# Patient Record
Sex: FEMALE | Race: WHITE | NOT HISPANIC OR LATINO | Employment: OTHER | URBAN - METROPOLITAN AREA
[De-identification: names, ages, dates, MRNs, and addresses within clinical notes are randomized per-mention and may not be internally consistent; named-entity substitution may affect disease eponyms.]

---

## 2017-04-14 ENCOUNTER — HOSPITAL ENCOUNTER (OUTPATIENT)
Dept: LAB | Facility: MEDICAL CENTER | Age: 77
End: 2017-04-14
Attending: INTERNAL MEDICINE
Payer: MEDICARE

## 2017-04-14 DIAGNOSIS — E03.4 HYPOTHYROIDISM DUE TO ACQUIRED ATROPHY OF THYROID: ICD-10-CM

## 2017-04-14 LAB
ALBUMIN SERPL BCP-MCNC: 4.2 G/DL (ref 3.2–4.9)
ALBUMIN/GLOB SERPL: 1.5 G/DL
ALP SERPL-CCNC: 48 U/L (ref 30–99)
ALT SERPL-CCNC: 21 U/L (ref 2–50)
ANION GAP SERPL CALC-SCNC: 6 MMOL/L (ref 0–11.9)
AST SERPL-CCNC: 15 U/L (ref 12–45)
BASOPHILS # BLD AUTO: 0.5 % (ref 0–1.8)
BASOPHILS # BLD: 0.02 K/UL (ref 0–0.12)
BILIRUB SERPL-MCNC: 0.8 MG/DL (ref 0.1–1.5)
BUN SERPL-MCNC: 11 MG/DL (ref 8–22)
CALCIUM SERPL-MCNC: 9.5 MG/DL (ref 8.5–10.5)
CHLORIDE SERPL-SCNC: 106 MMOL/L (ref 96–112)
CHOLEST SERPL-MCNC: 202 MG/DL (ref 100–199)
CO2 SERPL-SCNC: 26 MMOL/L (ref 20–33)
CREAT SERPL-MCNC: 0.52 MG/DL (ref 0.5–1.4)
CREAT UR-MCNC: 115.4 MG/DL
EOSINOPHIL # BLD AUTO: 0.06 K/UL (ref 0–0.51)
EOSINOPHIL NFR BLD: 1.4 % (ref 0–6.9)
ERYTHROCYTE [DISTWIDTH] IN BLOOD BY AUTOMATED COUNT: 38.5 FL (ref 35.9–50)
EST. AVERAGE GLUCOSE BLD GHB EST-MCNC: 151 MG/DL
GFR SERPL CREATININE-BSD FRML MDRD: >60 ML/MIN/1.73 M 2
GLOBULIN SER CALC-MCNC: 2.8 G/DL (ref 1.9–3.5)
GLUCOSE SERPL-MCNC: 166 MG/DL (ref 65–99)
HBA1C MFR BLD: 6.9 % (ref 0–5.6)
HCT VFR BLD AUTO: 42.9 % (ref 37–47)
HDLC SERPL-MCNC: 53 MG/DL
HGB BLD-MCNC: 14.7 G/DL (ref 12–16)
IMM GRANULOCYTES # BLD AUTO: 0 K/UL (ref 0–0.11)
IMM GRANULOCYTES NFR BLD AUTO: 0 % (ref 0–0.9)
LDLC SERPL CALC-MCNC: 118 MG/DL
LYMPHOCYTES # BLD AUTO: 1.58 K/UL (ref 1–4.8)
LYMPHOCYTES NFR BLD: 36.4 % (ref 22–41)
MCH RBC QN AUTO: 29.6 PG (ref 27–33)
MCHC RBC AUTO-ENTMCNC: 34.3 G/DL (ref 33.6–35)
MCV RBC AUTO: 86.3 FL (ref 81.4–97.8)
MICROALBUMIN UR-MCNC: 1.5 MG/DL
MICROALBUMIN/CREAT UR: 13 MG/G (ref 0–30)
MONOCYTES # BLD AUTO: 0.3 K/UL (ref 0–0.85)
MONOCYTES NFR BLD AUTO: 6.9 % (ref 0–13.4)
NEUTROPHILS # BLD AUTO: 2.38 K/UL (ref 2–7.15)
NEUTROPHILS NFR BLD: 54.8 % (ref 44–72)
NRBC # BLD AUTO: 0 K/UL
NRBC BLD AUTO-RTO: 0 /100 WBC
PLATELET # BLD AUTO: 193 K/UL (ref 164–446)
PMV BLD AUTO: 10.4 FL (ref 9–12.9)
POTASSIUM SERPL-SCNC: 4.3 MMOL/L (ref 3.6–5.5)
PROT SERPL-MCNC: 7 G/DL (ref 6–8.2)
RBC # BLD AUTO: 4.97 M/UL (ref 4.2–5.4)
SODIUM SERPL-SCNC: 138 MMOL/L (ref 135–145)
TRIGL SERPL-MCNC: 153 MG/DL (ref 0–149)
TSH SERPL DL<=0.005 MIU/L-ACNC: 1.19 UIU/ML (ref 0.3–3.7)
WBC # BLD AUTO: 4.3 K/UL (ref 4.8–10.8)

## 2017-04-14 PROCEDURE — 84443 ASSAY THYROID STIM HORMONE: CPT

## 2017-04-14 PROCEDURE — 82570 ASSAY OF URINE CREATININE: CPT

## 2017-04-14 PROCEDURE — 82043 UR ALBUMIN QUANTITATIVE: CPT

## 2017-04-14 PROCEDURE — 83036 HEMOGLOBIN GLYCOSYLATED A1C: CPT

## 2017-04-14 PROCEDURE — 85025 COMPLETE CBC W/AUTO DIFF WBC: CPT

## 2017-04-14 PROCEDURE — 36415 COLL VENOUS BLD VENIPUNCTURE: CPT

## 2017-04-14 PROCEDURE — 80053 COMPREHEN METABOLIC PANEL: CPT

## 2017-04-14 PROCEDURE — 80061 LIPID PANEL: CPT

## 2017-04-24 ENCOUNTER — OFFICE VISIT (OUTPATIENT)
Dept: MEDICAL GROUP | Age: 77
End: 2017-04-24
Payer: MEDICARE

## 2017-04-24 VITALS
HEART RATE: 67 BPM | BODY MASS INDEX: 29.09 KG/M2 | HEIGHT: 66 IN | SYSTOLIC BLOOD PRESSURE: 138 MMHG | DIASTOLIC BLOOD PRESSURE: 80 MMHG | OXYGEN SATURATION: 94 % | TEMPERATURE: 97.2 F | WEIGHT: 181 LBS

## 2017-04-24 DIAGNOSIS — M54.50 CHRONIC MIDLINE LOW BACK PAIN WITHOUT SCIATICA: ICD-10-CM

## 2017-04-24 DIAGNOSIS — E78.2 MIXED HYPERLIPIDEMIA: ICD-10-CM

## 2017-04-24 DIAGNOSIS — M81.0 OSTEOPOROSIS: ICD-10-CM

## 2017-04-24 DIAGNOSIS — Z23 NEED FOR TDAP VACCINATION: ICD-10-CM

## 2017-04-24 DIAGNOSIS — K21.00 GASTROESOPHAGEAL REFLUX DISEASE WITH ESOPHAGITIS: ICD-10-CM

## 2017-04-24 DIAGNOSIS — G89.29 CHRONIC MIDLINE LOW BACK PAIN WITHOUT SCIATICA: ICD-10-CM

## 2017-04-24 DIAGNOSIS — E55.9 VITAMIN D DEFICIENCY: ICD-10-CM

## 2017-04-24 DIAGNOSIS — E03.4 HYPOTHYROIDISM DUE TO ACQUIRED ATROPHY OF THYROID: ICD-10-CM

## 2017-04-24 PROCEDURE — 99215 OFFICE O/P EST HI 40 MIN: CPT | Performed by: INTERNAL MEDICINE

## 2017-04-24 RX ORDER — GLIMEPIRIDE 2 MG/1
2 TABLET ORAL EVERY MORNING
Qty: 90 TAB | Refills: 4 | Status: SHIPPED | OUTPATIENT
Start: 2017-04-24 | End: 2017-09-22

## 2017-04-24 RX ORDER — SIMVASTATIN 40 MG
40 TABLET ORAL EVERY EVENING
Qty: 90 TAB | Refills: 4 | Status: SHIPPED | OUTPATIENT
Start: 2017-04-24 | End: 2018-07-15 | Stop reason: SDUPTHER

## 2017-04-24 RX ORDER — MELOXICAM 15 MG/1
15 TABLET ORAL DAILY
Qty: 30 TAB | Refills: 4 | Status: SHIPPED | OUTPATIENT
Start: 2017-04-24 | End: 2019-02-13 | Stop reason: SDUPTHER

## 2017-04-24 RX ORDER — SIMVASTATIN 40 MG
40 TABLET ORAL EVERY EVENING
Qty: 90 TAB | Refills: 4 | Status: SHIPPED | OUTPATIENT
Start: 2017-04-24 | End: 2017-04-24 | Stop reason: SDUPTHER

## 2017-04-24 ASSESSMENT — ENCOUNTER SYMPTOMS
NEUROLOGICAL NEGATIVE: 1
MUSCULOSKELETAL NEGATIVE: 1
EYES NEGATIVE: 1
CARDIOVASCULAR NEGATIVE: 1
RESPIRATORY NEGATIVE: 1
PSYCHIATRIC NEGATIVE: 1
GASTROINTESTINAL NEGATIVE: 1
CONSTITUTIONAL NEGATIVE: 1

## 2017-04-24 NOTE — PROGRESS NOTES
Subjective:      Mavis Lizarraga is a 76 y.o. female who presents with Thyroid Problem    Patient Active Problem List    Diagnosis Date Noted   • Chronic midline low back pain without sciatica 04/24/2017   • S/P colonoscopy- neg at [facility] about 2014 complicated by perforation 10/24/2016   • Uncontrolled type 2 diabetes mellitus without complication, without long-term current use of insulin (CMS-[facility]) 01/28/2016   • Mixed hyperlipidemia 01/15/2013   • Hypothyroidism due to acquired atrophy of thyroid 01/15/2013   • Gastroesophageal reflux disease with esophagitis 01/15/2013   • S/P breast reconstruction- right breast cancer 1980s- neg nodes; 6 mo chemo rx 08/15/2012     Allergies   Allergen Reactions   • Metformin Diarrhea     NAUSEA VOMIITG AND DIARRHEA   • Morphine      Hallucinations     Outpatient Prescriptions Prior to Visit   Medication Sig Dispense Refill   • levothyroxine (SYNTHROID) 75 MCG Tab Take 1 Tab by mouth every day. 90 Tab 4   • Omega-3 Fatty Acids (FISH OIL PO) Take 1 Cap by mouth every day.     • metformin (GLUCOPHAGE) 500 MG Tab Take 0.5 Tabs by mouth every day. (Patient not taking: Reported on 4/24/2017) 90 Tab 4   • lovastatin (MEVACOR) 10 MG tablet Take 1 Tab by mouth every day. 90 Tab 4   • cimetidine (TAGAMET) 400 MG Tab Take 1 Tab by mouth 1 time daily as needed. 60 Tab 11   • simvastatin (ZOCOR) 40 MG Tab Take 1 Tab by mouth every evening. 90 Tab 3   • VITAMIN E Take 1 Cap by mouth every day.     • Multiple Vitamin (MULTI-VITAMIN PO) Take 1 Tab by mouth every day.       No facility-administered medications prior to visit.               HPI    Review of Systems   Constitutional: Negative.    HENT: Negative.    Eyes: Negative.    Respiratory: Negative.    Cardiovascular: Negative.    Gastrointestinal: Negative.    Genitourinary: Negative.    Musculoskeletal: Negative.    Skin: Negative.    Neurological: Negative.    Endo/Heme/Allergies: Negative.    Psychiatric/Behavioral: Negative.            "Objective:     /80 mmHg  Pulse 67  Temp(Src) 36.2 °C (97.2 °F)  Ht 1.664 m (5' 5.5\")  Wt 82.101 kg (181 lb)  BMI 29.65 kg/m2  SpO2 94%     Physical Exam   Constitutional: She is oriented to person, place, and time. She appears well-developed and well-nourished.   HENT:   Head: Normocephalic and atraumatic.   Right Ear: External ear normal.   Left Ear: External ear normal.   Nose: Nose normal.   Mouth/Throat: Oropharynx is clear and moist.   Eyes: Conjunctivae and EOM are normal. Pupils are equal, round, and reactive to light. Right eye exhibits no discharge. Left eye exhibits no discharge. No scleral icterus.   Neck: Normal range of motion. Neck supple. No JVD present. No tracheal deviation present. No thyromegaly present.   Cardiovascular: Normal rate, regular rhythm, normal heart sounds and intact distal pulses.  Exam reveals no gallop and no friction rub.    Pulmonary/Chest: Effort normal and breath sounds normal. No stridor. No respiratory distress. She has no wheezes. She has no rales. She exhibits no tenderness.   Abdominal: Soft. Bowel sounds are normal. She exhibits no distension and no mass. There is no tenderness. There is no rebound and no guarding. No hernia.   Musculoskeletal: Normal range of motion. She exhibits no edema or tenderness.   Lymphadenopathy:     She has no cervical adenopathy.   Neurological: She is alert and oriented to person, place, and time. She has normal reflexes. She displays normal reflexes. No cranial nerve deficit. Coordination normal.   Skin: Skin is warm and dry. No rash noted. No erythema. No pallor.   Psychiatric: She has a normal mood and affect. Her behavior is normal. Judgment and thought content normal.   Nursing note and vitals reviewed.    Hospital Outpatient Visit on 04/14/2017   Component Date Value   • Glycohemoglobin 04/14/2017 6.9*   • Est Avg Glucose 04/14/2017 151    • Creatinine, Urine 04/14/2017 115.40    • Microalbumin, Urine Rand* 04/14/2017 1.5  "   • Micro Alb Creat Ratio 04/14/2017 13    • WBC 04/14/2017 4.3*   • RBC 04/14/2017 4.97    • Hemoglobin 04/14/2017 14.7    • Hematocrit 04/14/2017 42.9    • MCV 04/14/2017 86.3    • MCH 04/14/2017 29.6    • MCHC 04/14/2017 34.3    • RDW 04/14/2017 38.5    • Platelet Count 04/14/2017 193    • MPV 04/14/2017 10.4    • Neutrophils-Polys 04/14/2017 54.80    • Lymphocytes 04/14/2017 36.40    • Monocytes 04/14/2017 6.90    • Eosinophils 04/14/2017 1.40    • Basophils 04/14/2017 0.50    • Immature Granulocytes 04/14/2017 0.00    • Nucleated RBC 04/14/2017 0.00    • Neutrophils (Absolute) 04/14/2017 2.38    • Lymphs (Absolute) 04/14/2017 1.58    • Monos (Absolute) 04/14/2017 0.30    • Eos (Absolute) 04/14/2017 0.06    • Baso (Absolute) 04/14/2017 0.02    • Immature Granulocytes (a* 04/14/2017 0.00    • NRBC (Absolute) 04/14/2017 0.00    • Sodium 04/14/2017 138    • Potassium 04/14/2017 4.3    • Chloride 04/14/2017 106    • Co2 04/14/2017 26    • Anion Gap 04/14/2017 6.0    • Glucose 04/14/2017 166*   • Bun 04/14/2017 11    • Creatinine 04/14/2017 0.52    • Calcium 04/14/2017 9.5    • AST(SGOT) 04/14/2017 15    • ALT(SGPT) 04/14/2017 21    • Alkaline Phosphatase 04/14/2017 48    • Total Bilirubin 04/14/2017 0.8    • Albumin 04/14/2017 4.2    • Total Protein 04/14/2017 7.0    • Globulin 04/14/2017 2.8    • A-G Ratio 04/14/2017 1.5    • Cholesterol,Tot 04/14/2017 202*   • Triglycerides 04/14/2017 153*   • HDL 04/14/2017 53    • LDL 04/14/2017 118*   • TSH 04/14/2017 1.190    • GFR If  04/14/2017 >60    • GFR If Non  Ameri* 04/14/2017 >60       Lab Results   Component Value Date/Time    GLYCOHEMOGLOBIN 6.9* 04/14/2017 07:33 AM    GLYCOHEMOGLOBIN 7.4* 10/19/2016 07:05 AM     Lab Results   Component Value Date/Time    SODIUM 138 04/14/2017 07:33 AM    POTASSIUM 4.3 04/14/2017 07:33 AM    CHLORIDE 106 04/14/2017 07:33 AM    CO2 26 04/14/2017 07:33 AM    GLUCOSE 166* 04/14/2017 07:33 AM    BUN 11 04/14/2017  07:33 AM    CREATININE 0.52 04/14/2017 07:33 AM    ALKALINE PHOSPHATASE 48 04/14/2017 07:33 AM    AST(SGOT) 15 04/14/2017 07:33 AM    ALT(SGPT) 21 04/14/2017 07:33 AM    TOTAL BILIRUBIN 0.8 04/14/2017 07:33 AM     Lab Results   Component Value Date/Time    INR 1.29* 11/06/2009 03:10 PM     Lab Results   Component Value Date/Time    CHOLESTEROL,* 04/14/2017 07:33 AM    * 04/14/2017 07:33 AM    HDL 53 04/14/2017 07:33 AM    TRIGLYCERIDES 153* 04/14/2017 07:33 AM       No results found for: TESTOSTERONE  No results found for: TSH  No results found for: FREET4  No results found for: URICACID  No components found for: VITB12  No results found for: 25HYDROXY              Assessment/Plan:     1. Uncontrolled type 2 diabetes mellitus without complication, without long-term current use of insulin (CMS-[facility])    Under good control. Continue same regimen.    - glimepiride (AMARYL) 2 MG Tab; Take 1 Tab by mouth every morning.  Dispense: 90 Tab; Refill: 4  - TSH; Future  - COMP METABOLIC PANEL; Future  - LIPID PROFILE; Future  - CBC WITH DIFFERENTIAL; Future  - HEMOGLOBIN A1C; Future  - MICROALBUMIN CREAT RATIO URINE; Future    2. Mixed hyperlipidemia Under good control. Continue same regimen.   - simvastatin (ZOCOR) 40 MG Tab; Take 1 Tab by mouth every evening. REPLACES LOVASTATIN  Dispense: 90 Tab; Refill: 4    3. Hypothyroidism due to acquired atrophy of thyroid Under good control. Continue same regimen.        4. Gastroesophageal reflux disease with esophagitis    Under good control. Continue same regimen.  5. Chronic midline low back pain without sciatica    Under good control. Continue same regimen.  - meloxicam (MOBIC) 15 MG tablet; Take 1 Tab by mouth every day.  Dispense: 30 Tab; Refill: 4    6. Osteoporosis     - DS-BONE DENSITY STUDY (DEXA); Future    7. Vitamin D deficiency    Under good control. Continue same regimen.- VITAMIN D,25 HYDROXY; Future    8. Need for Tdap vaccination          - TDAP VACCINE  =>6YO IM        40 minute face-to-face encounter took place today.  More than half of this time was spent in the coordination of care of the above problems, as well as counseling.

## 2017-04-24 NOTE — MR AVS SNAPSHOT
"        Mavis Patel Zia   2017 10:00 AM   Office Visit   MRN: [id number]    Department:  [address] [place] [address]   Dept Phone:  [phone]    Description:  Female : 1940   Provider:  Jonathan Bboo M.D.           Reason for Visit     Thyroid Problem lab review      Allergies as of 2017     Allergen Noted Reactions    Metformin 2017   Diarrhea    NAUSEA VOMIITG AND DIARRHEA    Morphine 02/10/2010       Hallucinations      You were diagnosed with     Uncontrolled type 2 diabetes mellitus without complication, without long-term current use of insulin (CMS-HCC)   [8925064]       Mixed hyperlipidemia   [272.2.ICD-9-CM]       Hypothyroidism due to acquired atrophy of thyroid   [2782685]       Gastroesophageal reflux disease with esophagitis   [4679556]       Chronic midline low back pain without sciatica   [9666360]       Osteoporosis   [7870717]       Vitamin D deficiency   [6488472]       Need for Tdap vaccination   [206490]         Vital Signs     Blood Pressure Pulse Temperature Height Weight Body Mass Index    138/80 mmHg 67 36.2 °C (97.2 °F) 1.664 m (5' 5.5\") 82.101 kg (181 lb) 29.65 kg/m2    Oxygen Saturation Smoking Status                94% Never Smoker           Basic Information     Date Of Birth Sex Race Ethnicity Preferred Language    1940 Female White Non- English      Problem List              ICD-10-CM Priority Class Noted - Resolved    S/P breast reconstruction- right breast cancer 1980s- neg nodes; 6 mo chemo rx Z98.82   8/15/2012 - Present    Mixed hyperlipidemia E78.2   1/15/2013 - Present    Hypothyroidism due to acquired atrophy of thyroid E03.4   1/15/2013 - Present    Gastroesophageal reflux disease with esophagitis K21.0   1/15/2013 - Present    Uncontrolled type 2 diabetes mellitus without complication, without long-term current use of insulin (CMS-HCC) E11.65   2016 - Present    S/P colonoscopy- neg at [facility] about  complicated by perforation Z98.890 "  10/24/2016 - Present    Chronic midline low back pain without sciatica M54.5, G89.29   4/24/2017 - Present      Health Maintenance        Date Due Completion Dates    IMM DTaP/Tdap/Td Vaccine (1 - Tdap) 8/30/1960 ---    BONE DENSITY 8/30/2006 ---    IMM PNEUMOCOCCAL 65+ (ADULT) LOW/MEDIUM RISK SERIES (2 of 2 - PPSV23) 9/24/2016 9/24/2015    MAMMOGRAM 5/23/2017 5/23/2016, 3/26/2014, 5/1/2012, 6/8/2009, 6/8/2009, 8/9/2006, 4/29/2005    A1C SCREENING 10/14/2017 4/14/2017, 10/19/2016, 1/28/2016, 6/26/2015, 6/11/2013    RETINAL SCREENING 11/30/2017 11/30/2015    DIABETES MONOFILAMENT / LE EXAM 12/13/2017 12/13/2016, 1/28/2016    FASTING LIPID PROFILE 4/14/2018 4/14/2017, 10/19/2016, 6/5/2015, 3/29/2013, 4/2/2012, 9/8/2011    URINE ACR / MICROALBUMIN 4/14/2018 4/14/2017, 10/19/2016    SERUM CREATININE 4/14/2018 4/14/2017, 10/19/2016, 6/5/2015, 4/15/2014, 3/29/2013, 4/2/2012, 9/8/2011, 2/12/2010, 2/11/2010, 2/5/2010, 11/6/2009, 10/30/2009, 10/29/2009, 10/28/2009, 10/27/2009, 10/26/2009, 10/25/2009, 10/24/2009, 10/23/2009    COLONOSCOPY 9/29/2019 9/29/2009            Current Immunizations     13-VALENT PCV PREVNAR 9/24/2015    Influenza Vaccine Adult HD 10/24/2016    SHINGLES VACCINE 11/19/2012    Tdap Vaccine  Incomplete      Below and/or attached are the medications your provider expects you to take. Review all of your home medications and newly ordered medications with your provider and/or pharmacist. Follow medication instructions as directed by your provider and/or pharmacist. Please keep your medication list with you and share with your provider. Update the information when medications are discontinued, doses are changed, or new medications (including over-the-counter products) are added; and carry medication information at all times in the event of emergency situations     Allergies:  METFORMIN - Diarrhea     MORPHINE - (reactions not documented)               Medications  Valid as of: April 24, 2017 -  1:48 PM     Generic Name Brand Name Tablet Size Instructions for use    Cimetidine (Tab) TAGAMET 400 MG Take 1 Tab by mouth 1 time daily as needed.        Glimepiride (Tab) AMARYL 2 MG Take 1 Tab by mouth every morning.        Levothyroxine Sodium (Tab) SYNTHROID 75 MCG Take 1 Tab by mouth every day.        Meloxicam (Tab) MOBIC 15 MG Take 1 Tab by mouth every day.        Omega-3 Fatty Acids   Take 1 Cap by mouth every day.        Simvastatin (Tab) ZOCOR 40 MG Take 1 Tab by mouth every evening. REPLACES LOVASTATIN        .                 Medicines prescribed today were sent to:     [facility][id number] - [place], [place] - [address]    [address] [place] [address]    Phone: [phone] Fax: [phone]    Open 24 Hours?: No      Medication refill instructions:       If your prescription bottle indicates you have medication refills left, it is not necessary to call your provider’s office. Please contact your pharmacy and they will refill your medication.    If your prescription bottle indicates you do not have any refills left, you may request refills at any time through one of the following ways: The online [employer] system (except Urgent Care), by calling your provider’s office, or by asking your pharmacy to contact your provider’s office with a refill request. Medication refills are processed only during regular business hours and may not be available until the next business day. Your provider may request additional information or to have a follow-up visit with you prior to refilling your medication.   *Please Note: Medication refills are assigned a new Rx number when refilled electronically. Your pharmacy may indicate that no refills were authorized even though a new prescription for the same medication is available at the pharmacy. Please request the medicine by name with the pharmacy before contacting your provider for a refill.        Your To Do List     Future Labs/Procedures Complete By Expires     CBC WITH DIFFERENTIAL  As directed 4/24/2018    COMP METABOLIC PANEL  As directed 4/24/2018    DS-BONE DENSITY STUDY (DEXA)  As directed 4/24/2018    HEMOGLOBIN A1C  As directed 4/24/2018    LIPID PROFILE  As directed 4/24/2018    MICROALBUMIN CREAT RATIO URINE  As directed 4/24/2018    TSH  As directed 4/24/2018    VITAMIN D,25 HYDROXY  As directed 4/24/2018         MyChart Access Code: Activation code not generated  Current MyChart Status: Active

## 2017-08-02 ENCOUNTER — PATIENT OUTREACH (OUTPATIENT)
Dept: HEALTH INFORMATION MANAGEMENT | Facility: OTHER | Age: 77
End: 2017-08-02

## 2017-08-02 NOTE — PROGRESS NOTES
WebIZ Checked & Epic Updated: yes  HealthConnect Verified: yes  Verify PCP: yes    Communication Preference Obtained: yes     Review Care Team: no/ Pt was busy    Annual Wellness Visit Scheduling  1. Scheduling Status:Scheduled     Care Gap Scheduling (Attempt to Schedule EACH Overdue Care Gap!)     Health Maintenance Due   Topic Date Due   • IMM DTaP/Tdap/Td Vaccine (1 - Tdap) All care gaps scheduled   • BONE DENSITY     • IMM PNEUMOCOCCAL 65+ (ADULT) LOW/MEDIUM RISK SERIES (2 of 2 - PPSV23)    • Annual Wellness Visit     • MAMMOGRAM           [facility] Medical Activation: already active  [facility] Medical [place]: yes  Virtual Visits: yes  Opt In to Text Messages: yes

## 2017-09-14 ENCOUNTER — TELEPHONE (OUTPATIENT)
Dept: MEDICAL GROUP | Age: 77
End: 2017-09-14

## 2017-09-14 NOTE — TELEPHONE ENCOUNTER
ANNUAL WELLNESS VISIT PRE-VISIT PLANNING     1.  Reviewed note from last office visit with PCP: YES    2.  If any orders were placed at last visit, do we have Results/Consult Notes?        •  Labs - labs ordered for 10/30/17 appointment       •  Imaging - Imaging ordered, NOT completed. Patient advised to complete prior to next appointment.       •  Referrals - No referrals were ordered at last office visit.    3.  Immunizations were updated in [facility] using WebIZ?: Yes       •  WebIZ Recommendations: FLU, PNEUMOVAX (PPSV23), TDAP and ZOSTAVAX (Shingles)       •  Is patient due for Tdap? YES. Patient was notified of copay.       •  Is patient due for Shingles? YES. Patient was notified of copay.     4.  Patient is due for the following Health Maintenance Topics:   Health Maintenance Due   Topic Date Due   • IMM DTaP/Tdap/Td Vaccine (1 - Tdap) 08/30/1960   • BONE DENSITY  08/30/2006   • IMM PNEUMOCOCCAL 65+ (ADULT) LOW/MEDIUM RISK SERIES (2 of 2 - PPSV23) 09/24/2016   • Annual Wellness Visit  09/24/2016   • MAMMOGRAM  05/23/2017   • IMM INFLUENZA (1) 09/01/2017       - Patient has completed PREVNAR (PCV13)  and ZOSTAVAX (Shingles) Immunization(s) per WebIZ. Chart has been updated.      5.  Reviewed/Updated the following with patient:       •   Preferred Pharmacy? YES       •   Preferred Lab? YES       •   Medications? YES. Was Abstract Encounter opened and chart updated? YES       •   Social History? YES. Was Abstract Encounter opened and chart updated? YES       •   Family History? YES. Was Abstract Encounter opened and chart updated? YES    6.  Care Team Updated:       •   DME Company (gait device, O2, CPAP, etc.): N\A       •   Other Specialists (eye doctor, derm, GYN, cardiology, endo, etc): YES    7.  Patient has the following Care Path diagnoses on Problem List:  DIABETES    Has patient ever had diabetes education? Yes, and is NOT interested in more at this time.        8.  Specialty Comments was updated with  diagnosis information provided by SCP: NO    9.  Patient was advised: “This is a free wellness visit. The provider will screen for medical conditions to help you stay healthy. If you have other concerns to address you may be asked to discuss these at a separate visit or there may be an additional fee.”     6.  Patient was informed to arrive 15 min prior to their scheduled appointment and bring in their medication bottles.

## 2017-09-22 ENCOUNTER — OFFICE VISIT (OUTPATIENT)
Dept: MEDICAL GROUP | Age: 77
End: 2017-09-22
Payer: MEDICARE

## 2017-09-22 VITALS
WEIGHT: 183 LBS | SYSTOLIC BLOOD PRESSURE: 120 MMHG | HEART RATE: 62 BPM | HEIGHT: 65 IN | BODY MASS INDEX: 30.49 KG/M2 | OXYGEN SATURATION: 95 % | DIASTOLIC BLOOD PRESSURE: 80 MMHG | TEMPERATURE: 97.2 F

## 2017-09-22 DIAGNOSIS — K21.00 GASTROESOPHAGEAL REFLUX DISEASE WITH ESOPHAGITIS: ICD-10-CM

## 2017-09-22 DIAGNOSIS — E03.4 HYPOTHYROIDISM DUE TO ACQUIRED ATROPHY OF THYROID: ICD-10-CM

## 2017-09-22 DIAGNOSIS — Z23 NEED FOR VACCINATION: ICD-10-CM

## 2017-09-22 DIAGNOSIS — Z00.00 MEDICARE ANNUAL WELLNESS VISIT, SUBSEQUENT: ICD-10-CM

## 2017-09-22 DIAGNOSIS — Z98.890 STATUS POST RIGHT BREAST RECONSTRUCTION: ICD-10-CM

## 2017-09-22 DIAGNOSIS — E78.2 MIXED HYPERLIPIDEMIA: ICD-10-CM

## 2017-09-22 PROBLEM — M54.50 CHRONIC MIDLINE LOW BACK PAIN WITHOUT SCIATICA: Status: RESOLVED | Noted: 2017-04-24 | Resolved: 2017-09-22

## 2017-09-22 PROBLEM — G89.29 CHRONIC MIDLINE LOW BACK PAIN WITHOUT SCIATICA: Status: RESOLVED | Noted: 2017-04-24 | Resolved: 2017-09-22

## 2017-09-22 PROCEDURE — G0009 ADMIN PNEUMOCOCCAL VACCINE: HCPCS | Performed by: INTERNAL MEDICINE

## 2017-09-22 PROCEDURE — 90662 IIV NO PRSV INCREASED AG IM: CPT | Performed by: INTERNAL MEDICINE

## 2017-09-22 PROCEDURE — G0439 PPPS, SUBSEQ VISIT: HCPCS | Mod: 25 | Performed by: INTERNAL MEDICINE

## 2017-09-22 PROCEDURE — 90732 PPSV23 VACC 2 YRS+ SUBQ/IM: CPT | Performed by: INTERNAL MEDICINE

## 2017-09-22 PROCEDURE — G0008 ADMIN INFLUENZA VIRUS VAC: HCPCS | Performed by: INTERNAL MEDICINE

## 2017-09-22 ASSESSMENT — PATIENT HEALTH QUESTIONNAIRE - PHQ9
SUM OF ALL RESPONSES TO PHQ QUESTIONS 1-9: 0
CLINICAL INTERPRETATION OF PHQ2 SCORE: 0

## 2017-09-22 NOTE — LETTER
Request for Medical Records    Patient Name: Mavis Lizarraga    : 1940      Dear Doctor: Dr. Chapman  The above named patient receives primary care at the [facility] by Jonathan Boob M.D..  The patient informs us that you are her eye care Provider.    Please fax a copy of the most recent eye exam to [phone] or answer the  questions below and fax this sheet back to us at the above number.  Attached is a signed Release of Information.      Date of last eye exam: _____________    Retinal eye exam summary:        Please select the choice(s) that apply.    ____ No diabetic retinopathy    ____    Diabetic retinopathy present      Printed Name and Credentials: __________________________________    Signature of Eye Care Provider: _________________________________    We appreciate your assistance and collaboration in providing efficient patient care!    Kindest Regards,    KYLAH CONTRERAS  [address]  [place] [address] [phone]

## 2017-09-22 NOTE — PROGRESS NOTES
Chief Complaint   Patient presents with   • Annual Wellness Visit         HPI:  Mavis is a 77 y.o. here for Medicare Annual Wellness Visit         Patient Active Problem List    Diagnosis Date Noted   • Chronic midline low back pain without sciatica 04/24/2017   • S/P colonoscopy- neg at [facility] about 2014 complicated by perforation 10/24/2016   • Uncontrolled type 2 diabetes mellitus without complication, without long-term current use of insulin (CMS-[facility]) 01/28/2016   • Mixed hyperlipidemia 01/15/2013   • Hypothyroidism due to acquired atrophy of thyroid 01/15/2013   • Gastroesophageal reflux disease with esophagitis 01/15/2013   • S/P breast reconstruction- right breast cancer 1980s- neg nodes; 6 mo chemo rx 08/15/2012       Current Outpatient Prescriptions   Medication Sig Dispense Refill   • simvastatin (ZOCOR) 40 MG Tab Take 1 Tab by mouth every evening. REPLACES LOVASTATIN 90 Tab 4   • meloxicam (MOBIC) 15 MG tablet Take 1 Tab by mouth every day. 30 Tab 4   • levothyroxine (SYNTHROID) 75 MCG Tab Take 1 Tab by mouth every day. 90 Tab 4   • cimetidine (TAGAMET) 400 MG Tab Take 1 Tab by mouth 1 time daily as needed. 60 Tab 11   • Omega-3 Fatty Acids (FISH OIL PO) Take 1 Cap by mouth every day.     • glimepiride (AMARYL) 2 MG Tab Take 1 Tab by mouth every morning. (Patient not taking: Reported on 9/22/2017) 90 Tab 4     No current facility-administered medications for this visit.         Patient is taking medications as noted in medication list.  Current supplements as per medication list.     Allergies: Metformin and Morphine    Current social contact/activities: playing [employer], [employer], card club      Is patient current with immunizations? No, due for FLU, PNEUMOVAX (PPSV23) and TDAP. Patient is interested in receiving FLU and PNEUMOVAX (PPSV23) today.    She  reports that she has never smoked. She has never used smokeless tobacco. She reports that she drinks alcohol. She reports that she does not use  drugs.  Counseling given: Yes        DPA/Advanced directive: Patient has Advanced Directive on file.     ROS:    Gait: Uses no assistive device    Ostomy: no    Other tubes: no     Amputations: no    Chronic oxygen use no    Last eye exam 11/2016    Wears hearing aids: no    : Denies incontinence.          Screening:    DIABETES    Has patient ever had diabetes education? Yes, and is NOT interested in more at this time.            Depression Screening    Little interest or pleasure in doing things?  0 - not at all  Feeling down, depressed, or hopeless? 0 - not at all  Patient Health Questionnaire Score: 0    If depressive symptoms identified deferred to follow up visit unless specifically addressed in assessment and plan.    Interpretation of PHQ-9 Total Score   Score Severity   1-4 No Depression   5-9 Mild Depression   10-14 Moderate Depression   15-19 Moderately Severe Depression   20-27 Severe Depression    Screening for Cognitive Impairment    Three Minute Recall (apple, watch, allie)  3/3    Draw clock face with all 12 numbers set to the hand to show 10 minutes past 11 o'clock  1 5/5  If cognitive concerns identified, deferred for follow up unless specifically addressed in assessment and plan.    Fall Risk Assessment    Has the patient had two or more falls in the last year or any fall with injury in the last year?  No  If fall risk identified, deferred for follow up unless specifically addressed in assessment and plan.    Safety Assessment    Throw rugs on floor.  Yes  Handrails on all stairs.  Yes  Good lighting in all hallways.  Yes  Difficulty hearing.  No  Patient counseled about all safety risks that were identified.    Functional Assessment ADLs    Are there any barriers preventing you from cooking for yourself or meeting nutritional needs?  No.    Are there any barriers preventing you from driving safely or obtaining transportation?  No.    Are there any barriers preventing you from using a telephone  or calling for help?  No.    Are there any barriers preventing you from shopping?  No.    Are there any barriers preventing you from taking care of your own finances?  No.    Are there any barriers preventing you from managing your medications?  No.    Are you currently engaging any exercise or physical activity?  Yes.  Walking dog everyday.    Health Maintenance Summary                IMM DTaP/Tdap/Td Vaccine Overdue 8/30/1960     BONE DENSITY Overdue 8/30/2006     IMM PNEUMOCOCCAL 65+ (ADULT) LOW/MEDIUM RISK SERIES Overdue 9/24/2016      Done 9/24/2015 Imm Admin: Pneumococcal Conjugate Vaccine (Prevnar/PCV-13)    Annual Wellness Visit Overdue 9/24/2016      Done 9/24/2015 Visit Dx: Medicare annual wellness visit, subsequent     Patient has more history with this topic...    MAMMOGRAM Overdue 5/23/2017      Done 5/23/2016 MA-SCREEN MAMMO W/ IMPLANTS W/CAD-BILAT     Patient has more history with this topic...    IMM INFLUENZA Overdue 9/1/2017      Done 10/24/2016 Imm Admin: Influenza Vaccine Adult HD    A1C SCREENING Next Due 10/14/2017      Done 4/14/2017 HEMOGLOBIN A1C (A)     Patient has more history with this topic...    RETINAL SCREENING Next Due 11/30/2017      Done 11/30/2015 AMB REFERRAL FOR RETINAL SCREENING EXAM    DIABETES MONOFILAMENT / LE EXAM Next Due 12/13/2017      Done 12/13/2016 AMB DIABETIC MONOFILAMENT LOWER EXTREMITY EXAM     Patient has more history with this topic...    FASTING LIPID PROFILE Next Due 4/14/2018      Done 4/14/2017 LIPID PROFILE (A)     Patient has more history with this topic...    URINE ACR / MICROALBUMIN Next Due 4/14/2018      Done 4/14/2017 MICROALBUMIN CREAT RATIO URINE     Patient has more history with this topic...    SERUM CREATININE Next Due 4/14/2018      Done 4/14/2017 COMP METABOLIC PANEL (A)     Patient has more history with this topic...    COLONOSCOPY Next Due 9/29/2019      Done 9/29/2009 AMB REFERRAL TO GI FOR COLONOSCOPY          Patient Care Team:  Jonathan NICHOLAS  MARLA Bobo as PCP - General (Internal Medicine)  Hi Chapman II, O.D. (Optometry)  Lauri Batista M.D. (Gynecology)    Social History   Substance Use Topics   • Smoking status: Never Smoker   • Smokeless tobacco: Never Used   • Alcohol use Yes      Comment: very rarely on ocassion will have a Sravani     Family History   Problem Relation Age of Onset   • Heart Disease Mother 64   • Hypertension Mother    • Diabetes Mother    • Stroke Mother    • Cancer Mother      Stomach cancer   • Heart Disease Maternal Grandmother 63     heart attack     She  has a past medical history of Anesthesia; Arthritis; Backpain; Breast cancer (CMS-HCC) (1980s); Diverticulosis; DVT of deep femoral vein (CMS-HCC); Heart burn; High cholesterol; Pneumonia (Feb 2006); Thyroid condition; and Ulcer (CMS-HCC). She also has no past medical history of COPD.   Past Surgical History:   Procedure Laterality Date   • ANTERIOR AND POSTERIOR REPAIR  6/23/2014    Performed by Lauri Batista M.D. at SURGERY SAME DAY [facility] ORS   • BLADDER SLING FEMALE  6/23/2014    Performed by Lauri Batista M.D. at SURGERY SAME DAY [facility] ORS   • BREAST RECONSTRUCTION  8/14/2012    Performed by SARTHAK LEVIN at [facility]   • BREAST IMPLANT REVISION  8/14/2012    Performed by SARTHAK LEVIN at [facility]   • CAPSULECTOMY  8/14/2012    Performed by SARTHAK LEVIN at [facility]   • MASTOPEXY  8/14/2012    Performed by SARTHAK LEVIN at [facility]   • LIPOSUCTION  8/14/2012    Performed by SARTHAK LEVIN at [facility]   • RHYTIDECTOMY  8/14/2012    Performed by SARTHAK LEVIN at [facility]   • PLATYSMAPLASTY  8/14/2012    Performed by SARTHAK LEVIN at [facility]   • BLEPHAROPLASTY  8/14/2012    Performed by SARTHAK LEVIN at [facility]   • LOW ANTERIOR RESECTION LAPAROSCOPIC  2/10/2010    Performed by KOBI GOODE at  "SURGERY [facility] ORS   • COLECTOMY N/A 2009    partial- post colonoscopy perforation- dr cohn   • MASTECTOMY Right 1980s    neg nodes; 6 mos adjuvant chemo   • OTHER      mastectomy R breast   • OTHER ABDOMINAL SURGERY      I&D of ruptured diverticuli    • PB ENLARGE BREAST WITH IMPLANT     • PB REDUCTION OF LARGE BREAST     • [place] CHEMOTHERAPY, UNSPECIFIED PROCEDURE             Exam:     Blood pressure 120/80, pulse 62, temperature 36.2 °C (97.2 °F), height 1.651 m (5' 5\"), weight 83 kg (183 lb), SpO2 95 %. Body mass index is 30.45 kg/m².    Hearing excellent.    Dentition good  Alert, oriented in no acute distress.  Eye contact is good, speech goal directed, affect calm       Assessment and Plan. The following treatment and monitoring plan is recommended:     1. Need for vaccination  INFLUENZA VACCINE, HIGH DOSE (65+ ONLY)    Pneumococal Polysaccharide Vaccine 23-Valent =>1yo SQ/IM     1. Medicare annual wellness visit, subsequent     - Annual Wellness Visit - Includes PPPS Subsequent ()    2. Need for vaccination     - INFLUENZA VACCINE, HIGH DOSE (65+ ONLY)  - Pneumococal Polysaccharide Vaccine 23-Valent =>1yo SQ/IM  - Annual Wellness Visit - Includes PPPS Subsequent ()    3. Uncontrolled type 2 diabetes mellitus without complication, without long-term current use of insulin (CMS-[facility])   Under good control. Continue same regimen    OFF ALL MEDS NOW ONEAL NEEDS FADIA IN 1 MO TO ASSESS CONTROL OFF MEDS- COULD NOT TOLERATE AMARYL OR METFORMIN    - REFERRAL TO OPHTHALMOLOGY  - Annual Wellness Visit - Includes PPPS Subsequent ()    4. Mixed hyperlipidemia   Under good control. Continue same regimen.    - Annual Wellness Visit - Includes PPPS Subsequent ()    5. Hypothyroidism due to acquired atrophy of thyroid    Under good control. Continue same regimen.  - Annual Wellness Visit - Includes PPPS Subsequent ()    6. Gastroesophageal reflux disease with esophagitis    Under good control. " Continue same regimen.  - Annual Wellness Visit - Includes PPPS Subsequent ()    7. Status post right breast reconstruction       - Annual Wellness Visit - Includes PPPS Subsequent ()      Services suggested: No services needed at this time  Health Care Screening recommendations as per orders if indicated.  Referrals offered: PT/OT/Nutrition counseling/Behavioral Health/Smoking cessation as per orders if indicated.    Discussion today about general wellness and lifestyle habits:    · Prevent falls and reduce trip hazards; Cautioned about securing or removing rugs.  · Have a working fire alarm and carbon monoxide detector;   · Engage in regular physical activity and social activities       Follow-up: No Follow-up on file.

## 2017-09-22 NOTE — LETTER
[facility]  Jonathan Bobo M.D.  [address]  [address]  [place] [address]  Fax: [phone]   Authorization for Release/Disclosure of   Protected Health Information   Name: MAVIS TAYLOR : 1940 SSN: [id number]   Address: [address] [place] [address] Phone:    [phone] (home)    I authorize the entity listed below to release/disclose the PHI below to:   [facility]/Jonathan Bobo M.D. and Jonathan Bobo M.D.   Provider or Entity Name:  Dr. Chapman   Address   City, State, Zip   Phone:      Fax:  [phone]   Reason for request: continuity of care   Information to be released:    [  ] LAST COLONOSCOPY,  including any PATH REPORT and follow-up  [  ] LAST FIT/COLOGUARD RESULT [  ] LAST DEXA  [  ] LAST MAMMOGRAM  [  ] LAST PAP  [  ] LAST LABS [X  ] RETINA EXAM REPORT  [  ] IMMUNIZATION RECORDS  [  ] Release all info      [  ] Check here and initial the line next to each item to release ALL health information INCLUDING  _____ Care and treatment for drug and / or alcohol abuse  _____ HIV testing, infection status, or AIDS  _____ Genetic Testing    DATES OF SERVICE OR TIME PERIOD TO BE DISCLOSED: _____________  I understand and acknowledge that:  * This Authorization may be revoked at any time by you in writing, except if your health information has already been used or disclosed.  * Your health information that will be used or disclosed as a result of you signing this authorization could be re-disclosed by the recipient. If this occurs, your re-disclosed health information may no longer be protected by State or Federal laws.  * You may refuse to sign this Authorization. Your refusal will not affect your ability to obtain treatment.  * This Authorization becomes effective upon signing and will  on (date) __________.      If no date is indicated, this Authorization will  one (1) year from the signature date.    Name: Mavis Taylor    Signature:   Date:     2017            PLEASE FAX REQUESTED RECORDS BACK TO: [phone]

## 2017-11-10 ENCOUNTER — HOSPITAL ENCOUNTER (OUTPATIENT)
Dept: RADIOLOGY | Facility: MEDICAL CENTER | Age: 77
End: 2017-11-10
Attending: INTERNAL MEDICINE
Payer: MEDICARE

## 2017-11-10 DIAGNOSIS — M81.0 OSTEOPOROSIS: ICD-10-CM

## 2017-11-10 DIAGNOSIS — Z12.31 VISIT FOR SCREENING MAMMOGRAM: ICD-10-CM

## 2017-11-10 PROCEDURE — 77080 DXA BONE DENSITY AXIAL: CPT

## 2017-11-10 PROCEDURE — G0202 SCR MAMMO BI INCL CAD: HCPCS

## 2017-11-13 ENCOUNTER — HOSPITAL ENCOUNTER (OUTPATIENT)
Dept: LAB | Facility: MEDICAL CENTER | Age: 77
End: 2017-11-13
Attending: INTERNAL MEDICINE
Payer: MEDICARE

## 2017-11-13 DIAGNOSIS — E03.4 HYPOTHYROIDISM DUE TO ACQUIRED ATROPHY OF THYROID: ICD-10-CM

## 2017-11-13 DIAGNOSIS — E78.2 MIXED HYPERLIPIDEMIA: ICD-10-CM

## 2017-11-13 LAB
ALBUMIN SERPL BCP-MCNC: 4.3 G/DL (ref 3.2–4.9)
ALBUMIN/GLOB SERPL: 1.5 G/DL
ALP SERPL-CCNC: 47 U/L (ref 30–99)
ALT SERPL-CCNC: 19 U/L (ref 2–50)
ANION GAP SERPL CALC-SCNC: 7 MMOL/L (ref 0–11.9)
AST SERPL-CCNC: 16 U/L (ref 12–45)
BASOPHILS # BLD AUTO: 0.2 % (ref 0–1.8)
BASOPHILS # BLD: 0.01 K/UL (ref 0–0.12)
BILIRUB SERPL-MCNC: 0.9 MG/DL (ref 0.1–1.5)
BUN SERPL-MCNC: 8 MG/DL (ref 8–22)
CALCIUM SERPL-MCNC: 9.3 MG/DL (ref 8.5–10.5)
CHLORIDE SERPL-SCNC: 103 MMOL/L (ref 96–112)
CHOLEST SERPL-MCNC: 181 MG/DL (ref 100–199)
CO2 SERPL-SCNC: 26 MMOL/L (ref 20–33)
CREAT SERPL-MCNC: 0.53 MG/DL (ref 0.5–1.4)
CREAT UR-MCNC: 145.7 MG/DL
EOSINOPHIL # BLD AUTO: 0.09 K/UL (ref 0–0.51)
EOSINOPHIL NFR BLD: 2 % (ref 0–6.9)
ERYTHROCYTE [DISTWIDTH] IN BLOOD BY AUTOMATED COUNT: 39.9 FL (ref 35.9–50)
EST. AVERAGE GLUCOSE BLD GHB EST-MCNC: 180 MG/DL
GFR SERPL CREATININE-BSD FRML MDRD: >60 ML/MIN/1.73 M 2
GLOBULIN SER CALC-MCNC: 2.9 G/DL (ref 1.9–3.5)
GLUCOSE SERPL-MCNC: 169 MG/DL (ref 65–99)
HBA1C MFR BLD: 7.9 % (ref 0–5.6)
HCT VFR BLD AUTO: 42.2 % (ref 37–47)
HDLC SERPL-MCNC: 46 MG/DL
HGB BLD-MCNC: 14.5 G/DL (ref 12–16)
IMM GRANULOCYTES # BLD AUTO: 0 K/UL (ref 0–0.11)
IMM GRANULOCYTES NFR BLD AUTO: 0 % (ref 0–0.9)
LDLC SERPL CALC-MCNC: 99 MG/DL
LYMPHOCYTES # BLD AUTO: 1.55 K/UL (ref 1–4.8)
LYMPHOCYTES NFR BLD: 35.1 % (ref 22–41)
MCH RBC QN AUTO: 29.6 PG (ref 27–33)
MCHC RBC AUTO-ENTMCNC: 34.4 G/DL (ref 33.6–35)
MCV RBC AUTO: 86.1 FL (ref 81.4–97.8)
MICROALBUMIN UR-MCNC: 2.8 MG/DL
MICROALBUMIN/CREAT UR: 19 MG/G (ref 0–30)
MONOCYTES # BLD AUTO: 0.27 K/UL (ref 0–0.85)
MONOCYTES NFR BLD AUTO: 6.1 % (ref 0–13.4)
NEUTROPHILS # BLD AUTO: 2.5 K/UL (ref 2–7.15)
NEUTROPHILS NFR BLD: 56.6 % (ref 44–72)
NRBC # BLD AUTO: 0 K/UL
NRBC BLD AUTO-RTO: 0 /100 WBC
PLATELET # BLD AUTO: 176 K/UL (ref 164–446)
PMV BLD AUTO: 9.5 FL (ref 9–12.9)
POTASSIUM SERPL-SCNC: 4.1 MMOL/L (ref 3.6–5.5)
PROT SERPL-MCNC: 7.2 G/DL (ref 6–8.2)
RBC # BLD AUTO: 4.9 M/UL (ref 4.2–5.4)
SODIUM SERPL-SCNC: 136 MMOL/L (ref 135–145)
TRIGL SERPL-MCNC: 179 MG/DL (ref 0–149)
TSH SERPL DL<=0.005 MIU/L-ACNC: 1.34 UIU/ML (ref 0.3–3.7)
WBC # BLD AUTO: 4.4 K/UL (ref 4.8–10.8)

## 2017-11-13 PROCEDURE — 82570 ASSAY OF URINE CREATININE: CPT

## 2017-11-13 PROCEDURE — 83036 HEMOGLOBIN GLYCOSYLATED A1C: CPT

## 2017-11-13 PROCEDURE — 80061 LIPID PANEL: CPT

## 2017-11-13 PROCEDURE — 84443 ASSAY THYROID STIM HORMONE: CPT

## 2017-11-13 PROCEDURE — 80053 COMPREHEN METABOLIC PANEL: CPT

## 2017-11-13 PROCEDURE — 82043 UR ALBUMIN QUANTITATIVE: CPT

## 2017-11-13 PROCEDURE — 36415 COLL VENOUS BLD VENIPUNCTURE: CPT

## 2017-11-13 PROCEDURE — 85025 COMPLETE CBC W/AUTO DIFF WBC: CPT

## 2017-11-15 ENCOUNTER — NON-PROVIDER VISIT (OUTPATIENT)
Dept: MEDICAL GROUP | Age: 77
End: 2017-11-15
Payer: MEDICARE

## 2017-11-15 DIAGNOSIS — Z23 NEED FOR TDAP VACCINATION: ICD-10-CM

## 2017-11-15 PROCEDURE — 90715 TDAP VACCINE 7 YRS/> IM: CPT | Performed by: INTERNAL MEDICINE

## 2017-11-15 PROCEDURE — 90471 IMMUNIZATION ADMIN: CPT | Performed by: INTERNAL MEDICINE

## 2017-11-15 NOTE — PROGRESS NOTES
"Mavis Lizarraga is a 77 y.o. female here for a non-provider visit for:   TDAP    Reason for immunization: Annual Flu Vaccine  Immunization records indicate need for vaccine: Yes, confirmed with Epic  Minimum interval has been met for this vaccine: Yes  ABN completed: Yes    Order and dose verified by: WILBERTO  VIS Dated   was given to patient: Yes  All IAC Questionnaire questions were answered \"No.\"    Patient tolerated injection and no adverse effects were observed or reported: Yes    Pt scheduled for next dose in series: Not Indicated    "

## 2018-01-14 DIAGNOSIS — K21.9 GASTROESOPHAGEAL REFLUX DISEASE WITHOUT ESOPHAGITIS: ICD-10-CM

## 2018-01-15 RX ORDER — CIMETIDINE 400 MG/1
TABLET, FILM COATED ORAL
Qty: 60 TAB | Refills: 0 | Status: SHIPPED | OUTPATIENT
Start: 2018-01-15 | End: 2018-09-25 | Stop reason: SDUPTHER

## 2018-01-24 DIAGNOSIS — E03.4 HYPOTHYROIDISM DUE TO ACQUIRED ATROPHY OF THYROID: ICD-10-CM

## 2018-01-24 RX ORDER — LEVOTHYROXINE SODIUM 0.07 MG/1
75 TABLET ORAL DAILY
Qty: 90 TAB | Refills: 4 | Status: SHIPPED | OUTPATIENT
Start: 2018-01-24 | End: 2018-09-25 | Stop reason: SDUPTHER

## 2018-05-23 ENCOUNTER — PATIENT OUTREACH (OUTPATIENT)
Dept: HEALTH INFORMATION MANAGEMENT | Facility: OTHER | Age: 78
End: 2018-05-23

## 2018-05-23 NOTE — PROGRESS NOTES
Outcome: Requested Call Back    Please transfer to Patient Outreach Team at [phone] when patient returns call.    WebIZ Checked & Epic Updated:  yes    [facility]Connect Verified: yes    Attempt # 1

## 2018-06-12 NOTE — PROGRESS NOTES
1. Attempt #: 2 - Patient is going out of town, will call back to schedule when ready    2. HealthConnect Verified: yes    3. Verify PCP: yes    4. Care Team Updated:       •   DME Company (gait device, O2, CPAP, etc.): N\A       •   Other Specialists (eye doctor, derm, GYN, cardiology, endo, etc): N\A    5.  Reviewed/Updated the following with patient:       •   Communication Preference Obtained? YES       •   Preferred Pharmacy? NO       •   Preferred Lab? NO       •   Family History (document living status of immediate family members and if + hx of cancer, diabetes, hypertension, hyperlipidemia, heart attack, stroke) NO    6. [employer] Activation: already active    7. [employer] Garrick: no    8. Annual Wellness Visit Scheduling  Scheduling Status:Not Scheduled. Patient states they are not interested      9. Care Gap Scheduling (Attempt to Schedule EACH Overdue Care Gap!)     Health Maintenance Due   Topic Date Due   • DIABETES MONOFILAMENT / LE EXAM  12/13/2017   • RETINAL SCREENING  01/10/2018   • A1C SCREENING  05/13/2018        10. Patient was NOT advised: “This is a free wellness visit. The provider will screen for medical conditions to help you stay healthy. If you have other concerns to address you may be asked to discuss these at a separate visit or there may be an additional fee.”     11. Patient was NOT informed to arrive 15 min prior to their scheduled appointment and bring in their medication bottles.

## 2018-07-15 DIAGNOSIS — E78.2 MIXED HYPERLIPIDEMIA: ICD-10-CM

## 2018-07-17 RX ORDER — SIMVASTATIN 40 MG
TABLET ORAL
Qty: 90 TAB | Refills: 0 | Status: SHIPPED | OUTPATIENT
Start: 2018-07-17 | End: 2018-09-25 | Stop reason: SDUPTHER

## 2018-08-20 ENCOUNTER — PATIENT OUTREACH (OUTPATIENT)
Dept: HEALTH INFORMATION MANAGEMENT | Facility: OTHER | Age: 78
End: 2018-08-20

## 2018-08-20 NOTE — PROGRESS NOTES
Outcome: Left Message    Please transfer to Patient Outreach Team at [phone] when patient returns call.    WebIZ Checked & Epic Updated:  yes    [facility]Connect Verified: yes    Attempt # 1

## 2018-09-10 NOTE — PROGRESS NOTES
Outcome: Left Message    Please transfer to Patient Outreach Team at [phone] when patient returns call.      Attempt # 2

## 2018-09-12 NOTE — PROGRESS NOTES
1. Attempt #:2    2. HealthConnect Verified: yes    3. Verify PCP: yes    4. Review Care Team: yes    5. WebIZ Checked & Epic Updated: Yes  · WebIZ Recommendations: FLU, HEPATITIS B and SHINGRIX (Shingles)  · Is patient due for Tdap? YES. Patient was not notified of copay/out of pocket cost.  · Is patient due for Shingles? YES. Patient was not notified of copay/out of pocket cost.    6. Communication Preference Obtained: yes    7. Annual Wellness Visit Scheduling  · Scheduling Status:Scheduled        9. Care Gap Scheduling (Attempt to Schedule EACH Overdue Care Gap!)     Health Maintenance Due   Topic Date Due   • IMM HEP B VACCINE (1 of 3 - Risk 3-dose series) 08/30/1960   • IMM ZOSTER VACCINES (2 of 3) 01/14/2013   • DIABETES MONOFILAMENT / LE EXAM  12/13/2017   • RETINAL SCREENING  01/10/2018   • A1C SCREENING  05/13/2018   • IMM INFLUENZA (1) 09/01/2018        Scheduled patient for Annual Wellness Visit     10. [employer] Activation: already active    11. [employer] Garrick: no    12. Virtual Visits: no    13. [employer] Text Messages: yes

## 2018-09-24 ENCOUNTER — TELEPHONE (OUTPATIENT)
Dept: MEDICAL GROUP | Age: 78
End: 2018-09-24

## 2018-09-24 NOTE — TELEPHONE ENCOUNTER
ANNUAL WELLNESS VISIT PRE-VISIT PLANNING WITH OUTREACH    1.  If any orders were placed at last visit, do we have Results/Consult Notes?        •  Labs - Labs were not ordered at last office visit.  Note: If patient appointment is for lab review and patient did not complete labs, check with provider if OK to reschedule patient until labs completed.       •  Imaging - Imaging was not ordered at last office visit.       •  Referrals - No referrals were ordered at last office visit.    2.  Immunizations were updated in Epic using WebIZ?:Epic matches WebIZ       •  WebIZ Recommendations: FLU and HEPATITIS B       •  Is patient due for Tdap? NO       •  Is patient due for Shingles?NO    3.  Patient has the following Care Path diagnoses on Problem List:  DIABETES    Has patient ever had diabetes education? Yes, and is NOT interested in more at this time.      4.  MDX printed for Provider? YES

## 2018-09-25 ENCOUNTER — OFFICE VISIT (OUTPATIENT)
Dept: MEDICAL GROUP | Age: 78
End: 2018-09-25
Payer: MEDICARE

## 2018-09-25 VITALS
DIASTOLIC BLOOD PRESSURE: 66 MMHG | HEIGHT: 66 IN | OXYGEN SATURATION: 97 % | TEMPERATURE: 98 F | WEIGHT: 180 LBS | BODY MASS INDEX: 28.93 KG/M2 | HEART RATE: 71 BPM | SYSTOLIC BLOOD PRESSURE: 114 MMHG

## 2018-09-25 DIAGNOSIS — E78.2 MIXED HYPERLIPIDEMIA: ICD-10-CM

## 2018-09-25 DIAGNOSIS — Z23 NEED FOR VACCINATION: ICD-10-CM

## 2018-09-25 DIAGNOSIS — Z00.00 MEDICARE ANNUAL WELLNESS VISIT, SUBSEQUENT: ICD-10-CM

## 2018-09-25 DIAGNOSIS — Z98.890 STATUS POST RIGHT BREAST RECONSTRUCTION: ICD-10-CM

## 2018-09-25 DIAGNOSIS — K21.00 GASTROESOPHAGEAL REFLUX DISEASE WITH ESOPHAGITIS: ICD-10-CM

## 2018-09-25 DIAGNOSIS — E03.4 HYPOTHYROIDISM DUE TO ACQUIRED ATROPHY OF THYROID: ICD-10-CM

## 2018-09-25 LAB
HBA1C MFR BLD: 8.1 % (ref ?–5.8)
INT CON NEG: NORMAL
INT CON POS: NORMAL

## 2018-09-25 PROCEDURE — 90662 IIV NO PRSV INCREASED AG IM: CPT | Performed by: INTERNAL MEDICINE

## 2018-09-25 PROCEDURE — 83036 HEMOGLOBIN GLYCOSYLATED A1C: CPT | Performed by: INTERNAL MEDICINE

## 2018-09-25 PROCEDURE — G0008 ADMIN INFLUENZA VIRUS VAC: HCPCS | Performed by: INTERNAL MEDICINE

## 2018-09-25 PROCEDURE — G0439 PPPS, SUBSEQ VISIT: HCPCS | Mod: 25 | Performed by: INTERNAL MEDICINE

## 2018-09-25 RX ORDER — SIMVASTATIN 40 MG
40 TABLET ORAL EVERY EVENING
Qty: 90 TAB | Refills: 4 | Status: SHIPPED | OUTPATIENT
Start: 2018-09-25 | End: 2019-08-14

## 2018-09-25 RX ORDER — CIMETIDINE 400 MG/1
400 TABLET, FILM COATED ORAL
Qty: 60 TAB | Refills: 0 | Status: SHIPPED | OUTPATIENT
Start: 2018-09-25 | End: 2019-03-27

## 2018-09-25 RX ORDER — GLIPIZIDE 5 MG/1
5 TABLET ORAL DAILY
Qty: 90 TAB | Refills: 4 | Status: SHIPPED | OUTPATIENT
Start: 2018-09-25 | End: 2019-03-27 | Stop reason: SDUPTHER

## 2018-09-25 RX ORDER — MULTIVIT WITH MINERALS/LUTEIN
1000 TABLET ORAL EVERY MORNING
Status: ON HOLD | COMMUNITY
End: 2023-03-02

## 2018-09-25 RX ORDER — LEVOTHYROXINE SODIUM 0.07 MG/1
75 TABLET ORAL DAILY
Qty: 90 TAB | Refills: 4 | Status: SHIPPED | OUTPATIENT
Start: 2018-09-25 | End: 2019-12-23

## 2018-09-25 ASSESSMENT — PATIENT HEALTH QUESTIONNAIRE - PHQ9: CLINICAL INTERPRETATION OF PHQ2 SCORE: 0

## 2018-09-25 ASSESSMENT — ACTIVITIES OF DAILY LIVING (ADL): BATHING_REQUIRES_ASSISTANCE: 0

## 2018-09-25 ASSESSMENT — ENCOUNTER SYMPTOMS: GENERAL WELL-BEING: EXCELLENT

## 2018-09-25 NOTE — PROGRESS NOTES
Chief Complaint   Patient presents with   • Annual Wellness Visit          HPI:  Mavis is a 78 y.o. here for Medicare Annual Wellness Visit          Patient Active Problem List    Diagnosis Date Noted   • Uncontrolled type 2 diabetes mellitus without complication, without long-term current use of insulin (HCC) 01/28/2016   • Mixed hyperlipidemia 01/15/2013   • Hypothyroidism due to acquired atrophy of thyroid 01/15/2013   • Gastroesophageal reflux disease with esophagitis 01/15/2013   • Status post right breast reconstruction- right breast cancer 1980s- neg nodes; 6 mo chemo rx 08/15/2012       Current Outpatient Prescriptions   Medication Sig Dispense Refill   • vitamin E (VITAMIN E) 1000 UNIT Cap Take 1 Cap by mouth every day.     • simvastatin (ZOCOR) 40 MG Tab TAKE ONE TABLET BY MOUTH EVERY EVENING (REPLACES LOVASTATIN) 90 Tab 0   • levothyroxine (SYNTHROID) 75 MCG Tab Take 1 Tab by mouth every day. 90 Tab 4   • cimetidine (TAGAMET) 400 MG Tab TAKE ONE TABLET BY MOUTH ONE TIME DAILY AS NEEDED (GENERIC FOR TAGAMET) 60 Tab 0   • Omega-3 Fatty Acids (FISH OIL PO) Take 1 Cap by mouth every day.     • meloxicam (MOBIC) 15 MG tablet Take 1 Tab by mouth every day. 30 Tab 4     No current facility-administered medications for this visit.         Patient is taking medications as noted in medication list.  Current supplements as per medication list.     Allergies: Amaryl [glimepiride]; Metformin; and Morphine    Current social contact/activities: travel/play cards/line dancing      Is patient current with immunizations? No, due for FLU. Patient is interested in receiving FLU today.    She  reports that she has never smoked. She has never used smokeless tobacco. She reports that she drinks alcohol. She reports that she does not use drugs.  Counseling given: Not Answered        DPA/Advanced directive: Patient has Advanced Directive on file.     ROS:    Gait: Uses no assistive device    Ostomy: No      Other tubes: No     Amputations: No    Chronic oxygen use No     Last eye exam 6/18    Wears hearing aids: No    : Denies any urinary leakage during the last 6 months         Screening:         Depression Screening    Little interest or pleasure in doing things?  0 - not at all  Feeling down, depressed, or hopeless? 0 - not at all  Patient Health Questionnaire Score: 0    If depressive symptoms identified deferred to follow up visit unless specifically addressed in assessment and plan.    Interpretation of PHQ-9 Total Score   Score Severity   1-4 No Depression   5-9 Mild Depression   10-14 Moderate Depression   15-19 Moderately Severe Depression   20-27 Severe Depression    Screening for Cognitive Impairment    Three Minute Recall (leader, season, table)  3/3    Tano clock face with all 12 numbers and set the hands to show 10 past 11.  Yes    If cognitive concerns identified, deferred for follow up unless specifically addressed in assessment and plan.    Fall Risk Assessment    Has the patient had two or more falls in the last year or any fall with injury in the last year?  No  If fall risk identified, deferred for follow up unless specifically addressed in assessment and plan.    Safety Assessment    Throw rugs on floor.  Yes  Handrails on all stairs.  No  Good lighting in all hallways.  Yes  Difficulty hearing.  No  Patient counseled about all safety risks that were identified.    Functional Assessment ADLs    Are there any barriers preventing you from cooking for yourself or meeting nutritional needs?  No.    Are there any barriers preventing you from driving safely or obtaining transportation?  No.    Are there any barriers preventing you from using a telephone or calling for help?  No.    Are there any barriers preventing you from shopping?  No.    Are there any barriers preventing you from taking care of your own finances?  No.    Are there any barriers preventing you from managing your medications?  No.    Are there any  barriers preventing you from showering, bathing or dressing yourself?  No.    Are you currently engaging in any exercise or physical activity?  Yes.  work  What is your perception of your health?  Excellent.    Health Maintenance Summary                IMM HEP B VACCINE Overdue 8/30/1960     IMM ZOSTER VACCINES Overdue 1/14/2013      Done 11/19/2012 Imm Admin: Zoster Vaccine Live (ZVL) (Zostavax)    DIABETES MONOFILAMENT / LE EXAM Overdue 12/13/2017      Done 12/13/2016 AMB DIABETIC MONOFILAMENT LOWER EXTREMITY EXAM     Patient has more history with this topic...    A1C SCREENING Overdue 5/13/2018      Done 11/13/2017 HEMOGLOBIN A1C      Patient has more history with this topic...    IMM INFLUENZA Overdue 9/1/2018      Done 9/22/2017 Imm Admin: Influenza Vaccine Adult HD     Patient has more history with this topic...    Annual Wellness Visit Overdue 9/23/2018      Done 9/22/2017 Visit Dx: Medicare annual wellness visit, subsequent     Patient has more history with this topic...    FASTING LIPID PROFILE Next Due 11/13/2018      Done 11/13/2017 LIPID PROFILE      Patient has more history with this topic...    URINE ACR / MICROALBUMIN Next Due 11/13/2018      Done 11/13/2017 MICROALBUMIN CREAT RATIO URINE     Patient has more history with this topic...    SERUM CREATININE Next Due 11/13/2018      Done 11/13/2017 COMP METABOLIC PANEL      Patient has more history with this topic...    RETINAL SCREENING Next Due 7/31/2019      Done 7/31/2018 REFERRAL FOR RETINAL SCREENING EXAM     Patient has more history with this topic...    MAMMOGRAM Next Due 11/10/2019      Done 11/10/2017 MA-MAMMO SCREENING BILAT W/TOMOSYNTHESIS W/CAD     Patient has more history with this topic...    BONE DENSITY Next Due 11/10/2022      Done 11/10/2017 DS-BONE DENSITY STUDY (DEXA)    IMM DTaP/Tdap/Td Vaccine Next Due 11/15/2027      Done 11/15/2017 Imm Admin: Tdap Vaccine          Patient Care Team:  Jonathan Bobo M.D. as PCP - General  (Internal Medicine)  Hi Chapman II, O.D. (Optometry)  Lauri Batista M.D. (Gynecology)    Social History   Substance Use Topics   • Smoking status: Never Smoker   • Smokeless tobacco: Never Used   • Alcohol use Yes      Comment: very rarely on ocassion will have a Sravani     Family History   Problem Relation Age of Onset   • Heart Disease Mother 64   • Hypertension Mother    • Diabetes Mother    • Stroke Mother    • Cancer Mother         Stomach cancer   • Heart Disease Maternal Grandmother 63        heart attack     She  has a past medical history of Anesthesia; Arthritis; Backpain; Breast cancer (HCC) (1980s); Diverticulosis; DVT of deep femoral vein (HCC); Heart burn; High cholesterol; Pneumonia (Feb 2006); Thyroid condition; and Ulcer. She also has no past medical history of COPD.   Past Surgical History:   Procedure Laterality Date   • ANTERIOR AND POSTERIOR REPAIR  6/23/2014    Performed by aLuri Batista M.D. at SURGERY SAME DAY [facility]   • BLADDER SLING FEMALE  6/23/2014    Performed by Lauri Batista M.D. at SURGERY SAME DAY [facility]   • BREAST RECONSTRUCTION  8/14/2012    Performed by SARTHAK LEVIN at SURGERY [facility]   • BREAST IMPLANT REVISION  8/14/2012    Performed by SARTHAK LEVIN at [facility]   • CAPSULECTOMY  8/14/2012    Performed by SARTHAK LEVIN at [facility]   • MASTOPEXY  8/14/2012    Performed by SARTHAK LEVIN at [facility]   • LIPOSUCTION  8/14/2012    Performed by SARTHAK LEVIN at [facility]   • RHYTIDECTOMY  8/14/2012    Performed by SARTHAK LEVIN at [facility]   • PLATYSMAPLASTY  8/14/2012    Performed by SARTHAK LEVIN at [facility]   • BLEPHAROPLASTY  8/14/2012    Performed by SARTHAK LEVIN at [facility]   • LOW ANTERIOR RESECTION LAPAROSCOPIC  2/10/2010    Performed by KOBI GOODE at [facility]   • COLECTOMY N/A  2009    partial- post colonoscopy perforation- dr cohn   • MASTECTOMY Right 1980s    neg nodes; 6 mos adjuvant chemo   • OTHER      mastectomy R breast   • OTHER ABDOMINAL SURGERY      I&D of ruptured diverticuli    • PB ENLARGE BREAST WITH IMPLANT     • PB REDUCTION OF LARGE BREAST     • HI CHEMOTHERAPY, UNSPECIFIED PROCEDURE             Exam:     not currently breastfeeding. There is no height or weight on file to calculate BMI.    Hearing excellent.    Dentition good  Alert, oriented in no acute distress.  Eye contact is good, speech goal directed, affect calm       Assessment and Plan. The following treatment and monitoring plan is recommended:     /.  1. Medicare annual wellness visit, subsequent     - Annual Wellness Visit - Includes PPPS Subsequent ()    2. Need for vaccination       - INFLUENZA VACCINE, HIGH DOSE (65+ ONLY)  - Annual Wellness Visit - Includes PPPS Subsequent ()    3. Uncontrolled type 2 diabetes mellitus without complication, without long-term current use of insulin (HCC)  Not at goal.  Not on any diabetic medication.  Start glipizide 5 mg once a day with meals and recheck A1c again in 6 months      - POCT Hemoglobin A1C  - glipiZIDE (GLUCOTROL) 5 MG Tab; Take 1 Tab by mouth every day.  Dispense: 90 Tab; Refill: 4  - LIPID PROFILE; Future  - HEMOGLOBIN A1C; Future  - MICROALBUMIN CREAT RATIO URINE; Future  - Diabetic Monofilament Lower Extremity Exam  - Annual Wellness Visit - Includes PPPS Subsequent ()    4. Status post right breast reconstruction- right breast cancer 1980s- neg nodes; 6 mo chemo rx    Under good control. Continue same regimen.  - Annual Wellness Visit - Includes PPPS Subsequent ()    5. Mixed hyperlipidemia   Under good control. Continue same regimen.    - simvastatin (ZOCOR) 40 MG Tab; Take 1 Tab by mouth every evening.  Dispense: 90 Tab; Refill: 4  - COMP METABOLIC PANEL; Future  - LIPID PROFILE; Future  - CBC WITH DIFFERENTIAL; Future  - Annual  Wellness Visit - Includes PPPS Subsequent ()    6. Hypothyroidism due to acquired atrophy of thyroid    Under good control. Continue same regimen.  - levothyroxine (SYNTHROID) 75 MCG Tab; Take 1 Tab by mouth every day.  Dispense: 90 Tab; Refill: 4  - TSH; Future  - Annual Wellness Visit - Includes PPPS Subsequent ()    7. GERD  - cimetidine (TAGAMET) 400 MG Tab; Take 1 Tab by mouth 1 time daily as needed.  Dispense: 60 Tab; Refill: 0  - Annual Wellness Visit - Includes PPPS Subsequent ()   Under good control. Continue same regimen.      Services suggested: No services needed at this time  Health Care Screening recommendations as per orders if indicated.  Referrals offered: PT/OT/Nutrition counseling/Behavioral Health/Smoking cessation as per orders if indicated.    Discussion today about general wellness and lifestyle habits:    · Prevent falls and reduce trip hazards; Cautioned about securing or removing rugs.  · Have a working fire alarm and carbon monoxide detector;   · Engage in regular physical activity and social activities       Follow-up: No Follow-up on file.

## 2019-02-13 DIAGNOSIS — G89.29 CHRONIC MIDLINE LOW BACK PAIN WITHOUT SCIATICA: ICD-10-CM

## 2019-02-13 DIAGNOSIS — M54.50 CHRONIC MIDLINE LOW BACK PAIN WITHOUT SCIATICA: ICD-10-CM

## 2019-02-13 RX ORDER — MELOXICAM 15 MG/1
15 TABLET ORAL DAILY
Qty: 90 TAB | Refills: 4 | Status: SHIPPED | OUTPATIENT
Start: 2019-02-13 | End: 2019-03-27

## 2019-03-20 ENCOUNTER — TELEPHONE (OUTPATIENT)
Dept: MEDICAL GROUP | Age: 79
End: 2019-03-20

## 2019-03-20 NOTE — TELEPHONE ENCOUNTER
ESTABLISHED PATIENT PRE-VISIT PLANNING     Patient was contacted to complete PVP.     Note: Patient will not be contacted if there is no indication to call.     1.  Reviewed notes from the last few office visits within the medical group: Yes    2.  If any orders were placed at last visit or intended to be done for this visit (i.e. 6 mos follow-up), do we have Results/Consult Notes?        •  Labs - Labs ordered, but not to be completed until 3/28/19.   Note: If patient appointment is for lab review and patient did not complete labs, check with provider if OK to reschedule patient until labs completed.       •  Imaging - Imaging was not ordered at last office visit.       •  Referrals - No referrals were ordered at last office visit.    3. Is this appointment scheduled as a Hospital Follow-Up? No    4.  Immunizations were updated in Epic using WebIZ?: Epic matches WebIZ       •  Web Iz Recommendations: HEPATITIS B and SHINGRIX (Shingles)    5.  Patient is due for the following Health Maintenance Topics:   Health Maintenance Due   Topic Date Due   • IMM HEP B VACCINE (1 of 3 - Risk 3-dose series) 08/30/1960   • IMM ZOSTER VACCINES (2 of 3) 01/14/2013   • FASTING LIPID PROFILE  11/13/2018   • URINE ACR / MICROALBUMIN  11/13/2018   • SERUM CREATININE  11/13/2018           6. Orders for overdue Health Maintenance topics pended in Pre-Charting? N\A    7.  AHA (MDX) form printed for Provider? YES    8.  Patient was informed to arrive 15 min prior to their scheduled appointment and bring in their medication bottles.

## 2019-03-27 ENCOUNTER — OFFICE VISIT (OUTPATIENT)
Dept: MEDICAL GROUP | Age: 79
End: 2019-03-27
Payer: MEDICARE

## 2019-03-27 VITALS
TEMPERATURE: 97.6 F | HEART RATE: 74 BPM | DIASTOLIC BLOOD PRESSURE: 72 MMHG | BODY MASS INDEX: 30.18 KG/M2 | HEIGHT: 66 IN | SYSTOLIC BLOOD PRESSURE: 126 MMHG | OXYGEN SATURATION: 94 % | WEIGHT: 187.8 LBS

## 2019-03-27 DIAGNOSIS — K21.00 GASTROESOPHAGEAL REFLUX DISEASE WITH ESOPHAGITIS: ICD-10-CM

## 2019-03-27 DIAGNOSIS — M62.830 SPASM OF BACK MUSCLES: ICD-10-CM

## 2019-03-27 DIAGNOSIS — E03.4 HYPOTHYROIDISM DUE TO ACQUIRED ATROPHY OF THYROID: ICD-10-CM

## 2019-03-27 DIAGNOSIS — Z12.31 SCREENING MAMMOGRAM, ENCOUNTER FOR: ICD-10-CM

## 2019-03-27 DIAGNOSIS — E78.2 MIXED HYPERLIPIDEMIA: ICD-10-CM

## 2019-03-27 LAB
HBA1C MFR BLD: 8 % (ref 0–5.6)
INT CON NEG: NEGATIVE
INT CON POS: POSITIVE

## 2019-03-27 PROCEDURE — 83036 HEMOGLOBIN GLYCOSYLATED A1C: CPT | Performed by: INTERNAL MEDICINE

## 2019-03-27 PROCEDURE — 8041 PR SCP AHA: Performed by: INTERNAL MEDICINE

## 2019-03-27 PROCEDURE — 99215 OFFICE O/P EST HI 40 MIN: CPT | Performed by: INTERNAL MEDICINE

## 2019-03-27 RX ORDER — CYCLOBENZAPRINE HCL 10 MG
10 TABLET ORAL 3 TIMES DAILY PRN
Qty: 30 TAB | Refills: 1 | Status: SHIPPED | OUTPATIENT
Start: 2019-03-27 | End: 2020-01-30 | Stop reason: SDUPTHER

## 2019-03-27 RX ORDER — GLIPIZIDE AND METFORMIN HCL 2.5; 25 MG/1; MG/1
1 TABLET, FILM COATED ORAL 2 TIMES DAILY WITH MEALS
Qty: 180 TAB | Refills: 4 | Status: SHIPPED | OUTPATIENT
Start: 2019-03-27 | End: 2020-04-27

## 2019-03-27 RX ORDER — GLIPIZIDE AND METFORMIN HCL 2.5; 25 MG/1; MG/1
1 TABLET, FILM COATED ORAL 2 TIMES DAILY WITH MEALS
Qty: 180 TAB | Refills: 4 | Status: SHIPPED | OUTPATIENT
Start: 2019-03-27 | End: 2019-03-27 | Stop reason: SDUPTHER

## 2019-03-27 RX ORDER — GLIPIZIDE 5 MG/1
5 TABLET ORAL DAILY
Qty: 180 TAB | Refills: 4 | Status: SHIPPED | OUTPATIENT
Start: 2019-03-27 | End: 2019-03-27

## 2019-03-27 ASSESSMENT — ENCOUNTER SYMPTOMS
RESPIRATORY NEGATIVE: 1
NEUROLOGICAL NEGATIVE: 1
GASTROINTESTINAL NEGATIVE: 1
MUSCULOSKELETAL NEGATIVE: 1
CARDIOVASCULAR NEGATIVE: 1
PSYCHIATRIC NEGATIVE: 1
CONSTITUTIONAL NEGATIVE: 1
EYES NEGATIVE: 1

## 2019-03-27 ASSESSMENT — PATIENT HEALTH QUESTIONNAIRE - PHQ9: CLINICAL INTERPRETATION OF PHQ2 SCORE: 0

## 2019-03-27 NOTE — PROGRESS NOTES
Annual Health Assessment Questions:    1.  Are you currently engaging in any exercise or physical activity? Yes    2.  How would you describe your mood or emotional well-being today? good    3.  Have you had any falls in the last year? No    4.  Have you noticed any problems with your balance or had difficulty walking? No    5.  In the last six months have you experienced any leakage of urine? No    6. DPA/Advanced Directive: Patient has Durable Power of  on file.      Subjective:      Mavis Lizarraga is a 78 y.o. female who presents with Annual Exam        HPI    The patient is here for follow up of chronic medical problems listed below. The patient is compliant with medications and having no side effects from them. Denies chest pain, abdominal pain, dyspnea, myalgias, or cough.    A1c 8.0 in office today. Currently taking Glipizide 5 mg QD for type 2 diabetes. Previously tried Metformin but did not tolerate well with vomiting. However, she does report she took Metformin with alcohol. Does not monitor glucose levels at home. She denies any symptoms including peripheral neuropathy. She will have blood work completed tomorrow. Retinal screening completed 07/2018. Did return from [place] last week and reports 7 lb weight gain during vacation.    She additionally reports occasional back spasms after certain exertions, such as gardening. She has been taking Cyclobenzaprine 10 mg TID PRN for pain, and requests a refill today. Denies associated radiating pain, numbness, weakness, or tingling.    Health maintenance reviewed. Mammogram due. History of right breast cancer in 1980's without recurrence. Reconstruction with implant at that time. Partial colectomy in 2009 due to perforation during colonoscopy.        Patient Active Problem List   Diagnosis   • Mixed hyperlipidemia   • Hypothyroidism due to acquired atrophy of thyroid   • Gastroesophageal reflux disease with esophagitis   • Uncontrolled type 2 diabetes  "mellitus without complication, without long-term current use of insulin (HCC)   • Spasm of back muscles       Outpatient Medications Prior to Visit   Medication Sig Dispense Refill   • vitamin E (VITAMIN E) 1000 UNIT Cap Take 1 Cap by mouth every day.     • simvastatin (ZOCOR) 40 MG Tab Take 1 Tab by mouth every evening. 90 Tab 4   • levothyroxine (SYNTHROID) 75 MCG Tab Take 1 Tab by mouth every day. 90 Tab 4   • Omega-3 Fatty Acids (FISH OIL PO) Take 1 Cap by mouth every day.     • meloxicam (MOBIC) 15 MG tablet Take 1 Tab by mouth every day. 90 Tab 4   • cimetidine (TAGAMET) 400 MG Tab TAKE ONE TABLET BY MOUTH DAILY AS NEEDED.(GENERIC FOR TAGAMET) 90 Tab 4   • glipiZIDE (GLUCOTROL) 5 MG Tab Take 1 Tab by mouth every day. 90 Tab 4   • cimetidine (TAGAMET) 400 MG Tab Take 1 Tab by mouth 1 time daily as needed. 60 Tab 0     No facility-administered medications prior to visit.         Allergies   Allergen Reactions   • Amaryl [Glimepiride]      drowsiness   • Metformin Diarrhea     NAUSEA VOMIITG AND DIARRHEA   • Morphine      Hallucinations       Review of Systems   Constitutional: Negative.    HENT: Negative.    Eyes: Negative.    Respiratory: Negative.    Cardiovascular: Negative.    Gastrointestinal: Negative.    Genitourinary: Negative.    Musculoskeletal: Negative.    Skin: Negative.    Neurological: Negative.    Endo/Heme/Allergies: Negative.    Psychiatric/Behavioral: Negative.    All other systems reviewed and are negative.           Objective:     /72 (BP Location: Left arm, Patient Position: Sitting, BP Cuff Size: Adult)   Pulse 74   Temp 36.4 °C (97.6 °F) (Temporal)   Ht 1.676 m (5' 6\")   Wt 85.2 kg (187 lb 12.8 oz)   LMP  (LMP Unknown)   SpO2 94%   Breastfeeding? No   BMI 30.31 kg/m²     Physical Exam   Constitutional: Oriented to person, place, and time. Appears well-developed and well-nourished. No distress.   Head: Normocephalic and atraumatic.   Right Ear: External ear normal.   Left " Ear: External ear normal.   Nose: Nose normal.   Mouth/Throat: Oropharynx is clear and moist. No oropharyngeal exudate.   Eyes: Pupils are equal, round, and reactive to light. Conjunctivae and EOM are normal. Right eye exhibits no discharge. Left eye exhibits no discharge. No scleral icterus.   Neck: Normal range of motion. Neck supple. No JVD present. No tracheal deviation present. No thyromegaly present.   Cardiovascular: Normal rate, regular rhythm, normal heart sounds and intact distal pulses.  Exam reveals no gallop and no friction rub.    No murmur heard.  Pulmonary/Chest: Effort normal. No stridor. No respiratory distress. No wheezing or rales. No tenderness.   Abdominal: Soft. Bowel sounds are normal. No distension and no mass. There is no tenderness. There is no rebound and no guarding. No hernia.   Musculoskeletal: Normal range of motion No edema or tenderness.   Lymphadenopathy: No cervical adenopathy.   Neurological: Alert and oriented to person, place, and time. Normal reflexes. Normal reflexes. No cranial nerve deficit. Normal muscle tone. Coordination normal.   Skin: Skin is warm and dry. No rash noted. Not diaphoretic. No erythema. No pallor.   Psychiatric: Normal mood and affect. Behavior is normal. Judgment and thought content normal.   Nursing note and vitals reviewed.      Lab Results   Component Value Date/Time    HBA1C 8.0 (A) 03/27/2019 03:47 PM    HBA1C 8.1 09/25/2018 02:55 PM     Lab Results   Component Value Date/Time    SODIUM 136 11/13/2017 07:23 AM    POTASSIUM 4.1 11/13/2017 07:23 AM    CHLORIDE 103 11/13/2017 07:23 AM    CO2 26 11/13/2017 07:23 AM    GLUCOSE 169 (H) 11/13/2017 07:23 AM    BUN 8 11/13/2017 07:23 AM    CREATININE 0.53 11/13/2017 07:23 AM    ALKPHOSPHAT 47 11/13/2017 07:23 AM    ASTSGOT 16 11/13/2017 07:23 AM    ALTSGPT 19 11/13/2017 07:23 AM    TBILIRUBIN 0.9 11/13/2017 07:23 AM     Lab Results   Component Value Date/Time    INR 1.29 (H) 11/06/2009 03:10 PM     Lab  Results   Component Value Date/Time    CHOLSTRLTOT 181 11/13/2017 07:23 AM    LDL 99 11/13/2017 07:23 AM    HDL 46 11/13/2017 07:23 AM    TRIGLYCERIDE 179 (H) 11/13/2017 07:23 AM       No results found for: TESTOSTERONE  No results found for: TSH  No results found for: FREET4  No results found for: URICACID  No components found for: VITB12  No results found for: 25HYDROXY       Assessment/Plan:     1. Uncontrolled type 2 diabetes mellitus without complication, without long-term current use of insulin (HCC)  Not at goal. Due to A1c of 8.0 today, will discontinue Glipizide 5 mg QD and initiate Glipizide-Metformin 2.5-250 mg BID. Advised to take medication on full stomach. If unable to tolerate, will switch to Glipizide 5 mg BID. Discussed goal of A1c below 7.0. Additionally discussed increasing exercise and monitoring diet with high protein and low carbohydrate intake. Lose 15 lbs.; patient counseled.  - POCT Hemoglobin A1C  - glipizide-metformin (METAGLIP) 2.5-250 MG per tablet; Take 1 Tab by mouth 2 times a day, with meals.  Dispense: 180 Tab; Refill: 4    2. Mixed hyperlipidemia  Under good control. Continue same regimen of Simvastatin 40 mg QD.    3. Hypothyroidism due to acquired atrophy of thyroid    Under good control. Continue same regimen of Levothyroxine 75 mcg QD.    4. Gastroesophageal reflux disease with esophagitis  Under good control. Continue same regimen of Cimetidine 400 mg PRN.    5. Spasm of back muscles  Occasional back spasms without other symptoms, continue to use Cyclobenzaprine 10 mg TID PRN.  - cyclobenzaprine (FLEXERIL) 10 MG Tab; Take 1 Tab by mouth 3 times a day as needed for Mild Pain.  Dispense: 30 Tab; Refill: 1    6. Screening mammogram, encounter for  History of breast cancer in 1980's without recurrence. Due for screening mammogram.  - MA-SCREEN MAMMO W/CAD-BILAT; Future        40 minute face-to-face encounter took place today.  More than half of this time was spent in the  coordination of care of the above problems, as well as counseling.     I, Mary Raya (Marsibdevorah), am scribing for, and in the presence of, Jonathan Bobo M.D..    Electronically signed by: Mary Raya (Jose Miguel), 3/27/2019    I, Jonathan Bobo M.D., personally performed the services described in this documentation, as scribed by Mary Raya in my presence, and it is both accurate and complete.

## 2019-03-28 ENCOUNTER — HOSPITAL ENCOUNTER (OUTPATIENT)
Dept: LAB | Facility: MEDICAL CENTER | Age: 79
End: 2019-03-28
Attending: INTERNAL MEDICINE
Payer: MEDICARE

## 2019-03-28 DIAGNOSIS — E78.2 MIXED HYPERLIPIDEMIA: ICD-10-CM

## 2019-03-28 DIAGNOSIS — E03.4 HYPOTHYROIDISM DUE TO ACQUIRED ATROPHY OF THYROID: ICD-10-CM

## 2019-03-28 LAB
ALBUMIN SERPL BCP-MCNC: 4.5 G/DL (ref 3.2–4.9)
ALBUMIN/GLOB SERPL: 1.7 G/DL
ALP SERPL-CCNC: 43 U/L (ref 30–99)
ALT SERPL-CCNC: 25 U/L (ref 2–50)
ANION GAP SERPL CALC-SCNC: 8 MMOL/L (ref 0–11.9)
AST SERPL-CCNC: 17 U/L (ref 12–45)
BASOPHILS # BLD AUTO: 0.5 % (ref 0–1.8)
BASOPHILS # BLD: 0.02 K/UL (ref 0–0.12)
BILIRUB SERPL-MCNC: 0.9 MG/DL (ref 0.1–1.5)
BUN SERPL-MCNC: 13 MG/DL (ref 8–22)
CALCIUM SERPL-MCNC: 9.1 MG/DL (ref 8.5–10.5)
CHLORIDE SERPL-SCNC: 107 MMOL/L (ref 96–112)
CHOLEST SERPL-MCNC: 186 MG/DL (ref 100–199)
CO2 SERPL-SCNC: 25 MMOL/L (ref 20–33)
CREAT SERPL-MCNC: 0.57 MG/DL (ref 0.5–1.4)
CREAT UR-MCNC: 382.9 MG/DL
EOSINOPHIL # BLD AUTO: 0.08 K/UL (ref 0–0.51)
EOSINOPHIL NFR BLD: 1.8 % (ref 0–6.9)
ERYTHROCYTE [DISTWIDTH] IN BLOOD BY AUTOMATED COUNT: 39.8 FL (ref 35.9–50)
FASTING STATUS PATIENT QL REPORTED: NORMAL
GLOBULIN SER CALC-MCNC: 2.6 G/DL (ref 1.9–3.5)
GLUCOSE SERPL-MCNC: 216 MG/DL (ref 65–99)
HCT VFR BLD AUTO: 44.2 % (ref 37–47)
HDLC SERPL-MCNC: 50 MG/DL
HGB BLD-MCNC: 15.1 G/DL (ref 12–16)
IMM GRANULOCYTES # BLD AUTO: 0.01 K/UL (ref 0–0.11)
IMM GRANULOCYTES NFR BLD AUTO: 0.2 % (ref 0–0.9)
LDLC SERPL CALC-MCNC: 108 MG/DL
LYMPHOCYTES # BLD AUTO: 1.78 K/UL (ref 1–4.8)
LYMPHOCYTES NFR BLD: 40.2 % (ref 22–41)
MCH RBC QN AUTO: 30.4 PG (ref 27–33)
MCHC RBC AUTO-ENTMCNC: 34.2 G/DL (ref 33.6–35)
MCV RBC AUTO: 88.9 FL (ref 81.4–97.8)
MICROALBUMIN UR-MCNC: 10.8 MG/DL
MICROALBUMIN/CREAT UR: 28 MG/G (ref 0–30)
MONOCYTES # BLD AUTO: 0.36 K/UL (ref 0–0.85)
MONOCYTES NFR BLD AUTO: 8.1 % (ref 0–13.4)
NEUTROPHILS # BLD AUTO: 2.18 K/UL (ref 2–7.15)
NEUTROPHILS NFR BLD: 49.2 % (ref 44–72)
NRBC # BLD AUTO: 0 K/UL
NRBC BLD-RTO: 0 /100 WBC
PLATELET # BLD AUTO: 177 K/UL (ref 164–446)
PMV BLD AUTO: 9.9 FL (ref 9–12.9)
POTASSIUM SERPL-SCNC: 4.3 MMOL/L (ref 3.6–5.5)
PROT SERPL-MCNC: 7.1 G/DL (ref 6–8.2)
RBC # BLD AUTO: 4.97 M/UL (ref 4.2–5.4)
SODIUM SERPL-SCNC: 140 MMOL/L (ref 135–145)
TRIGL SERPL-MCNC: 139 MG/DL (ref 0–149)
TSH SERPL DL<=0.005 MIU/L-ACNC: 1.35 UIU/ML (ref 0.38–5.33)
WBC # BLD AUTO: 4.4 K/UL (ref 4.8–10.8)

## 2019-03-28 PROCEDURE — 80053 COMPREHEN METABOLIC PANEL: CPT

## 2019-03-28 PROCEDURE — 82570 ASSAY OF URINE CREATININE: CPT

## 2019-03-28 PROCEDURE — 85025 COMPLETE CBC W/AUTO DIFF WBC: CPT

## 2019-03-28 PROCEDURE — 84443 ASSAY THYROID STIM HORMONE: CPT

## 2019-03-28 PROCEDURE — 80061 LIPID PANEL: CPT

## 2019-03-28 PROCEDURE — 82043 UR ALBUMIN QUANTITATIVE: CPT

## 2019-03-28 PROCEDURE — 36415 COLL VENOUS BLD VENIPUNCTURE: CPT

## 2019-06-05 ENCOUNTER — PATIENT OUTREACH (OUTPATIENT)
Dept: HEALTH INFORMATION MANAGEMENT | Facility: OTHER | Age: 79
End: 2019-06-05

## 2019-06-05 NOTE — PROGRESS NOTES
Outcome: call back at a later time    Please transfer to [facility]  [phone] when patient returns call.     WebIZ Checked & Epic Updated:  yes     HealthConnect Verified: yes     Attempt # 1

## 2019-08-12 ENCOUNTER — TELEPHONE (OUTPATIENT)
Dept: MEDICAL GROUP | Age: 79
End: 2019-08-12

## 2019-08-12 NOTE — TELEPHONE ENCOUNTER
ESTABLISHED PATIENT PRE-VISIT PLANNING     Patient was NOT contacted to complete PVP.     Note: Patient will not be contacted if there is no indication to call.     1.  Reviewed notes from the last few office visits within the medical group: Yes    2.  If any orders were placed at last visit or intended to be done for this visit (i.e. 6 mos follow-up), do we have Results/Consult Notes?        •  Labs - Labs ordered, completed on 3/28/19 and results are in chart.   Note: If patient appointment is for lab review and patient did not complete labs, check with provider if OK to reschedule patient until labs completed.       •  Imaging - Imaging ordered, NOT completed. Patient advised to complete prior to next appointment.       •  Referrals - No referrals were ordered at last office visit.    3. Is this appointment scheduled as a Hospital Follow-Up? No    4.  Immunizations were updated in Epic using WebIZ?: Epic matches WebIZ       •  Web Iz Recommendations: HEPATITIS B and SHINGRIX (Shingles)    5.  Patient is due for the following Health Maintenance Topics:   Health Maintenance Due   Topic Date Due   • IMM HEP B VACCINE (1 of 3 - Risk 3-dose series) 08/30/1960   • IMM ZOSTER VACCINES (2 of 3) 01/14/2013   • RETINAL SCREENING  07/31/2019           6. Orders for overdue Health Maintenance topics pended in Pre-Charting? N\A    7.  AHA (MDX) form printed for Provider? No, already completed    8.  Patient was NOT informed to arrive 15 min prior to their scheduled appointment and bring in their medication bottles.

## 2019-08-14 ENCOUNTER — OFFICE VISIT (OUTPATIENT)
Dept: MEDICAL GROUP | Age: 79
End: 2019-08-14
Payer: MEDICARE

## 2019-08-14 VITALS
OXYGEN SATURATION: 96 % | HEART RATE: 62 BPM | SYSTOLIC BLOOD PRESSURE: 126 MMHG | HEIGHT: 66 IN | TEMPERATURE: 98.4 F | DIASTOLIC BLOOD PRESSURE: 82 MMHG | WEIGHT: 185.6 LBS | BODY MASS INDEX: 29.83 KG/M2

## 2019-08-14 DIAGNOSIS — K21.00 GASTROESOPHAGEAL REFLUX DISEASE WITH ESOPHAGITIS: ICD-10-CM

## 2019-08-14 DIAGNOSIS — L81.9 PIGMENTED SKIN LESION OF UNCERTAIN NATURE: ICD-10-CM

## 2019-08-14 DIAGNOSIS — E78.2 MIXED HYPERLIPIDEMIA: ICD-10-CM

## 2019-08-14 DIAGNOSIS — E03.4 HYPOTHYROIDISM DUE TO ACQUIRED ATROPHY OF THYROID: ICD-10-CM

## 2019-08-14 DIAGNOSIS — D23.9 MULTIPLE DYSPLASTIC NEVI: ICD-10-CM

## 2019-08-14 LAB
HBA1C MFR BLD: 7.6 % (ref 0–5.6)
INT CON NEG: NEGATIVE
INT CON POS: POSITIVE

## 2019-08-14 PROCEDURE — 83036 HEMOGLOBIN GLYCOSYLATED A1C: CPT | Performed by: INTERNAL MEDICINE

## 2019-08-14 PROCEDURE — 99214 OFFICE O/P EST MOD 30 MIN: CPT | Performed by: INTERNAL MEDICINE

## 2019-08-14 RX ORDER — ROSUVASTATIN CALCIUM 40 MG/1
40 TABLET, COATED ORAL DAILY
Qty: 100 TAB | Refills: 4 | Status: SHIPPED | OUTPATIENT
Start: 2019-08-14 | End: 2020-08-13

## 2019-08-14 ASSESSMENT — ENCOUNTER SYMPTOMS
PSYCHIATRIC NEGATIVE: 1
NEUROLOGICAL NEGATIVE: 1
CARDIOVASCULAR NEGATIVE: 1
CONSTITUTIONAL NEGATIVE: 1
RESPIRATORY NEGATIVE: 1
GASTROINTESTINAL NEGATIVE: 1
MUSCULOSKELETAL NEGATIVE: 1
EYES NEGATIVE: 1

## 2019-08-14 NOTE — PROGRESS NOTES
Subjective:      Mavis Lizarraga is a 78 y.o. female who presents with Referral Needed (dermatology)         HPI    She presents today requesting a referral to dermatology for diffuse skin lesions which have gradually increased in size over the past several months. The lesions are pigmented, painful and occasionally scaly. Some of the lesions are becoming irritated secondary to them catching on her clothing and jewelery. No prior history of skin cancer.     Patient has a chronic history of poorly controlled type II diabetes mellitus. Currently on Glipizide-Metformin 2.5-250 mg twice daily. A1c was 7.6 today. Reports compliance with medications and denies any medication side effects. She denies any vision changes, polyuria, polydipsia, polyphagia, numbness or tingling to their feet, lesions or opens wounds to their feet. Retinal screening was last completed on 07/31/18. Weight is relatively stable over the past year.     Patient has a history of hypothyroidism and is taking Levothyroxine 75 mcg once every morning on an empty stomach. She denies any medication side effects. Thyroid testing was completed on 03/28/19 revealing a normal TSH of 1.350.     Chronic history of hyperlipidemia. Currently taking Zocor 40 mg once daily as directed. No medication side effects were reported including myalgias or abdominal pain. She is not taking a daily Aspirin. No acute complaints of dizziness, claudication or chest pain. Lipid panel was last completed on 03/28/19. Normal liver enzymes.       Patient Active Problem List   Diagnosis   • Mixed hyperlipidemia   • Hypothyroidism due to acquired atrophy of thyroid   • Gastroesophageal reflux disease with esophagitis   • Uncontrolled type 2 diabetes mellitus without complication, without long-term current use of insulin ([facility])   • Spasm of back muscles       Outpatient Medications Prior to Visit   Medication Sig Dispense Refill   • cyclobenzaprine (FLEXERIL) 10 MG Tab Take 1 Tab by  "mouth 3 times a day as needed for Mild Pain. 30 Tab 1   • glipizide-metformin (METAGLIP) 2.5-250 MG per tablet Take 1 Tab by mouth 2 times a day, with meals. 180 Tab 4   • cimetidine (TAGAMET) 400 MG Tab TAKE ONE TABLET BY MOUTH DAILY AS NEEDED.(GENERIC FOR TAGAMET) 90 Tab 4   • vitamin E (VITAMIN E) 1000 UNIT Cap Take 1 Cap by mouth every day.     • levothyroxine (SYNTHROID) 75 MCG Tab Take 1 Tab by mouth every day. 90 Tab 4   • Omega-3 Fatty Acids (FISH OIL PO) Take 1 Cap by mouth every day.     • simvastatin (ZOCOR) 40 MG Tab Take 1 Tab by mouth every evening. 90 Tab 4     No facility-administered medications prior to visit.         Allergies   Allergen Reactions   • Amaryl [Glimepiride]      drowsiness   • Metformin Diarrhea     NAUSEA VOMIITG AND DIARRHEA   • Morphine      Hallucinations         Review of Systems   Constitutional: Negative.    HENT: Negative.    Eyes: Negative.    Respiratory: Negative.    Cardiovascular: Negative.    Gastrointestinal: Negative.    Genitourinary: Negative.    Musculoskeletal: Negative.    Skin:        Multiple diffuse skin lesions increasing in size, pigmentation, pain and scaling.   Neurological: Negative.    Endo/Heme/Allergies: Negative.    Psychiatric/Behavioral: Negative.    All other systems reviewed and are negative.     See HPI for further details.     Objective:     /82 (BP Location: Left arm, Patient Position: Sitting, BP Cuff Size: Adult)   Pulse 62   Temp 36.9 °C (98.4 °F) (Temporal)   Ht 1.676 m (5' 6\")   Wt 84.2 kg (185 lb 9.6 oz)   LMP  (LMP Unknown)   SpO2 96%   Breastfeeding? No   BMI 29.96 kg/m²     Nursing note and vitals reviewed.    Physical Exam   Constitutional: Oriented to person, place, and time. Appears well-developed and well-nourished. No distress.   Head: Normocephalic and atraumatic.   Right Ear: External ear normal.   Left Ear: External ear normal.   Nose: Nose normal.   Mouth/Throat: Oropharynx is clear and moist. No oropharyngeal " exudate.   Eyes: Pupils are equal, round, and reactive to light. Conjunctivae and EOM are normal. Right eye exhibits no discharge. Left eye exhibits no discharge. No scleral icterus.   Neck: Normal range of motion. Neck supple. No JVD present. No tracheal deviation present. No thyromegaly present. No bruit.  Cardiovascular: Normal rate, regular rhythm, normal heart sounds and intact distal pulses.  Exam reveals no gallop and no friction rub. No murmur heard.  Pulmonary/Chest: Effort normal. No stridor. No respiratory distress. No wheezing or rales. No tenderness.   Abdominal: Soft. Bowel sounds are normal. No distension and no mass. There is no tenderness. There is no rebound and no guarding. No hernia. No pulsatile masses.  Musculoskeletal: Normal range of motion No edema or tenderness.   Lymphadenopathy: No cervical adenopathy.   Neurological: Alert and oriented to person, place, and time. Normal muscle tone. Coordination normal.   Skin: Skin is warm and dry. No rash noted. Not diaphoretic. No erythema. No pallor. Multiple dysplastic nevi and seborrheic keratosis diffusely.  Psychiatric: Normal mood and affect. Behavior is normal. Judgment and thought content normal.      No visits with results within 1 Month(s) from this visit.   Latest known visit with results is:   Hospital Outpatient Visit on 03/28/2019   Component Date Value   • TSH 03/28/2019 1.350    • Sodium 03/28/2019 140    • Potassium 03/28/2019 4.3    • Chloride 03/28/2019 107    • Co2 03/28/2019 25    • Anion Gap 03/28/2019 8.0    • Glucose 03/28/2019 216*   • Bun 03/28/2019 13    • Creatinine 03/28/2019 0.57    • Calcium 03/28/2019 9.1    • AST(SGOT) 03/28/2019 17    • ALT(SGPT) 03/28/2019 25    • Alkaline Phosphatase 03/28/2019 43    • Total Bilirubin 03/28/2019 0.9    • Albumin 03/28/2019 4.5    • Total Protein 03/28/2019 7.1    • Globulin 03/28/2019 2.6    • A-G Ratio 03/28/2019 1.7    • Cholesterol,Tot 03/28/2019 186    • Triglycerides 03/28/2019  139    • HDL 03/28/2019 50    • LDL 03/28/2019 108*   • WBC 03/28/2019 4.4*   • RBC 03/28/2019 4.97    • Hemoglobin 03/28/2019 15.1    • Hematocrit 03/28/2019 44.2    • MCV 03/28/2019 88.9    • MCH 03/28/2019 30.4    • MCHC 03/28/2019 34.2    • RDW 03/28/2019 39.8    • Platelet Count 03/28/2019 177    • MPV 03/28/2019 9.9    • Neutrophils-Polys 03/28/2019 49.20    • Lymphocytes 03/28/2019 40.20    • Monocytes 03/28/2019 8.10    • Eosinophils 03/28/2019 1.80    • Basophils 03/28/2019 0.50    • Immature Granulocytes 03/28/2019 0.20    • Nucleated RBC 03/28/2019 0.00    • Neutrophils (Absolute) 03/28/2019 2.18    • Lymphs (Absolute) 03/28/2019 1.78    • Monos (Absolute) 03/28/2019 0.36    • Eos (Absolute) 03/28/2019 0.08    • Baso (Absolute) 03/28/2019 0.02    • Immature Granulocytes (a* 03/28/2019 0.01    • NRBC (Absolute) 03/28/2019 0.00    • Creatinine, Urine 03/28/2019 382.90    • Microalbumin, Urine Rand* 03/28/2019 10.8    • Micro Alb Creat Ratio 03/28/2019 28    • Fasting Status 03/28/2019 Fasting    • GFR If  03/28/2019 >60    • GFR If Non  Ameri* 03/28/2019 >60       Lab Results   Component Value Date/Time    HBA1C 7.6 (A) 08/14/2019 02:00 PM    HBA1C 8.0 (A) 03/27/2019 03:47 PM     Lab Results   Component Value Date/Time    SODIUM 140 03/28/2019 07:12 AM    POTASSIUM 4.3 03/28/2019 07:12 AM    CHLORIDE 107 03/28/2019 07:12 AM    CO2 25 03/28/2019 07:12 AM    GLUCOSE 216 (H) 03/28/2019 07:12 AM    BUN 13 03/28/2019 07:12 AM    CREATININE 0.57 03/28/2019 07:12 AM    ALKPHOSPHAT 43 03/28/2019 07:12 AM    ASTSGOT 17 03/28/2019 07:12 AM    ALTSGPT 25 03/28/2019 07:12 AM    TBILIRUBIN 0.9 03/28/2019 07:12 AM     Lab Results   Component Value Date/Time    INR 1.29 (H) 11/06/2009 03:10 PM     Lab Results   Component Value Date/Time    CHOLSTRLTOT 186 03/28/2019 07:12 AM     (H) 03/28/2019 07:12 AM    HDL 50 03/28/2019 07:12 AM    TRIGLYCERIDE 139 03/28/2019 07:12 AM       No results  found for: TESTOSTERONE  No results found for: TSH  No results found for: FREET4  No results found for: URICACID  No components found for: VITB12  No results found for: 25HYDROXY       Assessment/Plan:   The following treatment plan was discussed:     1. Pigmented skin lesion of uncertain nature  Plan to refer to dermatology for further evaluation and possible removal.  - REFERRAL TO DERMATOLOGY    2. Multiple dysplastic nevi  Plan to refer to dermatology for further evaluation and possible removal.  - REFERRAL TO DERMATOLOGY    3. Uncontrolled type 2 diabetes mellitus without complication, without long-term current use of insulin ([facility])  Current medication regimen consists of Glipizide-Metformin 2.5-250 mg twice daily. A1c was 7.6 today.  - Recommended the patient follow a low fat, low carbohydrate and high protein diet. Avoid foods with processed sugars.  - Plan to reevaluate labs 1-2 weeks prior to their next follow up appointment.  - POCT Hemoglobin A1C  - Diabetic Monofilament Lower Extremity Exam  - Comp Metabolic Panel; Future  - Lipid Profile; Future  - CBC WITH DIFFERENTIAL; Future  - HEMOGLOBIN A1C; Future    4. Mixed hyperlipidemia- not at goal on zocor 40 mg- switch to crestor 40 mg/d  Not at goal with current regimen of Zocor. Plan to switch to Crestor 40 mg once daily. Reviewed the risks and benefits of treatment and potential side effects of medication.  - Recommended they follow low fat, low carbohydrate and high fiber diet. Additionally, patient was asked to exercise regularly including frequent cardio.  - Recheck lab 1-2 weeks before next follow up visit.   - rosuvastatin (CRESTOR) 40 MG tablet; Take 1 Tab by mouth every day.  Dispense: 100 Tab; Refill: 4    5. Hypothyroidism due to acquired atrophy of thyroid  Chronic, stable history. Well controlled with current medication of Levothyroxine 75 mcg taken on an empty stomach. Patient understands they must wait at least 30 minutes prior to eating or  drinking coffee after taking the medication.  - Thyroid testing was last completed on her most recent labs revealing a normal TSH of 1.350.  - Plan to repeat labs 1-2 weeks prior to their next follow up appointment.    6. Gastroesophageal reflux disease with esophagitis  Under good control. Continue same regimen.           40 minute face-to-face encounter took place today.  More than half of this time was spent in the coordination of care of the above problems, as well as counseling.      IGita (Scribe), am scribing for, and in the presence of, Jonathan Bobo M.D.    Electronically signed by: Gita Jaime (Scribe), 8/14/2019    IJonathan M.D., personally performed the services described in this documentation, as scribed by Gita Jaime in my presence, and it is both accurate and complete.

## 2019-09-11 ENCOUNTER — APPOINTMENT (OUTPATIENT)
Dept: RADIOLOGY | Facility: MEDICAL CENTER | Age: 79
End: 2019-09-11
Attending: INTERNAL MEDICINE
Payer: MEDICARE

## 2019-09-28 DIAGNOSIS — E78.2 MIXED HYPERLIPIDEMIA: ICD-10-CM

## 2019-09-30 RX ORDER — SIMVASTATIN 40 MG
TABLET ORAL
Qty: 100 TAB | Refills: 4 | Status: SHIPPED
Start: 2019-09-30 | End: 2020-01-29 | Stop reason: CLARIF

## 2019-10-21 ENCOUNTER — HOSPITAL ENCOUNTER (OUTPATIENT)
Dept: RADIOLOGY | Facility: MEDICAL CENTER | Age: 79
End: 2019-10-21
Attending: INTERNAL MEDICINE
Payer: MEDICARE

## 2019-10-21 DIAGNOSIS — Z12.31 SCREENING MAMMOGRAM, ENCOUNTER FOR: ICD-10-CM

## 2019-10-21 PROCEDURE — 77063 BREAST TOMOSYNTHESIS BI: CPT

## 2019-12-22 DIAGNOSIS — E03.4 HYPOTHYROIDISM DUE TO ACQUIRED ATROPHY OF THYROID: ICD-10-CM

## 2019-12-23 RX ORDER — LEVOTHYROXINE SODIUM 0.07 MG/1
TABLET ORAL
Qty: 90 TAB | Refills: 3 | Status: SHIPPED | OUTPATIENT
Start: 2019-12-23 | End: 2020-11-03

## 2019-12-27 ENCOUNTER — HOSPITAL ENCOUNTER (OUTPATIENT)
Dept: LAB | Facility: MEDICAL CENTER | Age: 79
End: 2019-12-27
Attending: INTERNAL MEDICINE
Payer: MEDICARE

## 2019-12-27 LAB
ALBUMIN SERPL BCP-MCNC: 4.2 G/DL (ref 3.2–4.9)
ALBUMIN/GLOB SERPL: 1.7 G/DL
ALP SERPL-CCNC: 38 U/L (ref 30–99)
ALT SERPL-CCNC: 23 U/L (ref 2–50)
ANION GAP SERPL CALC-SCNC: 5 MMOL/L (ref 0–11.9)
AST SERPL-CCNC: 17 U/L (ref 12–45)
BASOPHILS # BLD AUTO: 0.2 % (ref 0–1.8)
BASOPHILS # BLD: 0.01 K/UL (ref 0–0.12)
BILIRUB SERPL-MCNC: 0.6 MG/DL (ref 0.1–1.5)
BUN SERPL-MCNC: 11 MG/DL (ref 8–22)
CALCIUM SERPL-MCNC: 9.1 MG/DL (ref 8.5–10.5)
CHLORIDE SERPL-SCNC: 105 MMOL/L (ref 96–112)
CHOLEST SERPL-MCNC: 153 MG/DL (ref 100–199)
CO2 SERPL-SCNC: 28 MMOL/L (ref 20–33)
CREAT SERPL-MCNC: 0.57 MG/DL (ref 0.5–1.4)
EOSINOPHIL # BLD AUTO: 0.11 K/UL (ref 0–0.51)
EOSINOPHIL NFR BLD: 2.7 % (ref 0–6.9)
ERYTHROCYTE [DISTWIDTH] IN BLOOD BY AUTOMATED COUNT: 38.8 FL (ref 35.9–50)
EST. AVERAGE GLUCOSE BLD GHB EST-MCNC: 200 MG/DL
GLOBULIN SER CALC-MCNC: 2.5 G/DL (ref 1.9–3.5)
GLUCOSE SERPL-MCNC: 214 MG/DL (ref 65–99)
HBA1C MFR BLD: 8.6 % (ref 0–5.6)
HCT VFR BLD AUTO: 41.1 % (ref 37–47)
HDLC SERPL-MCNC: 48 MG/DL
HGB BLD-MCNC: 14 G/DL (ref 12–16)
IMM GRANULOCYTES # BLD AUTO: 0.01 K/UL (ref 0–0.11)
IMM GRANULOCYTES NFR BLD AUTO: 0.2 % (ref 0–0.9)
LDLC SERPL CALC-MCNC: 74 MG/DL
LYMPHOCYTES # BLD AUTO: 1.76 K/UL (ref 1–4.8)
LYMPHOCYTES NFR BLD: 42.7 % (ref 22–41)
MCH RBC QN AUTO: 30.1 PG (ref 27–33)
MCHC RBC AUTO-ENTMCNC: 34.1 G/DL (ref 33.6–35)
MCV RBC AUTO: 88.4 FL (ref 81.4–97.8)
MONOCYTES # BLD AUTO: 0.3 K/UL (ref 0–0.85)
MONOCYTES NFR BLD AUTO: 7.3 % (ref 0–13.4)
NEUTROPHILS # BLD AUTO: 1.93 K/UL (ref 2–7.15)
NEUTROPHILS NFR BLD: 46.9 % (ref 44–72)
NRBC # BLD AUTO: 0 K/UL
NRBC BLD-RTO: 0 /100 WBC
PLATELET # BLD AUTO: 161 K/UL (ref 164–446)
PMV BLD AUTO: 9.9 FL (ref 9–12.9)
POTASSIUM SERPL-SCNC: 4.7 MMOL/L (ref 3.6–5.5)
PROT SERPL-MCNC: 6.7 G/DL (ref 6–8.2)
RBC # BLD AUTO: 4.65 M/UL (ref 4.2–5.4)
SODIUM SERPL-SCNC: 138 MMOL/L (ref 135–145)
TRIGL SERPL-MCNC: 155 MG/DL (ref 0–149)
WBC # BLD AUTO: 4.1 K/UL (ref 4.8–10.8)

## 2019-12-27 PROCEDURE — 83036 HEMOGLOBIN GLYCOSYLATED A1C: CPT

## 2019-12-27 PROCEDURE — 80053 COMPREHEN METABOLIC PANEL: CPT

## 2019-12-27 PROCEDURE — 36415 COLL VENOUS BLD VENIPUNCTURE: CPT

## 2019-12-27 PROCEDURE — 85025 COMPLETE CBC W/AUTO DIFF WBC: CPT

## 2019-12-27 PROCEDURE — 80061 LIPID PANEL: CPT

## 2020-01-28 ENCOUNTER — TELEPHONE (OUTPATIENT)
Dept: MEDICAL GROUP | Age: 80
End: 2020-01-28

## 2020-01-28 NOTE — TELEPHONE ENCOUNTER
ESTABLISHED PATIENT PRE-VISIT PLANNING     Patient was NOT contacted to complete PVP.     Note: Patient will not be contacted if there is no indication to call.     1.  Reviewed notes from the last few office visits within the medical group: Yes    2.  If any orders were placed at last visit or intended to be done for this visit (i.e. 6 mos follow-up), do we have Results/Consult Notes?        •  Labs - Labs ordered, completed on 12/27/19 and results are in chart.   Note: If patient appointment is for lab review and patient did not complete labs, check with provider if OK to reschedule patient until labs completed.       •  Imaging - Imaging ordered, completed and results are in chart.       •  Referrals - Referral ordered, patient has NOT been seen.    3. Is this appointment scheduled as a Hospital Follow-Up? No    4.  Immunizations were updated in Epic using WebIZ?: Epic matches WebIZ       •  Web Iz Recommendations: HEPATITIS B and SHINGRIX (Shingles)    5.  Patient is due for the following Health Maintenance Topics:   Health Maintenance Due   Topic Date Due   • IMM HEP B VACCINE (1 of 3 - Risk 3-dose series) 08[birth year]30/1960   • IMM ZOSTER VACCINES (2 of 3) 01/14/2013   • RETINAL SCREENING  07/31/2019   • Annual Wellness Visit  09/26/2019           6. Orders for overdue Health Maintenance topics pended in Pre-Charting? N\A    7.  AHA (MDX) form printed for Provider? YES    8.  Patient was NOT informed to arrive 15 min prior to their scheduled appointment and bring in their medication bottles.

## 2020-01-29 NOTE — PROGRESS NOTES
"RN-CDE Note  Annual Health Assessment Questions:    1.  Are you currently engaging in any exercise or physical activity? No    2.  How would you describe your mood or emotional well-being today? good    3.  Have you had any falls in the last year? No    4.  Have you noticed any problems with your balance or had difficulty walking? No    5.  In the last six months have you experienced any leakage of urine? No    6. DPA/Advanced Directive: Patient has Advanced Directive on file.        Subjective:   Mavis is a 79 year old female with type 2 diabetes, here for follow up  Health changes since last visit/interval Hx: None    Medications (including changes made today)  Current Outpatient Medications   Medication Sig Dispense Refill   • levothyroxine (SYNTHROID) 75 MCG Tab TAKE ONE TABLET BY MOUTH DAILY 90 Tab 3   • rosuvastatin (CRESTOR) 40 MG tablet Take 1 Tab by mouth every day. 100 Tab 4   • cyclobenzaprine (FLEXERIL) 10 MG Tab Take 1 Tab by mouth 3 times a day as needed for Mild Pain. 30 Tab 1   • glipizide-metformin (METAGLIP) 2.5-250 MG per tablet Take 1 Tab by mouth 2 times a day, with meals. 180 Tab 4   • cimetidine (TAGAMET) 400 MG Tab TAKE ONE TABLET BY MOUTH DAILY AS NEEDED.(GENERIC FOR TAGAMET) 90 Tab 4   • vitamin E (VITAMIN E) 1000 UNIT Cap Take 1 Cap by mouth every day.     • Omega-3 Fatty Acids (FISH OIL PO) Take 1 Cap by mouth every day.       No current facility-administered medications for this visit.        Taking daily ASA: No  Taking above medications as prescribed: no  did not take diabetes medication for about a month, back to using now  SIDE EFFECTS: Patient denies side effects to medications    Exercise: no regular exercise, sedentary  Diet: \"healthy\" diet  in general  Patient's body mass index is unknown because there is no height or weight on file. Exercise and nutrition counseling were performed at this visit.      Health Maintenance:   Health Maintenance Due   Topic Date Due   • IMM HEP B " VACCINE (1 of 3 - Risk 3-dose series) 08/30/1960   • IMM ZOSTER VACCINES (2 of 3) 01/14/2013   • RETINAL SCREENING  07/31/2019   • Annual Wellness Visit  09/26/2019       DM:   Last A1c:   Lab Results   Component Value Date/Time    HBA1C 8.6 (H) 12/27/2019 07:22 AM      A1C GOAL: < 7.5    Glucose monitoring frequency: not testing    Hypoglycemic episodes: no    Last Retinal Exam: will send request for records to Dr. Chapman  Daily Foot Exam: Yes , denies problems.     Lab Results   Component Value Date/Time    MALBCRT 28 03/28/2019 07:12 AM    MICROALBUR 10.8 03/28/2019 07:12 AM        ACR Albumin/Creatinine Ratio goal <30     HTN:   Blood pressure goal <140/<80 .   Currently Rx ACE/ARB: No    Dyslipidemia:    Lab Results   Component Value Date/Time    CHOLSTRLTOT 153 12/27/2019 07:22 AM    LDL 74 12/27/2019 07:22 AM    HDL 48 12/27/2019 07:22 AM    TRIGLYCERIDE 155 (H) 12/27/2019 07:22 AM       Lab Results   Component Value Date/Time    SODIUM 138 12/27/2019 07:22 AM    POTASSIUM 4.7 12/27/2019 07:22 AM    CHLORIDE 105 12/27/2019 07:22 AM    CO2 28 12/27/2019 07:22 AM    GLUCOSE 214 (H) 12/27/2019 07:22 AM    BUN 11 12/27/2019 07:22 AM    CREATININE 0.57 12/27/2019 07:22 AM     Lab Results   Component Value Date/Time    ALKPHOSPHAT 38 12/27/2019 07:22 AM    ASTSGOT 17 12/27/2019 07:22 AM    ALTSGPT 23 12/27/2019 07:22 AM    TBILIRUBIN 0.6 12/27/2019 07:22 AM        Currently Rx Statin: Yes    She  reports that she has never smoked. She has never used smokeless tobacco.    Objective:     Exam:  Monofilament: not done    Plan:     Discussed and educated on:   - All medications, side effects and compliance (discussed carefully)  - Annual eye examinations at Ophthalmology  - Foot Care: what to look for when checking feet every day  - HbA1C: target  - Weight control and daily exercise    Recommended medication changes: none at this time.

## 2020-01-30 ENCOUNTER — OFFICE VISIT (OUTPATIENT)
Dept: MEDICAL GROUP | Age: 80
End: 2020-01-30
Payer: MEDICARE

## 2020-01-30 VITALS
SYSTOLIC BLOOD PRESSURE: 110 MMHG | HEART RATE: 59 BPM | OXYGEN SATURATION: 95 % | TEMPERATURE: 98.2 F | DIASTOLIC BLOOD PRESSURE: 64 MMHG | HEIGHT: 66 IN | BODY MASS INDEX: 28.93 KG/M2 | WEIGHT: 180 LBS

## 2020-01-30 DIAGNOSIS — M62.830 SPASM OF BACK MUSCLES: ICD-10-CM

## 2020-01-30 DIAGNOSIS — K21.9 GASTROESOPHAGEAL REFLUX DISEASE WITHOUT ESOPHAGITIS: ICD-10-CM

## 2020-01-30 DIAGNOSIS — E03.4 HYPOTHYROIDISM DUE TO ACQUIRED ATROPHY OF THYROID: ICD-10-CM

## 2020-01-30 DIAGNOSIS — E78.2 MIXED HYPERLIPIDEMIA: ICD-10-CM

## 2020-01-30 PROCEDURE — 8041 PR SCP AHA: Performed by: INTERNAL MEDICINE

## 2020-01-30 PROCEDURE — 99214 OFFICE O/P EST MOD 30 MIN: CPT | Performed by: INTERNAL MEDICINE

## 2020-01-30 RX ORDER — CIMETIDINE 400 MG/1
TABLET, FILM COATED ORAL
Qty: 90 TAB | Refills: 4 | Status: SHIPPED | OUTPATIENT
Start: 2020-01-30 | End: 2021-07-07

## 2020-01-30 RX ORDER — CYCLOBENZAPRINE HCL 10 MG
10 TABLET ORAL 3 TIMES DAILY PRN
Qty: 30 TAB | Refills: 1 | Status: ON HOLD | OUTPATIENT
Start: 2020-01-30 | End: 2021-04-14 | Stop reason: SDUPTHER

## 2020-01-30 NOTE — LETTER
[facility]  Jonathan Bobo M.D.  [address]  [place] [address]  Fax: [phone]   Authorization for Release/Disclosure of   Protected Health Information   Name: MAVIS TAYLOR : 1940 SSN: [id number]   Address: [address] [place] [address] Phone:    [phone] (home)    I authorize the entity listed below to release/disclose the PHI below to:   [facility]/Jonathan Bobo M.D. and Jonathan Bobo M.D.   Provider or Entity Name:    Dr. Chapman   Address   City, State, Zip   Phone:      Fax:  [phone]   Reason for request: continuity of care   Information to be released:    [  ] LAST COLONOSCOPY,  including any PATH REPORT and follow-up  [  ] LAST FIT/COLOGUARD RESULT [  ] LAST DEXA  [  ] LAST MAMMOGRAM  [  ] LAST PAP  [  ] LAST LABS [ xxx ] RETINA EXAM REPORT  [  ] IMMUNIZATION RECORDS  [  ] Release all info      [  ] Check here and initial the line next to each item to release ALL health information INCLUDING  _____ Care and treatment for drug and / or alcohol abuse  _____ HIV testing, infection status, or AIDS  _____ Genetic Testing    DATES OF SERVICE OR TIME PERIOD TO BE DISCLOSED: _____________  I understand and acknowledge that:  * This Authorization may be revoked at any time by you in writing, except if your health information has already been used or disclosed.  * Your health information that will be used or disclosed as a result of you signing this authorization could be re-disclosed by the recipient. If this occurs, your re-disclosed health information may no longer be protected by State or Federal laws.  * You may refuse to sign this Authorization. Your refusal will not affect your ability to obtain treatment.  * This Authorization becomes effective upon signing and will  on (date) __________.      If no date is indicated, this Authorization will  one (1) year from the signature date.    Name: Mavis Taylor    Signature:   Date:     2020            PLEASE FAX REQUESTED RECORDS BACK TO: [phone]

## 2020-01-30 NOTE — PROGRESS NOTES
Subjective:      Mavis Lizarraga is a 79 y.o. female who presents with Follow-Up and Diabetes Mellitus        HPI    The patient is here to visit the diabetic nurse for her type II diabetes. Refer to diabetic RN note. She is also here for followup of chronic medical problems listed below. The patient is compliant with medications and having no side effects from them. Denies chest pain, abdominal pain, dyspnea, myalgias, or cough.    Patient states she was recently on a cruise and had forgotten to take her medications during that time which may reflect on her recent blood work. However, she states that she is currently feeling good without any complications. Review of lab work shows that results are within normal limits.     Patient Active Problem List   Diagnosis   • Mixed hyperlipidemia   • Hypothyroidism due to acquired atrophy of thyroid   • Gastroesophageal reflux disease with esophagitis   • Uncontrolled type 2 diabetes mellitus without complication, without long-term current use of insulin (HCC)   • Spasm of back muscles       Outpatient Medications Prior to Visit   Medication Sig Dispense Refill   • levothyroxine (SYNTHROID) 75 MCG Tab TAKE ONE TABLET BY MOUTH DAILY 90 Tab 3   • rosuvastatin (CRESTOR) 40 MG tablet Take 1 Tab by mouth every day. 100 Tab 4   • glipizide-metformin (METAGLIP) 2.5-250 MG per tablet Take 1 Tab by mouth 2 times a day, with meals. 180 Tab 4   • vitamin E (VITAMIN E) 1000 UNIT Cap Take 1 Cap by mouth every day.     • Omega-3 Fatty Acids (FISH OIL PO) Take 1 Cap by mouth every day.     • simvastatin (ZOCOR) 40 MG Tab TAKE ONE TABLET BY MOUTH EVERY EVENING 100 Tab 4   • cyclobenzaprine (FLEXERIL) 10 MG Tab Take 1 Tab by mouth 3 times a day as needed for Mild Pain. 30 Tab 1   • cimetidine (TAGAMET) 400 MG Tab TAKE ONE TABLET BY MOUTH DAILY AS NEEDED.(GENERIC FOR TAGAMET) 90 Tab 4     No facility-administered medications prior to visit.         Allergies   Allergen Reactions   • Amaryl  "[Glimepiride]      drowsiness   • Metformin Diarrhea     NAUSEA VOMIITG AND DIARRHEA   • Morphine      Hallucinations       Review of Systems   All other systems reviewed and are negative.       Objective:     /64 (BP Location: Left arm, Patient Position: Sitting, BP Cuff Size: Adult)   Pulse (!) 59   Temp 36.8 °C (98.2 °F) (Temporal)   Ht 1.676 m (5' 6\")   Wt 81.6 kg (180 lb)   LMP  (LMP Unknown)   SpO2 95%   BMI 29.05 kg/m²     Physical Exam   Constitutional: Oriented to person, place, and time. Appears well-developed and well-nourished. No distress.   Head: Normocephalic and atraumatic.   Right Ear: External ear normal.   Left Ear: External ear normal.   Nose: Nose normal.   Mouth/Throat: Oropharynx is clear and moist. No oropharyngeal exudate.   Eyes: Pupils are equal, round, and reactive to light. Conjunctivae and EOM are normal. Right eye exhibits no discharge. Left eye exhibits no discharge. No scleral icterus.   Neck: Normal range of motion. Neck supple. No JVD present. No tracheal deviation present. No thyromegaly present.   Cardiovascular: Normal rate, regular rhythm, normal heart sounds and intact distal pulses.  Exam reveals no gallop and no friction rub.    No murmur heard.  Pulmonary/Chest: Effort normal. No stridor. No respiratory distress. No wheezing or rales. No tenderness.   Abdominal: Soft. Bowel sounds are normal. No distension and no mass. There is no tenderness. There is no rebound and no guarding. No hernia.   Musculoskeletal: Normal range of motion No edema or tenderness.   Lymphadenopathy: No cervical adenopathy.   Neurological: Alert and oriented to person, place, and time. Normal reflexes. Normal reflexes. No cranial nerve deficit. Normal muscle tone. Coordination normal.   Skin: Skin is warm and dry. No rash noted. Not diaphoretic. No erythema. No pallor.   Psychiatric: Normal mood and affect. Behavior is normal. Judgment and thought content normal.   Nursing note and vitals " reviewed.      Lab Results   Component Value Date/Time    HBA1C 8.6 (H) 12/27/2019 07:22 AM    HBA1C 7.6 (A) 08/14/2019 02:00 PM     Lab Results   Component Value Date/Time    SODIUM 138 12/27/2019 07:22 AM    POTASSIUM 4.7 12/27/2019 07:22 AM    CHLORIDE 105 12/27/2019 07:22 AM    CO2 28 12/27/2019 07:22 AM    GLUCOSE 214 (H) 12/27/2019 07:22 AM    BUN 11 12/27/2019 07:22 AM    CREATININE 0.57 12/27/2019 07:22 AM    ALKPHOSPHAT 38 12/27/2019 07:22 AM    ASTSGOT 17 12/27/2019 07:22 AM    ALTSGPT 23 12/27/2019 07:22 AM    TBILIRUBIN 0.6 12/27/2019 07:22 AM     Lab Results   Component Value Date/Time    INR 1.29 (H) 11/06/2009 03:10 PM     Lab Results   Component Value Date/Time    CHOLSTRLTOT 153 12/27/2019 07:22 AM    LDL 74 12/27/2019 07:22 AM    HDL 48 12/27/2019 07:22 AM    TRIGLYCERIDE 155 (H) 12/27/2019 07:22 AM       No results found for: TESTOSTERONE  No results found for: TSH  No results found for: FREET4  No results found for: URICACID  No components found for: VITB12  No results found for: 25HYDROXY       Assessment/Plan:     1. Spasm of back muscles  - Under good control. Continue same regimen.   - cyclobenzaprine (FLEXERIL) 10 MG Tab; Take 1 Tab by mouth 3 times a day as needed for Mild Pain.  Dispense: 30 Tab; Refill: 1    2. Gastroesophageal reflux disease without esophagitis  - Under good control. Continue same regimen.   - cimetidine (TAGAMET) 400 MG Tab; TAKE ONE TABLET BY MOUTH DAILY AS NEEDED.(GENERIC FOR TAGAMET)  Dispense: 90 Tab; Refill: 4    3. Uncontrolled type 2 diabetes mellitus without complication, without long-term current use of insulin (HCC)  - Under good control. Continue same regimen. Refer to diabetic RN note.     4. Hypothyroidism due to acquired atrophy of thyroid  - Under good control. Continue same regimen.     5. Mixed hyperlipidemia  - Under good control. Continue same regimen.         Sachi SETHI (Scribe), am scribing for, and in the presence of, Jonathan Bobo  M.D..    Electronically signed by: Sachi Vale (Scribe), 1/30/2020    IJonathan M.D., personally performed the services described in this documentation, as scribed by Sachi Vale in my presence, and it is both accurate and complete.

## 2020-04-17 DIAGNOSIS — G89.29 CHRONIC MIDLINE LOW BACK PAIN WITHOUT SCIATICA: ICD-10-CM

## 2020-04-17 DIAGNOSIS — M54.50 CHRONIC MIDLINE LOW BACK PAIN WITHOUT SCIATICA: ICD-10-CM

## 2020-04-17 RX ORDER — MELOXICAM 15 MG/1
TABLET ORAL
Qty: 90 TAB | Refills: 3 | Status: SHIPPED | OUTPATIENT
Start: 2020-04-17 | End: 2021-04-28 | Stop reason: SDUPTHER

## 2020-04-26 DIAGNOSIS — E78.2 MIXED HYPERLIPIDEMIA: ICD-10-CM

## 2020-04-27 RX ORDER — GLIPIZIDE AND METFORMIN HCL 2.5; 25 MG/1; MG/1
TABLET, FILM COATED ORAL
Qty: 200 TAB | Refills: 3 | Status: ON HOLD
Start: 2020-04-27 | End: 2021-04-14

## 2020-08-13 DIAGNOSIS — E78.2 MIXED HYPERLIPIDEMIA: ICD-10-CM

## 2020-08-13 RX ORDER — ROSUVASTATIN CALCIUM 40 MG/1
TABLET, COATED ORAL
Qty: 100 TAB | Refills: 3 | Status: SHIPPED | OUTPATIENT
Start: 2020-08-13 | End: 2021-04-28 | Stop reason: SDUPTHER

## 2020-08-26 ENCOUNTER — NON-PROVIDER VISIT (OUTPATIENT)
Dept: MEDICAL GROUP | Age: 80
End: 2020-08-26
Payer: MEDICARE

## 2020-08-26 ENCOUNTER — TELEPHONE (OUTPATIENT)
Dept: MEDICAL GROUP | Age: 80
End: 2020-08-26

## 2020-08-26 DIAGNOSIS — Z23 NEED FOR VACCINATION: ICD-10-CM

## 2020-08-26 PROCEDURE — 90746 HEPB VACCINE 3 DOSE ADULT IM: CPT | Performed by: INTERNAL MEDICINE

## 2020-08-26 PROCEDURE — 90472 IMMUNIZATION ADMIN EACH ADD: CPT | Performed by: INTERNAL MEDICINE

## 2020-08-26 PROCEDURE — 90750 HZV VACC RECOMBINANT IM: CPT | Performed by: INTERNAL MEDICINE

## 2020-08-26 PROCEDURE — G0010 ADMIN HEPATITIS B VACCINE: HCPCS | Performed by: INTERNAL MEDICINE

## 2020-08-26 NOTE — PROGRESS NOTES
"Mavis Lizarraga is a 79 y.o. female here for a non-provider visit for:   HEPATITIS B 1 of 3  SHINGRIX (Shingles)    Reason for immunization: Overdue/Provider Recommended  Immunization records indicate need for vaccine: Yes, confirmed with Epic  Minimum interval has been met for this vaccine: Yes  ABN completed: Not Indicated    Order and dose verified by: AS  VIS Dated  8/15/19 and 10/30/19 was given to patient: Yes  All IAC Questionnaire questions were answered \"No.\"    Patient tolerated injection and no adverse effects were observed or reported: Yes    Pt scheduled for next dose in series: Not Indicated    "

## 2020-10-09 ENCOUNTER — TELEPHONE (OUTPATIENT)
Dept: MEDICAL GROUP | Age: 80
End: 2020-10-09

## 2020-10-09 NOTE — TELEPHONE ENCOUNTER
ESTABLISHED PATIENT PRE-VISIT PLANNING     Patient was NOT contacted to complete PVP.     Note: Patient will not be contacted if there is no indication to call.     1.  Reviewed notes from the last few office visits within the medical group: Yes    2.  If any orders were placed at last visit or intended to be done for this visit (i.e. 6 mos follow-up), do we have Results/Consult Notes?        •  Labs - Labs were not ordered at last office visit.   Note: If patient appointment is for lab review and patient did not complete labs, check with provider if OK to reschedule patient until labs completed.       •  Imaging - Imaging was not ordered at last office visit.       •  Referrals - No referrals were ordered at last office visit.    3. Is this appointment scheduled as a Hospital Follow-Up? No    4.  Immunizations were updated in Epic using WebIZ?: Epic matches WebIZ       •  Web Iz Recommendations: FLU, HEPATITIS B and SHINGRIX (Shingles)    5.  Patient is due for the following Health Maintenance Topics:   Health Maintenance Due   Topic Date Due   • Annual Wellness Visit  09/26/2019   • URINE ACR / MICROALBUMIN  03/28/2020   • A1C SCREENING  06/27/2020   • DIABETES MONOFILAMENT / LE EXAM  08/14/2020   • IMM INFLUENZA (1) 09/01/2020   • IMM HEP B VACCINE (2 of 3 - Risk 3-dose series) 09/23/2020   • RETINAL SCREENING  10/16/2020   • IMM ZOSTER VACCINES (3 of 3) 10/21/2020           6. Orders for overdue Health Maintenance topics pended in Pre-Charting? N\A    7.  AHA (MDX) form printed for Provider? No, already completed    8.  Patient was NOT informed to arrive 15 min prior to their scheduled appointment and bring in their medication bottles.

## 2020-11-16 NOTE — PROGRESS NOTES
1. Attempt #:Final    2. [facility] Verified: yes    3. Verify PCP: yes    4. Review Care Team: yes    5.  Reviewed/Updated the following with patient:       •   Communication Preference Obtained? YES       •   Preferred Pharmacy? YES       •   Preferred Lab? YES       •   Family History (document living status of immediate family members and if + hx of cancer, diabetes, hypertension, hyperlipidemia, heart attack, stroke) NO    7. Annual Wellness Visit Scheduling  · Scheduling Status:Scheduled     8. Care Gap Scheduling (Attempt to Schedule EACH Overdue Care Gap!)     Health Maintenance Due   Topic Date Due   • Annual Wellness Visit  09/26/2019   • URINE ACR / MICROALBUMIN  03/28/2020   • A1C SCREENING  06/27/2020   • DIABETES MONOFILAMENT / LE EXAM  08/14/2020   • IMM INFLUENZA (1) 09/01/2020   • IMM HEP B VACCINE (2 of 3 - Risk 3-dose series) 09/23/2020   • RETINAL SCREENING  10/16/2020   • IMM ZOSTER VACCINES (3 of 3) 10/21/2020      9. Code[employer] Activation: already active    10. Code[employer] Garrick: no    11. Virtual Visits: no    12. Opt In to Text Messages: no    13. Patient was advised: “This is a free wellness visit. The provider will screen for medical conditions to help you stay healthy. If you have other concerns to address you may be asked to discuss these at a separate visit or there may be an additional fee.”     14. Patient was informed to arrive 15 min prior to their scheduled appointment and bring in their medication bottles.

## 2021-01-08 DIAGNOSIS — Z23 NEED FOR VACCINATION: ICD-10-CM

## 2021-01-21 ENCOUNTER — IMMUNIZATION (OUTPATIENT)
Dept: FAMILY PLANNING/WOMEN'S HEALTH CLINIC | Facility: IMMUNIZATION CENTER | Age: 81
End: 2021-01-21
Attending: INTERNAL MEDICINE
Payer: MEDICARE

## 2021-01-21 DIAGNOSIS — Z23 ENCOUNTER FOR VACCINATION: Primary | ICD-10-CM

## 2021-01-21 DIAGNOSIS — Z23 NEED FOR VACCINATION: ICD-10-CM

## 2021-01-21 PROCEDURE — 0001A PFIZER SARS-COV-2 VACCINE: CPT | Performed by: NURSE PRACTITIONER

## 2021-01-21 PROCEDURE — 91300 PFIZER SARS-COV-2 VACCINE: CPT | Performed by: NURSE PRACTITIONER

## 2021-02-11 ENCOUNTER — IMMUNIZATION (OUTPATIENT)
Dept: FAMILY PLANNING/WOMEN'S HEALTH CLINIC | Facility: IMMUNIZATION CENTER | Age: 81
End: 2021-02-11
Attending: INTERNAL MEDICINE
Payer: MEDICARE

## 2021-02-11 DIAGNOSIS — Z23 ENCOUNTER FOR VACCINATION: Primary | ICD-10-CM

## 2021-02-11 PROCEDURE — 91300 PFIZER SARS-COV-2 VACCINE: CPT

## 2021-02-11 PROCEDURE — 0002A PFIZER SARS-COV-2 VACCINE: CPT

## 2021-03-18 ENCOUNTER — APPOINTMENT (OUTPATIENT)
Dept: RADIOLOGY | Facility: IMAGING CENTER | Age: 81
End: 2021-03-18
Attending: NURSE PRACTITIONER
Payer: MEDICARE

## 2021-03-18 ENCOUNTER — OFFICE VISIT (OUTPATIENT)
Dept: URGENT CARE | Facility: CLINIC | Age: 81
End: 2021-03-18
Payer: MEDICARE

## 2021-03-18 VITALS
DIASTOLIC BLOOD PRESSURE: 84 MMHG | WEIGHT: 180 LBS | TEMPERATURE: 98.9 F | BODY MASS INDEX: 28.93 KG/M2 | HEIGHT: 66 IN | OXYGEN SATURATION: 95 % | HEART RATE: 72 BPM | SYSTOLIC BLOOD PRESSURE: 132 MMHG | RESPIRATION RATE: 16 BRPM

## 2021-03-18 DIAGNOSIS — S49.92XA ARM INJURY, LEFT, INITIAL ENCOUNTER: ICD-10-CM

## 2021-03-18 DIAGNOSIS — S52.124A CLOSED NONDISPLACED FRACTURE OF HEAD OF RIGHT RADIUS, INITIAL ENCOUNTER: ICD-10-CM

## 2021-03-18 PROCEDURE — 73080 X-RAY EXAM OF ELBOW: CPT | Mod: TC,FY,RT | Performed by: NURSE PRACTITIONER

## 2021-03-18 PROCEDURE — 73090 X-RAY EXAM OF FOREARM: CPT | Mod: TC,FY,RT | Performed by: NURSE PRACTITIONER

## 2021-03-18 PROCEDURE — 73060 X-RAY EXAM OF HUMERUS: CPT | Mod: TC,FY,RT | Performed by: NURSE PRACTITIONER

## 2021-03-18 PROCEDURE — 99203 OFFICE O/P NEW LOW 30 MIN: CPT | Performed by: NURSE PRACTITIONER

## 2021-03-18 RX ORDER — MULTIVIT-MIN/IRON/FOLIC ACID/K 18-600-40
CAPSULE ORAL
COMMUNITY
End: 2021-04-08

## 2021-03-18 ASSESSMENT — FIBROSIS 4 INDEX: FIB4 SCORE: 1.74

## 2021-03-18 NOTE — PROGRESS NOTES
Subjective:      Mavis Lizarraga is a 80 y.o. female who presents with Arm Injury (fell on her rt arm yesteray )    Past Medical History:   Diagnosis Date   • Anesthesia     poor tolerence to morphine   • Arthritis     back   • Backpain    • Breast cancer (HCC) 1980s    right   • Diverticulosis    • DVT of deep femoral vein (HCC)     leg   • Heart burn    • High cholesterol    • Pneumonia Feb 2006   • Thyroid condition    • Ulcer      Social History     Socioeconomic History   • Marital status:      Spouse name: Not on file   • Number of children: Not on file   • Years of education: Not on file   • Highest education level: Not on file   Occupational History   • Not on file   Tobacco Use   • Smoking status: Never Smoker   • Smokeless tobacco: Never Used   Substance and Sexual Activity   • Alcohol use: Yes     Comment: very rarely on ocassion will have a Sravani   • Drug use: No   • Sexual activity: Yes     Partners: Male   Other Topics Concern   • Not on file   Social History Narrative   • Not on file     Social Determinants of Health     Financial Resource Strain:    • Difficulty of Paying Living Expenses:    Food Insecurity:    • Worried About Running Out of Food in the Last Year:    • Ran Out of Food in the Last Year:    Transportation Needs:    • Lack of Transportation (Medical):    • Lack of Transportation (Non-Medical):    Physical Activity:    • Days of Exercise per Week:    • Minutes of Exercise per Session:    Stress:    • Feeling of Stress :    Social Connections:    • Frequency of Communication with Friends and Family:    • Frequency of Social Gatherings with Friends and Family:    • Attends [employer] Services:    • Active Member of Clubs or Organizations:    • Attends Club or Organization Meetings:    • Marital Status:    Intimate Partner Violence:    • Fear of Current or Ex-Partner:    • Emotionally Abused:    • Physically Abused:    • Sexually Abused:      Family History   Problem Relation  "Age of Onset   • Heart Disease Mother 64   • Hypertension Mother    • Diabetes Mother    • Stroke Mother    • Cancer Mother         Stomach cancer   • Heart Disease Maternal Grandmother 63        heart attack       Allergies: Amaryl [glimepiride], Metformin, and Morphine    Patient is a 80-year-old female who presents today with complaint throughout the right upper arm, elbow, and right forearm.  States yesterday she tripped and fell in her garage.  States she landed on her right arm with her arm underneath her.  States she is unable to fully extend her elbow.  When she attempts to do this, states she has pain in both the upper and lower arm.  She has noted no decreased range of motion in her shoulder.  No other injuries.          Arm Injury  This is a new problem. The current episode started yesterday. The problem occurs constantly. The problem has been unchanged. Nothing aggravates the symptoms. She has tried nothing for the symptoms. The treatment provided no relief.       Review of Systems   Musculoskeletal:        Right arm injury     All other systems reviewed and are negative.         Objective:     /84   Pulse 72   Temp 37.2 °C (98.9 °F) (Temporal)   Resp 16   Ht 1.676 m (5' 6\")   Wt 81.6 kg (180 lb)   LMP  (LMP Unknown)   SpO2 95%   BMI 29.05 kg/m²      Physical Exam  Vitals reviewed.   Constitutional:       Appearance: Normal appearance.   Musculoskeletal:        Arms:       Comments: No obvious discoloration or soft tissue swelling.  Full range of motion in the shoulder.  Patient has full range of motion with flexion in the right elbow, however experiences pain in the elbow, upper arm, and lower arm with full extension.  Patient is not able to fully extend.   Skin:     General: Skin is warm and dry.   Neurological:      General: No focal deficit present.      Mental Status: She is alert.   Psychiatric:         Mood and Affect: Mood normal.         Behavior: Behavior normal.         Thought " Content: Thought content normal.         Judgment: Judgment normal.       XR humerus:    IMPRESSION:     Mildly impacted radial head fracture.     Elbow joint effusion.     Degenerative changes at the acromioclavicular and glenohumeral joints..     Calcification adjacent to the greater tuberosity can be seen in calcific tendinopathy.    XR elbow:    IMPRESSION:     Mildly impacted radial head fracture.     Elbow joint effusion.     Demineralization.    XR forearm:     3/18/2021 12:09 PM     HISTORY/REASON FOR EXAM:  Pain/Deformity Following Trauma        TECHNIQUE/EXAM DESCRIPTION AND NUMBER OF VIEWS:  2 views of the right forearm.     COMPARISON:  None     FINDINGS:  There is a mildly impacted radial head fracture. No dislocation is seen. Bones are demineralized. There is spurring of the olecranon.        IMPRESSION:     Mildly impacted radial head fracture.     Minimal spurring of the olecranon.     Demineralization.          Assessment/Plan:        1. Arm injury, left, initial encounter  2. Right radial head fracture    Sling placed  Referral to orthopedics;patient will follow up with [facility]  Ice as needed  Tylenol as needed.

## 2021-04-08 ENCOUNTER — APPOINTMENT (OUTPATIENT)
Dept: RADIOLOGY | Facility: MEDICAL CENTER | Age: 81
End: 2021-04-08
Attending: EMERGENCY MEDICINE
Payer: MEDICARE

## 2021-04-08 ENCOUNTER — HOSPITAL ENCOUNTER (OUTPATIENT)
Facility: MEDICAL CENTER | Age: 81
End: 2021-04-11
Attending: EMERGENCY MEDICINE | Admitting: INTERNAL MEDICINE
Payer: MEDICARE
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
## 2021-04-08 DIAGNOSIS — R42 DIZZINESS: ICD-10-CM

## 2021-04-08 DIAGNOSIS — R29.898 RIGHT LEG WEAKNESS: ICD-10-CM

## 2021-04-08 LAB
ABO + RH BLD: NORMAL
ABO GROUP BLD: NORMAL
ALBUMIN SERPL BCP-MCNC: 4.5 G/DL (ref 3.2–4.9)
ALBUMIN/GLOB SERPL: 1.6 G/DL
ALP SERPL-CCNC: 92 U/L (ref 30–99)
ALT SERPL-CCNC: 17 U/L (ref 2–50)
ANION GAP SERPL CALC-SCNC: 9 MMOL/L (ref 7–16)
APTT PPP: 25.1 SEC (ref 24.7–36)
AST SERPL-CCNC: 16 U/L (ref 12–45)
BASOPHILS # BLD AUTO: 0.4 % (ref 0–1.8)
BASOPHILS # BLD: 0.02 K/UL (ref 0–0.12)
BILIRUB SERPL-MCNC: 0.8 MG/DL (ref 0.1–1.5)
BLD GP AB SCN SERPL QL: NORMAL
BUN SERPL-MCNC: 10 MG/DL (ref 8–22)
CALCIUM SERPL-MCNC: 9.3 MG/DL (ref 8.4–10.2)
CHLORIDE SERPL-SCNC: 100 MMOL/L (ref 96–112)
CK SERPL-CCNC: 47 U/L (ref 0–154)
CO2 SERPL-SCNC: 28 MMOL/L (ref 20–33)
CREAT SERPL-MCNC: 0.61 MG/DL (ref 0.5–1.4)
CRP SERPL HS-MCNC: <0.3 MG/DL (ref 0–0.75)
EKG IMPRESSION: NORMAL
EOSINOPHIL # BLD AUTO: 0.07 K/UL (ref 0–0.51)
EOSINOPHIL NFR BLD: 1.4 % (ref 0–6.9)
ERYTHROCYTE [DISTWIDTH] IN BLOOD BY AUTOMATED COUNT: 39.2 FL (ref 35.9–50)
ERYTHROCYTE [SEDIMENTATION RATE] IN BLOOD BY WESTERGREN METHOD: 5 MM/HOUR (ref 0–30)
EST. AVERAGE GLUCOSE BLD GHB EST-MCNC: 266 MG/DL
GLOBULIN SER CALC-MCNC: 2.9 G/DL (ref 1.9–3.5)
GLUCOSE SERPL-MCNC: 265 MG/DL (ref 65–99)
HBA1C MFR BLD: 10.9 % (ref 4–5.6)
HCT VFR BLD AUTO: 43.1 % (ref 37–47)
HGB BLD-MCNC: 14.6 G/DL (ref 12–16)
IMM GRANULOCYTES # BLD AUTO: 0.01 K/UL (ref 0–0.11)
IMM GRANULOCYTES NFR BLD AUTO: 0.2 % (ref 0–0.9)
INR PPP: 0.99 (ref 0.87–1.13)
LYMPHOCYTES # BLD AUTO: 1.86 K/UL (ref 1–4.8)
LYMPHOCYTES NFR BLD: 36.4 % (ref 22–41)
MCH RBC QN AUTO: 29.7 PG (ref 27–33)
MCHC RBC AUTO-ENTMCNC: 33.9 G/DL (ref 33.6–35)
MCV RBC AUTO: 87.6 FL (ref 81.4–97.8)
MONOCYTES # BLD AUTO: 0.32 K/UL (ref 0–0.85)
MONOCYTES NFR BLD AUTO: 6.3 % (ref 0–13.4)
NEUTROPHILS # BLD AUTO: 2.83 K/UL (ref 2–7.15)
NEUTROPHILS NFR BLD: 55.3 % (ref 44–72)
NRBC # BLD AUTO: 0 K/UL
NRBC BLD-RTO: 0 /100 WBC
PLATELET # BLD AUTO: 173 K/UL (ref 164–446)
PMV BLD AUTO: 9.5 FL (ref 9–12.9)
POTASSIUM SERPL-SCNC: 4.2 MMOL/L (ref 3.6–5.5)
PROT SERPL-MCNC: 7.4 G/DL (ref 6–8.2)
PROTHROMBIN TIME: 12.8 SEC (ref 12–14.6)
RBC # BLD AUTO: 4.92 M/UL (ref 4.2–5.4)
RH BLD: NORMAL
SARS-COV-2 RNA RESP QL NAA+PROBE: NOTDETECTED
SODIUM SERPL-SCNC: 137 MMOL/L (ref 135–145)
SPECIMEN SOURCE: NORMAL
TROPONIN T SERPL-MCNC: 8 NG/L (ref 6–19)
TSH SERPL DL<=0.005 MIU/L-ACNC: 1.21 UIU/ML (ref 0.38–5.33)
WBC # BLD AUTO: 5.1 K/UL (ref 4.8–10.8)

## 2021-04-08 PROCEDURE — 82607 VITAMIN B-12: CPT

## 2021-04-08 PROCEDURE — 80053 COMPREHEN METABOLIC PANEL: CPT

## 2021-04-08 PROCEDURE — 71045 X-RAY EXAM CHEST 1 VIEW: CPT

## 2021-04-08 PROCEDURE — 70498 CT ANGIOGRAPHY NECK: CPT

## 2021-04-08 PROCEDURE — 70496 CT ANGIOGRAPHY HEAD: CPT

## 2021-04-08 PROCEDURE — 82550 ASSAY OF CK (CPK): CPT

## 2021-04-08 PROCEDURE — 84443 ASSAY THYROID STIM HORMONE: CPT

## 2021-04-08 PROCEDURE — 86901 BLOOD TYPING SEROLOGIC RH(D): CPT

## 2021-04-08 PROCEDURE — 96375 TX/PRO/DX INJ NEW DRUG ADDON: CPT

## 2021-04-08 PROCEDURE — 96372 THER/PROPH/DIAG INJ SC/IM: CPT

## 2021-04-08 PROCEDURE — 85025 COMPLETE CBC W/AUTO DIFF WBC: CPT

## 2021-04-08 PROCEDURE — 85610 PROTHROMBIN TIME: CPT

## 2021-04-08 PROCEDURE — 86900 BLOOD TYPING SEROLOGIC ABO: CPT

## 2021-04-08 PROCEDURE — 0042T CT-CEREBRAL PERFUSION ANALYSIS: CPT

## 2021-04-08 PROCEDURE — 93971 EXTREMITY STUDY: CPT | Mod: RT

## 2021-04-08 PROCEDURE — 99285 EMERGENCY DEPT VISIT HI MDM: CPT

## 2021-04-08 PROCEDURE — 700102 HCHG RX REV CODE 250 W/ 637 OVERRIDE(OP): Performed by: INTERNAL MEDICINE

## 2021-04-08 PROCEDURE — 83036 HEMOGLOBIN GLYCOSYLATED A1C: CPT

## 2021-04-08 PROCEDURE — 700111 HCHG RX REV CODE 636 W/ 250 OVERRIDE (IP): Performed by: EMERGENCY MEDICINE

## 2021-04-08 PROCEDURE — 85730 THROMBOPLASTIN TIME PARTIAL: CPT

## 2021-04-08 PROCEDURE — 99219 PR INITIAL OBSERVATION CARE,LEVL II: CPT | Performed by: INTERNAL MEDICINE

## 2021-04-08 PROCEDURE — 84484 ASSAY OF TROPONIN QUANT: CPT

## 2021-04-08 PROCEDURE — 86140 C-REACTIVE PROTEIN: CPT

## 2021-04-08 PROCEDURE — 86780 TREPONEMA PALLIDUM: CPT

## 2021-04-08 PROCEDURE — U0003 INFECTIOUS AGENT DETECTION BY NUCLEIC ACID (DNA OR RNA); SEVERE ACUTE RESPIRATORY SYNDROME CORONAVIRUS 2 (SARS-COV-2) (CORONAVIRUS DISEASE [COVID-19]), AMPLIFIED PROBE TECHNIQUE, MAKING USE OF HIGH THROUGHPUT TECHNOLOGIES AS DESCRIBED BY CMS-2020-01-R: HCPCS

## 2021-04-08 PROCEDURE — 93971 EXTREMITY STUDY: CPT | Mod: 26 | Performed by: INTERNAL MEDICINE

## 2021-04-08 PROCEDURE — 85652 RBC SED RATE AUTOMATED: CPT

## 2021-04-08 PROCEDURE — 700111 HCHG RX REV CODE 636 W/ 250 OVERRIDE (IP): Performed by: INTERNAL MEDICINE

## 2021-04-08 PROCEDURE — 93005 ELECTROCARDIOGRAM TRACING: CPT | Performed by: EMERGENCY MEDICINE

## 2021-04-08 PROCEDURE — G0378 HOSPITAL OBSERVATION PER HR: HCPCS

## 2021-04-08 PROCEDURE — 96374 THER/PROPH/DIAG INJ IV PUSH: CPT

## 2021-04-08 PROCEDURE — U0005 INFEC AGEN DETEC AMPLI PROBE: HCPCS

## 2021-04-08 PROCEDURE — 700117 HCHG RX CONTRAST REV CODE 255: Performed by: EMERGENCY MEDICINE

## 2021-04-08 PROCEDURE — A9270 NON-COVERED ITEM OR SERVICE: HCPCS | Performed by: INTERNAL MEDICINE

## 2021-04-08 PROCEDURE — 73521 X-RAY EXAM HIPS BI 2 VIEWS: CPT

## 2021-04-08 PROCEDURE — 70450 CT HEAD/BRAIN W/O DYE: CPT | Mod: MG

## 2021-04-08 PROCEDURE — 86850 RBC ANTIBODY SCREEN: CPT

## 2021-04-08 RX ORDER — BISACODYL 10 MG
10 SUPPOSITORY, RECTAL RECTAL
Status: DISCONTINUED | OUTPATIENT
Start: 2021-04-08 | End: 2021-04-11 | Stop reason: HOSPADM

## 2021-04-08 RX ORDER — LEVOTHYROXINE SODIUM 0.07 MG/1
75 TABLET ORAL
Status: DISCONTINUED | OUTPATIENT
Start: 2021-04-08 | End: 2021-04-11 | Stop reason: HOSPADM

## 2021-04-08 RX ORDER — ACETAMINOPHEN 325 MG/1
650 TABLET ORAL EVERY 6 HOURS PRN
Status: DISCONTINUED | OUTPATIENT
Start: 2021-04-08 | End: 2021-04-11 | Stop reason: HOSPADM

## 2021-04-08 RX ORDER — MULTIVIT WITH MINERALS/LUTEIN
1000 TABLET ORAL EVERY MORNING
COMMUNITY

## 2021-04-08 RX ORDER — OXYCODONE HYDROCHLORIDE 5 MG/1
5 TABLET ORAL EVERY 4 HOURS PRN
Status: DISCONTINUED | OUTPATIENT
Start: 2021-04-08 | End: 2021-04-11 | Stop reason: HOSPADM

## 2021-04-08 RX ORDER — LIDOCAINE 50 MG/G
1 PATCH TOPICAL DAILY
Status: DISCONTINUED | OUTPATIENT
Start: 2021-04-08 | End: 2021-04-11 | Stop reason: HOSPADM

## 2021-04-08 RX ORDER — ROSUVASTATIN CALCIUM 10 MG/1
10 TABLET, COATED ORAL EVERY EVENING
Status: DISCONTINUED | OUTPATIENT
Start: 2021-04-09 | End: 2021-04-11 | Stop reason: HOSPADM

## 2021-04-08 RX ORDER — ROSUVASTATIN CALCIUM 10 MG/1
10 TABLET, COATED ORAL EVERY EVENING
Status: DISCONTINUED | OUTPATIENT
Start: 2021-04-08 | End: 2021-04-08

## 2021-04-08 RX ORDER — CYCLOBENZAPRINE HCL 10 MG
10 TABLET ORAL 3 TIMES DAILY PRN
Status: DISCONTINUED | OUTPATIENT
Start: 2021-04-08 | End: 2021-04-11 | Stop reason: HOSPADM

## 2021-04-08 RX ORDER — SODIUM CHLORIDE 9 MG/ML
INJECTION, SOLUTION INTRAVENOUS CONTINUOUS
Status: DISCONTINUED | OUTPATIENT
Start: 2021-04-08 | End: 2021-04-08

## 2021-04-08 RX ORDER — ONDANSETRON 2 MG/ML
4 INJECTION INTRAMUSCULAR; INTRAVENOUS EVERY 4 HOURS PRN
Status: DISCONTINUED | OUTPATIENT
Start: 2021-04-08 | End: 2021-04-11 | Stop reason: HOSPADM

## 2021-04-08 RX ORDER — ONDANSETRON 4 MG/1
4 TABLET, ORALLY DISINTEGRATING ORAL EVERY 4 HOURS PRN
Status: DISCONTINUED | OUTPATIENT
Start: 2021-04-08 | End: 2021-04-11 | Stop reason: HOSPADM

## 2021-04-08 RX ORDER — AMOXICILLIN 250 MG
2 CAPSULE ORAL 2 TIMES DAILY
Status: DISCONTINUED | OUTPATIENT
Start: 2021-04-08 | End: 2021-04-11 | Stop reason: HOSPADM

## 2021-04-08 RX ORDER — POLYETHYLENE GLYCOL 3350 17 G/17G
1 POWDER, FOR SOLUTION ORAL
Status: DISCONTINUED | OUTPATIENT
Start: 2021-04-08 | End: 2021-04-11 | Stop reason: HOSPADM

## 2021-04-08 RX ADMIN — ACETAMINOPHEN 650 MG: 325 TABLET, FILM COATED ORAL at 18:14

## 2021-04-08 RX ADMIN — IOHEXOL 140 ML: 350 INJECTION, SOLUTION INTRAVENOUS at 06:10

## 2021-04-08 RX ADMIN — OXYCODONE HYDROCHLORIDE 5 MG: 5 TABLET ORAL at 08:47

## 2021-04-08 RX ADMIN — OXYCODONE HYDROCHLORIDE 5 MG: 5 TABLET ORAL at 13:20

## 2021-04-08 RX ADMIN — OXYCODONE HYDROCHLORIDE 5 MG: 5 TABLET ORAL at 19:40

## 2021-04-08 RX ADMIN — ENOXAPARIN SODIUM 40 MG: 40 INJECTION SUBCUTANEOUS at 10:06

## 2021-04-08 RX ADMIN — LEVOTHYROXINE SODIUM 75 MCG: 0.07 TABLET ORAL at 10:06

## 2021-04-08 RX ADMIN — FENTANYL CITRATE 50 MCG: 50 INJECTION, SOLUTION INTRAMUSCULAR; INTRAVENOUS at 06:57

## 2021-04-08 RX ADMIN — CYCLOBENZAPRINE 10 MG: 10 TABLET, FILM COATED ORAL at 13:20

## 2021-04-08 ASSESSMENT — PATIENT HEALTH QUESTIONNAIRE - PHQ9
SUM OF ALL RESPONSES TO PHQ9 QUESTIONS 1 AND 2: 0
1. LITTLE INTEREST OR PLEASURE IN DOING THINGS: NOT AT ALL
2. FEELING DOWN, DEPRESSED, IRRITABLE, OR HOPELESS: NOT AT ALL

## 2021-04-08 ASSESSMENT — LIFESTYLE VARIABLES
HAVE YOU EVER FELT YOU SHOULD CUT DOWN ON YOUR DRINKING: NO
CONSUMPTION TOTAL: NEGATIVE
ALCOHOL_USE: NO
EVER HAD A DRINK FIRST THING IN THE MORNING TO STEADY YOUR NERVES TO GET RID OF A HANGOVER: NO
TOTAL SCORE: 0
HAVE PEOPLE ANNOYED YOU BY CRITICIZING YOUR DRINKING: NO
TOTAL SCORE: 0
AVERAGE NUMBER OF DAYS PER WEEK YOU HAVE A DRINK CONTAINING ALCOHOL: 0
TOTAL SCORE: 0
EVER FELT BAD OR GUILTY ABOUT YOUR DRINKING: NO
ON A TYPICAL DAY WHEN YOU DRINK ALCOHOL HOW MANY DRINKS DO YOU HAVE: 0
HOW MANY TIMES IN THE PAST YEAR HAVE YOU HAD 5 OR MORE DRINKS IN A DAY: 0

## 2021-04-08 ASSESSMENT — COGNITIVE AND FUNCTIONAL STATUS - GENERAL
SUGGESTED CMS G CODE MODIFIER DAILY ACTIVITY: CH
SUGGESTED CMS G CODE MODIFIER MOBILITY: CH
MOBILITY SCORE: 24
DAILY ACTIVITIY SCORE: 24

## 2021-04-08 ASSESSMENT — ENCOUNTER SYMPTOMS
SEIZURES: 0
EYES NEGATIVE: 1
GASTROINTESTINAL NEGATIVE: 1
CARDIOVASCULAR NEGATIVE: 1
FEVER: 0
TINGLING: 0
MYALGIAS: 0
NECK PAIN: 0
PSYCHIATRIC NEGATIVE: 1
SENSORY CHANGE: 0
LOSS OF CONSCIOUSNESS: 0
CHILLS: 0
FOCAL WEAKNESS: 1
TREMORS: 0
SPEECH CHANGE: 0
WEAKNESS: 1
FALLS: 0
DIZZINESS: 0

## 2021-04-08 ASSESSMENT — PAIN SCALES - WONG BAKER
WONGBAKER_NUMERICALRESPONSE: HURTS A LITTLE MORE
WONGBAKER_NUMERICALRESPONSE: HURTS A WHOLE LOT

## 2021-04-08 ASSESSMENT — PAIN DESCRIPTION - PAIN TYPE
TYPE: ACUTE PAIN

## 2021-04-08 ASSESSMENT — FIBROSIS 4 INDEX: FIB4 SCORE: 1.74

## 2021-04-08 NOTE — ED PROVIDER NOTES
ED Provider Note    CHIEF COMPLAINT  Chief Complaint   Patient presents with   • Fall   • Dizziness       HPI  Mavis Lizarraga is a 80 y.o. female who presents with a chief complaint of bilateral lower extremity weakness and fall.  Patient notes she was in her baseline state of health when she went to bed last night at 9:30 PM.  She awoke this morning around 4:00 AM with significant bilateral lower extremity pain from the waist down to both feet.  She noted that she was having difficulty trying to get out of the bed to use the restroom but ultimately was able to shuffle out of the bed.  Unfortunately, when she attempted to stand it was as if her legs were not there.  She fell forward to the ground landing on her right arm and striking her head.  She does not think she lost consciousness.  She is not anticoagulated.  She was unable to get up from the ground on her own and ultimately her  was able to assist her to a standing position.  She walked into the living room area and was able to stretch and extend her legs.  Her  made coffee but she did not drink it because she was feeling dizzy and lightheaded.  She finally decided to come to the ER and was able to ambulate to their vehicle without difficulty.  She had no difficulty speaking or swallowing.  No fevers or chills, chest pain or shortness of breath, nausea, vomiting, diarrhea, constipation.    REVIEW OF SYSTEMS  See HPI for further details.  Bilateral lower extremity weakness.  Fall.  Dizziness.  Lightheadedness.  Leg pain.  All other systems are negative.     PAST MEDICAL HISTORY   has a past medical history of Anesthesia, Arthritis, Backpain, Breast cancer (HCC) (1980s), Diverticulosis, DVT of deep femoral vein ([facility]), Heart burn, High cholesterol, Pneumonia (Feb 2006), Thyroid condition, and Ulcer.    SOCIAL HISTORY  Social History     Tobacco Use   • Smoking status: Never Smoker   • Smokeless tobacco: Never Used   Substance and Sexual  "Activity   • Alcohol use: Yes     Comment: very rarely on ocassion will have a Sravani   • Drug use: No   • Sexual activity: Yes     Partners: Male       SURGICAL HISTORY   has a past surgical history that includes other abdominal surgery; other; low anterior resection laparoscopic (2/10/2010); breast reduction; chemotherapy, unspecified procedure; breast reconstruction (8/14/2012); breast implant revision (8/14/2012); capsulectomy (8/14/2012); mastopexy (8/14/2012); liposuction (8/14/2012); rhytidectomy (8/14/2012); platysmaplasty (8/14/2012); blepharoplasty (8/14/2012); breast augmentation with implant; mastectomy (Right, 1980s); anterior and posterior repair (6/23/2014); bladder sling female (6/23/2014); and colectomy (N/A, 2009).    CURRENT MEDICATIONS  Home Medications     Reviewed by Minerva Guillaume R.N. (Registered Nurse) on 04/08/21 at 0545  Med List Status: Partial   Medication Last Dose Status   cimetidine (TAGAMET) 400 MG Tab  Active   cyclobenzaprine (FLEXERIL) 10 MG Tab  Active   glipizide-metformin (METAGLIP) 2.5-250 MG per tablet 4/7/2021 Active   levothyroxine (SYNTHROID) 75 MCG Tab 4/7/2021 Active   meloxicam (MOBIC) 15 MG tablet  Active   Omega-3 Fatty Acids (FISH OIL PO)  Active   rosuvastatin (CRESTOR) 40 MG tablet 4/7/2021 Active   Vitamin D, Cholecalciferol, 50 MCG (2000 UT) Cap  Active   vitamin E (VITAMIN E) 1000 UNIT Cap  Active                ALLERGIES  Allergies   Allergen Reactions   • Amaryl [Glimepiride]      drowsiness   • Metformin Diarrhea     NAUSEA VOMIITG AND DIARRHEA   • Morphine      Hallucinations       PHYSICAL EXAM  VITAL SIGNS: Pulse 70   Resp 17   Ht 1.702 m (5' 7\")   Wt 79.4 kg (175 lb)   LMP  (LMP Unknown)   SpO2 96%   BMI 27.41 kg/m²     Pulse ox interpretation: I interpret this pulse ox as normal.  Constitutional: Alert in no apparent distress.  HENT: No signs of trauma, Bilateral external ears normal, Nose normal.  Moist mucous membranes.  Eyes: Pupils are equal " and reactive, Conjunctiva normal, Non-icteric.   Neck: Normal range of motion, No tenderness, Supple, No stridor.   Lymphatic: No lymphadenopathy noted.   Cardiovascular: Regular rate and rhythm, no murmurs. Pulses symmetrical.  Thorax & Lungs: Normal breath sounds, No respiratory distress, No wheezing, No chest tenderness.   Abdomen: Bowel sounds normal, Soft, No tenderness, No masses, No pulsatile masses. No peritoneal signs.  Skin: Warm, Dry, No erythema, No rash.   Back: Normal alignment.  Extremities: Intact distal pulses, No edema, No tenderness, No cyanosis.  Musculoskeletal: No major deformities noted.  No tenderness in right upper extremity.  Right upper extremity is warm and well-perfused.  Full range of motion at right shoulder, right elbow, and right wrist.  Tender in the right inguinal region without obvious external trauma.  No shortening/rotation of the right lower extremity.  When hip is isolated patient is easily able to flex and extend at the right knee.  Patient is unable to engage the right hip to lift the right lower extremity off of the stretcher.  Neurologic: Alert and oriented x3, normal speech, normal vision, cranial nerves II through XII are grossly intact, normal finger-to-nose, no pronator drift, normal strength and sensation in bilateral upper extremities, normal sensation bilateral lower extremities, unable to lift right lower extremity off of the bed.  When right lower extremity is passively lifted it falls directly down to the bed.  When right hip is isolated, patient can flex and extend at the knee without difficulty.  Psychiatric: Affect normal, Judgment normal, Mood normal.     DIAGNOSTIC STUDIES / PROCEDURES    LABS  Results for orders placed or performed during the hospital encounter of 04/08/21   CBC WITH DIFFERENTIAL   Result Value Ref Range    WBC 5.1 4.8 - 10.8 K/uL    RBC 4.92 4.20 - 5.40 M/uL    Hemoglobin 14.6 12.0 - 16.0 g/dL    Hematocrit 43.1 37.0 - 47.0 %    MCV 87.6  81.4 - 97.8 fL    MCH 29.7 27.0 - 33.0 pg    MCHC 33.9 33.6 - 35.0 g/dL    RDW 39.2 35.9 - 50.0 fL    Platelet Count 173 164 - 446 K/uL    MPV 9.5 9.0 - 12.9 fL    Neutrophils-Polys 55.30 44.00 - 72.00 %    Lymphocytes 36.40 22.00 - 41.00 %    Monocytes 6.30 0.00 - 13.40 %    Eosinophils 1.40 0.00 - 6.90 %    Basophils 0.40 0.00 - 1.80 %    Immature Granulocytes 0.20 0.00 - 0.90 %    Nucleated RBC 0.00 /100 WBC    Neutrophils (Absolute) 2.83 2.00 - 7.15 K/uL    Lymphs (Absolute) 1.86 1.00 - 4.80 K/uL    Monos (Absolute) 0.32 0.00 - 0.85 K/uL    Eos (Absolute) 0.07 0.00 - 0.51 K/uL    Baso (Absolute) 0.02 0.00 - 0.12 K/uL    Immature Granulocytes (abs) 0.01 0.00 - 0.11 K/uL    NRBC (Absolute) 0.00 K/uL   COMP METABOLIC PANEL   Result Value Ref Range    Sodium 137 135 - 145 mmol/L    Potassium 4.2 3.6 - 5.5 mmol/L    Chloride 100 96 - 112 mmol/L    Co2 28 20 - 33 mmol/L    Anion Gap 9.0 7.0 - 16.0    Glucose 265 (H) 65 - 99 mg/dL    Bun 10 8 - 22 mg/dL    Creatinine 0.61 0.50 - 1.40 mg/dL    Calcium 9.3 8.4 - 10.2 mg/dL    AST(SGOT) 16 12 - 45 U/L    ALT(SGPT) 17 2 - 50 U/L    Alkaline Phosphatase 92 30 - 99 U/L    Total Bilirubin 0.8 0.1 - 1.5 mg/dL    Albumin 4.5 3.2 - 4.9 g/dL    Total Protein 7.4 6.0 - 8.2 g/dL    Globulin 2.9 1.9 - 3.5 g/dL    A-G Ratio 1.6 g/dL   PROTHROMBIN TIME   Result Value Ref Range    PT 12.8 12.0 - 14.6 sec    INR 0.99 0.87 - 1.13   APTT   Result Value Ref Range    APTT 25.1 24.7 - 36.0 sec   COD (ADULT)   Result Value Ref Range    ABO Grouping Only A     Rh Grouping Only NEG     Antibody Screen-Cod NEG    TROPONIN   Result Value Ref Range    Troponin T 8 6 - 19 ng/L   ABO Rh Confirm   Result Value Ref Range    ABO Rh Confirm A NEG    ESTIMATED GFR   Result Value Ref Range    GFR If African American >60 >60 mL/min/1.73 m 2    GFR If Non African American >60 >60 mL/min/1.73 m 2   EKG   Result Value Ref Range    Report       [facility] Emergency Dept.    Test Date:   2021  Pt Name:    JAMAICA TAYLOR               Department: E[facility]  MRN:        [id number]                      [facility]  Gender:     Female                       Technician: [id number]  :        [id number]                   Requested By:BISMARK BAILEY  Order #:    [id number]                    Reading MD: Bismark Bailey MD    Measurements  Intervals                                Axis  Rate:       66                           P:          -32  CO:         199                          QRS:        1  QRSD:       93                           T:          55  QT:         407  QTc:        427    Interpretive Statements  Sinus rhythm  Low voltage, extremity and precordial leads  Compared to ECG 04/15/2014 09:14:26  Low QRS voltage now present  Electronically Signed On 2021 5:57:20 PDT by Bimsark Bailey MD       RADIOLOGY  Chest x-ray  CT head  CTA head/neck  CT perfusion scan    COURSE & MEDICAL DECISION MAKING  Pertinent Labs & Imaging studies reviewed. (See chart for details)  This is a 80-year-old female who was last seen normal at 9:30 PM last night who is here with transient weakness and numbness in her bilateral lower extremities leading to a ground-level fall.  Initially patient was not made a stroke alert as she was outside of the window for TPA but during the exam, after she had ambulated into the emergency department without difficulty, she was once again unable to move her right lower extremity.  Her left lower extremity was able to flex and extend without difficulty.  She had no numbness in the lower extremities at all.  The remainder of her neurologic exam was normal.  Point-of-care blood sugar revealed that the patient was not hypoglycemic.  The patient was sent to the CT scanner and a stroke alert was called.  Thankfully, CT imaging did not reveal acute abnormality.  I spoke with neurology, Dr. Alcala, who felt that this was a more peripheral etiology.  He recommended I try to isolate the patient's  right hip to determine if she can flex and extend at the knee which she was able to do easily.  She does have tenderness in the right groin region and notes a history of DVT during which she had similar symptoms decades ago.  She also had a fall within the past week and was evaluated at an urgent care.  It is possible that her symptoms are musculoskeletal in origin.  Dr. Alcala does not feel that an emergent MRI is necessary.  Patient has a 1+ right dorsalis pedis pulse in the right lower extremity is warm and well-perfused.    DVT ultrasound and x-ray of the pelvis were ordered.  Patient was given a dose of pain medication.    Labs are grossly normal with the exception of blood glucose that is 265.    Chest x-ray reveals atherosclerosis but no acute abnormality.    Again CT imaging of the head and neck as well as perfusion scan were without acute abnormality.    Patient care will be transferred to my colleague, Dr. Patel, pending imaging results.  I anticipate hospitalization for physical therapy given her inability to ambulate.    FINAL IMPRESSION  1.  Dizzy  2.  Ground-level fall  3.  Right hip pain     Electronically signed by: Cyrus Ulloa M.D., 4/8/2021 5:52 AM

## 2021-04-08 NOTE — ED NOTES
Pharmacy Medication Reconciliation      Medication reconciliation updated and complete per pt & pt family at bedside  Allergies have been verified and updated   No oral ABX within the last 14 days  Patient home pharmacy:[facility]

## 2021-04-08 NOTE — ASSESSMENT & PLAN NOTE
Patient takes rosuvastatin 40 mg daily at home(very high dose)  Since the patient does not have any history of heart disease or stroke, >>decrease dose to 10 mg daily  Check CPK

## 2021-04-08 NOTE — ED TRIAGE NOTES
"Pt woke up at 0400 and had to go to bathroom. Pt states she went to get up and \"I had no legs\" and they wouldn't move. Pt reports legs were aching. Pt reports she fell forward and hit forehead. Denies LOC.      helped her get up, she reports she was dizzy then.     Pt reports tripping last week as well and cracking bone in right arm.     Pt not on blood thinners. Pt partner at bedside.     Pt was able to stand and ambulate with steady gait to bed; pt unable to lift RIGHT leg for stoke scale assessment due to RIGHT hip pain.  "

## 2021-04-08 NOTE — PROGRESS NOTES
Received report from ER RN for admit to room 224-2. New admit to floor. Oriented patient to room and bathroom. Pt alert and oriented. Ambulated with assist by 2 for stability. Tolerated well. Will continue to monitor.

## 2021-04-08 NOTE — ASSESSMENT & PLAN NOTE
History of breast cancer was treated more than  30 years ago by surgery  She has been stable since

## 2021-04-08 NOTE — H&P
Hospital Medicine History & Physical Note    Date of Service  4/8/2021    Primary Care Physician  Jonathan Bobo M.D.    Consultants  None     Code Status  Full Code    Chief Complaint  Chief Complaint   Patient presents with   • Fall   • Dizziness       History of Presenting Illness    80-year-old female with history of breast cancer, dyslipidemia, diabetes, DVT and hypothyroidism presented 4/8 with lower extremity weakness, patient woke up today with lower extremity weakness and pain on around her hips area, the weakness on the left side was improved however she was not able to move her right leg with pain, no sharp pain and no numbness or tingling and her sensation were normal, denied any back pain, no urinary or bowel symptoms, denied any weakness or numbness on her arms, no headache, denied any chest pain or shortness of breath, on admission labs did not show any acute finding, CTA and CT for head did not show any acute stroke or bleeding, x-ray for hips did not show any fractures.       Review of Systems  Review of Systems   Constitutional: Negative for chills, fever and malaise/fatigue.   HENT: Negative.    Eyes: Negative.    Cardiovascular: Negative.    Gastrointestinal: Negative.    Genitourinary: Negative.    Musculoskeletal: Positive for joint pain. Negative for falls, myalgias and neck pain.   Skin: Negative.    Neurological: Positive for focal weakness and weakness. Negative for dizziness, tingling, tremors, sensory change, speech change, seizures and loss of consciousness.   Psychiatric/Behavioral: Negative.        Past Medical History   has a past medical history of Anesthesia, Arthritis, Backpain, Breast cancer (HCC) (1980s), Diverticulosis, DVT of deep femoral vein (HCC), Heart burn, High cholesterol, Pneumonia (Feb 2006), Thyroid condition, and Ulcer.    Surgical History   has a past surgical history that includes other abdominal surgery; other; low anterior resection laparoscopic (2/10/2010); pr  breast reduction; pr chemotherapy, unspecified procedure; breast reconstruction (8/14/2012); breast implant revision (8/14/2012); capsulectomy (8/14/2012); mastopexy (8/14/2012); liposuction (8/14/2012); rhytidectomy (8/14/2012); platysmaplasty (8/14/2012); blepharoplasty (8/14/2012); pr breast augmentation with implant; mastectomy (Right, 1980s); anterior and posterior repair (6/23/2014); bladder sling female (6/23/2014); and colectomy (N/A, 2009).     Family History  family history includes Cancer in her mother; Diabetes in her mother; Heart Disease (age of onset: 63) in her maternal grandmother; Heart Disease (age of onset: 64) in her mother; Hypertension in her mother; Stroke in her mother.     Social History   reports that she has never smoked. She has never used smokeless tobacco. She reports current alcohol use. She reports that she does not use drugs.    Allergies  Allergies   Allergen Reactions   • Glimepiride Unspecified     drowsiness   • Metformin Diarrhea and Nausea     NAUSEA VOMIITG AND DIARRHEA   • Morphine Unspecified     Hallucinations       Medications  Prior to Admission Medications   Prescriptions Last Dose Informant Patient Reported? Taking?   Omega-3 Fatty Acids (FISH OIL PO)  Patient Yes No   Sig: Take 1 Cap by mouth every day.   Vitamin D, Cholecalciferol, 50 MCG (2000 UT) Cap   Yes No   Sig: Take  by mouth.   cimetidine (TAGAMET) 400 MG Tab   No No   Sig: TAKE ONE TABLET BY MOUTH DAILY AS NEEDED.(GENERIC FOR TAGAMET)   cyclobenzaprine (FLEXERIL) 10 MG Tab   No No   Sig: Take 1 Tab by mouth 3 times a day as needed for Mild Pain.   glipizide-metformin (METAGLIP) 2.5-250 MG per tablet 4/7/2021 at Unknown time  No No   Sig: TAKE ONE TABLET BY MOUTH TWICE A DAY WITH MEALS   levothyroxine (SYNTHROID) 75 MCG Tab 4/7/2021 at Unknown time  No No   Sig: TAKE ONE TABLET BY MOUTH DAILY (SYNTHROID)   meloxicam (MOBIC) 15 MG tablet   No No   Sig: TAKE ONE TABLET BY MOUTH DAILY   rosuvastatin (CRESTOR) 40  MG tablet 4/7/2021 at Unknown time  No No   Sig: TAKE ONE TABLET BY MOUTH DAILY (REPLACES SIMVASTATIN)   vitamin E (VITAMIN E) 1000 UNIT Cap   Yes No   Sig: Take 1 Cap by mouth every day.      Facility-Administered Medications: None       Physical Exam  Temp:  [36.1 °C (97 °F)-36.9 °C (98.4 °F)] 36.9 °C (98.4 °F)  Pulse:  [63-73] 67  Resp:  [9-18] 17  BP: (123-150)/(53-62) 149/55  SpO2:  [93 %-99 %] 94 %    Physical Exam  Constitutional:       Appearance: She is not ill-appearing.   HENT:      Head: Normocephalic and atraumatic.   Eyes:      General: No scleral icterus.  Cardiovascular:      Rate and Rhythm: Normal rate.      Heart sounds: Murmur present.   Pulmonary:      Effort: Pulmonary effort is normal. No respiratory distress.      Breath sounds: No rales.   Abdominal:      General: Abdomen is flat. Bowel sounds are normal. There is no distension.      Tenderness: There is no abdominal tenderness. There is no guarding.   Musculoskeletal:         General: No swelling or deformity.      Right lower leg: No edema.      Left lower leg: No edema.   Skin:     General: Skin is warm.      Coloration: Skin is not jaundiced.      Findings: No bruising or rash.   Neurological:      Mental Status: She is alert and oriented to person, place, and time.      Cranial Nerves: Cranial nerves are intact.      Sensory: No sensory deficit.      Motor: Weakness present.      Coordination: Coordination normal.      Gait: Gait abnormal.      Deep Tendon Reflexes: Babinski sign absent on the right side. Babinski sign absent on the left side.      Reflex Scores:       Patellar reflexes are 2+ on the right side and 2+ on the left side.       Achilles reflexes are 2+ on the right side and 2+ on the left side.     Comments: Weakness on the right leg 2-3/5   Weakness on the left leg 4/5  Reflexes around normal  No weakness on upper extremities         Laboratory:  Recent Labs     04/08/21  0540   WBC 5.1   RBC 4.92   HEMOGLOBIN 14.6    HEMATOCRIT 43.1   MCV 87.6   MCH 29.7   MCHC 33.9   RDW 39.2   PLATELETCT 173   MPV 9.5     Recent Labs     04/08/21  0540   SODIUM 137   POTASSIUM 4.2   CHLORIDE 100   CO2 28   GLUCOSE 265*   BUN 10   CREATININE 0.61   CALCIUM 9.3     Recent Labs     04/08/21  0540   ALTSGPT 17   ASTSGOT 16   ALKPHOSPHAT 92   TBILIRUBIN 0.8   GLUCOSE 265*     Recent Labs     04/08/21  0540   APTT 25.1   INR 0.99     No results for input(s): NTPROBNP in the last 72 hours.      Recent Labs     04/08/21  0540   TROPONINT 8       Imaging:  US-EXTREMITY VENOUS LOWER UNILAT RIGHT   Final Result      DX-HIP-BILATERAL-WITH PELVIS-2 VIEWS   Final Result         1.  No radiographic evidence of acute traumatic injury.      CT-CTA HEAD WITH & W/O-POST PROCESS   Final Result         1.  No large vessel occlusion or aneurysm is identified.      CT-CTA NECK WITH & W/O-POST PROCESSING   Final Result         1.  No high-grade stenosis, occlusion, aneurysm, or dissection identified.         CT-CEREBRAL PERFUSION ANALYSIS   Final Result         1.  Cerebral blood flow less than 30% likely representing completed infarct = 0 mL.      2.  T Max more than 6 seconds likely representing combination of completed infarct and ischemia = 0 mL.      3.  Mismatched volume likely representing ischemic brain/penumbra = None      4.  Please note that the cerebral perfusion was performed on the limited brain tissue around the basal ganglia region. Infarct/ischemia outside the CT perfusion sections can be missed in this study.      DX-CHEST-PORTABLE (1 VIEW)   Final Result         1.  No acute cardiopulmonary disease.   2.  Atherosclerosis      CT-HEAD W/O   Final Result         1.  No acute intracranial abnormality.   2.  Atherosclerosis.      MR-HIP-W/O RIGHT    (Results Pending)         Assessment/Plan:  I anticipate this patient is appropriate for observation status at this time.    * Lower extremity weakness  Assessment & Plan  Happened suddenly  morning,  right more than the left with pain around her hip area   no numbness or losing sensation, and no urinary or bowel symptoms  CTA for head did not show any ischemia or stroke  X-ray did not show any fracture around hips area  Could be related to osteoarthritis and pain causing her weakness  Inflammation markers ESR CRP and CPK are normal  MRI for right hip  PT and OT  Consider neurology consult if needed            Uncontrolled type 2 diabetes mellitus without complication, without long-term current use of insulin  Assessment & Plan  Last A1c in 2019 was 8  Patient does not take her medications due to side effects(glipizide and Metformin)  Check A1c  SSI    Mixed hyperlipidemia- (present on admission)  Assessment & Plan  Patient takes rosuvastatin 40 mg daily at home(very high dose)  Since the patient does not have any history of heart disease or stroke, >>decrease dose to 10 mg daily  Check CPK    Breast cancer (HCC)  Assessment & Plan  History of breast cancer was treated more than  30 years ago by surgery  She has been stable since    Hypothyroidism due to acquired atrophy of thyroid- (present on admission)  Assessment & Plan  Continue levothyroxine 75 mcg daily  Check TSH        Interventions to be considered in all patients in order to minimize the risk of delirium.   -do not disturb patient (vitals or lab draws) between the hours of 10 PM and 6 AM.  -ideally the patient should not sleep during the day and we should avoid day time naps.   -up in chair for meals  -ambulate at least three times daily, as able  -watch for constipation  -timed voiding - ask patient is she would like to go to the bathroom q 2-3 hours, except during the do not disturb hours.   -remove all necessary lines (central lines, peripheral IVs, feeding tubes, savage catheters)  -unless patient has shown harm to self or others I would recommend against use of restraints - either chemical or physical (antipsychotics)   -minimize polypharmacy, do  not dose medication during sleep hours

## 2021-04-08 NOTE — ED NOTES
Report to [facility]. Pt remains on monitor. Spouse at bedside. Pt reports she has not felt effects of pain medication yet.

## 2021-04-08 NOTE — DIETARY
Nutrition Services:    Presence of wound or hx of wound noted on Nutrition Admit Screen. Per nursing's progress not addressing skin check, pt's skin is intact. Pt is currently on a regular diet and per chart,  pt's PO was 50-75% at lunch today. Ht: 170.2 cm, Wt: 79.4 kg (stand up scale), BMI 27.41 (overweight).  Consult RD as needed. RD will re-screen weekly.      RD available prn

## 2021-04-08 NOTE — ASSESSMENT & PLAN NOTE
Happened suddenly  morning, right more than the left with pain around her hip area   no numbness or losing sensation, and no urinary or bowel symptoms  CTA for head did not show any ischemia or stroke  X-ray did not show any fracture around hips area  Inflammation markers ESR CRP and CPK are normal    MRI for right hip  showed Fractures of the right superior and inferior pubic rami.    Consulted orthopedics Dr. 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 I talked to neurosurgeon Dr. Marin, who reviewed the image, don't think the neurologic deficits is related to the mass. no surgery indication at this point. No need for steroids.     MRI brain and thoracic w and wo contrast to complete the workup for metastasis  Dr. Young reviewed the imaging, discussed with neurosurgeon Dr. Marin regarding severe cervical narrowing and cord compression.     PT and OT  neurology consulted  Oncology consulted  Neurosurgeon consulted     Patient will be Transferred to [facility] for radiation oncology consult

## 2021-04-08 NOTE — PROGRESS NOTES
Assessed patient for any skin issues or impairments. All bony prominences observed by 2 RN check. All skin areas are intact, warm, pink, and show adequate turgor. Will continue to monitor.

## 2021-04-08 NOTE — ED NOTES
This Rn unable to draw off existing US placed IV. IV still flushing and working. Lab said they would draw requested repeat purple when pt is upstairs.

## 2021-04-08 NOTE — ASSESSMENT & PLAN NOTE
A1c 10.9  Patient does not take her medications due to side effects(glipizide and Metformin)    BG high over 200  Start on Lantus 10 units  SSI

## 2021-04-09 ENCOUNTER — APPOINTMENT (OUTPATIENT)
Dept: RADIOLOGY | Facility: MEDICAL CENTER | Age: 81
End: 2021-04-09
Attending: INTERNAL MEDICINE
Payer: MEDICARE

## 2021-04-09 ENCOUNTER — APPOINTMENT (OUTPATIENT)
Dept: RADIOLOGY | Facility: MEDICAL CENTER | Age: 81
End: 2021-04-09
Attending: PSYCHIATRY & NEUROLOGY
Payer: MEDICARE

## 2021-04-09 ENCOUNTER — APPOINTMENT (OUTPATIENT)
Dept: RADIOLOGY | Facility: MEDICAL CENTER | Age: 81
End: 2021-04-09
Attending: STUDENT IN AN ORGANIZED HEALTH CARE EDUCATION/TRAINING PROGRAM
Payer: MEDICARE

## 2021-04-09 PROBLEM — S32.599A PUBIC RAMUS FRACTURE (HCC): Status: ACTIVE | Noted: 2021-04-09

## 2021-04-09 LAB
AMMONIA PLAS-SCNC: 21 UMOL/L (ref 11–45)
ANION GAP SERPL CALC-SCNC: 12 MMOL/L (ref 7–16)
BASOPHILS # BLD AUTO: 0.2 % (ref 0–1.8)
BASOPHILS # BLD: 0.01 K/UL (ref 0–0.12)
BUN SERPL-MCNC: 11 MG/DL (ref 8–22)
CALCIUM SERPL-MCNC: 8.9 MG/DL (ref 8.4–10.2)
CHLORIDE SERPL-SCNC: 100 MMOL/L (ref 96–112)
CO2 SERPL-SCNC: 25 MMOL/L (ref 20–33)
CREAT SERPL-MCNC: 0.47 MG/DL (ref 0.5–1.4)
EOSINOPHIL # BLD AUTO: 0.07 K/UL (ref 0–0.51)
EOSINOPHIL NFR BLD: 1.3 % (ref 0–6.9)
ERYTHROCYTE [DISTWIDTH] IN BLOOD BY AUTOMATED COUNT: 39.4 FL (ref 35.9–50)
EST. AVERAGE GLUCOSE BLD GHB EST-MCNC: 266 MG/DL
GLUCOSE BLD-MCNC: 219 MG/DL (ref 65–99)
GLUCOSE BLD-MCNC: 268 MG/DL (ref 65–99)
GLUCOSE SERPL-MCNC: 218 MG/DL (ref 65–99)
HBA1C MFR BLD: 10.9 % (ref 4–5.6)
HCT VFR BLD AUTO: 40 % (ref 37–47)
HGB BLD-MCNC: 13.5 G/DL (ref 12–16)
IMM GRANULOCYTES # BLD AUTO: 0.01 K/UL (ref 0–0.11)
IMM GRANULOCYTES NFR BLD AUTO: 0.2 % (ref 0–0.9)
LYMPHOCYTES # BLD AUTO: 1.86 K/UL (ref 1–4.8)
LYMPHOCYTES NFR BLD: 34.5 % (ref 22–41)
MAGNESIUM SERPL-MCNC: 1.8 MG/DL (ref 1.5–2.5)
MCH RBC QN AUTO: 29.9 PG (ref 27–33)
MCHC RBC AUTO-ENTMCNC: 33.8 G/DL (ref 33.6–35)
MCV RBC AUTO: 88.5 FL (ref 81.4–97.8)
MONOCYTES # BLD AUTO: 0.46 K/UL (ref 0–0.85)
MONOCYTES NFR BLD AUTO: 8.5 % (ref 0–13.4)
NEUTROPHILS # BLD AUTO: 2.98 K/UL (ref 2–7.15)
NEUTROPHILS NFR BLD: 55.3 % (ref 44–72)
NRBC # BLD AUTO: 0 K/UL
NRBC BLD-RTO: 0 /100 WBC
PLATELET # BLD AUTO: 157 K/UL (ref 164–446)
PMV BLD AUTO: 9.7 FL (ref 9–12.9)
POTASSIUM SERPL-SCNC: 4.2 MMOL/L (ref 3.6–5.5)
RBC # BLD AUTO: 4.52 M/UL (ref 4.2–5.4)
SODIUM SERPL-SCNC: 137 MMOL/L (ref 135–145)
TREPONEMA PALLIDUM IGG+IGM AB [PRESENCE] IN SERUM OR PLASMA BY IMMUNOASSAY: NORMAL
TSH SERPL DL<=0.005 MIU/L-ACNC: 0.52 UIU/ML (ref 0.38–5.33)
VIT B12 SERPL-MCNC: 1277 PG/ML (ref 211–911)
WBC # BLD AUTO: 5.4 K/UL (ref 4.8–10.8)

## 2021-04-09 PROCEDURE — 99226 PR SUBSEQUENT OBSERVATION CARE,LEVEL III: CPT | Performed by: STUDENT IN AN ORGANIZED HEALTH CARE EDUCATION/TRAINING PROGRAM

## 2021-04-09 PROCEDURE — 82140 ASSAY OF AMMONIA: CPT

## 2021-04-09 PROCEDURE — 96375 TX/PRO/DX INJ NEW DRUG ADDON: CPT

## 2021-04-09 PROCEDURE — 700101 HCHG RX REV CODE 250: Performed by: INTERNAL MEDICINE

## 2021-04-09 PROCEDURE — 700102 HCHG RX REV CODE 250 W/ 637 OVERRIDE(OP): Performed by: INTERNAL MEDICINE

## 2021-04-09 PROCEDURE — 84165 PROTEIN E-PHORESIS SERUM: CPT

## 2021-04-09 PROCEDURE — 36415 COLL VENOUS BLD VENIPUNCTURE: CPT

## 2021-04-09 PROCEDURE — 83825 ASSAY OF MERCURY: CPT

## 2021-04-09 PROCEDURE — 86039 ANTINUCLEAR ANTIBODIES (ANA): CPT

## 2021-04-09 PROCEDURE — 99204 OFFICE O/P NEW MOD 45 MIN: CPT | Performed by: PSYCHIATRY & NEUROLOGY

## 2021-04-09 PROCEDURE — 86038 ANTINUCLEAR ANTIBODIES: CPT

## 2021-04-09 PROCEDURE — 83036 HEMOGLOBIN GLYCOSYLATED A1C: CPT

## 2021-04-09 PROCEDURE — G0378 HOSPITAL OBSERVATION PER HR: HCPCS

## 2021-04-09 PROCEDURE — 86235 NUCLEAR ANTIGEN ANTIBODY: CPT | Mod: 91

## 2021-04-09 PROCEDURE — 83655 ASSAY OF LEAD: CPT

## 2021-04-09 PROCEDURE — 82175 ASSAY OF ARSENIC: CPT

## 2021-04-09 PROCEDURE — 86225 DNA ANTIBODY NATIVE: CPT

## 2021-04-09 PROCEDURE — 82607 VITAMIN B-12: CPT

## 2021-04-09 PROCEDURE — 700111 HCHG RX REV CODE 636 W/ 250 OVERRIDE (IP): Performed by: INTERNAL MEDICINE

## 2021-04-09 PROCEDURE — 97161 PT EVAL LOW COMPLEX 20 MIN: CPT

## 2021-04-09 PROCEDURE — A9270 NON-COVERED ITEM OR SERVICE: HCPCS | Performed by: INTERNAL MEDICINE

## 2021-04-09 PROCEDURE — A9576 INJ PROHANCE MULTIPACK: HCPCS | Performed by: STUDENT IN AN ORGANIZED HEALTH CARE EDUCATION/TRAINING PROGRAM

## 2021-04-09 PROCEDURE — 700111 HCHG RX REV CODE 636 W/ 250 OVERRIDE (IP): Performed by: PSYCHIATRY & NEUROLOGY

## 2021-04-09 PROCEDURE — 96365 THER/PROPH/DIAG IV INF INIT: CPT

## 2021-04-09 PROCEDURE — 83735 ASSAY OF MAGNESIUM: CPT

## 2021-04-09 PROCEDURE — 700102 HCHG RX REV CODE 250 W/ 637 OVERRIDE(OP): Performed by: STUDENT IN AN ORGANIZED HEALTH CARE EDUCATION/TRAINING PROGRAM

## 2021-04-09 PROCEDURE — 86780 TREPONEMA PALLIDUM: CPT

## 2021-04-09 PROCEDURE — 97166 OT EVAL MOD COMPLEX 45 MIN: CPT

## 2021-04-09 PROCEDURE — 80048 BASIC METABOLIC PNL TOTAL CA: CPT

## 2021-04-09 PROCEDURE — 82300 ASSAY OF CADMIUM: CPT

## 2021-04-09 PROCEDURE — 96366 THER/PROPH/DIAG IV INF ADDON: CPT

## 2021-04-09 PROCEDURE — 84443 ASSAY THYROID STIM HORMONE: CPT

## 2021-04-09 PROCEDURE — 85025 COMPLETE CBC W/AUTO DIFF WBC: CPT

## 2021-04-09 PROCEDURE — 84155 ASSAY OF PROTEIN SERUM: CPT

## 2021-04-09 PROCEDURE — 73721 MRI JNT OF LWR EXTRE W/O DYE: CPT | Mod: RT,ME

## 2021-04-09 PROCEDURE — 72158 MRI LUMBAR SPINE W/O & W/DYE: CPT | Mod: MF

## 2021-04-09 PROCEDURE — 700117 HCHG RX CONTRAST REV CODE 255: Performed by: STUDENT IN AN ORGANIZED HEALTH CARE EDUCATION/TRAINING PROGRAM

## 2021-04-09 PROCEDURE — 84425 ASSAY OF VITAMIN B-1: CPT

## 2021-04-09 PROCEDURE — 82962 GLUCOSE BLOOD TEST: CPT | Mod: 91

## 2021-04-09 PROCEDURE — 87529 HSV DNA AMP PROBE: CPT

## 2021-04-09 PROCEDURE — 96372 THER/PROPH/DIAG INJ SC/IM: CPT

## 2021-04-09 PROCEDURE — G0475 HIV COMBINATION ASSAY: HCPCS

## 2021-04-09 PROCEDURE — 82306 VITAMIN D 25 HYDROXY: CPT

## 2021-04-09 RX ORDER — DIPHENHYDRAMINE HYDROCHLORIDE 50 MG/ML
25 INJECTION INTRAMUSCULAR; INTRAVENOUS ONCE
Status: COMPLETED | OUTPATIENT
Start: 2021-04-09 | End: 2021-04-09

## 2021-04-09 RX ORDER — MAGNESIUM SULFATE HEPTAHYDRATE 40 MG/ML
2 INJECTION, SOLUTION INTRAVENOUS ONCE
Status: COMPLETED | OUTPATIENT
Start: 2021-04-09 | End: 2021-04-09

## 2021-04-09 RX ORDER — DEXTROSE MONOHYDRATE 25 G/50ML
50 INJECTION, SOLUTION INTRAVENOUS
Status: DISCONTINUED | OUTPATIENT
Start: 2021-04-09 | End: 2021-04-11 | Stop reason: HOSPADM

## 2021-04-09 RX ORDER — INSULIN GLARGINE 100 [IU]/ML
10 INJECTION, SOLUTION SUBCUTANEOUS EVERY EVENING
Status: DISCONTINUED | OUTPATIENT
Start: 2021-04-09 | End: 2021-04-11 | Stop reason: HOSPADM

## 2021-04-09 RX ORDER — METOCLOPRAMIDE HYDROCHLORIDE 5 MG/ML
10 INJECTION INTRAMUSCULAR; INTRAVENOUS ONCE
Status: COMPLETED | OUTPATIENT
Start: 2021-04-09 | End: 2021-04-09

## 2021-04-09 RX ADMIN — ENOXAPARIN SODIUM 40 MG: 40 INJECTION SUBCUTANEOUS at 05:04

## 2021-04-09 RX ADMIN — ROSUVASTATIN 10 MG: 10 TABLET, FILM COATED ORAL at 05:03

## 2021-04-09 RX ADMIN — INSULIN GLARGINE 10 UNITS: 100 INJECTION, SOLUTION SUBCUTANEOUS at 17:23

## 2021-04-09 RX ADMIN — ACETAMINOPHEN 650 MG: 325 TABLET, FILM COATED ORAL at 05:03

## 2021-04-09 RX ADMIN — LEVOTHYROXINE SODIUM 75 MCG: 0.07 TABLET ORAL at 05:03

## 2021-04-09 RX ADMIN — GADOTERIDOL 15 ML: 279.3 INJECTION, SOLUTION INTRAVENOUS at 15:22

## 2021-04-09 RX ADMIN — LIDOCAINE 1 PATCH: 50 PATCH TOPICAL at 05:05

## 2021-04-09 RX ADMIN — INSULIN HUMAN 3 UNITS: 100 INJECTION, SOLUTION PARENTERAL at 17:24

## 2021-04-09 RX ADMIN — INSULIN HUMAN 2 UNITS: 100 INJECTION, SOLUTION PARENTERAL at 22:20

## 2021-04-09 RX ADMIN — ACETAMINOPHEN 650 MG: 325 TABLET, FILM COATED ORAL at 18:04

## 2021-04-09 RX ADMIN — METOCLOPRAMIDE 10 MG: 5 INJECTION, SOLUTION INTRAMUSCULAR; INTRAVENOUS at 16:35

## 2021-04-09 RX ADMIN — MAGNESIUM SULFATE 2 G: 2 INJECTION INTRAVENOUS at 16:36

## 2021-04-09 RX ADMIN — DIPHENHYDRAMINE HYDROCHLORIDE 25 MG: 50 INJECTION, SOLUTION INTRAMUSCULAR; INTRAVENOUS at 16:36

## 2021-04-09 ASSESSMENT — ENCOUNTER SYMPTOMS
CHILLS: 0
TREMORS: 0
PALPITATIONS: 0
WEAKNESS: 1
VOMITING: 0
NAUSEA: 0
LOSS OF CONSCIOUSNESS: 0
WHEEZING: 0
FOCAL WEAKNESS: 1
ABDOMINAL PAIN: 0
DOUBLE VISION: 0
BLOOD IN STOOL: 0
SENSORY CHANGE: 0
NECK PAIN: 0
HEMOPTYSIS: 0
BLURRED VISION: 0
HEADACHES: 0
COUGH: 0
DIARRHEA: 0
EYE PAIN: 0
BACK PAIN: 0
SHORTNESS OF BREATH: 0
FLANK PAIN: 0
FEVER: 0

## 2021-04-09 ASSESSMENT — COGNITIVE AND FUNCTIONAL STATUS - GENERAL
MOBILITY SCORE: 17
DAILY ACTIVITIY SCORE: 24
SUGGESTED CMS G CODE MODIFIER MOBILITY: CK
MOVING FROM LYING ON BACK TO SITTING ON SIDE OF FLAT BED: UNABLE
MOVING TO AND FROM BED TO CHAIR: UNABLE
SUGGESTED CMS G CODE MODIFIER DAILY ACTIVITY: CH
CLIMB 3 TO 5 STEPS WITH RAILING: A LITTLE

## 2021-04-09 ASSESSMENT — GAIT ASSESSMENTS
DEVIATION: ANTALGIC
GAIT LEVEL OF ASSIST: SUPERVISED
DISTANCE (FEET): 20
DISTANCE (FEET): 60
ASSISTIVE DEVICE: FRONT WHEEL WALKER

## 2021-04-09 ASSESSMENT — PAIN DESCRIPTION - PAIN TYPE
TYPE: ACUTE PAIN

## 2021-04-09 ASSESSMENT — ACTIVITIES OF DAILY LIVING (ADL): TOILETING: INDEPENDENT

## 2021-04-09 NOTE — ASSESSMENT & PLAN NOTE
MRI right hip showed Fractures of the right superior and inferior pubic rami.    Consulted orthopedics Dr. Lorenzo Monaco

## 2021-04-09 NOTE — CONSULTS
"Neurology Initial Consult H&P  Neurohospitalist Service, [facility]    Referring Physician: Pooja Song M.D.    Chief Complaint   Patient presents with   • Fall   • Dizziness       HPI: Mavis Lizarraga is a 80 y.o. woman presenting for whom neurology has been consulted for bilateral lower extremity weakness for the past two days, right>left.  As documented by Helene Lunsford 4/8/21, \"history of breast cancer, dyslipidemia, diabetes, DVT and hypothyroidism presented 4/8 with lower extremity weakness, patient woke up today with lower extremity weakness and pain on around her hips area, the weakness on the left side was improved however she was not able to move her right leg with pain, no sharp pain and no numbness or tingling and her sensation were normal, denied any back pain, no urinary or bowel symptoms, denied any weakness or numbness on her arms, no headache, denied any chest pain or shortness of breath, on admission labs did not show any acute finding.\"  Today at bedside, the patient admits headache.  The headache is located frontally, dull in sensation, lasting hours, without nausea or photophobia.  She adds that starting two days ago she woke at 4AM and both legs were painful, dull in sensation, with radiation down both legs.  Upon standing the patient immediately fell.  The patient feels continued weakness in the right leg.  Denies weakness in arms.  Denies saddle anesthesia. Denies urinary retention nor frequency.  Denies changes in vision or speech.  She notes that two weeks ago she suffered a mechanical fall after slipping on a rug.    Review of systems: In addition to what is detailed in the HPI above, all other systems reviewed and are negative.    Past Medical History:    has a past medical history of Anesthesia, Arthritis, Backpain, Breast cancer (HCC) (1980s), Diverticulosis, DVT of deep femoral vein (HCC), Heart burn, High cholesterol, Pneumonia (Feb 2006), Thyroid " condition, and Ulcer. She also has no past medical history of COPD.    FHx:  family history includes Cancer in her mother; Diabetes in her mother; Heart Disease (age of onset: 63) in her maternal grandmother; Heart Disease (age of onset: 64) in her mother; Hypertension in her mother; Stroke in her mother.    SHx:   reports that she has never smoked. She has never used smokeless tobacco. She reports current alcohol use. She reports that she does not use drugs.    Allergies:  Allergies   Allergen Reactions   • Glimepiride Unspecified     drowsiness   • Metformin Diarrhea and Nausea     NAUSEA VOMIITG AND DIARRHEA   • Morphine Unspecified     Hallucinations       Medications:    Current Facility-Administered Medications:   •  cyclobenzaprine (Flexeril) tablet 10 mg, 10 mg, Oral, TID PRN, Helene Lunsford M.D., 10 mg at 04/08/21 1320  •  levothyroxine (SYNTHROID) tablet 75 mcg, 75 mcg, Oral, AM ES, Helene Lunsford M.D., 75 mcg at 04/09/21 0503  •  senna-docusate (PERICOLACE or SENOKOT S) 8.6-50 MG per tablet 2 tablet, 2 tablet, Oral, BID, Stopped at 04/08/21 1800 **AND** polyethylene glycol/lytes (MIRALAX) PACKET 1 Packet, 1 Packet, Oral, QDAY PRN **AND** magnesium hydroxide (MILK OF MAGNESIA) suspension 30 mL, 30 mL, Oral, QDAY PRN **AND** bisacodyl (DULCOLAX) suppository 10 mg, 10 mg, Rectal, QDAY PRN, Helene Lunsford M.D.  •  enoxaparin (LOVENOX) inj 40 mg, 40 mg, Subcutaneous, DAILY, Helene Lunsford M.D., 40 mg at 04/09/21 0504  •  acetaminophen (Tylenol) tablet 650 mg, 650 mg, Oral, Q6HRS PRN, Helene Lunsford M.D., 650 mg at 04/09/21 0503  •  ondansetron (ZOFRAN) syringe/vial injection 4 mg, 4 mg, Intravenous, Q4HRS PRN, Helene Lunsford M.D.  •  ondansetron (ZOFRAN ODT) dispertab 4 mg, 4 mg, Oral, Q4HRS PRN, Helene Lunsford M.D.  •  oxyCODONE immediate-release (ROXICODONE) tablet 5 mg, 5 mg, Oral, Q4HRS PRN, Helene Lunsford M.D., 5 mg at 04/08/21 1940  •  lidocaine (LIDODERM) 5 % 1 Patch, 1  Patch, Transdermal, DAILY, Helene Lunsford M.D., 1 Patch at 04/09/21 0505  •  rosuvastatin (CRESTOR) tablet 10 mg, 10 mg, Oral, Q EVENING, Helene Lunsford M.D., 10 mg at 04/09/21 0503    Physical Examination:     Vitals:    04/08/21 1700 04/08/21 2300 04/09/21 0454 04/09/21 1036   BP: 134/58 119/59 137/58 134/46   Pulse: 75 68 67 64   Resp: 17 18 18 18   Temp: 36 °C (96.8 °F) 36.3 °C (97.4 °F) 37.1 °C (98.7 °F) 36.3 °C (97.4 °F)   TempSrc: Tympanic Oral Oral Temporal   SpO2: 94% 90% 92% 94%   Weight:       Height:           General: Patient is awake and in no acute distress  Eyes: examination of optic disks not indicated at this time given acuity of consult  CV: RRR    NEUROLOGICAL EXAM:     Mental status: Awake, alert and fully oriented, follows commands  Speech and language: speech is not dysarthric. The patient is able to name and repeat.  Cranial nerve exam: Pupils are equal, round and reactive to light bilaterally. Visual fields are full. Extraocular muscles are intact. Sensation in the face is intact to light touch. Face is symmetric. Hearing to finger rub equal. Palate elevates symmetrically. Shoulder shrug is full. Tongue is midline.  Motor exam: Strength is 5/5 in all extremities both distally and proximally with the exception of 1/5 right iliopsoas 2/5 hip adductors, 3/5 quad, and 5/5 tibialis anterior and gastroc. + straight leg test on the right.  Tone is normal. No abnormal movements were seen on exam.  Sensory exam: No sensory deficits identified   Deep tendon reflexes:  2+ and symmetric. Toes down-going bilaterally.  Coordination: no ataxia   Gait: deferred given patient preference    Objective Data:    Labs:  Lab Results   Component Value Date/Time    PROTHROMBTM 12.8 04/08/2021 05:40 AM    INR 0.99 04/08/2021 05:40 AM      Lab Results   Component Value Date/Time    WBC 5.4 04/09/2021 03:31 AM    RBC 4.52 04/09/2021 03:31 AM    HEMOGLOBIN 13.5 04/09/2021 03:31 AM    HEMATOCRIT 40.0 04/09/2021  03:31 AM    MCV 88.5 04/09/2021 03:31 AM    MCH 29.9 04/09/2021 03:31 AM    MCHC 33.8 04/09/2021 03:31 AM    MPV 9.7 04/09/2021 03:31 AM    NEUTSPOLYS 55.30 04/09/2021 03:31 AM    LYMPHOCYTES 34.50 04/09/2021 03:31 AM    MONOCYTES 8.50 04/09/2021 03:31 AM    EOSINOPHILS 1.30 04/09/2021 03:31 AM    EOSINOPHILS 3 10/28/2009 07:00 PM    BASOPHILS 0.20 04/09/2021 03:31 AM      Lab Results   Component Value Date/Time    SODIUM 137 04/09/2021 03:31 AM    POTASSIUM 4.2 04/09/2021 03:31 AM    CHLORIDE 100 04/09/2021 03:31 AM    CO2 25 04/09/2021 03:31 AM    GLUCOSE 218 (H) 04/09/2021 03:31 AM    BUN 11 04/09/2021 03:31 AM    CREATININE 0.47 (L) 04/09/2021 03:31 AM      Lab Results   Component Value Date/Time    CHOLSTRLTOT 153 12/27/2019 07:22 AM    LDL 74 12/27/2019 07:22 AM    HDL 48 12/27/2019 07:22 AM    TRIGLYCERIDE 155 (H) 12/27/2019 07:22 AM       Lab Results   Component Value Date/Time    ALKPHOSPHAT 92 04/08/2021 05:40 AM    ASTSGOT 16 04/08/2021 05:40 AM    ALTSGPT 17 04/08/2021 05:40 AM    TBILIRUBIN 0.8 04/08/2021 05:40 AM        Imaging/Testing:    I interpreted and/or reviewed the patient's neuroimaging    MR-HIP-W/O RIGHT   Final Result      1.  Fractures of the right superior and inferior pubic rami.      2.  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      3.  Tendinopathy of the right gluteus medius and minimus tendons.      US-EXTREMITY VENOUS LOWER UNILAT RIGHT   Final Result      DX-HIP-BILATERAL-WITH PELVIS-2 VIEWS   Final Result         1.  No radiographic evidence of acute traumatic injury.      CT-CTA HEAD WITH & W/O-POST PROCESS   Final Result         1.  No large vessel occlusion or aneurysm is identified.      CT-CTA NECK WITH & W/O-POST PROCESSING   Final Result         1.  No high-grade stenosis, occlusion, aneurysm, or dissection identified.         CT-CEREBRAL PERFUSION ANALYSIS   Final Result         1.  Cerebral blood flow less than 30% likely representing completed infarct = 0 mL.      2.   T Max more than 6 seconds likely representing combination of completed infarct and ischemia = 0 mL.      3.  Mismatched volume likely representing ischemic brain/penumbra = None      4.  Please note that the cerebral perfusion was performed on the limited brain tissue around the basal ganglia region. Infarct/ischemia outside the CT perfusion sections can be missed in this study.      DX-CHEST-PORTABLE (1 VIEW)   Final Result         1.  No acute cardiopulmonary disease.   2.  Atherosclerosis      CT-HEAD W/O   Final Result         1.  No acute intracranial abnormality.   2.  Atherosclerosis.      MR-LUMBAR SPINE-WITH & W/O    (Results Pending)   MR-LUMBAR SPINE-WITH & W/O    (Results Pending)   IR-US GUIDED PIV    (Results Pending)     Assessment and Plan:    Mavis Lizarraga is a 80 y.o. woman presenting for whom neurology has been consulted for subacute right lower extremity weakness.  She is not a candidate for acute stroke intervention as she presents outside of the therapeutic window, and alternative etiology is suspected.  The differential is broad and includes structural pathologies, toxicity, infection, vitamin deficiency, and autoimmunity.  As the examination demonstrates pain on right straight leg raise testing, spinal imaging is recommended.  The hip fracture could be secondary to her fall that preceded this admission ie. a result of underlying pathology rather than the cause of presentation.  Ddx suggestive of radiculopathy noting differential inclusive of malignancy given known history of breast cancer, remote 30 years ago.  Pending additional workup.    Plan:    1. R leg weakness, pain limited  - MRI thoracic and lumbosacral spine w/ and w/o CST  - Recommend completion of workup for altered mental status contributors and/or confounders: TSH, B12, thiamine, RPR, HIV, ammonia, UA, utox  - serum heavy metals, IGNACIO, ESR, CRP, HSV, A1c, SPEP with reflex to FELISA, Vitamin D  - primary team to address right  hip fracture imaging on MRI    2. Headache  - headache cocktail x1: 2g IV Magnesium, 10mg IV Reglan, 25mg IV Benadryl    The evaluation of the patient, and recommended management, was discussed with Dr. Pooja Song.    Jak Young MD  Neurohospitalist, Acute Care Services   of Neurology

## 2021-04-09 NOTE — PROGRESS NOTES
Received report from day shift RN. Assumed care of patient. Patient is currently AOx4, reporting 5/10 pain located in the R groin that is described as sharp, deep and constant in nature, medicated per MAR. Pt able to dorsi/plantar flex adequately in BLE at this time, but significant weakness noted in RLE. CMS intact, denies N/T. POC reviewed with the patient and plan for MRI in AM. Patient verbalized understanding. Hourly rounding in place. Bed locked and in lowest position, call light and belongings within reach.

## 2021-04-09 NOTE — THERAPY
"Physical Therapy   Initial Evaluation     Patient Name: Mavis Lizarraga  Age:  80 y.o., Sex:  female  Medical Record #: [id number]  Today's Date: 4/9/2021     Precautions: Fall Risk(2/2 pain in R hip)    Assessment  Patient is a 80 y.o. female with h/o breast CA, increased lipids, DM, DVT and hypothyroid presented to the hospital on 4/8 with lower extremity weakness and B hip pain. Her L hip pain and weakness then resolved, however she continued to have RLE symptoms and was admitted for w/u. CT and CTA negative for stroke, X-ray negative for fx. However her MRI showed R superior and inferior pubic rami fractures. Orthopedics consulted; meanwhile PT is still requested as there are no bedrest orders and pt continues to ambulate to the bathroom w/o an AD.     On evaluation, pt was not able to actively move her RLE, but was able to demonstrate movement with AAROM. When given assistance she did not have any pain and she does not have pain at rest. Issued pt a hospital FWW and instructed in use by wt shifting to her arms to decrease wt bearing through her RLE. Initially she was reluctant to use of the walker \"I don't want to use this forever\", however with education regarding the purpose of short term use to decrease pain, improve balance and still allow safe mobility, she agreed and was able to demonstrate proper use. Educated her to use the FWW at all times to decrease wt bearing in order to protect her R hip until all testing is completed and she verbalized understanding.     At this point, she no longer requires continued PT in the acute setting. Patient will not be actively followed for physical therapy services at this time, however may be seen if requested by physician for 1 more visit within 30 days to address any discharge or equipment needs      Plan    Evaluation only    DC Equipment Recommendations: (P) None(pt has a FWW)  Discharge Recommendations: (P) Recommend home health for continued physical therapy " services       Subjective    Agreeable to PT; c/o pain with movement although states she's been getting OOB by herself to use the BR throughout the day     Objective     04/09/21 1358   Prior Living Situation   Prior Services None   Housing / Facility 1 Story House   Steps Into Home 0   Steps In Home 0   Equipment Owned Front-Wheel Walker   Lives with - Patient's Self Care Capacity Spouse   Prior Level of Functional Mobility   Bed Mobility Independent   Transfer Status Independent   Ambulation Independent   Distance Ambulation (Feet)   (community)   Assistive Devices Used None   Comments physically active; still works as a  at [employer]   History of Falls   History of Falls Yes   Date of Last Fall 04/07/21  (states falling when getting OOB; unclear why)   Cognition    Cognition / Consciousness WDL   Comments pleasant; appropriately concerned about why she has groin pain   Passive ROM Lower Body   Passive ROM Lower Body WDL   Active ROM Lower Body    Comments limited AROM 2/2 pain   Strength Lower Body   Lower Body Strength  WDL   Balance Assessment   Sitting Balance (Static) Good   Sitting Balance (Dynamic) Good   Standing Balance (Static) Fair   Standing Balance (Dynamic) Fair   Comments with FWW   Gait Analysis   Gait Level Of Assist Supervised  (cues to sequence wt shift to arms to unweight her RLE)   Assistive Device Front Wheel Walker   Distance (Feet) 60   # of Times Distance was Traveled 1   Deviation Antalgic   Weight Bearing Status WBAT   Bed Mobility    Supine to Sit Minimal Assist   Sit to Supine Minimal Assist   Scooting Modified Independent  (extra time)   Rolling Modified Independent  (extra time)   Comments requires light assist with RLE   Functional Mobility   Sit to Stand Modified Independent   Mobility in room and hallway     Ana Laura Pop, PT

## 2021-04-09 NOTE — PROGRESS NOTES
Hospital Medicine Daily Progress Note    Date of Service  4/9/2021    Chief Complaint  80 y.o. female admitted 4/8/2021 with lower extremity weakness    Hospital Course  80-year-old female with history of breast cancer, dyslipidemia, diabetes, DVT and hypothyroidism presented 4/8 with lower extremity weakness, patient woke up today with lower extremity weakness and pain on around her hips area, the weakness on the left side was improved however she was not able to move her right leg with pain, no sharp pain and no numbness or tingling and her sensation were normal, denied any back pain, no urinary or bowel symptoms, denied any weakness or numbness on her arms, no headache, denied any chest pain or shortness of breath, on admission labs did not show any acute finding, CTA and CT for head did not show any acute stroke or bleeding, x-ray for hips did not show any fractures.       Interval Problem Update  I evaluated and examined the patient at the bedside.  Labs and imaging were reviewed. Care plan was discussed with the patient and bedside nurse.    Patient reported bilateral lower extremity weakness for 2 days and the right hip pain.  MRI right hip showed Fractures of the right superior and inferior pubic rami.  Consulted orthopedics Dr. Lorenzo Monaco    Consulted neurology, Dr. 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 I talked to neurosurgeon Dr. Marin, who reviewed the image, don't think the neurologic deficits is related to the mass. no surgery indication at this point. No need for steroids. Recommended MRI brain and thoracic w and wo contrast to complete the workup.    Will consult oncology.       Consultants/Specialty  orthopedics Dr. Lorenzo Monaco  Neurology      Code Status  Full Code    Disposition  TBD    Review of Systems  Review of Systems   Constitutional: Positive for malaise/fatigue.  Negative for chills and fever.   HENT: Negative for ear discharge and ear pain.    Eyes: Negative for blurred vision, double vision and pain.   Respiratory: Negative for cough, hemoptysis, shortness of breath and wheezing.    Cardiovascular: Negative for chest pain and palpitations.   Gastrointestinal: Negative for abdominal pain, blood in stool, diarrhea, nausea and vomiting.   Genitourinary: Negative for dysuria, flank pain, hematuria and urgency.   Musculoskeletal: Positive for joint pain. Negative for back pain and neck pain.        Right hip pain   Neurological: Positive for focal weakness and weakness. Negative for tremors, sensory change, loss of consciousness and headaches.        Bilateral lower extremity weakness, right more than left        Physical Exam  Temp:  [36 °C (96.8 °F)-37.1 °C (98.7 °F)] 36.3 °C (97.4 °F)  Pulse:  [64-75] 64  Resp:  [17-18] 18  BP: (119-137)/(46-59) 134/46  SpO2:  [90 %-94 %] 94 %    Physical Exam  Constitutional:       General: She is not in acute distress.     Appearance: She is ill-appearing.   HENT:      Head: Normocephalic and atraumatic.      Right Ear: External ear normal.      Left Ear: External ear normal.      Nose: Nose normal. No congestion or rhinorrhea.      Mouth/Throat:      Mouth: Mucous membranes are moist.   Eyes:      Extraocular Movements: Extraocular movements intact.      Conjunctiva/sclera: Conjunctivae normal.      Pupils: Pupils are equal, round, and reactive to light.   Cardiovascular:      Rate and Rhythm: Regular rhythm.      Pulses: Normal pulses.      Heart sounds: Normal heart sounds.   Pulmonary:      Effort: Pulmonary effort is normal. No respiratory distress.      Breath sounds: Normal breath sounds. No stridor. No wheezing, rhonchi or rales.   Chest:      Chest wall: No tenderness.   Abdominal:      General: Bowel sounds are normal. There is no distension.      Palpations: Abdomen is soft.      Tenderness: There is no abdominal tenderness.  There is no guarding or rebound.   Musculoskeletal:         General: Tenderness present. No swelling.      Cervical back: Normal range of motion and neck supple.      Comments: Limited ROM of right hip   Skin:     General: Skin is warm and dry.   Neurological:      Mental Status: She is alert and oriented to person, place, and time.      Sensory: No sensory deficit.      Motor: No weakness.      Coordination: Coordination normal.      Comments: RLE 3/5, LLE 4/5  Gross sensory intact   Psychiatric:         Mood and Affect: Mood normal.         Fluids    Intake/Output Summary (Last 24 hours) at 4/9/2021 1256  Last data filed at 4/9/2021 0000  Gross per 24 hour   Intake 220 ml   Output --   Net 220 ml       Laboratory  Recent Labs     04/08/21  0540 04/09/21  0331   WBC 5.1 5.4   RBC 4.92 4.52   HEMOGLOBIN 14.6 13.5   HEMATOCRIT 43.1 40.0   MCV 87.6 88.5   MCH 29.7 29.9   MCHC 33.9 33.8   RDW 39.2 39.4   PLATELETCT 173 157*   MPV 9.5 9.7     Recent Labs     04/08/21  0540 04/09/21  0331   SODIUM 137 137   POTASSIUM 4.2 4.2   CHLORIDE 100 100   CO2 28 25   GLUCOSE 265* 218*   BUN 10 11   CREATININE 0.61 0.47*   CALCIUM 9.3 8.9     Recent Labs     04/08/21  0540   APTT 25.1   INR 0.99               Imaging  MR-HIP-W/O RIGHT   Final Result      1.  Fractures of the right superior and inferior pubic rami.      2.  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      3.  Tendinopathy of the right gluteus medius and minimus tendons.      US-EXTREMITY VENOUS LOWER UNILAT RIGHT   Final Result      DX-HIP-BILATERAL-WITH PELVIS-2 VIEWS   Final Result         1.  No radiographic evidence of acute traumatic injury.      CT-CTA HEAD WITH & W/O-POST PROCESS   Final Result         1.  No large vessel occlusion or aneurysm is identified.      CT-CTA NECK WITH & W/O-POST PROCESSING   Final Result         1.  No high-grade stenosis, occlusion, aneurysm, or dissection identified.         CT-CEREBRAL PERFUSION ANALYSIS   Final Result          1.  Cerebral blood flow less than 30% likely representing completed infarct = 0 mL.      2.  T Max more than 6 seconds likely representing combination of completed infarct and ischemia = 0 mL.      3.  Mismatched volume likely representing ischemic brain/penumbra = None      4.  Please note that the cerebral perfusion was performed on the limited brain tissue around the basal ganglia region. Infarct/ischemia outside the CT perfusion sections can be missed in this study.      DX-CHEST-PORTABLE (1 VIEW)   Final Result         1.  No acute cardiopulmonary disease.   2.  Atherosclerosis      CT-HEAD W/O   Final Result         1.  No acute intracranial abnormality.   2.  Atherosclerosis.      MR-LUMBAR SPINE-WITH & W/O    (Results Pending)   MR-LUMBAR SPINE-WITH & W/O    (Results Pending)   IR-US GUIDED PIV    (Results Pending)        Assessment/Plan  * Lower extremity weakness  Assessment & Plan  Happened suddenly  morning, right more than the left with pain around her hip area   no numbness or losing sensation, and no urinary or bowel symptoms  CTA for head did not show any ischemia or stroke  X-ray did not show any fracture around hips area  Inflammation markers ESR CRP and CPK are normal    MRI for right hip  showed Fractures of the right superior and inferior pubic rami.    Consulted orthopedics Dr. Lorenzo Monaco    PT and OT  neurology consulted            Pubic ramus fracture (HCC)  Assessment & Plan  MRI right hip showed Fractures of the right superior and inferior pubic rami.    Consulted orthopedics Dr. Lorenzo Monaco      Uncontrolled type 2 diabetes mellitus without complication, without long-term current use of insulin  Assessment & Plan  A1c 10.9  Patient does not take her medications due to side effects(glipizide and Metformin)    BG high over 200  Start on Lantus 10 units  SSI    Mixed hyperlipidemia- (present on admission)  Assessment & Plan  Patient takes rosuvastatin 40 mg daily at home(very high  dose)  Since the patient does not have any history of heart disease or stroke, >>decrease dose to 10 mg daily  Check CPK    Breast cancer (HCC)  Assessment & Plan  History of breast cancer was treated more than  30 years ago by surgery  She has been stable since    Hypothyroidism due to acquired atrophy of thyroid- (present on admission)  Assessment & Plan  Continue levothyroxine 75 mcg daily  Check TSH       VTE prophylaxis: lovenox

## 2021-04-09 NOTE — PROGRESS NOTES
Neurological Interval Note    Discussed with nursing team and radiology. Patient with difficult IV access. Inquiry as to using the right arm in the setting of known history of breast malignancy.  As this hx is removed by several decades, risk is low; recommend attempt at using the left limb as priority but may use right if multiple failed attempts are captured.    Jak Young MD  Neurohospitalist, Acute Care Services   of Neurology

## 2021-04-09 NOTE — THERAPY
Occupational Therapy   Initial Evaluation     Patient Name: Mavis Lizarraga  Age:  80 y.o., Sex:  female  Medical Record #: [id number]  Today's Date: 4/9/2021     Precautions  Precautions: (P) Fall Risk(2/2 pain in R hip)    Assessment  Patient is 80 y.o. female with a diagnosis of GLF, weakness, B LE weakenss.  Additional factors influencing patient status / progress: good family support at home.      Plan    Recommend Occupational Therapy for Evaluation only for the following treatments:  NA.    DC Equipment Recommendations: (P) None  Discharge Recommendations: (P) Recommend home health for continued occupational therapy services     Subjective    Pleasant and cooperative throughout     Objective       04/09/21 0840   Total Time Spent   Total Time Spent (Mins) 42   Charge Group   OT Evaluation OT Evaluation Mod   Initial Contact Note    Initial Contact Note Order Received and Verified, Evaluation Only - Patient Does Not Require Further Acute Occupational Therapy at this Time.  However, May Benefit from Post Acute Therapy for Higher Level Functional Deficits.   Prior Living Situation   Prior Services None;Intermittent Physical Support for ADL Per Family   Housing / Facility 1 Story House   Steps In Home 0   Bathroom Set up Walk In Shower   Equipment Owned Front-Wheel Walker   Lives with - Patient's Self Care Capacity Spouse   Comments furniture walked in room, attempted FWW use, declined   Prior Level of ADL Function   Self Feeding Independent   Grooming / Hygiene Independent   Bathing Independent   Dressing Independent   Toileting Independent   Prior Level of IADL Function   Medication Management Independent   Laundry Independent   Kitchen Mobility Independent   Finances Independent   Home Management Requires Assist   Shopping Independent   Prior Level Of Mobility Independent Without Device in Home   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[employer])   History of Falls   History  of Falls No   Precautions   Precautions Fall Risk   Vitals   O2 Delivery Device None - Room Air   Pain 0 - 10 Group   Therapist Pain Assessment Post Activity Pain Same as Prior to Activity;Nurse Notified  (none reported or observed)   Cognition    Cognition / Consciousness WDL   Passive ROM Upper Body   Passive ROM Upper Body WDL   Active ROM Upper Body   Active ROM Upper Body  WDL   Dominant Hand Right   Strength Upper Body   Upper Body Strength  WDL   Upper Body Muscle Tone   Upper Body Muscle Tone  WDL   Coordination Upper Body   Coordination WDL   Balance Assessment   Sitting Balance (Static) Good   Sitting Balance (Dynamic) Good   Standing Balance (Static) Fair   Standing Balance (Dynamic) Fair -   Weight Shift Sitting Fair   Weight Shift Standing Fair   Comments declined wealker use during functional mobility   Bed Mobility    Supine to Sit Supervised   Sit to Supine Supervised   Scooting Supervised   Rolling Supervised   ADL Assessment   Eating Modified Independent   Grooming Supervision;Standing   Bathing   (NT)   Upper Body Dressing Supervision   Lower Body Dressing Supervision   Toileting Supervision   How much help from another person does the patient currently need...   Putting on and taking off regular lower body clothing? 4   Bathing (including washing, rinsing, and drying)? 4   Toileting, which includes using a toilet, bedpan, or urinal? 4   Putting on and taking off regular upper body clothing? 4   Taking care of personal grooming such as brushing teeth? 4   Eating meals? 4   6 Clicks Daily Activity Score 24   Functional Mobility   Sit to Stand Supervised   Bed, Chair, Wheelchair Transfer Supervised   Toilet Transfers Supervised   Transfer Method Stand Pivot   Distance (Feet) 20   # of Times Distance was Traveled 2   Visual Perception   Visual Perception  WDL   Education Group   Role of Occupational Therapist Patient Response Patient;Acceptance;Explanation;Demonstration;Verbal Demonstration;Action  Demonstration   Anticipated Discharge Equipment and Recommendations   DC Equipment Recommendations None   Discharge Recommendations Recommend home health for continued occupational therapy services   Interdisciplinary Plan of Care Collaboration   IDT Collaboration with  Nursing   Patient Position at End of Therapy In Bed;Call Light within Reach;Tray Table within Reach;Phone within Reach   Collaboration Comments report given   Session Information   Date / Session Number  4/9,1/1   Priority 0

## 2021-04-09 NOTE — CARE PLAN
Assumed day shift care at start of shift  Patient a+o x 4  denies pain  Vss, afebrile  RLE: patient unable to raise leg off bed, +pulses, +csm  Denies numbness/tingling  No needs at this time, wc    PLAN: MRI this am    Fall precautions/hourly rounding maintained, call light within reach and functioning, all items within reach.  Patient encouraged to call for assistance, poc reviewed with patient, ?'s/concerns answered.  Bed alarm active.       Problem: Safety  Goal: Will remain free from injury  Outcome: PROGRESSING AS EXPECTED     Problem: Pain Management  Goal: Pain level will decrease to patient's comfort goal  Outcome: PROGRESSING AS EXPECTED

## 2021-04-09 NOTE — PROCEDURES
Patient here for US guided PIV placement. Successful access obtained in left arm x 3 attempts by two RNs with inability to thread catheter. Dr Young notified and stated that access could be attempted in right arm as malignancy was 30 yrs prior. Successful access obtained in right arm with one attempt.

## 2021-04-10 ENCOUNTER — APPOINTMENT (OUTPATIENT)
Dept: RADIOLOGY | Facility: MEDICAL CENTER | Age: 81
End: 2021-04-10
Attending: PSYCHIATRY & NEUROLOGY
Payer: MEDICARE

## 2021-04-10 LAB
ANION GAP SERPL CALC-SCNC: 8 MMOL/L (ref 7–16)
BASOPHILS # BLD AUTO: 0.2 % (ref 0–1.8)
BASOPHILS # BLD: 0.01 K/UL (ref 0–0.12)
BUN SERPL-MCNC: 10 MG/DL (ref 8–22)
CALCIUM SERPL-MCNC: 8.9 MG/DL (ref 8.4–10.2)
CHLORIDE SERPL-SCNC: 103 MMOL/L (ref 96–112)
CO2 SERPL-SCNC: 26 MMOL/L (ref 20–33)
CREAT SERPL-MCNC: 0.43 MG/DL (ref 0.5–1.4)
EOSINOPHIL # BLD AUTO: 0.08 K/UL (ref 0–0.51)
EOSINOPHIL NFR BLD: 1.8 % (ref 0–6.9)
ERYTHROCYTE [DISTWIDTH] IN BLOOD BY AUTOMATED COUNT: 38.9 FL (ref 35.9–50)
GLUCOSE BLD-MCNC: 191 MG/DL (ref 65–99)
GLUCOSE BLD-MCNC: 199 MG/DL (ref 65–99)
GLUCOSE BLD-MCNC: 213 MG/DL (ref 65–99)
GLUCOSE BLD-MCNC: 291 MG/DL (ref 65–99)
GLUCOSE SERPL-MCNC: 190 MG/DL (ref 65–99)
HCT VFR BLD AUTO: 40.6 % (ref 37–47)
HGB BLD-MCNC: 13.7 G/DL (ref 12–16)
HIV 1+2 AB+HIV1 P24 AG SERPL QL IA: NORMAL
IMM GRANULOCYTES # BLD AUTO: 0.01 K/UL (ref 0–0.11)
IMM GRANULOCYTES NFR BLD AUTO: 0.2 % (ref 0–0.9)
LYMPHOCYTES # BLD AUTO: 1.85 K/UL (ref 1–4.8)
LYMPHOCYTES NFR BLD: 42 % (ref 22–41)
MCH RBC QN AUTO: 29.6 PG (ref 27–33)
MCHC RBC AUTO-ENTMCNC: 33.7 G/DL (ref 33.6–35)
MCV RBC AUTO: 87.7 FL (ref 81.4–97.8)
MONOCYTES # BLD AUTO: 0.37 K/UL (ref 0–0.85)
MONOCYTES NFR BLD AUTO: 8.4 % (ref 0–13.4)
NEUTROPHILS # BLD AUTO: 2.08 K/UL (ref 2–7.15)
NEUTROPHILS NFR BLD: 47.4 % (ref 44–72)
NRBC # BLD AUTO: 0 K/UL
NRBC BLD-RTO: 0 /100 WBC
PHOSPHATE SERPL-MCNC: 3.6 MG/DL (ref 2.5–4.5)
PLATELET # BLD AUTO: 162 K/UL (ref 164–446)
PMV BLD AUTO: 9.8 FL (ref 9–12.9)
POTASSIUM SERPL-SCNC: 4 MMOL/L (ref 3.6–5.5)
RBC # BLD AUTO: 4.63 M/UL (ref 4.2–5.4)
SODIUM SERPL-SCNC: 137 MMOL/L (ref 135–145)
TREPONEMA PALLIDUM IGG+IGM AB [PRESENCE] IN SERUM OR PLASMA BY IMMUNOASSAY: NORMAL
VIT B12 SERPL-MCNC: 1071 PG/ML (ref 211–911)
WBC # BLD AUTO: 4.4 K/UL (ref 4.8–10.8)

## 2021-04-10 PROCEDURE — 700102 HCHG RX REV CODE 250 W/ 637 OVERRIDE(OP): Performed by: INTERNAL MEDICINE

## 2021-04-10 PROCEDURE — 72157 MRI CHEST SPINE W/O & W/DYE: CPT

## 2021-04-10 PROCEDURE — 36415 COLL VENOUS BLD VENIPUNCTURE: CPT

## 2021-04-10 PROCEDURE — 99226 PR SUBSEQUENT OBSERVATION CARE,LEVEL III: CPT | Performed by: STUDENT IN AN ORGANIZED HEALTH CARE EDUCATION/TRAINING PROGRAM

## 2021-04-10 PROCEDURE — 700102 HCHG RX REV CODE 250 W/ 637 OVERRIDE(OP): Performed by: PSYCHIATRY & NEUROLOGY

## 2021-04-10 PROCEDURE — 96366 THER/PROPH/DIAG IV INF ADDON: CPT

## 2021-04-10 PROCEDURE — 96372 THER/PROPH/DIAG INJ SC/IM: CPT

## 2021-04-10 PROCEDURE — 84155 ASSAY OF PROTEIN SERUM: CPT

## 2021-04-10 PROCEDURE — 700117 HCHG RX CONTRAST REV CODE 255: Performed by: PSYCHIATRY & NEUROLOGY

## 2021-04-10 PROCEDURE — A9270 NON-COVERED ITEM OR SERVICE: HCPCS | Performed by: INTERNAL MEDICINE

## 2021-04-10 PROCEDURE — G0378 HOSPITAL OBSERVATION PER HR: HCPCS

## 2021-04-10 PROCEDURE — 70553 MRI BRAIN STEM W/O & W/DYE: CPT

## 2021-04-10 PROCEDURE — 700111 HCHG RX REV CODE 636 W/ 250 OVERRIDE (IP): Performed by: INTERNAL MEDICINE

## 2021-04-10 PROCEDURE — 84165 PROTEIN E-PHORESIS SERUM: CPT

## 2021-04-10 PROCEDURE — 99214 OFFICE O/P EST MOD 30 MIN: CPT | Performed by: PSYCHIATRY & NEUROLOGY

## 2021-04-10 PROCEDURE — 84100 ASSAY OF PHOSPHORUS: CPT

## 2021-04-10 PROCEDURE — 72156 MRI NECK SPINE W/O & W/DYE: CPT

## 2021-04-10 PROCEDURE — A9576 INJ PROHANCE MULTIPACK: HCPCS | Performed by: PSYCHIATRY & NEUROLOGY

## 2021-04-10 PROCEDURE — 85025 COMPLETE CBC W/AUTO DIFF WBC: CPT

## 2021-04-10 PROCEDURE — 80048 BASIC METABOLIC PNL TOTAL CA: CPT

## 2021-04-10 PROCEDURE — A9270 NON-COVERED ITEM OR SERVICE: HCPCS | Performed by: PSYCHIATRY & NEUROLOGY

## 2021-04-10 PROCEDURE — 82962 GLUCOSE BLOOD TEST: CPT | Mod: 91

## 2021-04-10 PROCEDURE — 700101 HCHG RX REV CODE 250: Performed by: INTERNAL MEDICINE

## 2021-04-10 PROCEDURE — 700111 HCHG RX REV CODE 636 W/ 250 OVERRIDE (IP): Performed by: PSYCHIATRY & NEUROLOGY

## 2021-04-10 RX ORDER — TEMAZEPAM 15 MG/1
15 CAPSULE ORAL
Status: DISCONTINUED | OUTPATIENT
Start: 2021-04-10 | End: 2021-04-11 | Stop reason: HOSPADM

## 2021-04-10 RX ORDER — MAGNESIUM SULFATE HEPTAHYDRATE 40 MG/ML
2 INJECTION, SOLUTION INTRAVENOUS ONCE
Status: COMPLETED | OUTPATIENT
Start: 2021-04-10 | End: 2021-04-10

## 2021-04-10 RX ADMIN — OXYCODONE HYDROCHLORIDE 5 MG: 5 TABLET ORAL at 20:25

## 2021-04-10 RX ADMIN — TEMAZEPAM 15 MG: 15 CAPSULE ORAL at 20:26

## 2021-04-10 RX ADMIN — INSULIN GLARGINE 10 UNITS: 100 INJECTION, SOLUTION SUBCUTANEOUS at 17:13

## 2021-04-10 RX ADMIN — OXYCODONE HYDROCHLORIDE 5 MG: 5 TABLET ORAL at 10:00

## 2021-04-10 RX ADMIN — INSULIN HUMAN 3 UNITS: 100 INJECTION, SOLUTION PARENTERAL at 20:13

## 2021-04-10 RX ADMIN — INSULIN HUMAN 1 UNITS: 100 INJECTION, SOLUTION PARENTERAL at 11:51

## 2021-04-10 RX ADMIN — INSULIN HUMAN 2 UNITS: 100 INJECTION, SOLUTION PARENTERAL at 17:08

## 2021-04-10 RX ADMIN — OXYCODONE HYDROCHLORIDE 5 MG: 5 TABLET ORAL at 16:31

## 2021-04-10 RX ADMIN — MAGNESIUM SULFATE 2 G: 2 INJECTION INTRAVENOUS at 12:41

## 2021-04-10 RX ADMIN — LIDOCAINE 1 PATCH: 50 PATCH TOPICAL at 05:42

## 2021-04-10 RX ADMIN — ENOXAPARIN SODIUM 40 MG: 40 INJECTION SUBCUTANEOUS at 05:43

## 2021-04-10 RX ADMIN — SENNOSIDES AND DOCUSATE SODIUM 2 TABLET: 8.6; 5 TABLET ORAL at 17:08

## 2021-04-10 RX ADMIN — ROSUVASTATIN 10 MG: 10 TABLET, FILM COATED ORAL at 05:42

## 2021-04-10 RX ADMIN — GADOTERIDOL 15 ML: 279.3 INJECTION, SOLUTION INTRAVENOUS at 15:51

## 2021-04-10 RX ADMIN — INSULIN HUMAN 1 UNITS: 100 INJECTION, SOLUTION PARENTERAL at 05:45

## 2021-04-10 RX ADMIN — LEVOTHYROXINE SODIUM 75 MCG: 0.07 TABLET ORAL at 05:42

## 2021-04-10 ASSESSMENT — PAIN SCALES - WONG BAKER: WONGBAKER_NUMERICALRESPONSE: HURTS EVEN MORE

## 2021-04-10 ASSESSMENT — ENCOUNTER SYMPTOMS
FOCAL WEAKNESS: 1
FLANK PAIN: 0
VOMITING: 0
DOUBLE VISION: 0
BACK PAIN: 0
ABDOMINAL PAIN: 0
WEAKNESS: 1
NECK PAIN: 0
EYE PAIN: 0
PALPITATIONS: 0
BLURRED VISION: 0
HEADACHES: 0
BLOOD IN STOOL: 0
LOSS OF CONSCIOUSNESS: 0
FEVER: 0
HEMOPTYSIS: 0
CHILLS: 0
DIARRHEA: 0
COUGH: 0
SHORTNESS OF BREATH: 0
TREMORS: 0
SENSORY CHANGE: 0
NAUSEA: 0
WHEEZING: 0

## 2021-04-10 ASSESSMENT — PAIN DESCRIPTION - PAIN TYPE
TYPE: ACUTE PAIN

## 2021-04-10 ASSESSMENT — FIBROSIS 4 INDEX: FIB4 SCORE: 1.89

## 2021-04-10 NOTE — CARE PLAN
Received report from day shift nurse.   Assumed pt care at   Pt is A&Ox4, resting comfortably in bed.   Pt on r.a.. No signs of SOB/respiratory distress.   Labs noted, VSS.   Pt denied any pain upon assessment   Fall precautions in place.  Bed locked & at lowest position.   Call light and personal belongings within reach.       Problem: Knowledge Deficit  Goal: Knowledge of disease process/condition, treatment plan, diagnostic tests, and medications will improve  Outcome: PROGRESSING AS EXPECTED  Note: Ortho consult placed   Pt will have MRI - brain today      Problem: Pain Management  Goal: Pain level will decrease to patient's comfort goal  Outcome: PROGRESSING AS EXPECTED  Note: Pt denied any pain upon assessment   Encouraged pt to report to the nurse if start to experience pain - pt verbalized understanding

## 2021-04-10 NOTE — PROGRESS NOTES
Neurology Progress Note  Neurohospitalist Service, [facility]    Referring Physician: Pooja Song M.D.    Chief Complaint   Patient presents with   • Fall   • Dizziness       HPI: Refer to initial documented Neurology H&P, as detailed in the patient's chart.    Interval History: No acute events overnight.  Patient with mild frontal headache today, without nausea, noting that headache was intermittently relieved s/p yesterdays headache cocktail as ordered.  Today feels that strength in RLE is improving compared to yesterday albeit with pain in groin with certain movements and/or with strength testing against resistance.     Review of systems: In addition to what is detailed in the HPI and/or updated in the interval history, all other systems reviewed and are negative.    Past Medical History:    has a past medical history of Anesthesia, Arthritis, Backpain, Breast cancer (HCC) (1980s), Diverticulosis, DVT of deep femoral vein (HCC), Heart burn, High cholesterol, Pneumonia (Feb 2006), Thyroid condition, and Ulcer. She also has no past medical history of COPD.    FHx:  family history includes Cancer in her mother; Diabetes in her mother; Heart Disease (age of onset: 63) in her maternal grandmother; Heart Disease (age of onset: 64) in her mother; Hypertension in her mother; Stroke in her mother.    SHx:   reports that she has never smoked. She has never used smokeless tobacco. She reports current alcohol use. She reports that she does not use drugs.    Medications:    Current Facility-Administered Medications:   •  magnesium sulfate IVPB premix 2 g, 2 g, Intravenous, Once, Jak Young M.D.  •  insulin glargine (Lantus) injection, 10 Units, Subcutaneous, Q EVENING, Pooja Song M.D., 10 Units at 04/09/21 1723  •  insulin regular (HumuLIN R,NovoLIN R) injection, 1-6 Units, Subcutaneous, 4X/DAY ACHS, 1 Units at 04/10/21 1151 **AND** POC blood glucose manual result, , , Q AC AND BEDTIME(S) **AND**  NOTIFEVERARDO HUGHES and PharmD, , , Once **AND** glucose 4 g chewable tablet 16 g, 16 g, Oral, Q15 MIN PRN **AND** dextrose 50% (D50W) injection 50 mL, 50 mL, Intravenous, Q15 MIN PRN, Pooja Song M.D.  •  cyclobenzaprine (Flexeril) tablet 10 mg, 10 mg, Oral, TID PRN, Helene Lunsford M.D., 10 mg at 04/08/21 1320  •  levothyroxine (SYNTHROID) tablet 75 mcg, 75 mcg, Oral, AM ES, Helene Lunsford M.D., 75 mcg at 04/10/21 0542  •  senna-docusate (PERICOLACE or SENOKOT S) 8.6-50 MG per tablet 2 tablet, 2 tablet, Oral, BID, Stopped at 04/08/21 1800 **AND** polyethylene glycol/lytes (MIRALAX) PACKET 1 Packet, 1 Packet, Oral, QDAY PRN **AND** magnesium hydroxide (MILK OF MAGNESIA) suspension 30 mL, 30 mL, Oral, QDAY PRN **AND** bisacodyl (DULCOLAX) suppository 10 mg, 10 mg, Rectal, QDAY PRN, Helene Lunsford M.D.  •  enoxaparin (LOVENOX) inj 40 mg, 40 mg, Subcutaneous, DAILY, Helene Lunsford M.D., 40 mg at 04/10/21 0543  •  acetaminophen (Tylenol) tablet 650 mg, 650 mg, Oral, Q6HRS PRN, Helene Lunsford M.D., 650 mg at 04/09/21 1804  •  ondansetron (ZOFRAN) syringe/vial injection 4 mg, 4 mg, Intravenous, Q4HRS PRN, Helene Lunsford M.D.  •  ondansetron (ZOFRAN ODT) dispertab 4 mg, 4 mg, Oral, Q4HRS PRN, Helene Lunsford M.D.  •  oxyCODONE immediate-release (ROXICODONE) tablet 5 mg, 5 mg, Oral, Q4HRS PRN, Helene Lunsford M.D., 5 mg at 04/10/21 1000  •  lidocaine (LIDODERM) 5 % 1 Patch, 1 Patch, Transdermal, DAILY, Helene Lunsford M.D., 1 Patch at 04/10/21 0542  •  rosuvastatin (CRESTOR) tablet 10 mg, 10 mg, Oral, Q EVENING, Helene Lunsford M.D., 10 mg at 04/10/21 0542    Physical Examination:     Vitals:    04/09/21 1713 04/09/21 2159 04/10/21 0400 04/10/21 1033   BP: 153/72 120/62 126/65 133/48   Pulse: 70 69 69 66   Resp: 18 18 18 17   Temp: 37.1 °C (98.8 °F) 36.9 °C (98.5 °F) 36.6 °C (97.8 °F) 37 °C (98.6 °F)   TempSrc: Temporal Oral Oral Oral   SpO2: 93% 94% 92% 94%   Weight:       Height:           General:  Patient is awake and in no acute distress  Eyes: examination of optic disks not indicated at this time  CV: RRR    NEUROLOGICAL EXAM:     Mental status: Awake, alert and fully oriented, follows commands  Speech and language: speech is not dysarthric. The patient is able to name and repeat.  Cranial nerve exam: Pupils are equal, round and reactive to light bilaterally. Visual fields are full. Extraocular muscles are intact. Sensation in the face is intact to light touch. Face is symmetric. Hearing to finger rub equal. Palate elevates symmetrically. Shoulder shrug is full. Tongue is midline.  Motor exam: 4/5 R iliopsoas and quad, 5/5 tib ant and gastroc, remainder of muscles 5/5. Tone is normal. No abnormal movements were seen on exam.  Sensory exam: No sensory deficits identified   Deep tendon reflexes: 2+ right patellar and 3+ right patellar. Toes DOWN-going bilaterally.  Coordination: no ataxia   Gait: deferred given patient preference    Objective Data:    Labs:  Lab Results   Component Value Date/Time    PROTHROMBTM 12.8 04/08/2021 05:40 AM    INR 0.99 04/08/2021 05:40 AM      Lab Results   Component Value Date/Time    WBC 4.4 (L) 04/10/2021 03:46 AM    RBC 4.63 04/10/2021 03:46 AM    HEMOGLOBIN 13.7 04/10/2021 03:46 AM    HEMATOCRIT 40.6 04/10/2021 03:46 AM    MCV 87.7 04/10/2021 03:46 AM    MCH 29.6 04/10/2021 03:46 AM    MCHC 33.7 04/10/2021 03:46 AM    MPV 9.8 04/10/2021 03:46 AM    NEUTSPOLYS 47.40 04/10/2021 03:46 AM    LYMPHOCYTES 42.00 (H) 04/10/2021 03:46 AM    MONOCYTES 8.40 04/10/2021 03:46 AM    EOSINOPHILS 1.80 04/10/2021 03:46 AM    EOSINOPHILS 3 10/28/2009 07:00 PM    BASOPHILS 0.20 04/10/2021 03:46 AM      Lab Results   Component Value Date/Time    SODIUM 137 04/10/2021 03:46 AM    POTASSIUM 4.0 04/10/2021 03:46 AM    CHLORIDE 103 04/10/2021 03:46 AM    CO2 26 04/10/2021 03:46 AM    GLUCOSE 190 (H) 04/10/2021 03:46 AM    BUN 10 04/10/2021 03:46 AM    CREATININE 0.43 (L) 04/10/2021 03:46 AM       Lab Results   Component Value Date/Time    CHOLSTRLTOT 153 12/27/2019 07:22 AM    LDL 74 12/27/2019 07:22 AM    HDL 48 12/27/2019 07:22 AM    TRIGLYCERIDE 155 (H) 12/27/2019 07:22 AM       Lab Results   Component Value Date/Time    ALKPHOSPHAT 92 04/08/2021 05:40 AM    ASTSGOT 16 04/08/2021 05:40 AM    ALTSGPT 17 04/08/2021 05:40 AM    TBILIRUBIN 0.8 04/08/2021 05:40 AM        Imaging/Testing:    I interpreted and/or reviewed the patient's neuroimaging    MR-LUMBAR SPINE-WITH & W/O   Final Result      1.  There is an approximately 9 mm sized enhancing lesion in the left side of the L4 vertebral body. This is new since the previous study. In view of history of breast carcinoma this is suspicious for metastasis.   2.  There is diffuse disc bulge at T10-T11 causing mild-to-moderate central canal stenosis. The size of the disc is increased since the previous study.   3.  Degenerative disease as described above.      IR-US GUIDED PIV   Final Result    Ultrasound-guided PERIPHERAL IV INSERTION performed by    qualified nursing staff as above.      MR-HIP-W/O RIGHT   Final Result      1.  Fractures of the right superior and inferior pubic rami.      2.  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      3.  Tendinopathy of the right gluteus medius and minimus tendons.      US-EXTREMITY VENOUS LOWER UNILAT RIGHT   Final Result      DX-HIP-BILATERAL-WITH PELVIS-2 VIEWS   Final Result         1.  No radiographic evidence of acute traumatic injury.      CT-CTA HEAD WITH & W/O-POST PROCESS   Final Result         1.  No large vessel occlusion or aneurysm is identified.      CT-CTA NECK WITH & W/O-POST PROCESSING   Final Result         1.  No high-grade stenosis, occlusion, aneurysm, or dissection identified.         CT-CEREBRAL PERFUSION ANALYSIS   Final Result         1.  Cerebral blood flow less than 30% likely representing completed infarct = 0 mL.      2.  T Max more than 6 seconds likely representing combination of  completed infarct and ischemia = 0 mL.      3.  Mismatched volume likely representing ischemic brain/penumbra = None      4.  Please note that the cerebral perfusion was performed on the limited brain tissue around the basal ganglia region. Infarct/ischemia outside the CT perfusion sections can be missed in this study.      DX-CHEST-PORTABLE (1 VIEW)   Final Result         1.  No acute cardiopulmonary disease.   2.  Atherosclerosis      CT-HEAD W/O   Final Result         1.  No acute intracranial abnormality.   2.  Atherosclerosis.      MR-BRAIN-WITH & W/O    (Results Pending)   MR-CERVICAL SPINE-WITH & W/O    (Results Pending)   MR-THORACIC SPINE-WITH & W/O    (Results Pending)       Assessment and Plan:    Mavis Lizarraga is a 80 y.o. woman presenting for whom neurology has been consulted for subacute right lower extremity weakness.  She is not a candidate for acute stroke intervention as she presents outside of the therapeutic window, and alternative etiology is suspected.  The hip fracture could be secondary to her fall that preceded this admission ie. a result of underlying pathology rather than the cause of presentation.  MRI lumbar spine showing enhancing lesion of the L4 vertebral body.  Pending additional dedicated imaging of remainder of neuroaxis.  Clinically better compared to yesterday, albeit with persistence of deficits in RLE, and pending transfer to [facility] anticipated 4/11/21.     Plan:     1. R leg weakness, pain limited ddx recurrence of malignancy to L4 vertebral body  - imaging of remainder of neuro-axis: MRI brain, MRI cspine, and MRI thoracic spine w/ and w/o CST  - pending transfer to [facility] 4/11/21 to assess candidacy for radiation therapy pending consultations from neurosurgery and oncological specialists     2. Headache  - headache cocktail x1: 2g IV Magnesium    The evaluation of the patient, and recommended management, was discussed with Dr. Pooja Song. I have performed a physical exam and  reviewed and updated ROS and Plan today (4/10/2021). In review of yesterday's note (4/9/2021), there are no changes except as documented above.    Jak Young MD  Neurohospitalist, Acute Care Services   of Neurology

## 2021-04-10 NOTE — PROGRESS NOTES
[facility] Medicine Daily Progress Note    Date of Service  4/10/2021    Chief Complaint  80 y.o. female admitted 4/8/2021 with lower extremity weakness    Hospital Course  80-year-old female with history of breast cancer, dyslipidemia, diabetes, DVT and hypothyroidism presented 4/8 with lower extremity weakness, patient woke up today with lower extremity weakness and pain on around her hips area, the weakness on the left side was improved however she was not able to move her right leg with pain, no sharp pain and no numbness or tingling and her sensation were normal, denied any back pain, no urinary or bowel symptoms, denied any weakness or numbness on her arms, no headache, denied any chest pain or shortness of breath, on admission labs did not show any acute finding, CTA and CT for head did not show any acute stroke or bleeding, x-ray for hips did not show any fractures.       Interval Problem Update  I evaluated and examined the patient at the bedside.  Labs and imaging were reviewed. Care plan was discussed with the patient and bedside nurse.    Patient reported lower extremity weakness slightly improved.  She is able to raise right leg a little bit.  Denies any urine or stool incontinence, denies numbness.    MRI lumbar showed 9mm mass in the lef side of L4. I talked to neurosurgeon Dr. Marin, who reviewed the image, don't think the neurologic deficits is related to the mass. no surgery indication at this point. No need for steroids.     Pending MRI brain and thoracic w and wo contrast to complete the workup for metastasis    Consulted oncology Dr. Rizzo    MRI right hip showed Fractures of the right superior and inferior pubic rami.  Consulted orthopedics Dr. Lorenzo Monaco    Uncontrolled diabetes, started on Lantus, SSI      Consultants/Specialty  orthopedics Dr. Lorenzo Monaco  Neurology Dr. Young  Oncology  Neurosurgeon Dr. Marin      Code Status  Full Code    Disposition  TBD    Review of  Systems  Review of Systems   Constitutional: Positive for malaise/fatigue. Negative for chills and fever.   HENT: Negative for ear discharge and ear pain.    Eyes: Negative for blurred vision, double vision and pain.   Respiratory: Negative for cough, hemoptysis, shortness of breath and wheezing.    Cardiovascular: Negative for chest pain and palpitations.   Gastrointestinal: Negative for abdominal pain, blood in stool, diarrhea, nausea and vomiting.   Genitourinary: Negative for dysuria, flank pain, hematuria and urgency.   Musculoskeletal: Positive for joint pain. Negative for back pain and neck pain.        Right hip pain   Neurological: Positive for focal weakness and weakness. Negative for tremors, sensory change, loss of consciousness and headaches.        Bilateral lower extremity weakness, right more than left        Physical Exam  Temp:  [36.6 °C (97.8 °F)-37.1 °C (98.8 °F)] 36.6 °C (97.8 °F)  Pulse:  [69-70] 69  Resp:  [18] 18  BP: (120-153)/(62-72) 126/65  SpO2:  [92 %-94 %] 92 %    Physical Exam  Constitutional:       General: She is not in acute distress.     Appearance: She is ill-appearing.   HENT:      Head: Normocephalic and atraumatic.      Right Ear: External ear normal.      Left Ear: External ear normal.      Nose: Nose normal. No congestion or rhinorrhea.      Mouth/Throat:      Mouth: Mucous membranes are moist.   Eyes:      Extraocular Movements: Extraocular movements intact.      Conjunctiva/sclera: Conjunctivae normal.      Pupils: Pupils are equal, round, and reactive to light.   Cardiovascular:      Rate and Rhythm: Regular rhythm.      Pulses: Normal pulses.      Heart sounds: Normal heart sounds.   Pulmonary:      Effort: Pulmonary effort is normal. No respiratory distress.      Breath sounds: Normal breath sounds. No stridor. No wheezing, rhonchi or rales.   Chest:      Chest wall: No tenderness.   Abdominal:      General: Bowel sounds are normal. There is no distension.       Palpations: Abdomen is soft.      Tenderness: There is no abdominal tenderness. There is no guarding or rebound.   Musculoskeletal:         General: Tenderness present. No swelling.      Cervical back: Normal range of motion and neck supple.      Comments: Limited ROM of right hip   Skin:     General: Skin is warm and dry.   Neurological:      Mental Status: She is alert and oriented to person, place, and time.      Sensory: No sensory deficit.      Motor: No weakness.      Coordination: Coordination normal.      Comments: RLE 3/5, LLE 4/5  Gross sensory intact   Psychiatric:         Mood and Affect: Mood normal.         Fluids    Intake/Output Summary (Last 24 hours) at 4/10/2021 1118  Last data filed at 4/10/2021 0400  Gross per 24 hour   Intake 240 ml   Output --   Net 240 ml       Laboratory  Recent Labs     04/08/21  0540 04/09/21  0331 04/10/21  0346   WBC 5.1 5.4 4.4*   RBC 4.92 4.52 4.63   HEMOGLOBIN 14.6 13.5 13.7   HEMATOCRIT 43.1 40.0 40.6   MCV 87.6 88.5 87.7   MCH 29.7 29.9 29.6   MCHC 33.9 33.8 33.7   RDW 39.2 39.4 38.9   PLATELETCT 173 157* 162*   MPV 9.5 9.7 9.8     Recent Labs     04/08/21  0540 04/09/21  0331 04/10/21  0346   SODIUM 137 137 137   POTASSIUM 4.2 4.2 4.0   CHLORIDE 100 100 103   CO2 28 25 26   GLUCOSE 265* 218* 190*   BUN 10 11 10   CREATININE 0.61 0.47* 0.43*   CALCIUM 9.3 8.9 8.9     Recent Labs     04/08/21  0540   APTT 25.1   INR 0.99               Imaging  MR-LUMBAR SPINE-WITH & W/O   Final Result      1.  There is an approximately 9 mm sized enhancing lesion in the left side of the L4 vertebral body. This is new since the previous study. In view of history of breast carcinoma this is suspicious for metastasis.   2.  There is diffuse disc bulge at T10-T11 causing mild-to-moderate central canal stenosis. The size of the disc is increased since the previous study.   3.  Degenerative disease as described above.      IR-US GUIDED PIV   Final Result    Ultrasound-guided PERIPHERAL IV  INSERTION performed by    qualified nursing staff as above.      MR-HIP-W/O RIGHT   Final Result      1.  Fractures of the right superior and inferior pubic rami.      2.  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      3.  Tendinopathy of the right gluteus medius and minimus tendons.      US-EXTREMITY VENOUS LOWER UNILAT RIGHT   Final Result      DX-HIP-BILATERAL-WITH PELVIS-2 VIEWS   Final Result         1.  No radiographic evidence of acute traumatic injury.      CT-CTA HEAD WITH & W/O-POST PROCESS   Final Result         1.  No large vessel occlusion or aneurysm is identified.      CT-CTA NECK WITH & W/O-POST PROCESSING   Final Result         1.  No high-grade stenosis, occlusion, aneurysm, or dissection identified.         CT-CEREBRAL PERFUSION ANALYSIS   Final Result         1.  Cerebral blood flow less than 30% likely representing completed infarct = 0 mL.      2.  T Max more than 6 seconds likely representing combination of completed infarct and ischemia = 0 mL.      3.  Mismatched volume likely representing ischemic brain/penumbra = None      4.  Please note that the cerebral perfusion was performed on the limited brain tissue around the basal ganglia region. Infarct/ischemia outside the CT perfusion sections can be missed in this study.      DX-CHEST-PORTABLE (1 VIEW)   Final Result         1.  No acute cardiopulmonary disease.   2.  Atherosclerosis      CT-HEAD W/O   Final Result         1.  No acute intracranial abnormality.   2.  Atherosclerosis.      MR-BRAIN-WITH & W/O    (Results Pending)   MR-CERVICAL SPINE-WITH & W/O    (Results Pending)   MR-THORACIC SPINE-WITH & W/O    (Results Pending)        Assessment/Plan  * Lower extremity weakness  Assessment & Plan  Happened suddenly  morning, right more than the left with pain around her hip area   no numbness or losing sensation, and no urinary or bowel symptoms  CTA for head did not show any ischemia or stroke  X-ray did not show any fracture around  hips area  Inflammation markers ESR CRP and CPK are normal    MRI for right hip  showed Fractures of the right superior and inferior pubic rami.    Consulted orthopedics Dr. 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 I talked to neurosurgeon Dr. Marin, who reviewed the image, don't think the neurologic deficits is related to the mass. no surgery indication at this point. No need for steroids.     Pending MRI brain and thoracic w and wo contrast to complete the workup for metastasis    PT and OT  neurology consulted  Oncology consulted  Neurosurgeon consulted    Pubic ramus fracture (HCC)  Assessment & Plan  MRI right hip showed Fractures of the right superior and inferior pubic rami.    Consulted orthopedics Dr. Lorenzo Monaco      Uncontrolled type 2 diabetes mellitus without complication, without long-term current use of insulin  Assessment & Plan  A1c 10.9  Patient does not take her medications due to side effects(glipizide and Metformin)    BG high over 200  Start on Lantus 10 units  SSI    Mixed hyperlipidemia- (present on admission)  Assessment & Plan  Patient takes rosuvastatin 40 mg daily at home(very high dose)  Since the patient does not have any history of heart disease or stroke, >>decrease dose to 10 mg daily  Check CPK    Breast cancer (HCC)  Assessment & Plan  History of breast cancer was treated more than  30 years ago by surgery  She has been stable since    Hypothyroidism due to acquired atrophy of thyroid- (present on admission)  Assessment & Plan  Continue levothyroxine 75 mcg daily  Check TSH       VTE prophylaxis: lovenox

## 2021-04-11 ENCOUNTER — APPOINTMENT (OUTPATIENT)
Dept: RADIOLOGY | Facility: MEDICAL CENTER | Age: 81
DRG: 536 | End: 2021-04-11
Attending: INTERNAL MEDICINE
Payer: MEDICARE

## 2021-04-11 ENCOUNTER — HOSPITAL ENCOUNTER (INPATIENT)
Facility: MEDICAL CENTER | Age: 81
LOS: 3 days | DRG: 536 | End: 2021-04-14
Attending: INTERNAL MEDICINE | Admitting: STUDENT IN AN ORGANIZED HEALTH CARE EDUCATION/TRAINING PROGRAM
Payer: MEDICARE

## 2021-04-11 VITALS
HEIGHT: 67 IN | OXYGEN SATURATION: 99 % | SYSTOLIC BLOOD PRESSURE: 106 MMHG | RESPIRATION RATE: 18 BRPM | DIASTOLIC BLOOD PRESSURE: 47 MMHG | TEMPERATURE: 98.4 F | WEIGHT: 174.16 LBS | BODY MASS INDEX: 27.34 KG/M2 | HEART RATE: 60 BPM

## 2021-04-11 DIAGNOSIS — M48.02 CERVICAL SPINAL STENOSIS: ICD-10-CM

## 2021-04-11 DIAGNOSIS — K21.00 GASTROESOPHAGEAL REFLUX DISEASE WITH ESOPHAGITIS WITHOUT HEMORRHAGE: ICD-10-CM

## 2021-04-11 DIAGNOSIS — M62.830 SPASM OF BACK MUSCLES: ICD-10-CM

## 2021-04-11 DIAGNOSIS — S32.591A CLOSED FRACTURE OF RAMUS OF RIGHT PUBIS, INITIAL ENCOUNTER (HCC): ICD-10-CM

## 2021-04-11 DIAGNOSIS — E11.65 UNCONTROLLED TYPE 2 DIABETES MELLITUS WITH HYPERGLYCEMIA (HCC): ICD-10-CM

## 2021-04-11 LAB
ANION GAP SERPL CALC-SCNC: 9 MMOL/L (ref 7–16)
BASOPHILS # BLD AUTO: 0.5 % (ref 0–1.8)
BASOPHILS # BLD: 0.02 K/UL (ref 0–0.12)
BUN SERPL-MCNC: 11 MG/DL (ref 8–22)
CALCIUM SERPL-MCNC: 8.7 MG/DL (ref 8.4–10.2)
CHLORIDE SERPL-SCNC: 102 MMOL/L (ref 96–112)
CO2 SERPL-SCNC: 25 MMOL/L (ref 20–33)
CREAT SERPL-MCNC: 0.4 MG/DL (ref 0.5–1.4)
EOSINOPHIL # BLD AUTO: 0.08 K/UL (ref 0–0.51)
EOSINOPHIL NFR BLD: 1.9 % (ref 0–6.9)
ERYTHROCYTE [DISTWIDTH] IN BLOOD BY AUTOMATED COUNT: 38.8 FL (ref 35.9–50)
GLUCOSE BLD-MCNC: 125 MG/DL (ref 65–99)
GLUCOSE BLD-MCNC: 141 MG/DL (ref 65–99)
GLUCOSE BLD-MCNC: 174 MG/DL (ref 65–99)
GLUCOSE BLD-MCNC: 187 MG/DL (ref 65–99)
GLUCOSE SERPL-MCNC: 208 MG/DL (ref 65–99)
HCT VFR BLD AUTO: 40.1 % (ref 37–47)
HGB BLD-MCNC: 13.4 G/DL (ref 12–16)
IMM GRANULOCYTES # BLD AUTO: 0.01 K/UL (ref 0–0.11)
IMM GRANULOCYTES NFR BLD AUTO: 0.2 % (ref 0–0.9)
LYMPHOCYTES # BLD AUTO: 1.63 K/UL (ref 1–4.8)
LYMPHOCYTES NFR BLD: 38.3 % (ref 22–41)
MCH RBC QN AUTO: 29.4 PG (ref 27–33)
MCHC RBC AUTO-ENTMCNC: 33.4 G/DL (ref 33.6–35)
MCV RBC AUTO: 87.9 FL (ref 81.4–97.8)
MONOCYTES # BLD AUTO: 0.44 K/UL (ref 0–0.85)
MONOCYTES NFR BLD AUTO: 10.3 % (ref 0–13.4)
NEUTROPHILS # BLD AUTO: 2.08 K/UL (ref 2–7.15)
NEUTROPHILS NFR BLD: 48.8 % (ref 44–72)
NRBC # BLD AUTO: 0 K/UL
NRBC BLD-RTO: 0 /100 WBC
PLATELET # BLD AUTO: 160 K/UL (ref 164–446)
PMV BLD AUTO: 9.6 FL (ref 9–12.9)
POTASSIUM SERPL-SCNC: 4.1 MMOL/L (ref 3.6–5.5)
RBC # BLD AUTO: 4.56 M/UL (ref 4.2–5.4)
SODIUM SERPL-SCNC: 136 MMOL/L (ref 135–145)
WBC # BLD AUTO: 4.3 K/UL (ref 4.8–10.8)

## 2021-04-11 PROCEDURE — 700117 HCHG RX CONTRAST REV CODE 255: Performed by: INTERNAL MEDICINE

## 2021-04-11 PROCEDURE — 700102 HCHG RX REV CODE 250 W/ 637 OVERRIDE(OP): Performed by: INTERNAL MEDICINE

## 2021-04-11 PROCEDURE — 82962 GLUCOSE BLOOD TEST: CPT | Mod: 91

## 2021-04-11 PROCEDURE — 770004 HCHG ROOM/CARE - ONCOLOGY PRIVATE *

## 2021-04-11 PROCEDURE — 700111 HCHG RX REV CODE 636 W/ 250 OVERRIDE (IP): Performed by: INTERNAL MEDICINE

## 2021-04-11 PROCEDURE — A9270 NON-COVERED ITEM OR SERVICE: HCPCS | Performed by: STUDENT IN AN ORGANIZED HEALTH CARE EDUCATION/TRAINING PROGRAM

## 2021-04-11 PROCEDURE — 99217 PR OBSERVATION CARE DISCHARGE: CPT | Performed by: STUDENT IN AN ORGANIZED HEALTH CARE EDUCATION/TRAINING PROGRAM

## 2021-04-11 PROCEDURE — 99221 1ST HOSP IP/OBS SF/LOW 40: CPT | Performed by: PSYCHIATRY & NEUROLOGY

## 2021-04-11 PROCEDURE — 700105 HCHG RX REV CODE 258: Performed by: STUDENT IN AN ORGANIZED HEALTH CARE EDUCATION/TRAINING PROGRAM

## 2021-04-11 PROCEDURE — G0378 HOSPITAL OBSERVATION PER HR: HCPCS

## 2021-04-11 PROCEDURE — 71260 CT THORAX DX C+: CPT | Mod: ME

## 2021-04-11 PROCEDURE — 80048 BASIC METABOLIC PNL TOTAL CA: CPT

## 2021-04-11 PROCEDURE — 700101 HCHG RX REV CODE 250: Performed by: INTERNAL MEDICINE

## 2021-04-11 PROCEDURE — 96372 THER/PROPH/DIAG INJ SC/IM: CPT

## 2021-04-11 PROCEDURE — 84155 ASSAY OF PROTEIN SERUM: CPT

## 2021-04-11 PROCEDURE — 99223 1ST HOSP IP/OBS HIGH 75: CPT | Mod: AI | Performed by: STUDENT IN AN ORGANIZED HEALTH CARE EDUCATION/TRAINING PROGRAM

## 2021-04-11 PROCEDURE — 85025 COMPLETE CBC W/AUTO DIFF WBC: CPT

## 2021-04-11 PROCEDURE — 36415 COLL VENOUS BLD VENIPUNCTURE: CPT

## 2021-04-11 PROCEDURE — 700111 HCHG RX REV CODE 636 W/ 250 OVERRIDE (IP): Performed by: STUDENT IN AN ORGANIZED HEALTH CARE EDUCATION/TRAINING PROGRAM

## 2021-04-11 PROCEDURE — 84165 PROTEIN E-PHORESIS SERUM: CPT

## 2021-04-11 PROCEDURE — A9270 NON-COVERED ITEM OR SERVICE: HCPCS | Performed by: INTERNAL MEDICINE

## 2021-04-11 PROCEDURE — 700102 HCHG RX REV CODE 250 W/ 637 OVERRIDE(OP): Performed by: STUDENT IN AN ORGANIZED HEALTH CARE EDUCATION/TRAINING PROGRAM

## 2021-04-11 RX ORDER — LIDOCAINE 50 MG/G
1 PATCH TOPICAL DAILY
Status: DISCONTINUED | OUTPATIENT
Start: 2021-04-12 | End: 2021-04-14 | Stop reason: HOSPADM

## 2021-04-11 RX ORDER — ONDANSETRON 2 MG/ML
4 INJECTION INTRAMUSCULAR; INTRAVENOUS EVERY 4 HOURS PRN
Status: CANCELLED | OUTPATIENT
Start: 2021-04-11

## 2021-04-11 RX ORDER — LEVOTHYROXINE SODIUM 0.07 MG/1
75 TABLET ORAL
Status: DISCONTINUED | OUTPATIENT
Start: 2021-04-12 | End: 2021-04-14 | Stop reason: HOSPADM

## 2021-04-11 RX ORDER — ACETAMINOPHEN 325 MG/1
650 TABLET ORAL EVERY 6 HOURS PRN
Status: DISCONTINUED | OUTPATIENT
Start: 2021-04-11 | End: 2021-04-14 | Stop reason: HOSPADM

## 2021-04-11 RX ORDER — ROSUVASTATIN CALCIUM 10 MG/1
10 TABLET, COATED ORAL EVERY EVENING
Status: DISCONTINUED | OUTPATIENT
Start: 2021-04-12 | End: 2021-04-14 | Stop reason: HOSPADM

## 2021-04-11 RX ORDER — ONDANSETRON 2 MG/ML
4 INJECTION INTRAMUSCULAR; INTRAVENOUS EVERY 4 HOURS PRN
Status: DISCONTINUED | OUTPATIENT
Start: 2021-04-11 | End: 2021-04-14 | Stop reason: HOSPADM

## 2021-04-11 RX ORDER — CYCLOBENZAPRINE HCL 10 MG
10 TABLET ORAL 3 TIMES DAILY PRN
Status: DISCONTINUED | OUTPATIENT
Start: 2021-04-11 | End: 2021-04-14 | Stop reason: HOSPADM

## 2021-04-11 RX ORDER — DEXTROSE MONOHYDRATE 25 G/50ML
50 INJECTION, SOLUTION INTRAVENOUS
Status: DISCONTINUED | OUTPATIENT
Start: 2021-04-11 | End: 2021-04-14 | Stop reason: HOSPADM

## 2021-04-11 RX ORDER — BISACODYL 10 MG
10 SUPPOSITORY, RECTAL RECTAL
Status: DISCONTINUED | OUTPATIENT
Start: 2021-04-11 | End: 2021-04-12

## 2021-04-11 RX ORDER — ONDANSETRON 4 MG/1
4 TABLET, ORALLY DISINTEGRATING ORAL EVERY 4 HOURS PRN
Status: DISCONTINUED | OUTPATIENT
Start: 2021-04-11 | End: 2021-04-14 | Stop reason: HOSPADM

## 2021-04-11 RX ORDER — LEVOTHYROXINE SODIUM 0.07 MG/1
75 TABLET ORAL
Status: CANCELLED | OUTPATIENT
Start: 2021-04-12

## 2021-04-11 RX ORDER — OXYCODONE HYDROCHLORIDE 5 MG/1
5 TABLET ORAL EVERY 4 HOURS PRN
Status: DISCONTINUED | OUTPATIENT
Start: 2021-04-11 | End: 2021-04-14 | Stop reason: HOSPADM

## 2021-04-11 RX ORDER — POLYETHYLENE GLYCOL 3350 17 G/17G
1 POWDER, FOR SOLUTION ORAL
Status: DISCONTINUED | OUTPATIENT
Start: 2021-04-11 | End: 2021-04-12

## 2021-04-11 RX ORDER — CYCLOBENZAPRINE HCL 10 MG
10 TABLET ORAL 3 TIMES DAILY PRN
Status: CANCELLED | OUTPATIENT
Start: 2021-04-11

## 2021-04-11 RX ORDER — ACETAMINOPHEN 325 MG/1
650 TABLET ORAL EVERY 6 HOURS PRN
Status: CANCELLED | OUTPATIENT
Start: 2021-04-11

## 2021-04-11 RX ORDER — INSULIN GLARGINE 100 [IU]/ML
10 INJECTION, SOLUTION SUBCUTANEOUS EVERY EVENING
Status: DISCONTINUED | OUTPATIENT
Start: 2021-04-11 | End: 2021-04-14 | Stop reason: HOSPADM

## 2021-04-11 RX ORDER — ONDANSETRON 4 MG/1
4 TABLET, ORALLY DISINTEGRATING ORAL EVERY 4 HOURS PRN
Status: CANCELLED | OUTPATIENT
Start: 2021-04-11

## 2021-04-11 RX ORDER — TEMAZEPAM 15 MG/1
15 CAPSULE ORAL
Status: DISCONTINUED | OUTPATIENT
Start: 2021-04-11 | End: 2021-04-14 | Stop reason: HOSPADM

## 2021-04-11 RX ORDER — DEXTROSE MONOHYDRATE 25 G/50ML
50 INJECTION, SOLUTION INTRAVENOUS
Status: CANCELLED | OUTPATIENT
Start: 2021-04-11

## 2021-04-11 RX ORDER — AMOXICILLIN 250 MG
2 CAPSULE ORAL 2 TIMES DAILY
Status: DISCONTINUED | OUTPATIENT
Start: 2021-04-11 | End: 2021-04-12

## 2021-04-11 RX ORDER — POLYETHYLENE GLYCOL 3350 17 G/17G
1 POWDER, FOR SOLUTION ORAL
Status: CANCELLED | OUTPATIENT
Start: 2021-04-11

## 2021-04-11 RX ORDER — INSULIN GLARGINE 100 [IU]/ML
10 INJECTION, SOLUTION SUBCUTANEOUS EVERY EVENING
Status: CANCELLED | OUTPATIENT
Start: 2021-04-11

## 2021-04-11 RX ORDER — LIDOCAINE 50 MG/G
1 PATCH TOPICAL DAILY
Status: CANCELLED | OUTPATIENT
Start: 2021-04-12

## 2021-04-11 RX ORDER — SODIUM CHLORIDE 9 MG/ML
INJECTION, SOLUTION INTRAVENOUS CONTINUOUS
Status: DISCONTINUED | OUTPATIENT
Start: 2021-04-11 | End: 2021-04-12

## 2021-04-11 RX ORDER — OXYCODONE HYDROCHLORIDE 5 MG/1
5 TABLET ORAL EVERY 4 HOURS PRN
Status: CANCELLED | OUTPATIENT
Start: 2021-04-11

## 2021-04-11 RX ORDER — AMOXICILLIN 250 MG
2 CAPSULE ORAL 2 TIMES DAILY
Status: CANCELLED | OUTPATIENT
Start: 2021-04-11

## 2021-04-11 RX ORDER — DEXAMETHASONE SODIUM PHOSPHATE 4 MG/ML
4 INJECTION, SOLUTION INTRA-ARTICULAR; INTRALESIONAL; INTRAMUSCULAR; INTRAVENOUS; SOFT TISSUE EVERY 6 HOURS
Status: DISCONTINUED | OUTPATIENT
Start: 2021-04-11 | End: 2021-04-13

## 2021-04-11 RX ORDER — ROSUVASTATIN CALCIUM 10 MG/1
10 TABLET, COATED ORAL EVERY EVENING
Status: CANCELLED | OUTPATIENT
Start: 2021-04-12

## 2021-04-11 RX ORDER — BISACODYL 10 MG
10 SUPPOSITORY, RECTAL RECTAL
Status: CANCELLED | OUTPATIENT
Start: 2021-04-11

## 2021-04-11 RX ADMIN — OXYCODONE HYDROCHLORIDE 5 MG: 5 TABLET ORAL at 00:46

## 2021-04-11 RX ADMIN — IOHEXOL 100 ML: 350 INJECTION, SOLUTION INTRAVENOUS at 20:17

## 2021-04-11 RX ADMIN — ACETAMINOPHEN 650 MG: 325 TABLET, FILM COATED ORAL at 00:10

## 2021-04-11 RX ADMIN — SODIUM CHLORIDE: 9 INJECTION, SOLUTION INTRAVENOUS at 16:36

## 2021-04-11 RX ADMIN — ROSUVASTATIN 10 MG: 10 TABLET, FILM COATED ORAL at 06:04

## 2021-04-11 RX ADMIN — LEVOTHYROXINE SODIUM 75 MCG: 0.07 TABLET ORAL at 06:04

## 2021-04-11 RX ADMIN — OXYCODONE 5 MG: 5 TABLET ORAL at 18:15

## 2021-04-11 RX ADMIN — INSULIN HUMAN 1 UNITS: 100 INJECTION, SOLUTION PARENTERAL at 06:13

## 2021-04-11 RX ADMIN — DEXAMETHASONE SODIUM PHOSPHATE 4 MG: 4 INJECTION, SOLUTION INTRA-ARTICULAR; INTRALESIONAL; INTRAMUSCULAR; INTRAVENOUS; SOFT TISSUE at 18:16

## 2021-04-11 RX ADMIN — DEXAMETHASONE SODIUM PHOSPHATE 4 MG: 4 INJECTION, SOLUTION INTRA-ARTICULAR; INTRALESIONAL; INTRAMUSCULAR; INTRAVENOUS; SOFT TISSUE at 23:53

## 2021-04-11 RX ADMIN — LIDOCAINE 1 PATCH: 50 PATCH TOPICAL at 06:04

## 2021-04-11 RX ADMIN — CYCLOBENZAPRINE 10 MG: 10 TABLET, FILM COATED ORAL at 18:15

## 2021-04-11 RX ADMIN — ACETAMINOPHEN 650 MG: 325 TABLET, FILM COATED ORAL at 10:45

## 2021-04-11 RX ADMIN — DOCUSATE SODIUM 50 MG AND SENNOSIDES 8.6 MG 2 TABLET: 8.6; 5 TABLET, FILM COATED ORAL at 18:16

## 2021-04-11 RX ADMIN — ENOXAPARIN SODIUM 40 MG: 40 INJECTION SUBCUTANEOUS at 06:04

## 2021-04-11 RX ADMIN — CYCLOBENZAPRINE 10 MG: 10 TABLET, FILM COATED ORAL at 00:10

## 2021-04-11 ASSESSMENT — COGNITIVE AND FUNCTIONAL STATUS - GENERAL
MOBILITY SCORE: 24
DAILY ACTIVITIY SCORE: 24
SUGGESTED CMS G CODE MODIFIER MOBILITY: CH
SUGGESTED CMS G CODE MODIFIER DAILY ACTIVITY: CH

## 2021-04-11 ASSESSMENT — ENCOUNTER SYMPTOMS
HEADACHES: 0
FOCAL WEAKNESS: 1
BACK PAIN: 1
FALLS: 1
BLURRED VISION: 0
COUGH: 0
DIARRHEA: 0
ORTHOPNEA: 0
SINUS PAIN: 0
WEAKNESS: 0
MYALGIAS: 0
CHILLS: 0
TREMORS: 0
EYE PAIN: 0
NAUSEA: 0
FEVER: 0
VOMITING: 0
HEMOPTYSIS: 0

## 2021-04-11 ASSESSMENT — LIFESTYLE VARIABLES
TOTAL SCORE: 0
HAVE YOU EVER FELT YOU SHOULD CUT DOWN ON YOUR DRINKING: NO
AVERAGE NUMBER OF DAYS PER WEEK YOU HAVE A DRINK CONTAINING ALCOHOL: 0
ON A TYPICAL DAY WHEN YOU DRINK ALCOHOL HOW MANY DRINKS DO YOU HAVE: 0
CONSUMPTION TOTAL: NEGATIVE
ALCOHOL_USE: NO
HAVE PEOPLE ANNOYED YOU BY CRITICIZING YOUR DRINKING: NO
EVER HAD A DRINK FIRST THING IN THE MORNING TO STEADY YOUR NERVES TO GET RID OF A HANGOVER: NO
HOW MANY TIMES IN THE PAST YEAR HAVE YOU HAD 5 OR MORE DRINKS IN A DAY: 0
TOTAL SCORE: 0
TOTAL SCORE: 0
EVER FELT BAD OR GUILTY ABOUT YOUR DRINKING: NO

## 2021-04-11 ASSESSMENT — PAIN DESCRIPTION - PAIN TYPE
TYPE: ACUTE PAIN

## 2021-04-11 ASSESSMENT — FIBROSIS 4 INDEX: FIB4 SCORE: 1.92

## 2021-04-11 NOTE — CARE PLAN
Problem: Safety  Goal: Will remain free from injury  Outcome: PROGRESSING AS EXPECTED  Note: Pt educated to call for assistance. Pt wearing slip resistant socks. Call light in reach.      Problem: Pain Management  Goal: Pain level will decrease to patient's comfort goal  Outcome: PROGRESSING AS EXPECTED  Note: Pt stated pain is more manageable this morning. Offered tylenol per MAR. Educated pt to rest and offered ice pack as well.

## 2021-04-11 NOTE — PROGRESS NOTES
Pt on unit, welcomed to floor. Admit profile completed.  Dr. Fofana notified, per his message, he will notify Dr. Do.   Pt is NPO per order.  Pt denies pain, denies nausea.   Assessment completed. Pt has r PIV that came from [facility], per RN Elliott who called report it was ok'd by physician to have PIV on that side (hx of rt mastectomy). PIV patent, no blood return noted, dressing CDI.

## 2021-04-11 NOTE — ASSESSMENT & PLAN NOTE
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

## 2021-04-11 NOTE — DISCHARGE SUMMARY
Discharge Summary    CHIEF COMPLAINT ON ADMISSION  Chief Complaint   Patient presents with   • Fall   • Dizziness       Reason for Admission  Fall; Dizzy      Admission Date  4/8/2021    CODE STATUS  Full Code    HPI & HOSPITAL COURSE  80-year-old female with history of breast cancer, dyslipidemia, diabetes, DVT and hypothyroidism presented 4/8 with lower extremity weakness, patient woke up today with lower extremity weakness and pain on around her hips area, the weakness on the left side was improved however she was not able to move her right leg with pain, no sharp pain and no numbness or tingling and her sensation were normal, denied any back pain, no urinary or bowel symptoms, denied any weakness or numbness on her arms, no headache, denied any chest pain or shortness of breath, on admission labs did not show any acute finding, CTA and CT for head did not show any acute stroke or bleeding, x-ray for hips did not show any fractures.  Neurology Dr. Young consulted.  Ordered MRI lumbar, which showed 9mm mass in the lef side of L4. I talked to neurosurgeon Dr. Marin, who reviewed the image, don't think the neurologic deficits is related to the mass. no surgery indication at this point. No need for steroids.  Recommended oncology consult and finish the work-up for possible metastasis given hx of breast cancer.  Dr. Young reviewed the imaging, discussed with neurosurgeon Dr. Marin regarding severe cervical narrowing and cord compression.  Dr. Marin is aware.     I also talked to oncology Dr. Rizzo, recommended transfer to [facility] for radiation oncology consult.      Patient reported right hip pain, MRI hip showed Fractures of the right superior and inferior pubic rami.  Voalted orthopedics Dr. Lorenzo Monaco, message was read but pt was not seen it. I talked to Dr. Fofana, the triage officer, will follow up.  No urgent surgical intervention indicated at this point.    Her A1c was 10.9 at admission.  Patient  has a history of diabetes but she does not take her medications.  Start Lantus 10 units, SSI, continue to monitor.     At discharge, patient denies numbness, urine or stool incontinence.  Her lower extremity strength has been actually slightly improved.  She is able to raise her right leg now, in which she cannot do it at admission.      Therefore, she is discharged in guarded and stable condition   Transfer to [facility] for radiation oncology consult    The patient recovered much more quickly than anticipated on admission.    Discharge Date  4/11/2021    FOLLOW UP ITEMS POST DISCHARGE  Transfer to [facility] for radiation oncology consult    DISCHARGE DIAGNOSES  Principal Problem:    Lower extremity weakness POA: Unknown  Active Problems:    Pubic ramus fracture (HCC) POA: Unknown    Mixed hyperlipidemia POA: Yes    Hypothyroidism due to acquired atrophy of thyroid POA: Yes    Breast cancer (HCC) POA: Unknown      Overview: right  Resolved Problems:    * No resolved hospital problems. *      FOLLOW UP  Future Appointments   Date Time Provider Department Center   4/28/2021 10:30 AM Waylon Webber D.O. [facility] None     No follow-up provider specified.    MEDICATIONS ON DISCHARGE     Medication List      ASK your doctor about these medications      Instructions   cimetidine 400 MG Tabs  Commonly known as: TAGAMET   TAKE ONE TABLET BY MOUTH DAILY AS NEEDED.(GENERIC FOR TAGAMET)     CINNAMON PO   Take 1 tablet by mouth every morning.  Dose: 1 tablet     cyclobenzaprine 10 mg Tabs  Commonly known as: Flexeril   Take 1 Tab by mouth 3 times a day as needed for Mild Pain.  Dose: 10 mg     FISH OIL PO   Take 1 Cap by mouth every day.  Dose: 1 capsule     glipizide-metformin 2.5-250 MG per tablet  Commonly known as: METAGLIP   TAKE ONE TABLET BY MOUTH TWICE A DAY WITH MEALS     ICAPS AREDS FORMULA PO   Take 1 tablet by mouth every morning.  Dose: 1 tablet     levothyroxine 75 MCG Tabs  Commonly known as:  SYNTHROID   TAKE ONE TABLET BY MOUTH DAILY (SYNTHROID)     meloxicam 15 MG tablet  Commonly known as: MOBIC   TAKE ONE TABLET BY MOUTH DAILY     NON SPECIFIED   Take 1 Package by mouth every morning. [employer] Diabetes Health Pack Vitamins  Dose: 1 Package     rosuvastatin 40 MG tablet  Commonly known as: CRESTOR   TAKE ONE TABLET BY MOUTH DAILY (REPLACES SIMVASTATIN)     VITAMIN B-12 PO   Take 1 tablet by mouth every morning.  Dose: 1 tablet     Vitamin C 1000 MG Tabs   Take 1,000 mg by mouth every morning.  Dose: 1,000 mg     VITAMIN D PO   Take 4,000 Units by mouth every morning.  Dose: 4,000 Units     vitamin E 1000 Unit (450 mg) Caps  Commonly known as: VITAMIN E   Take 1,000 Units by mouth every morning.  Dose: 1,000 Units            Allergies  Allergies   Allergen Reactions   • Glimepiride Unspecified     drowsiness   • Metformin Diarrhea and Nausea     NAUSEA VOMIITG AND DIARRHEA   • Morphine Unspecified     Hallucinations       DIET  Orders Placed This Encounter   Procedures   • Diet Order Diet: Consistent CHO (Diabetic)     Standing Status:   Standing     Number of Occurrences:   1     Order Specific Question:   Diet:     Answer:   Consistent CHO (Diabetic) [4]       ACTIVITY  As tolerated.  Weight bearing as tolerated    CONSULTATIONS  orthopedics Dr. Lorenzo Monaco  Neurology Dr. Young  Oncology Dr. Rizzo  Neurosurgeon Dr. Marin    PROCEDURES      LABORATORY  Lab Results   Component Value Date    SODIUM 136 04/11/2021    POTASSIUM 4.1 04/11/2021    CHLORIDE 102 04/11/2021    CO2 25 04/11/2021    GLUCOSE 208 (H) 04/11/2021    BUN 11 04/11/2021    CREATININE 0.40 (L) 04/11/2021        Lab Results   Component Value Date    WBC 4.3 (L) 04/11/2021    HEMOGLOBIN 13.4 04/11/2021    HEMATOCRIT 40.1 04/11/2021    PLATELETCT 160 (L) 04/11/2021        Total time of the discharge process exceeds 36 minutes.

## 2021-04-11 NOTE — PROGRESS NOTES
Assumed care of pt at 0715. Received report from Sybil HYDE. Pt in bed resting. Pt stated pain 5 on a 0 to 10 scale. Discussed pain options with pt. Pt stated she would like to try tylenol first rather than oxycodone. CMS intact. Pt ambulated standby to restroom. No other needs at this time. Bed in lowest position, call light in reach.

## 2021-04-11 NOTE — PROGRESS NOTES
Med rec complete per med rec tech on 4/8/21 at [facility].     ED Note by [email or web address]:  Medication reconciliation updated and complete per pt & pt family at bedside  Allergies have been verified and updated   No oral ABX within the last 14 days  Patient home pharmacy:[facility]

## 2021-04-11 NOTE — PROGRESS NOTES
Images reviewed, patient may have LE dominant presentation of myelopathy from C3-6 compression, also has hip fx. Will see how workup goes in coming days, may add on for C3-6 lami fusion if medically suitable for surgery.

## 2021-04-11 NOTE — PROGRESS NOTES
Bedside report received from day RN.Pt is AAO x 4.Pt reports pain medicated per MAR.POC discussed.Pt showered with full linen and gown changed. Continue with ACHS sliding scale. All needs met at this time.Bed in low position.Call light within reach.Rounding in place.

## 2021-04-11 NOTE — ASSESSMENT & PLAN NOTE
Associated with C4-C5 cord compression  Patient denies any shortness of breath, dysphagia, upper extremity weakness, tingling, numbness  Discussed with neurosurgery PA , will start on dexamethasone   Reviewed neurosurgery note from today 4/11: Plan for cervical laminectomy/fusion if medically suitable  F/u SLP eval , advance diet as tolerated

## 2021-04-11 NOTE — DISCHARGE PLANNING
Anticipated Discharge Disposition: [facility]     Action: Pt discussed during morning rounds and LSW informed that pt anticipated to transfer to [facility] today. LSW also received a message via [employer] from Amanda from the transfer center. Amanda requested [employer] PCS form be faxed to [phone]. LSW faxed PCS form to provided fax.     LSW able to collect signature from Dr. Song during morning rounds for COBRA.     Barriers to Discharge: None    Plan: Await bed availability at [facility], LSW to continue to assist as needed     Addendum 1027  LSW received a message during IDT rounds that [facility] has a bed available for pt. Pt going to R300 at 1130 via [employer]. Number for report is [id number].       Addendum 1112  LSW met with pt at bedside to collect signature for COBRA. Family at bedside. Pt did not want LSW to call any further family or friends.     LSW provided COBRA form to bedside RNBecki.

## 2021-04-11 NOTE — PROGRESS NOTES
Transfer Center Note    Transferring facility: [facility]  Transferring physician: Dr Song, Hospitalist  Transferring facility/physician contact number: [facility]  Chief complaint: Fall and Dizziness  Pertinent history & patient course:   History of breat cancer, was seen by Oncology before.  History of uncontrolled diabetes was not on insulin in the outpatient.  History of hypothyroidism and dyslipidemia.  Charted history of DVT but currently not on anticoagulation  Presents with Fall and Dizziness  Lower extremity weakness mere on right  No bladder or bowel symptoms.  At [facility] ED, afebrile, hemodynamically stable.  CT head:  1.  No acute intracranial abnormality.  2.  Atherosclerosis.  CTA HnN no stenosis, occlusion, aneurysm or dissection.  MR brain:  1.  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 Bone scan might be of value to further assess.  2.  No evidence of intracranial metastatic disease  3.  Mild atrophy  4.  Mild white matter changes  MR T and C spine:  1.  No evidence of metastatic disease in the thoracic spine  2.  Mild degenerative changes of the thoracic spine\  1.  Lesions in the C5 and C6 vertebral bodies suspicious for osseous metastatic disease  2.  Multilevel multifactorial degenerative changes  3.  Severe central canal narrowing at C4-C5 with cord compression and findings suspicious for myelomalacia  4.  Other areas of central canal and neural foraminal narrowing as described above  MRI R hip:  1.  Fractures of the right superior and inferior pubic rami.  2.  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  3.  Tendinopathy of the right gluteus medius and minimus tendons.  US venous Duplex NO DVT  Labs unremarkable except for HYPERGLYCEMIA  Dr. Marin, [facility] consulted for frontal calvarial metastases and cervical lesions and stenoses. He recommended transfer to [facility] and Oncology consult.  Dr. Young, Neurology consulted. Recommended work-up for  other causes oh R leg weakness.  Dr. Rizzo, Oncology notified and consulted.  Dr. Monaco was notified by Dr. Song for the rami fracture.  At the time of my interview, patient is afebrile, hemodynamically stable, not septic, NO compressive symptoms (bowel, bladder function intact), no new or worsening of neurologic symptoms  Pertinent imaging & lab results: As above.  Code Status: FULL code per transferring provider, I personally verified with the transferring provider patient's code status and the transferring provider has confirmed this with the patient.  Further work up or recommendations per triage officer prior to transfer: Inquire id decadron is needed, likely not in the absence of compressive symptoms or paresis  Consultants called prior to transfer and pertinent input from consultants: As above.  Patient accepted for transfer: Yes  Consultants to be called upon arrival: Dr. Marin, Dr. Young, Dr. Rizzo, Dr. Monaco.  Admission status: Inpatient.   Floor requested: Neurosurgery or Oncology (if the latter allows)  If ICU transfer, name of intensivist case discussed with and pertinent input from critical care: N/A    Please inform the triage officer upon arrival of the patient to [facility] for assignment of a hospitalist to perform admission.     For any question or concerns regarding the care of this patient, please reach out to the assigned hospitalist.

## 2021-04-11 NOTE — PROGRESS NOTES
SUNDAY here picking up Pt. Doroteo and all transfer paperwork given to them. Pt left in good and stable conditions.

## 2021-04-11 NOTE — ASSESSMENT & PLAN NOTE
Acute R>L extremity weakness resulting in fall  Denies groin paresthesia, bowel/bladder dysfunction to suggest cauda equina  CT head and CTA perfusion negative for CVA  MRI Lumbar demonstrated severe canal stenosis  Neurosurgery consulted and recommended dexamethasone, no operative intervention warranted  Weakness significantly improved with dexamethasone  Neurology consulted and s/o  PT/OT evaluation recommend no needs

## 2021-04-11 NOTE — PROGRESS NOTES
2 RN skin check complete with MARY ELLEN Horton. No areas of concern noted at this time. Pt has small spot on rt medial buttock; appears to be a bruise.   Devices in place none.  Skin assessed under devices n/a.  Confirmed pressure ulcers found on n/a.  New potential pressure ulcers noted on n/a. Wound consult placed n/a.  The following interventions in place pt ambulatory, able to reposition self. Waffle overlay on bed.

## 2021-04-11 NOTE — ASSESSMENT & PLAN NOTE
Fell PTA  MRI hip revealed Right superior & inferior pubic rami Fx  Orthopedic surgeon Dr. Monaco consulted, recommended nonoperative management  Bisphosphonate consideration after discharge due to pathologic fractures and likely osteoporosis

## 2021-04-11 NOTE — H&P
Hospital Medicine History & Physical Note    Date of Service  4/11/2021    Primary Care Physician  Jonathan Bobo M.D.    Consultants  orthopedics Dr. Lorenzo Monaco  Neurology Dr. Young  Oncology  Neurosurgeon Dr. Marin    Code Status  Full Code    Chief Complaint  Chief Complaint   Patient presents with   • Fall   • Dizziness       History of Presenting Illness  80-year-old female with history of breast cancer, dyslipidemia, diabetes, DVT and hypothyroidism presented 4/8 with lower extremity weakness, patient woke up today with lower extremity weakness and pain on around her hips area, the weakness on the left side was improved however she was not able to move her right leg with pain, no sharp pain and no numbness or tingling and her sensation were normal, denied any back pain, no urinary or bowel symptoms, denied any weakness or numbness on her arms, no headache, denied any chest pain or shortness of breath, on admission labs did not show any acute finding, CTA and CT for head did not show any acute stroke or bleeding, x-ray for hips did not show any fractures.  Neurology Dr. Young consulted.  Ordered MRI lumbar, which showed 9mm mass in the lef side of L4. I talked to neurosurgeon Dr. Marin, who reviewed the image, don't think the neurologic deficits is related to the mass. no surgery indication at this point. No need for steroids.  Recommended oncology consult and finish the work-up for possible metastasis given hx of breast cancer.  Dr. Young reviewed the imaging, discussed with neurosurgeon Dr. Marin regarding severe cervical narrowing and cord compression.  Dr. Marin is aware.      I also talked to oncology Dr. Rizzo, recommended transfer to [facility] for radiation oncology consult.       Patient reported right hip pain, MRI hip showed Fractures of the right superior and inferior pubic rami.  Voalted orthopedics Dr. Lorenzo Monaco, message was read but pt was not seen it. I talked to Dr. Fofana,  the triage officer, will follow up.  No urgent surgical intervention indicated at this point.     Her A1c was 10.9 at admission.  Patient has a history of diabetes but she does not take her medications.  Start Lantus 10 units, SSI, continue to monitor.      At discharge, patient denies numbness, sensory deficits, urine or stool incontinence.  Her lower extremity strength has been actually slightly improved.  She is able to raise her right leg now, in which she cannot do it at admission.      Patient will be Transferred to [facility] for radiation oncology consult    Review of Systems  Constitutional: Positive for malaise/fatigue. Negative for chills and fever.   HENT: Negative for ear discharge and ear pain.    Eyes: Negative for blurred vision, double vision and pain.   Respiratory: Negative for cough, hemoptysis, shortness of breath and wheezing.    Cardiovascular: Negative for chest pain and palpitations.   Gastrointestinal: Negative for abdominal pain, blood in stool, diarrhea, nausea and vomiting.   Genitourinary: Negative for dysuria, flank pain, hematuria and urgency.   Musculoskeletal: Positive for joint pain. Negative for back pain and neck pain.        Right hip pain   Neurological: Positive for focal weakness and weakness. Negative for tremors, sensory change, loss of consciousness and headaches.        Bilateral lower extremity weakness, right more than left       Past Medical History   has a past medical history of Anesthesia, Arthritis, Backpain, Breast cancer (HCC) (1980s), Diverticulosis, DVT of deep femoral vein (HCC), Heart burn, High cholesterol, Pneumonia (Feb 2006), Thyroid condition, and Ulcer.    Surgical History   has a past surgical history that includes other abdominal surgery; other; low anterior resection laparoscopic (2/10/2010); pr breast reduction; pr chemotherapy, unspecified procedure; breast reconstruction (8/14/2012); breast implant revision (8/14/2012); capsulectomy  (8/14/2012); mastopexy (8/14/2012); liposuction (8/14/2012); rhytidectomy (8/14/2012); platysmaplasty (8/14/2012); blepharoplasty (8/14/2012); pr breast augmentation with implant; mastectomy (Right, 1980s); anterior and posterior repair (6/23/2014); bladder sling female (6/23/2014); and colectomy (N/A, 2009).     Family History  family history includes Cancer in her mother; Diabetes in her mother; Heart Disease (age of onset: 63) in her maternal grandmother; Heart Disease (age of onset: 64) in her mother; Hypertension in her mother; Stroke in her mother.     Social History   reports that she has never smoked. She has never used smokeless tobacco. She reports current alcohol use. She reports that she does not use drugs.    Allergies  Allergies   Allergen Reactions   • Glimepiride Unspecified     drowsiness   • Metformin Diarrhea and Nausea     NAUSEA VOMIITG AND DIARRHEA   • Morphine Unspecified     Hallucinations       Medications  Prior to Admission Medications   Prescriptions Last Dose Informant Patient Reported? Taking?   Ascorbic Acid (VITAMIN C) 1000 MG Tab 4/7/2021 at am Patient Yes Yes   Sig: Take 1,000 mg by mouth every morning.   CINNAMON PO 4/7/2021 at am Patient Yes Yes   Sig: Take 1 tablet by mouth every morning.   Cyanocobalamin (VITAMIN B-12 PO) 4/7/2021 at am Patient Yes Yes   Sig: Take 1 tablet by mouth every morning.   Multiple Vitamins-Minerals (ICAPS AREDS FORMULA PO) 4/7/2021 at am Patient Yes Yes   Sig: Take 1 tablet by mouth every morning.   NON SPECIFIED 4/7/2021 at am Patient Yes Yes   Sig: Take 1 Package by mouth every morning. [employer] Diabetes Health Pack Vitamins   Omega-3 Fatty Acids (FISH OIL PO) 4/7/2021 at am Patient Yes No   Sig: Take 1 Cap by mouth every day.   VITAMIN D PO 4/7/2021 at am Patient Yes Yes   Sig: Take 4,000 Units by mouth every morning.   cimetidine (TAGAMET) 400 MG Tab 4/6/2021 at unk Patient No No   Sig: TAKE ONE TABLET BY MOUTH DAILY AS NEEDED.(GENERIC FOR  TAGAMET)   cyclobenzaprine (FLEXERIL) 10 MG Tab Not Taking at Unknown time Patient No No   Sig: Take 1 Tab by mouth 3 times a day as needed for Mild Pain.   Patient not taking: Reported on 4/8/2021   glipizide-metformin (METAGLIP) 2.5-250 MG per tablet 4/7/2021 at am Patient No No   Sig: TAKE ONE TABLET BY MOUTH TWICE A DAY WITH MEALS   Patient taking differently: Take 1 tablet by mouth every morning.   levothyroxine (SYNTHROID) 75 MCG Tab 4/7/2021 at am Patient No No   Sig: TAKE ONE TABLET BY MOUTH DAILY (SYNTHROID)   meloxicam (MOBIC) 15 MG tablet prn at prn Patient No No   Sig: TAKE ONE TABLET BY MOUTH DAILY   Patient taking differently: Take 15 mg by mouth 1 time a day as needed for Moderate Pain.   rosuvastatin (CRESTOR) 40 MG tablet 4/7/2021 at am Patient No No   Sig: TAKE ONE TABLET BY MOUTH DAILY (REPLACES SIMVASTATIN)   vitamin E (VITAMIN E) 1000 UNIT Cap 4/7/2021 at am Patient Yes No   Sig: Take 1,000 Units by mouth every morning.      Facility-Administered Medications: None       Physical Exam  Temp:  [36.7 °C (98 °F)-37 °C (98.6 °F)] 36.9 °C (98.4 °F)  Pulse:  [60-71] 60  Resp:  [17-18] 18  BP: (106-149)/(47-66) 106/47  SpO2:  [93 %-99 %] 99 %    Physical Exam  Constitutional:       General: She is not in acute distress.     Appearance: She is ill-appearing.   HENT:      Head: Normocephalic and atraumatic.      Right Ear: External ear normal.      Left Ear: External ear normal.      Nose: Nose normal. No congestion or rhinorrhea.      Mouth/Throat:      Mouth: Mucous membranes are moist.   Eyes:      Extraocular Movements: Extraocular movements intact.      Conjunctiva/sclera: Conjunctivae normal.      Pupils: Pupils are equal, round, and reactive to light.   Cardiovascular:      Rate and Rhythm: Regular rhythm.      Pulses: Normal pulses.      Heart sounds: Normal heart sounds.   Pulmonary:      Effort: Pulmonary effort is normal. No respiratory distress.      Breath sounds: Normal breath sounds. No  stridor. No wheezing, rhonchi or rales.   Chest:      Chest wall: No tenderness.   Abdominal:      General: Bowel sounds are normal. There is no distension.      Palpations: Abdomen is soft.      Tenderness: There is no abdominal tenderness. There is no guarding or rebound.   Musculoskeletal:         General: Tenderness present. No swelling.      Cervical back: Normal range of motion and neck supple.      Comments: Limited ROM of right hip   Skin:     General: Skin is warm and dry.   Neurological:      Mental Status: She is alert and oriented to person, place, and time.      Sensory: No sensory deficit.      Motor: No weakness.      Coordination: Coordination normal.      Comments: RLE 3/5, LLE 4/5  Gross sensory intact   Psychiatric:         Mood and Affect: Mood normal.     Laboratory:  Recent Labs     04/09/21  0331 04/10/21  0346 04/11/21  0328   WBC 5.4 4.4* 4.3*   RBC 4.52 4.63 4.56   HEMOGLOBIN 13.5 13.7 13.4   HEMATOCRIT 40.0 40.6 40.1   MCV 88.5 87.7 87.9   MCH 29.9 29.6 29.4   MCHC 33.8 33.7 33.4*   RDW 39.4 38.9 38.8   PLATELETCT 157* 162* 160*   MPV 9.7 9.8 9.6     Recent Labs     04/09/21  0331 04/10/21  0346 04/11/21  0328   SODIUM 137 137 136   POTASSIUM 4.2 4.0 4.1   CHLORIDE 100 103 102   CO2 25 26 25   GLUCOSE 218* 190* 208*   BUN 11 10 11   CREATININE 0.47* 0.43* 0.40*   CALCIUM 8.9 8.9 8.7     Recent Labs     04/09/21  0331 04/10/21  0346 04/11/21  0328   GLUCOSE 218* 190* 208*         No results for input(s): NTPROBNP in the last 72 hours.      No results for input(s): TROPONINT in the last 72 hours.    Imaging:  MR-BRAIN-WITH & W/O   Final Result      1.  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 Bone scan might be of value to further assess.   2.  No evidence of intracranial metastatic disease   3.  Mild atrophy   4.  Mild white matter changes      MR-THORACIC SPINE-WITH & W/O   Final Result      1.  No evidence of metastatic disease in the thoracic  spine   2.  Mild degenerative changes of the thoracic spine      MR-CERVICAL SPINE-WITH & W/O   Final Result      1.  Lesions in the C5 and C6 vertebral bodies suspicious for osseous metastatic disease   2.  Multilevel multifactorial degenerative changes   3.  Severe central canal narrowing at C4-C5 with cord compression and findings suspicious for myelomalacia   4.  Other areas of central canal and neural foraminal narrowing as described above      MR-LUMBAR SPINE-WITH & W/O   Final Result      1.  There is an approximately 9 mm sized enhancing lesion in the left side of the L4 vertebral body. This is new since the previous study. In view of history of breast carcinoma this is suspicious for metastasis.   2.  There is diffuse disc bulge at T10-T11 causing mild-to-moderate central canal stenosis. The size of the disc is increased since the previous study.   3.  Degenerative disease as described above.      IR-US GUIDED PIV   Final Result    Ultrasound-guided PERIPHERAL IV INSERTION performed by    qualified nursing staff as above.      MR-HIP-W/O RIGHT   Final Result      1.  Fractures of the right superior and inferior pubic rami.      2.  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      3.  Tendinopathy of the right gluteus medius and minimus tendons.      US-EXTREMITY VENOUS LOWER UNILAT RIGHT   Final Result      DX-HIP-BILATERAL-WITH PELVIS-2 VIEWS   Final Result         1.  No radiographic evidence of acute traumatic injury.      CT-CTA HEAD WITH & W/O-POST PROCESS   Final Result         1.  No large vessel occlusion or aneurysm is identified.      CT-CTA NECK WITH & W/O-POST PROCESSING   Final Result         1.  No high-grade stenosis, occlusion, aneurysm, or dissection identified.         CT-CEREBRAL PERFUSION ANALYSIS   Final Result         1.  Cerebral blood flow less than 30% likely representing completed infarct = 0 mL.      2.  T Max more than 6 seconds likely representing combination of completed  infarct and ischemia = 0 mL.      3.  Mismatched volume likely representing ischemic brain/penumbra = None      4.  Please note that the cerebral perfusion was performed on the limited brain tissue around the basal ganglia region. Infarct/ischemia outside the CT perfusion sections can be missed in this study.      DX-CHEST-PORTABLE (1 VIEW)   Final Result         1.  No acute cardiopulmonary disease.   2.  Atherosclerosis      CT-HEAD W/O   Final Result         1.  No acute intracranial abnormality.   2.  Atherosclerosis.            Assessment/Plan:  I anticipate this patient will require at least two midnights for appropriate medical management, necessitating inpatient admission.    * Lower extremity weakness  Assessment & Plan  Happened suddenly  morning, right more than the left with pain around her hip area   no numbness or losing sensation, and no urinary or bowel symptoms  CTA for head did not show any ischemia or stroke  X-ray did not show any fracture around hips area  Inflammation markers ESR CRP and CPK are normal    MRI for right hip  showed Fractures of the right superior and inferior pubic rami.    Consulted orthopedics Dr. 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 I talked to neurosurgeon Dr. Marin, who reviewed the image, don't think the neurologic deficits is related to the mass. no surgery indication at this point. No need for steroids.     MRI brain and thoracic w and wo contrast to complete the workup for metastasis  Dr. Young reviewed the imaging, discussed with neurosurgeon Dr. Marin regarding severe cervical narrowing and cord compression.     PT and OT  neurology consulted  Oncology consulted  Neurosurgeon consulted     Patient will be Transferred to [facility] for radiation oncology consult    Pubic ramus fracture (HCC)  Assessment & Plan  MRI right hip showed Fractures of the right superior and inferior pubic rami.    Consulted orthopedics Dr. Ventura  Jacinda      Uncontrolled type 2 diabetes mellitus without complication, without long-term current use of insulin  Assessment & Plan  A1c 10.9  Patient does not take her medications due to side effects(glipizide and Metformin)    BG high over 200  Start on Lantus 10 units  SSI    Mixed hyperlipidemia- (present on admission)  Assessment & Plan  Patient takes rosuvastatin 40 mg daily at home(very high dose)  Since the patient does not have any history of heart disease or stroke, >>decrease dose to 10 mg daily  Check CPK    Breast cancer (HCC)  Assessment & Plan  History of breast cancer was treated more than  30 years ago by surgery  She has been stable since    Hypothyroidism due to acquired atrophy of thyroid- (present on admission)  Assessment & Plan  Continue levothyroxine 75 mcg daily  Check TSH

## 2021-04-11 NOTE — CARE PLAN
Problem: Communication  Goal: The ability to communicate needs accurately and effectively will improve  Outcome: PROGRESSING AS EXPECTED  Pt educated about diabetes with signs and symptoms of hypoglycemia explained to pt. Diabetic pamphlet given to pt as well.     Problem: Safety  Goal: Will remain free from injury  Outcome: PROGRESSING AS EXPECTED  Pt calls appropriately.     Problem: Pain Management  Goal: Pain level will decrease to patient's comfort goal  Outcome: PROGRESSING AS EXPECTED  Will medicate per MAR.

## 2021-04-11 NOTE — H&P
[facility] Medicine History & Physical Note    Date of Service  4/11/2021    Primary Care Physician  Jonathan Bobo M.D.    Consultants  Neurosurgery: Dr. Marin  Neurology: Dr. Young  Oncology: Dr. Rizzo  Orthopedics: Dr. Soto    Code Status  Prior    Chief Complaint  No chief complaint on file.      History of Presenting Illness  80 y.o. female who presented 4/11/2021 with past medical history of hypothyroidism, hyperlipidemia, DVT not on anticoagulation, newly diagnosed diabetes, history of breast cancer status post surgery and chemotherapy 25 years ago presented on 4/829 [facility] with chief complaint of lower extremity weakness and pain.  On 4/8/2021 patient woke up in the morning found to have severe pain in her bilateral lower extremity that she mentioned is 10 x 10 in severity, as cramps and when patient tried to stand up from the bed to go to the restroom she was unable to do so and could not feel her bilateral lower extremity and later fell down.  2 weeks prior to this patient had another fall and fracture of her right forearm.  She denied any urinary or bowel incontinence, upper extremity weakness or numbness or Tingling, neck pain, AF/C/S OB/CP/N/V/leg, headache, vision changes, tinnitus abdominal pain, bowel or bladder habit changes.  At [facility] patient had MRI of her hip which showed fracture of right superior and inferior pubic rami, patient further had MRI of her entire spine which showed multiple nauseous mental status or metabolic C5-C6 with severe central canal narrowing at C4-C5 with cord compression, and millimeter laceration to left side of L4 vertebral body, and frontal calvarium metastasis.  At [facility] neurosurgery, neurology, orthopedics and oncology were consulted.  As per chart review oncology recommended transfer to [facility] for radiation oncology consult.  Today at bedside patient reports improvement with her bilateral lower extremity pain, and her right lower  extremity weakness has improved much compared to the 2 days ago.  I verified the CODE STATUS with patient who told me that she wants to be full code for now.  I paged neurosurgery, neurosurgery PA Hill Returned call recommended to start on steroids for C C4-C5 cord compression.  No plan for neurosurgery intervention overnight as per PA.    Review of Systems  Review of Systems   Constitutional: Negative for chills and fever.   HENT: Negative for ear pain and sinus pain.    Eyes: Negative for blurred vision and pain.   Respiratory: Negative for cough and hemoptysis.    Cardiovascular: Negative for chest pain and orthopnea.   Gastrointestinal: Negative for diarrhea, nausea and vomiting.   Genitourinary: Negative for dysuria and hematuria.   Musculoskeletal: Positive for back pain and falls. Negative for myalgias.   Skin: Negative for itching.   Neurological: Positive for focal weakness. Negative for tremors, weakness and headaches.   Psychiatric/Behavioral: Negative for suicidal ideas.       Past Medical History   has a past medical history of Anesthesia, Arthritis, Backpain, Breast cancer (HCC) (1980s), Diverticulosis, DVT of deep femoral vein (HCC), Heart burn, High cholesterol, Pneumonia (Feb 2006), Thyroid condition, and Ulcer.    Surgical History   has a past surgical history that includes other abdominal surgery; other; low anterior resection laparoscopic (2/10/2010); pr breast reduction; pr chemotherapy, unspecified procedure; breast reconstruction (8/14/2012); breast implant revision (8/14/2012); capsulectomy (8/14/2012); mastopexy (8/14/2012); liposuction (8/14/2012); rhytidectomy (8/14/2012); platysmaplasty (8/14/2012); blepharoplasty (8/14/2012); pr breast augmentation with implant; mastectomy (Right, 1980s); anterior and posterior repair (6/23/2014); bladder sling female (6/23/2014); and colectomy (N/A, 2009).     Family History  family history includes Cancer in her mother; Diabetes in her mother; Heart  Disease (age of onset: 63) in her maternal grandmother; Heart Disease (age of onset: 64) in her mother; Hypertension in her mother; Stroke in her mother.     Social History   reports that she has never smoked. She has never used smokeless tobacco. She reports current alcohol use. She reports that she does not use drugs.    Allergies  Allergies   Allergen Reactions   • Glimepiride Unspecified     drowsiness   • Metformin Diarrhea and Nausea     NAUSEA VOMIITG AND DIARRHEA   • Morphine Unspecified     Hallucinations       Medications  Prior to Admission Medications   Prescriptions Last Dose Informant Patient Reported? Taking?   Ascorbic Acid (VITAMIN C) 1000 MG Tab 4/7/2021 at am Patient Yes No   Sig: Take 1,000 mg by mouth every morning.   CINNAMON PO 4/7/2021 at am Patient Yes No   Sig: Take 1 tablet by mouth every morning.   Cyanocobalamin (VITAMIN B-12 PO) 4/7/2021 at am Patient Yes No   Sig: Take 1 tablet by mouth every morning.   Multiple Vitamins-Minerals (ICAPS AREDS FORMULA PO) 4/7/2021 at am Patient Yes No   Sig: Take 1 tablet by mouth every morning.   NON SPECIFIED 4/7/2021 at am Patient Yes No   Sig: Take 1 Package by mouth every morning. [employer] Diabetes Health Pack Vitamins   Omega-3 Fatty Acids (FISH OIL PO) 4/7/2021 at am Patient Yes No   Sig: Take 1 Cap by mouth every day.   VITAMIN D PO 4/7/2021 at am Patient Yes No   Sig: Take 4,000 Units by mouth every morning.   cimetidine (TAGAMET) 400 MG Tab 4/6/2021 at k Patient No No   Sig: TAKE ONE TABLET BY MOUTH DAILY AS NEEDED.(GENERIC FOR TAGAMET)   Patient taking differently: Take 400 mg by mouth 1 time a day as needed.   cyclobenzaprine (FLEXERIL) 10 MG Tab Not Taking at Unknown time Patient No No   Sig: Take 1 Tab by mouth 3 times a day as needed for Mild Pain.   Patient not taking: Reported on 4/11/2021   glipizide-metformin (METAGLIP) 2.5-250 MG per tablet 4/7/2021 at am Patient No No   Sig: TAKE ONE TABLET BY MOUTH TWICE A DAY WITH MEALS    Patient taking differently: Take 1 tablet by mouth every morning.   levothyroxine (SYNTHROID) 75 MCG Tab 4/7/2021 at am Patient No No   Sig: TAKE ONE TABLET BY MOUTH DAILY (SYNTHROID)   Patient taking differently: Take 75 mcg by mouth every morning on an empty stomach.   meloxicam (MOBIC) 15 MG tablet unknown at unknown Patient No No   Sig: TAKE ONE TABLET BY MOUTH DAILY   Patient taking differently: Take 15 mg by mouth 1 time a day as needed for Moderate Pain.   rosuvastatin (CRESTOR) 40 MG tablet 4/7/2021 at am Patient No No   Sig: TAKE ONE TABLET BY MOUTH DAILY (REPLACES SIMVASTATIN)   Patient taking differently: Take 40 mg by mouth every day.   vitamin E (VITAMIN E) 1000 UNIT Cap 4/7/2021 at am Patient Yes No   Sig: Take 1,000 Units by mouth every morning.      Facility-Administered Medications: None       Physical Exam  Temp:  [36.3 °C (97.4 °F)] 36.3 °C (97.4 °F)  Pulse:  [62] 62  Resp:  [18] 18  BP: (139)/(62) 139/62  SpO2:  [96 %] 96 %    Physical Exam  Vitals and nursing note reviewed.   Constitutional:       Appearance: Normal appearance.   HENT:      Head: Normocephalic and atraumatic.      Right Ear: External ear normal.      Left Ear: External ear normal.      Mouth/Throat:      Mouth: Mucous membranes are moist.   Eyes:      Extraocular Movements: Extraocular movements intact.      Conjunctiva/sclera: Conjunctivae normal.      Pupils: Pupils are equal, round, and reactive to light.   Cardiovascular:      Rate and Rhythm: Normal rate and regular rhythm.      Pulses: Normal pulses.      Heart sounds: Normal heart sounds.   Pulmonary:      Effort: Pulmonary effort is normal.      Breath sounds: Normal breath sounds.   Abdominal:      General: Abdomen is flat. Bowel sounds are normal.      Palpations: Abdomen is soft.   Musculoskeletal:         General: Normal range of motion.      Cervical back: Normal range of motion and neck supple.   Skin:     General: Skin is warm.      Capillary Refill: Capillary  refill takes less than 2 seconds.   Neurological:      General: No focal deficit present.      Mental Status: She is alert and oriented to person, place, and time.      Comments: Moving all 4 extremities against resistance  Intact sensation to tactile stimuli in all 4 extremities   Psychiatric:         Mood and Affect: Mood normal.         Laboratory:  Recent Labs     04/09/21  0331 04/10/21  0346 04/11/21  0328   WBC 5.4 4.4* 4.3*   RBC 4.52 4.63 4.56   HEMOGLOBIN 13.5 13.7 13.4   HEMATOCRIT 40.0 40.6 40.1   MCV 88.5 87.7 87.9   MCH 29.9 29.6 29.4   MCHC 33.8 33.7 33.4*   RDW 39.4 38.9 38.8   PLATELETCT 157* 162* 160*   MPV 9.7 9.8 9.6     Recent Labs     04/09/21  0331 04/10/21  0346 04/11/21  0328   SODIUM 137 137 136   POTASSIUM 4.2 4.0 4.1   CHLORIDE 100 103 102   CO2 25 26 25   GLUCOSE 218* 190* 208*   BUN 11 10 11   CREATININE 0.47* 0.43* 0.40*   CALCIUM 8.9 8.9 8.7     Recent Labs     04/09/21  0331 04/10/21  0346 04/11/21  0328   GLUCOSE 218* 190* 208*         No results for input(s): NTPROBNP in the last 72 hours.      No results for input(s): TROPONINT in the last 72 hours.    Imaging:  CT-CHEST,ABDOMEN,PELVIS WITH    (Results Pending)         Assessment/Plan:  I anticipate this patient will require at least two midnights for appropriate medical management, necessitating inpatient admission.    Pubic ramus fracture (HCC)- (present on admission)  Assessment & Plan  Status post fall 2 days ago  Likely pathological fracture secondary to diffuse osseous metastasis  Orthopedics Dr. Soto consulted at [facility]  Follow-up orthopedics recommendations    Lower extremity weakness- (present on admission)  Assessment & Plan  Happened suddenly  morning, right more than the left with pain around her hip area   no numbness or losing sensation, and no urinary or bowel symptoms  CTA for head did not show any ischemia or stroke  X-ray did not show any fracture around hips area  Inflammation markers ESR CRP and CPK are  normal     MRI for right hip  showed Fractures of the right superior and inferior pubic rami.    Consulted orthopedics Dr. Lorenzo Monaco     MRI lumbar showed 9mm mass in the lef side of L4. I talked to neurosurgeon Dr. Marin, who reviewed the image, don't think the neurologic deficits is related to the mass. no surgery indication at this point. No need for steroids.      MRI brain and thoracic w and wo contrast to complete the workup for metastasis  Dr. Young reviewed the imaging, discussed with neurosurgeon Dr. Marin regarding severe cervical narrowing and cord compression.      PT and OT  neurology consulted  Oncology consulted  Neurosurgeon consulted      Patient  transferred to [facility] for radiation oncology consult    Uncontrolled type 2 diabetes mellitus without complication, without long-term current use of insulin  Assessment & Plan  A1c 10.9  Started on Lantus 10 units nightly  Sliding scale insulin  Hypoglycemia protocol    Mixed hyperlipidemia- (present on admission)  Assessment & Plan  Continue with Crestor 10 mg nightly    Cervical spinal stenosis- (present on admission)  Assessment & Plan  Associated with C4-C5 cord compression  Patient denies any shortness of breath, dysphagia, upper extremity weakness, tingling, numbness  Discussed with neurosurgery PA , will start on dexamethasone   Reviewed neurosurgery note from today 4/11: Plan for cervical laminectomy/fusion if medically suitable  F/u SLP eval , advance diet as tolerated    Breast cancer (HCC)- (present on admission)  Assessment & Plan  Patient was diagnosed  25 years ago  Treated with surgery and chemotherapy  Now presenting with multiple bony metastasis and pathological fractures  Follow-up oncology recommendation      Hypothyroidism due to acquired atrophy of thyroid- (present on admission)  Assessment & Plan  Continue with Synthroid 75 mcg

## 2021-04-11 NOTE — ASSESSMENT & PLAN NOTE
Lab Results   Component Value Date/Time    HBA1C 10.9 (H) 04/09/2021 1333    HBA1C 10.9 (H) 04/08/2021 0953    HBA1C 8.6 (H) 12/27/2019 0722     Results from last 7 days   Lab Units 04/13/21  1848 04/13/21  1135 04/13/21  0617 04/12/21  2047 04/12/21  1647 04/12/21  1157 04/12/21  0619 04/11/21  2105   ACCU CHECK GLUCOSE 788 mg/dL 341* 252* 276* 381* 338* 271* 208* 174*     Likely from dexamethasone  I have ordered insulin sliding scale with D50 and glucagon for hypoglycemia per protocol.  Diabetic diet  Diabetic education

## 2021-04-11 NOTE — CONSULTS
"Neurology Initial Consult H&P  Neurohospitalist Service, [facility]    Referring Physician: Zan Do M.D.    HPI: Mavis Lizarraga is a 80 y.o. woman whom I saw in consultation at [facility] on 4/9/21 for right leg weakness.  From my note on 4/9/21, \"history of breast cancer, dyslipidemia, diabetes, DVT and hypothyroidism presented 4/8 with lower extremity weakness, patient woke up today with lower extremity weakness and pain on around her hips area, the weakness on the left side was improved however she was not able to move her right leg with pain, no sharp pain and no numbness or tingling and her sensation were normal, denied any back pain, no urinary or bowel symptoms, denied any weakness or numbness on her arms, no headache, denied any chest pain or shortness of breath, on admission labs did not show any acute finding.\"  The patient was transferred to [facility] on 4/11/21 for higher level of care and to seek specialist consultation in the setting of abnormal neuroimaging.  The patient notes some improvement in the strength of her right leg since the duration of admission at [facility].  The patient denies headache today and denies weakness in arms, no numbness, no changes in vision or changes in speech.  When the patient raises her right leg against gravity this precipitates a sharp pain localized to the anterior right groin which is much improved compared to initial onset.    Review of systems: In addition to what is detailed in the HPI above, all other systems reviewed and are negative.    Past Medical History:    has a past medical history of Anesthesia, Arthritis, Backpain, Breast cancer (HCC) (1980s), Diverticulosis, DVT of deep femoral vein (HCC), Heart burn, High cholesterol, Pneumonia (Feb 2006), Thyroid condition, and Ulcer. She also has no past medical history of COPD.    FHx:  family history includes Cancer in her mother; Diabetes in her mother; Heart Disease (age of onset: 63) in her " maternal grandmother; Heart Disease (age of onset: 64) in her mother; Hypertension in her mother; Stroke in her mother.    SHx:   reports that she has never smoked. She has never used smokeless tobacco. She reports current alcohol use. She reports that she does not use drugs.    Allergies:  Allergies   Allergen Reactions   • Glimepiride Unspecified     drowsiness   • Metformin Diarrhea and Nausea     NAUSEA VOMIITG AND DIARRHEA   • Morphine Unspecified     Hallucinations       Medications:    Current Facility-Administered Medications:   •  [START ON 4/12/2021] enoxaparin (LOVENOX) inj 40 mg, 40 mg, Subcutaneous, DAILY, Pooja Song M.D.  •  ondansetron (ZOFRAN ODT) dispertab 4 mg, 4 mg, Oral, Q4HRS PRN, Pooja Song M.D.  •  ondansetron (ZOFRAN) syringe/vial injection 4 mg, 4 mg, Intravenous, Q4HRS PRN, Pooja Song M.D.  •  senna-docusate (PERICOLACE or SENOKOT S) 8.6-50 MG per tablet 2 tablet, 2 tablet, Oral, BID **AND** polyethylene glycol/lytes (MIRALAX) PACKET 1 Packet, 1 Packet, Oral, QDAY PRN **AND** magnesium hydroxide (MILK OF MAGNESIA) suspension 30 mL, 30 mL, Oral, QDAY PRN **AND** bisacodyl (DULCOLAX) suppository 10 mg, 10 mg, Rectal, QDAY PRN, Pooja Song M.D.  •  acetaminophen (Tylenol) tablet 650 mg, 650 mg, Oral, Q6HRS PRN, Pooja Song M.D.  •  insulin regular (HumuLIN R,NovoLIN R) injection, 1-6 Units, Subcutaneous, 4X/DAY ACHS **AND** POC blood glucose manual result, , , Q AC AND BEDTIME(S) **AND** NOTIFY MD and PharmD, , , Once **AND** glucose 4 g chewable tablet 16 g, 16 g, Oral, Q15 MIN PRN **AND** dextrose 50% (D50W) injection 50 mL, 50 mL, Intravenous, Q15 MIN PRN, Pooja Song M.D.  •  cyclobenzaprine (Flexeril) tablet 10 mg, 10 mg, Oral, TID PRN, Pooja Song M.D.  •  insulin glargine (Lantus) injection, 10 Units, Subcutaneous, Q EVENING, Pooja Song M.D.  •  [START ON 4/12/2021] levothyroxine (SYNTHROID) tablet 75 mcg, 75 mcg, Oral, AM ES, Pooja Song M.D.  •  [START ON 4/12/2021] lidocaine (LIDODERM) 5 % 1 Patch, 1  Patch, Transdermal, DAILY, Pooja Song M.D.  •  oxyCODONE immediate-release (ROXICODONE) tablet 5 mg, 5 mg, Oral, Q4HRS PRN, Pooja Song M.D.  •  [START ON 4/12/2021] rosuvastatin (CRESTOR) tablet 10 mg, 10 mg, Oral, Q EVENING, Pooja Song M.D.  •  NS infusion, , Intravenous, Continuous, Zan Do M.D.  •  dexamethasone (DECADRON) injection 4 mg, 4 mg, Intravenous, Q6HRS, Zan Do M.D.    Physical Examination:     Vitals:    04/11/21 1243   BP: 139/62   Pulse: 62   Resp: 18   Temp: 36.3 °C (97.4 °F)   TempSrc: Temporal   SpO2: 96%   Weight: 78.2 kg (172 lb 6.4 oz)       General: Patient is awake and in no acute distress  Eyes: examination of optic disks not indicated at this time given acuity of consult  CV: RRR    NEUROLOGICAL EXAM:     Mental status: Awake, alert and fully oriented, follows commands  Speech and language: speech is not dysarthric. The patient is able to name and repeat.  Cranial nerve exam: Pupils are equal, round and reactive to light bilaterally. Visual fields are full. Extraocular muscles are intact. Sensation in the face is intact to light touch. Face is symmetric. Hearing to finger rub equal. Palate elevates symmetrically. Shoulder shrug is full. Tongue is midline.  Motor exam: 4+/5 right iliopsoas and quadriceps with 5/5 in remaining of muscles testes. Tone is normal. No abnormal movements were seen on exam.  Sensory exam: No sensory deficits identified   Deep tendon reflexes:  2+ on the left patellar and 3+ right patella. Toes down-going bilaterally.  Coordination: no ataxia   Gait: deferred given patient preference    Objective Data:    Labs:  Lab Results   Component Value Date/Time    PROTHROMBTM 12.8 04/08/2021 05:40 AM    INR 0.99 04/08/2021 05:40 AM      Lab Results   Component Value Date/Time    WBC 4.3 (L) 04/11/2021 03:28 AM    RBC 4.56 04/11/2021 03:28 AM    HEMOGLOBIN 13.4 04/11/2021 03:28 AM    HEMATOCRIT 40.1 04/11/2021 03:28 AM    MCV 87.9 04/11/2021 03:28 AM    MCH 29.4  04/11/2021 03:28 AM    MCHC 33.4 (L) 04/11/2021 03:28 AM    MPV 9.6 04/11/2021 03:28 AM    NEUTSPOLYS 48.80 04/11/2021 03:28 AM    LYMPHOCYTES 38.30 04/11/2021 03:28 AM    MONOCYTES 10.30 04/11/2021 03:28 AM    EOSINOPHILS 1.90 04/11/2021 03:28 AM    EOSINOPHILS 3 10/28/2009 07:00 PM    BASOPHILS 0.50 04/11/2021 03:28 AM      Lab Results   Component Value Date/Time    SODIUM 136 04/11/2021 03:28 AM    POTASSIUM 4.1 04/11/2021 03:28 AM    CHLORIDE 102 04/11/2021 03:28 AM    CO2 25 04/11/2021 03:28 AM    GLUCOSE 208 (H) 04/11/2021 03:28 AM    BUN 11 04/11/2021 03:28 AM    CREATININE 0.40 (L) 04/11/2021 03:28 AM      Lab Results   Component Value Date/Time    CHOLSTRLTOT 153 12/27/2019 07:22 AM    LDL 74 12/27/2019 07:22 AM    HDL 48 12/27/2019 07:22 AM    TRIGLYCERIDE 155 (H) 12/27/2019 07:22 AM       Lab Results   Component Value Date/Time    ALKPHOSPHAT 92 04/08/2021 05:40 AM    ASTSGOT 16 04/08/2021 05:40 AM    ALTSGPT 17 04/08/2021 05:40 AM    TBILIRUBIN 0.8 04/08/2021 05:40 AM        Imaging/Testing:    I interpreted and/or reviewed the patient's neuroimaging    MRI brain 4/10/21: 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 Severe central cord narrowing with encephalomalacia and 2. C5 and C6 vertebral body enhancing lesions  MRI l spine 4/9/21: L4 vertebral body enhancing lesion suspicious for metastasis  Assessment and Plan:    Mavis Lizarraga is a 80 y.o. woman presenting for whom neurology has been consulted for subacute right lower extremity weakness.  The hip fracture could be secondary to her fall that preceded this admission ie. a result of underlying pathology rather than the cause of presentation.  MRI lumbar spine showing enhancing lesion of the L4 vertebral body.  MRI c spine showing C5 and C6 vertebral body enhancing lesions suspicious for metastatic disease and severe central cord narrowing; neurosurgery following.  The MRI brain also shows a lesion of the calvarium.   Clinically better compared to initial presentation at [facility].     Plan:     - assess candidacy for radiation therapy pending consultations from oncological specialists  - orthopaedics to assess hip fracture  - neurosurgery to determine candidacy for cervical laminectomy in the setting of severe central cord stenosis  - temazepam 15mg qhs prn insomnia    The evaluation of the patient, and recommended management, was discussed with Dr. Marin, neurosurgery.    Jak Young MD  Neurohospitalist, Acute Care Services   of Neurology

## 2021-04-11 NOTE — PROGRESS NOTES
Neurology Interval Note    I reviewed the interval neuroimaging and personally called the patient to discuss the results. Counseling and support provided.     I also spoke with Dr. Steven Marin from Neurosurgery to brief him on the C-spine findings notable for severe narrowing and cord compression; it was indicated that he will see the patient in formal consultation bedside after transfer to [facility] (which is anticipated  tomorrow).    PAIGE Young MD  Neurology

## 2021-04-11 NOTE — PROGRESS NOTES
Report given to Sachi Mai RN. Pt to transport to [facility] with [facility].

## 2021-04-11 NOTE — H&P
Hospital Medicine History & Physical Note    Date of Service  4/11/2021    Primary Care Physician  Jonathan Bobo M.D.    Consultants  orthopedics Dr. Lorenzo Monaco  Neurology Dr. Young  Oncology  Neurosurgeon Dr. Marin    Code Status  Full Code    Chief Complaint  Chief Complaint   Patient presents with   • Fall   • Dizziness       History of Presenting Illness  80-year-old female with history of breast cancer, dyslipidemia, diabetes, DVT and hypothyroidism presented 4/8 with lower extremity weakness, patient woke up today with lower extremity weakness and pain on around her hips area, the weakness on the left side was improved however she was not able to move her right leg with pain, no sharp pain and no numbness or tingling and her sensation were normal, denied any back pain, no urinary or bowel symptoms, denied any weakness or numbness on her arms, no headache, denied any chest pain or shortness of breath, on admission labs did not show any acute finding, CTA and CT for head did not show any acute stroke or bleeding, x-ray for hips did not show any fractures.  Neurology Dr. Young consulted.  Ordered MRI lumbar, which showed 9mm mass in the lef side of L4. I talked to neurosurgeon Dr. Marin, who reviewed the image, don't think the neurologic deficits is related to the mass. no surgery indication at this point. No need for steroids.  Recommended oncology consult and finish the work-up for possible metastasis given hx of breast cancer.  Dr. Young reviewed the imaging, discussed with neurosurgeon Dr. Marin regarding severe cervical narrowing and cord compression.  Dr. Marin is aware.      I also talked to oncology Dr. Rizzo, recommended transfer to [facility] for radiation oncology consult.       Patient reported right hip pain, MRI hip showed Fractures of the right superior and inferior pubic rami.  Voalted orthopedics Dr. Lorenzo Monaco, message was read but pt was not seen it. I talked to Dr. Fofana,  the triage officer, will follow up.  No urgent surgical intervention indicated at this point.     Her A1c was 10.9 at admission.  Patient has a history of diabetes but she does not take her medications.  Start Lantus 10 units, SSI, continue to monitor.      At discharge, patient denies numbness, sensory deficits, urine or stool incontinence.  Her lower extremity strength has been actually slightly improved.  She is able to raise her right leg now, in which she cannot do it at admission.      Patient will be Transferred to [facility] for radiation oncology consult    Review of Systems  Constitutional: Positive for malaise/fatigue. Negative for chills and fever.   HENT: Negative for ear discharge and ear pain.    Eyes: Negative for blurred vision, double vision and pain.   Respiratory: Negative for cough, hemoptysis, shortness of breath and wheezing.    Cardiovascular: Negative for chest pain and palpitations.   Gastrointestinal: Negative for abdominal pain, blood in stool, diarrhea, nausea and vomiting.   Genitourinary: Negative for dysuria, flank pain, hematuria and urgency.   Musculoskeletal: Positive for joint pain. Negative for back pain and neck pain.        Right hip pain   Neurological: Positive for focal weakness and weakness. Negative for tremors, sensory change, loss of consciousness and headaches.        Bilateral lower extremity weakness, right more than left       Past Medical History   has a past medical history of Anesthesia, Arthritis, Backpain, Breast cancer (HCC) (1980s), Diverticulosis, DVT of deep femoral vein (HCC), Heart burn, High cholesterol, Pneumonia (Feb 2006), Thyroid condition, and Ulcer.    Surgical History   has a past surgical history that includes other abdominal surgery; other; low anterior resection laparoscopic (2/10/2010); pr breast reduction; pr chemotherapy, unspecified procedure; breast reconstruction (8/14/2012); breast implant revision (8/14/2012); capsulectomy  (8/14/2012); mastopexy (8/14/2012); liposuction (8/14/2012); rhytidectomy (8/14/2012); platysmaplasty (8/14/2012); blepharoplasty (8/14/2012); pr breast augmentation with implant; mastectomy (Right, 1980s); anterior and posterior repair (6/23/2014); bladder sling female (6/23/2014); and colectomy (N/A, 2009).     Family History  family history includes Cancer in her mother; Diabetes in her mother; Heart Disease (age of onset: 63) in her maternal grandmother; Heart Disease (age of onset: 64) in her mother; Hypertension in her mother; Stroke in her mother.     Social History   reports that she has never smoked. She has never used smokeless tobacco. She reports current alcohol use. She reports that she does not use drugs.    Allergies  Allergies   Allergen Reactions   • Glimepiride Unspecified     drowsiness   • Metformin Diarrhea and Nausea     NAUSEA VOMIITG AND DIARRHEA   • Morphine Unspecified     Hallucinations       Medications  Prior to Admission Medications   Prescriptions Last Dose Informant Patient Reported? Taking?   Ascorbic Acid (VITAMIN C) 1000 MG Tab 4/7/2021 at am Patient Yes Yes   Sig: Take 1,000 mg by mouth every morning.   CINNAMON PO 4/7/2021 at am Patient Yes Yes   Sig: Take 1 tablet by mouth every morning.   Cyanocobalamin (VITAMIN B-12 PO) 4/7/2021 at am Patient Yes Yes   Sig: Take 1 tablet by mouth every morning.   Multiple Vitamins-Minerals (ICAPS AREDS FORMULA PO) 4/7/2021 at am Patient Yes Yes   Sig: Take 1 tablet by mouth every morning.   NON SPECIFIED 4/7/2021 at am Patient Yes Yes   Sig: Take 1 Package by mouth every morning. VideoStep Diabetes Health Pack Vitamins   Omega-3 Fatty Acids (FISH OIL PO) 4/7/2021 at am Patient Yes No   Sig: Take 1 Cap by mouth every day.   VITAMIN D PO 4/7/2021 at am Patient Yes Yes   Sig: Take 4,000 Units by mouth every morning.   cimetidine (TAGAMET) 400 MG Tab 4/6/2021 at unk Patient No No   Sig: TAKE ONE TABLET BY MOUTH DAILY AS NEEDED.(GENERIC FOR  TAGAMET)   cyclobenzaprine (FLEXERIL) 10 MG Tab Not Taking at Unknown time Patient No No   Sig: Take 1 Tab by mouth 3 times a day as needed for Mild Pain.   Patient not taking: Reported on 4/8/2021   glipizide-metformin (METAGLIP) 2.5-250 MG per tablet 4/7/2021 at am Patient No No   Sig: TAKE ONE TABLET BY MOUTH TWICE A DAY WITH MEALS   Patient taking differently: Take 1 tablet by mouth every morning.   levothyroxine (SYNTHROID) 75 MCG Tab 4/7/2021 at am Patient No No   Sig: TAKE ONE TABLET BY MOUTH DAILY (SYNTHROID)   meloxicam (MOBIC) 15 MG tablet prn at prn Patient No No   Sig: TAKE ONE TABLET BY MOUTH DAILY   Patient taking differently: Take 15 mg by mouth 1 time a day as needed for Moderate Pain.   rosuvastatin (CRESTOR) 40 MG tablet 4/7/2021 at am Patient No No   Sig: TAKE ONE TABLET BY MOUTH DAILY (REPLACES SIMVASTATIN)   vitamin E (VITAMIN E) 1000 UNIT Cap 4/7/2021 at am Patient Yes No   Sig: Take 1,000 Units by mouth every morning.      Facility-Administered Medications: None       Physical Exam  Temp:  [36.7 °C (98 °F)-37 °C (98.6 °F)] 36.9 °C (98.4 °F)  Pulse:  [60-71] 60  Resp:  [17-18] 18  BP: (106-149)/(47-66) 106/47  SpO2:  [93 %-99 %] 99 %    Physical Exam  Constitutional:       General: She is not in acute distress.     Appearance: She is ill-appearing.   HENT:      Head: Normocephalic and atraumatic.      Right Ear: External ear normal.      Left Ear: External ear normal.      Nose: Nose normal. No congestion or rhinorrhea.      Mouth/Throat:      Mouth: Mucous membranes are moist.   Eyes:      Extraocular Movements: Extraocular movements intact.      Conjunctiva/sclera: Conjunctivae normal.      Pupils: Pupils are equal, round, and reactive to light.   Cardiovascular:      Rate and Rhythm: Regular rhythm.      Pulses: Normal pulses.      Heart sounds: Normal heart sounds.   Pulmonary:      Effort: Pulmonary effort is normal. No respiratory distress.      Breath sounds: Normal breath sounds. No  stridor. No wheezing, rhonchi or rales.   Chest:      Chest wall: No tenderness.   Abdominal:      General: Bowel sounds are normal. There is no distension.      Palpations: Abdomen is soft.      Tenderness: There is no abdominal tenderness. There is no guarding or rebound.   Musculoskeletal:         General: Tenderness present. No swelling.      Cervical back: Normal range of motion and neck supple.      Comments: Limited ROM of right hip   Skin:     General: Skin is warm and dry.   Neurological:      Mental Status: She is alert and oriented to person, place, and time.      Sensory: No sensory deficit.      Motor: No weakness.      Coordination: Coordination normal.      Comments: RLE 3/5, LLE 4/5  Gross sensory intact   Psychiatric:         Mood and Affect: Mood normal.     Laboratory:  Recent Labs     04/09/21  0331 04/10/21  0346 04/11/21  0328   WBC 5.4 4.4* 4.3*   RBC 4.52 4.63 4.56   HEMOGLOBIN 13.5 13.7 13.4   HEMATOCRIT 40.0 40.6 40.1   MCV 88.5 87.7 87.9   MCH 29.9 29.6 29.4   MCHC 33.8 33.7 33.4*   RDW 39.4 38.9 38.8   PLATELETCT 157* 162* 160*   MPV 9.7 9.8 9.6     Recent Labs     04/09/21  0331 04/10/21  0346 04/11/21  0328   SODIUM 137 137 136   POTASSIUM 4.2 4.0 4.1   CHLORIDE 100 103 102   CO2 25 26 25   GLUCOSE 218* 190* 208*   BUN 11 10 11   CREATININE 0.47* 0.43* 0.40*   CALCIUM 8.9 8.9 8.7     Recent Labs     04/09/21  0331 04/10/21  0346 04/11/21  0328   GLUCOSE 218* 190* 208*         No results for input(s): NTPROBNP in the last 72 hours.      No results for input(s): TROPONINT in the last 72 hours.    Imaging:  MR-BRAIN-WITH & W/O   Final Result      1.  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 Bone scan might be of value to further assess.   2.  No evidence of intracranial metastatic disease   3.  Mild atrophy   4.  Mild white matter changes      MR-THORACIC SPINE-WITH & W/O   Final Result      1.  No evidence of metastatic disease in the thoracic  spine   2.  Mild degenerative changes of the thoracic spine      MR-CERVICAL SPINE-WITH & W/O   Final Result      1.  Lesions in the C5 and C6 vertebral bodies suspicious for osseous metastatic disease   2.  Multilevel multifactorial degenerative changes   3.  Severe central canal narrowing at C4-C5 with cord compression and findings suspicious for myelomalacia   4.  Other areas of central canal and neural foraminal narrowing as described above      MR-LUMBAR SPINE-WITH & W/O   Final Result      1.  There is an approximately 9 mm sized enhancing lesion in the left side of the L4 vertebral body. This is new since the previous study. In view of history of breast carcinoma this is suspicious for metastasis.   2.  There is diffuse disc bulge at T10-T11 causing mild-to-moderate central canal stenosis. The size of the disc is increased since the previous study.   3.  Degenerative disease as described above.      IR-US GUIDED PIV   Final Result    Ultrasound-guided PERIPHERAL IV INSERTION performed by    qualified nursing staff as above.      MR-HIP-W/O RIGHT   Final Result      1.  Fractures of the right superior and inferior pubic rami.      2.  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      3.  Tendinopathy of the right gluteus medius and minimus tendons.      US-EXTREMITY VENOUS LOWER UNILAT RIGHT   Final Result      DX-HIP-BILATERAL-WITH PELVIS-2 VIEWS   Final Result         1.  No radiographic evidence of acute traumatic injury.      CT-CTA HEAD WITH & W/O-POST PROCESS   Final Result         1.  No large vessel occlusion or aneurysm is identified.      CT-CTA NECK WITH & W/O-POST PROCESSING   Final Result         1.  No high-grade stenosis, occlusion, aneurysm, or dissection identified.         CT-CEREBRAL PERFUSION ANALYSIS   Final Result         1.  Cerebral blood flow less than 30% likely representing completed infarct = 0 mL.      2.  T Max more than 6 seconds likely representing combination of completed  infarct and ischemia = 0 mL.      3.  Mismatched volume likely representing ischemic brain/penumbra = None      4.  Please note that the cerebral perfusion was performed on the limited brain tissue around the basal ganglia region. Infarct/ischemia outside the CT perfusion sections can be missed in this study.      DX-CHEST-PORTABLE (1 VIEW)   Final Result         1.  No acute cardiopulmonary disease.   2.  Atherosclerosis      CT-HEAD W/O   Final Result         1.  No acute intracranial abnormality.   2.  Atherosclerosis.            Assessment/Plan:  I anticipate this patient will require at least two midnights for appropriate medical management, necessitating inpatient admission.    * Lower extremity weakness  Assessment & Plan  Happened suddenly  morning, right more than the left with pain around her hip area   no numbness or losing sensation, and no urinary or bowel symptoms  CTA for head did not show any ischemia or stroke  X-ray did not show any fracture around hips area  Inflammation markers ESR CRP and CPK are normal    MRI for right hip  showed Fractures of the right superior and inferior pubic rami.    Consulted orthopedics Dr. 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 I talked to neurosurgeon Dr. Marin, who reviewed the image, don't think the neurologic deficits is related to the mass. no surgery indication at this point. No need for steroids.     MRI brain and thoracic w and wo contrast to complete the workup for metastasis  Dr. Young reviewed the imaging, discussed with neurosurgeon Dr. Marin regarding severe cervical narrowing and cord compression.     PT and OT  neurology consulted  Oncology consulted  Neurosurgeon consulted     Patient will be Transferred to [facility] for radiation oncology consult    Pubic ramus fracture (HCC)  Assessment & Plan  MRI right hip showed Fractures of the right superior and inferior pubic rami.    Consulted orthopedics Dr. Ventura  Jacinda      Uncontrolled type 2 diabetes mellitus without complication, without long-term current use of insulin  Assessment & Plan  A1c 10.9  Patient does not take her medications due to side effects(glipizide and Metformin)    BG high over 200  Start on Lantus 10 units  SSI    Mixed hyperlipidemia- (present on admission)  Assessment & Plan  Patient takes rosuvastatin 40 mg daily at home(very high dose)  Since the patient does not have any history of heart disease or stroke, >>decrease dose to 10 mg daily  Check CPK    Breast cancer (HCC)  Assessment & Plan  History of breast cancer was treated more than  30 years ago by surgery  She has been stable since    Hypothyroidism due to acquired atrophy of thyroid- (present on admission)  Assessment & Plan  Continue levothyroxine 75 mcg daily  Check TSH

## 2021-04-12 PROBLEM — M89.9 BONE LESION: Status: ACTIVE | Noted: 2021-04-12

## 2021-04-12 PROBLEM — C79.51 MALIGNANT NEOPLASM METASTATIC TO BONE (HCC): Status: ACTIVE | Noted: 2021-04-12

## 2021-04-12 PROBLEM — E55.9 VITAMIN D DEFICIENCY: Status: ACTIVE | Noted: 2021-04-12

## 2021-04-12 LAB
25(OH)D3 SERPL-MCNC: 24 NG/ML (ref 30–80)
GLUCOSE BLD-MCNC: 208 MG/DL (ref 65–99)
GLUCOSE BLD-MCNC: 271 MG/DL (ref 65–99)
GLUCOSE BLD-MCNC: 338 MG/DL (ref 65–99)
GLUCOSE BLD-MCNC: 381 MG/DL (ref 65–99)
HSV DNA SPEC QL NAA+PROBE: NOT DETECTED
NUCLEAR IGG SER QL IA: DETECTED
SPECIMEN SOURCE: NORMAL

## 2021-04-12 PROCEDURE — 99232 SBSQ HOSP IP/OBS MODERATE 35: CPT | Performed by: PSYCHIATRY & NEUROLOGY

## 2021-04-12 PROCEDURE — 700102 HCHG RX REV CODE 250 W/ 637 OVERRIDE(OP): Performed by: PSYCHIATRY & NEUROLOGY

## 2021-04-12 PROCEDURE — 700111 HCHG RX REV CODE 636 W/ 250 OVERRIDE (IP): Performed by: STUDENT IN AN ORGANIZED HEALTH CARE EDUCATION/TRAINING PROGRAM

## 2021-04-12 PROCEDURE — 770004 HCHG ROOM/CARE - ONCOLOGY PRIVATE *

## 2021-04-12 PROCEDURE — A9270 NON-COVERED ITEM OR SERVICE: HCPCS | Performed by: PSYCHIATRY & NEUROLOGY

## 2021-04-12 PROCEDURE — A9270 NON-COVERED ITEM OR SERVICE: HCPCS | Performed by: STUDENT IN AN ORGANIZED HEALTH CARE EDUCATION/TRAINING PROGRAM

## 2021-04-12 PROCEDURE — 92610 EVALUATE SWALLOWING FUNCTION: CPT

## 2021-04-12 PROCEDURE — 700102 HCHG RX REV CODE 250 W/ 637 OVERRIDE(OP): Performed by: STUDENT IN AN ORGANIZED HEALTH CARE EDUCATION/TRAINING PROGRAM

## 2021-04-12 PROCEDURE — 82962 GLUCOSE BLOOD TEST: CPT

## 2021-04-12 PROCEDURE — 700101 HCHG RX REV CODE 250: Performed by: STUDENT IN AN ORGANIZED HEALTH CARE EDUCATION/TRAINING PROGRAM

## 2021-04-12 PROCEDURE — 99233 SBSQ HOSP IP/OBS HIGH 50: CPT | Performed by: STUDENT IN AN ORGANIZED HEALTH CARE EDUCATION/TRAINING PROGRAM

## 2021-04-12 RX ORDER — AMOXICILLIN 250 MG
2 CAPSULE ORAL 2 TIMES DAILY PRN
Status: DISCONTINUED | OUTPATIENT
Start: 2021-04-12 | End: 2021-04-14 | Stop reason: HOSPADM

## 2021-04-12 RX ORDER — ERGOCALCIFEROL 1.25 MG/1
50000 CAPSULE ORAL
Status: DISCONTINUED | OUTPATIENT
Start: 2021-04-12 | End: 2021-04-12

## 2021-04-12 RX ORDER — ERGOCALCIFEROL 1.25 MG/1
50000 CAPSULE ORAL
Status: DISCONTINUED | OUTPATIENT
Start: 2021-04-19 | End: 2021-04-14 | Stop reason: HOSPADM

## 2021-04-12 RX ORDER — VITAMIN B COMPLEX
1000 TABLET ORAL DAILY
Status: DISCONTINUED | OUTPATIENT
Start: 2021-04-12 | End: 2021-04-14 | Stop reason: HOSPADM

## 2021-04-12 RX ORDER — BISACODYL 10 MG
10 SUPPOSITORY, RECTAL RECTAL
Status: DISCONTINUED | OUTPATIENT
Start: 2021-04-12 | End: 2021-04-14 | Stop reason: HOSPADM

## 2021-04-12 RX ORDER — POLYETHYLENE GLYCOL 3350 17 G/17G
1 POWDER, FOR SOLUTION ORAL
Status: DISCONTINUED | OUTPATIENT
Start: 2021-04-12 | End: 2021-04-14 | Stop reason: HOSPADM

## 2021-04-12 RX ADMIN — DEXAMETHASONE SODIUM PHOSPHATE 4 MG: 4 INJECTION, SOLUTION INTRA-ARTICULAR; INTRALESIONAL; INTRAMUSCULAR; INTRAVENOUS; SOFT TISSUE at 23:30

## 2021-04-12 RX ADMIN — DOCUSATE SODIUM 50 MG AND SENNOSIDES 8.6 MG 2 TABLET: 8.6; 5 TABLET, FILM COATED ORAL at 06:12

## 2021-04-12 RX ADMIN — LIDOCAINE 1 PATCH: 50 PATCH TOPICAL at 06:12

## 2021-04-12 RX ADMIN — DEXAMETHASONE SODIUM PHOSPHATE 4 MG: 4 INJECTION, SOLUTION INTRA-ARTICULAR; INTRALESIONAL; INTRAMUSCULAR; INTRAVENOUS; SOFT TISSUE at 16:45

## 2021-04-12 RX ADMIN — CYCLOBENZAPRINE 10 MG: 10 TABLET, FILM COATED ORAL at 20:40

## 2021-04-12 RX ADMIN — DEXAMETHASONE SODIUM PHOSPHATE 4 MG: 4 INJECTION, SOLUTION INTRA-ARTICULAR; INTRALESIONAL; INTRAMUSCULAR; INTRAVENOUS; SOFT TISSUE at 12:01

## 2021-04-12 RX ADMIN — INSULIN HUMAN 2 UNITS: 100 INJECTION, SOLUTION PARENTERAL at 06:30

## 2021-04-12 RX ADMIN — ROSUVASTATIN CALCIUM 10 MG: 10 TABLET, FILM COATED ORAL at 06:12

## 2021-04-12 RX ADMIN — DEXAMETHASONE SODIUM PHOSPHATE 4 MG: 4 INJECTION, SOLUTION INTRA-ARTICULAR; INTRALESIONAL; INTRAMUSCULAR; INTRAVENOUS; SOFT TISSUE at 06:11

## 2021-04-12 RX ADMIN — INSULIN HUMAN 3 UNITS: 100 INJECTION, SOLUTION PARENTERAL at 11:58

## 2021-04-12 RX ADMIN — ERGOCALCIFEROL 50000 UNITS: 1.25 CAPSULE ORAL at 15:33

## 2021-04-12 RX ADMIN — OXYCODONE 5 MG: 5 TABLET ORAL at 20:40

## 2021-04-12 RX ADMIN — INSULIN GLARGINE 10 UNITS: 100 INJECTION, SOLUTION SUBCUTANEOUS at 16:45

## 2021-04-12 RX ADMIN — INSULIN HUMAN 4 UNITS: 100 INJECTION, SOLUTION PARENTERAL at 16:47

## 2021-04-12 RX ADMIN — Medication 1000 UNITS: at 15:33

## 2021-04-12 RX ADMIN — LEVOTHYROXINE SODIUM 75 MCG: 0.07 TABLET ORAL at 06:12

## 2021-04-12 ASSESSMENT — ENCOUNTER SYMPTOMS
SINUS PAIN: 0
FOCAL WEAKNESS: 1
HEMOPTYSIS: 0
WEIGHT LOSS: 0
NERVOUS/ANXIOUS: 0
BACK PAIN: 1
COUGH: 0
BLOOD IN STOOL: 0
WEAKNESS: 1
EYE PAIN: 0
NECK PAIN: 0
SPUTUM PRODUCTION: 0
SHORTNESS OF BREATH: 0
CHILLS: 0
VOMITING: 0
FEVER: 0
FALLS: 0
DIZZINESS: 0
CONSTIPATION: 0
DIARRHEA: 1
MYALGIAS: 0
BRUISES/BLEEDS EASILY: 0
BACK PAIN: 0
NERVOUS/ANXIOUS: 1
ABDOMINAL PAIN: 0
NAUSEA: 0
DEPRESSION: 0
HEADACHES: 0
SORE THROAT: 0

## 2021-04-12 ASSESSMENT — PAIN DESCRIPTION - PAIN TYPE
TYPE: ACUTE PAIN
TYPE: ACUTE PAIN

## 2021-04-12 NOTE — CARE PLAN
Problem: Safety  Goal: Will remain free from injury  Outcome: PROGRESSING AS EXPECTED  Intervention: Provide assistance with mobility  Note: Ambulated with pt in hallway, pt with steady gait. Treaded socks on.      Problem: Pain Management  Goal: Pain level will decrease to patient's comfort goal  Outcome: PROGRESSING AS EXPECTED  Intervention: Follow pain managment plan developed in collaboration with patient and Interdisciplinary Team  Note: Pt medicated with prn oxy and flexeril with good results, able to rate pain from 8/10 on pain scale down to 1/10 on pain scale.

## 2021-04-12 NOTE — CONSULTS
DATE OF SERVICE:  04/11/2021     INPATIENT CONSULTATION     CHIEF COMPLAINT:  Right lower extremity proximal weakness.     HISTORY OF PRESENT ILLNESS:  This is a 80-year-old woman who has had couple of   falls over the last 2 weeks.  She turns out, she has a right hip fracture   after one of those fall, she had difficulty with proximal movement.  She went   into the ER at [facility], transferred up here for higher level of care.    Apparently, I believe one of the orthopedic surgeons saw her and was deemed   the fracture to be nonoperative.  An MRI of the cervical, lumbar and thoracic   area was obtained, thoracic was negative.  Lumbar showed some enhancing   lesions in the vertebral bodies noncompressive that respected the walls of the   vertebral body that may be consistent with her previous history of breast   cancer status post surgery and chemotherapy to over 25 years ago.  She does   not report any pain from her spine that would imply metastatic etiology.  She also has an occipital skull lesion as   well that I believe radiation therapy may be treating soon.  They do not have   tissue biopsy as of yet but these were all possible areas that can be reached   through the needle.  She has significant cervical spondylosis and stenosis   C3-C4, C5-C6, front back with a spondylolisthesis but interestingly enough she   has no upper extremity symptoms.  She is not dropping things.  I was called   for my consultation interestingly enough she is getting better with the   movement of her proximal right leg.     PAST MEDICAL HISTORY:  As mentioned in the HPI plus DVT, high cholesterol,   pneumonia, thyroid issues, back pain, diverticulosis.     PAST SURGICAL HISTORY:  Multiple including Lap, breast reduction, breast   reconstruction or implant revisions, multiple eye surgeries, other breast   augmentation surgery, bladder sling, colectomy.     FAMILY HISTORY:  Positive for cancer.     SOCIAL HISTORY:  She is a  nonsmoker, social drinker.     PHYSICAL EXAMINATION:  GENERAL:  She is awake, alert and oriented x3.  EYES:  Pupils are symmetric.  Extraocular motion intact.  Face is full.    Tongue is midline.  She has no pain to palpation of the occipital region.  NEUROLOGIC:  She has no Conway's reflex.  Normal reflexes otherwise.  Full   strength in her deltoid, biceps, triceps, , interossei, iliopsoas on the   left, slightly pain limited on the right, quadriceps, dorsiflexion, EHL,   plantar flexion are full and intact sensation.     ASSESSMENT AND PLAN:  The patient has cervical spondylosis but no myelopathy.    At this point, she has no radiculopathy.  She can follow up with me as   outpatient in 6 weeks period just to check on the trends there.  Regarding her   bony mets, I will defer the care to the radiation oncology doctors.  She   seems to be getting better, I will defer the care to the hip fracture to the   orthopedic surgeons.     Thanks for allowing me to participate in her care.  Please call me if there   are any further questions.        ______________________________  MD JAIME oLoney III/DINORAH    DD:  04/11/2021 18:41  DT:  04/11/2021 20:50    Job#:  [id number]

## 2021-04-12 NOTE — PROGRESS NOTES
Brief Note    The RUE IV line was intended to be used in limited capacity, specifically for IV contrast needed for the MR imaging at [facility] prior to transfer to [facility]. Now that the pt is admitted, a different line is recommended.    Nursing communication order placed to remove RUE IV line. Primary provider, JAY Kwan contacted via VOALTE who acknowledged and agreed.     PAIGE Young MD

## 2021-04-12 NOTE — PROGRESS NOTES
Neurology Progress Note  Neurohospitalist Service, [facility], formerly [facility] Neurosciences    Referring Physician: Zan Do M.D.    HPI: please refer to EMR records for HPI    Interval History: 4/12/21: Patient discontent that she did not have a diet order since admission.  Denies headache this AM.  Says weakness and pain in RLE has resolved.  Awaiting feedback from radiation oncology service.    Review of systems: In addition to what is detailed in the HPI above, all other systems reviewed and are negative.    Past Medical History:    has a past medical history of Anesthesia, Arthritis, Backpain, Breast cancer (HCC) (1980s), Diverticulosis, DVT of deep femoral vein (HCC), Heart burn, High cholesterol, Pneumonia (Feb 2006), Thyroid condition, and Ulcer. She also has no past medical history of COPD.    FHx:  family history includes Cancer in her mother; Diabetes in her mother; Heart Disease (age of onset: 63) in her maternal grandmother; Heart Disease (age of onset: 64) in her mother; Hypertension in her mother; Stroke in her mother.    SHx:   reports that she has never smoked. She has never used smokeless tobacco. She reports current alcohol use. She reports that she does not use drugs.    Allergies:  Allergies   Allergen Reactions   • Glimepiride Unspecified     drowsiness   • Metformin Diarrhea and Nausea     NAUSEA VOMIITG AND DIARRHEA   • Morphine Unspecified     Hallucinations       Medications:    Current Facility-Administered Medications:   •  vitamin D (Ergocalciferol) (DRISDOL) capsule 50,000 Units, 50,000 Units, Oral, Q7 DAYS, Jak Young M.D.  •  vitamin D (cholecalciferol) tablet 1,000 Units, 1,000 Units, Oral, DAILY, Jak Young M.D.  •  enoxaparin (LOVENOX) inj 40 mg, 40 mg, Subcutaneous, DAILY, Pooja Song M.D., Stopped at 04/12/21 0600  •  ondansetron (ZOFRAN ODT) dispertab 4 mg, 4 mg, Oral, Q4HRS PRN, Pooja Song M.D.  •  ondansetron (ZOFRAN) syringe/vial injection 4 mg, 4 mg, Intravenous, Q4HRS  PRN, Pooja Song M.D.  •  senna-docusate (PERICOLACE or SENOKOT S) 8.6-50 MG per tablet 2 tablet, 2 tablet, Oral, BID, 2 tablet at 04/12/21 0612 **AND** polyethylene glycol/lytes (MIRALAX) PACKET 1 Packet, 1 Packet, Oral, QDAY PRN **AND** magnesium hydroxide (MILK OF MAGNESIA) suspension 30 mL, 30 mL, Oral, QDAY PRN **AND** bisacodyl (DULCOLAX) suppository 10 mg, 10 mg, Rectal, QDAY PRN, Pooja Song M.D.  •  acetaminophen (Tylenol) tablet 650 mg, 650 mg, Oral, Q6HRS PRN, Pooja Song M.D.  •  insulin regular (HumuLIN R,NovoLIN R) injection, 1-6 Units, Subcutaneous, 4X/DAY ACHS, 3 Units at 04/12/21 1158 **AND** POC blood glucose manual result, , , Q AC AND BEDTIME(S) **AND** NOTIFY MD and PharmD, , , Once **AND** glucose 4 g chewable tablet 16 g, 16 g, Oral, Q15 MIN PRN **AND** dextrose 50% (D50W) injection 50 mL, 50 mL, Intravenous, Q15 MIN PRN, Pooja Sogn M.D.  •  cyclobenzaprine (Flexeril) tablet 10 mg, 10 mg, Oral, TID PRN, Pooja Song M.D., 10 mg at 04/11/21 1815  •  insulin glargine (Lantus) injection, 10 Units, Subcutaneous, Q EVENING, Pooja Song M.D., Stopped at 04/11/21 1800  •  levothyroxine (SYNTHROID) tablet 75 mcg, 75 mcg, Oral, AM ES, Pooja Song M.D., 75 mcg at 04/12/21 0612  •  lidocaine (LIDODERM) 5 % 1 Patch, 1 Patch, Transdermal, DAILY, Pooja Song M.D., 1 Patch at 04/12/21 0612  •  oxyCODONE immediate-release (ROXICODONE) tablet 5 mg, 5 mg, Oral, Q4HRS PRN, Pooja Song M.D., 5 mg at 04/11/21 1815  •  rosuvastatin (CRESTOR) tablet 10 mg, 10 mg, Oral, Q EVENING, Pooja Song M.D., 10 mg at 04/12/21 0612  •  dexamethasone (DECADRON) injection 4 mg, 4 mg, Intravenous, Q6HRS, Zan Do M.D., 4 mg at 04/12/21 1201  •  temazepam (RESTORIL) capsule 15 mg, 15 mg, Oral, QHS PRN, Jak Young M.D.    Physical Examination:     Vitals:    04/11/21 2027 04/12/21 0457 04/12/21 0707 04/12/21 1400   BP: 150/69 117/60 104/53    Pulse: 83 74 74    Resp: 20 17 16    Temp: 36.4 °C (97.5 °F) 36.4 °C (97.6 °F) 36.4 °C (97.5 °F)    TempSrc:  "Temporal Temporal Temporal    SpO2: 94% 91% 94%    Weight:       Height:    1.703 m (5' 7.03\")       General: Patient is awake and in no acute distress  Eyes: examination of optic disks not indicated at this time given acuity of consult  CV: RRR    NEUROLOGICAL EXAM:     Mental status: Awake, alert and fully oriented, follows commands  Speech and language: speech is not dysarthric. The patient is able to name and repeat.  Cranial nerve exam: Pupils are equal, round and reactive to light bilaterally. Visual fields are full. Extraocular muscles are intact. Sensation in the face is intact to light touch. Face is symmetric. Hearing to finger rub equal. Palate elevates symmetrically. Shoulder shrug is full. Tongue is midline.  Motor exam: 5/5 in all muscles tested both distally and proximally. Tone is normal. No abnormal movements were seen on exam.  Sensory exam: No sensory deficits identified   Deep tendon reflexes:  2+ b/l patella. Toes down-going bilaterally.  Coordination: no ataxia   Gait: deferred given patient preference    Objective Data:    Labs:  Lab Results   Component Value Date/Time    PROTHROMBTM 12.8 04/08/2021 05:40 AM    INR 0.99 04/08/2021 05:40 AM      Lab Results   Component Value Date/Time    WBC 4.3 (L) 04/11/2021 03:28 AM    RBC 4.56 04/11/2021 03:28 AM    HEMOGLOBIN 13.4 04/11/2021 03:28 AM    HEMATOCRIT 40.1 04/11/2021 03:28 AM    MCV 87.9 04/11/2021 03:28 AM    MCH 29.4 04/11/2021 03:28 AM    MCHC 33.4 (L) 04/11/2021 03:28 AM    MPV 9.6 04/11/2021 03:28 AM    NEUTSPOLYS 48.80 04/11/2021 03:28 AM    LYMPHOCYTES 38.30 04/11/2021 03:28 AM    MONOCYTES 10.30 04/11/2021 03:28 AM    EOSINOPHILS 1.90 04/11/2021 03:28 AM    EOSINOPHILS 3 10/28/2009 07:00 PM    BASOPHILS 0.50 04/11/2021 03:28 AM      Lab Results   Component Value Date/Time    SODIUM 136 04/11/2021 03:28 AM    POTASSIUM 4.1 04/11/2021 03:28 AM    CHLORIDE 102 04/11/2021 03:28 AM    CO2 25 04/11/2021 03:28 AM    GLUCOSE 208 (H) 04/11/2021 " 03:28 AM    BUN 11 04/11/2021 03:28 AM    CREATININE 0.40 (L) 04/11/2021 03:28 AM      Lab Results   Component Value Date/Time    CHOLSTRLTOT 153 12/27/2019 07:22 AM    LDL 74 12/27/2019 07:22 AM    HDL 48 12/27/2019 07:22 AM    TRIGLYCERIDE 155 (H) 12/27/2019 07:22 AM       Lab Results   Component Value Date/Time    ALKPHOSPHAT 92 04/08/2021 05:40 AM    ASTSGOT 16 04/08/2021 05:40 AM    ALTSGPT 17 04/08/2021 05:40 AM    TBILIRUBIN 0.8 04/08/2021 05:40 AM        Imaging/Testing:    I interpreted and/or reviewed the patient's neuroimaging    MRI brain 4/10/21: 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 Severe central cord narrowing with encephalomalacia and 2. C5 and C6 vertebral body enhancing lesions  MRI l spine 4/9/21: L4 vertebral body enhancing lesion suspicious for metastasis    Assessment and Plan:    Mavis Lizarraga is a 80 y.o. woman presenting for whom neurology has been consulted for subacute right lower extremity weakness.  The hip fracture could be secondary to her fall that preceded this admission ie. a result of underlying pathology rather than the cause of presentation.  MRI lumbar spine showing enhancing lesion of the L4 vertebral body.  MRI c spine showing C5 and C6 vertebral body enhancing lesions suspicious for metastatic disease and severe central cord narrowing.  The MRI brain also shows a lesion of the calvarium.  Clinically better compared to initial presentation at [facility].  Pending plan per radiation / oncology service.     Plan:     - assess candidacy for radiation therapy pending consultations from oncological specialists  - orthopaedics to assess hip fracture  - aggressive Vitamin D repletion given subtherapeutic level resulted; orders placed    No further recommendations or further studies from an acute neurological standpoint at this time. Please re-consult if you have further questions or there is a change in status.    The evaluation of the patient,  and recommended management, was discussed with Steven Marin, Ronit Frost, Nilesh Rizzo, and Aniya Guerra.    Jak Young MD  Neurohospitalist, Acute Care Services   of Neurology

## 2021-04-12 NOTE — CARE PLAN
Problem: Safety  Goal: Will remain free from falls  Outcome: PROGRESSING AS EXPECTED  Intervention: Implement fall precautions  Flowsheets (Taken 4/11/2021 2204)  Environmental Precautions:   Treaded Slipper Socks on Patient   Personal Belongings, Wastebasket, Call Bell etc. in Easy Reach   Transferred to Stronger Side   Report Given to Other Health Care Providers Regarding Fall Risk   Bed in Low Position   Communication Sign for Patients & Families   Mobility Assessed & Appropriate Sign Placed  Chair/Bed Strip Alarm: Patient Educated Regarding Fall Risk and Need for Bed Alarm, Understands and Continues to Refuse  Note: Fall precautions and education in place. Pt refused bed alarm despite RN education, pt verbalizes understanding and agrees to call RN/CNA prior to standing.      Problem: Knowledge Deficit  Goal: Knowledge of disease process/condition, treatment plan, diagnostic tests, and medications will improve  Outcome: PROGRESSING AS EXPECTED  Intervention: Assess knowledge level of disease process/condition, treatment plan, diagnostic tests, and medications  Note: POC discussed with pt. Pt given opportunity to ask questions and voice concerns as they arise. RN offer education as needed.

## 2021-04-12 NOTE — PROGRESS NOTES
Consent for contrast signed, placed in chart.  Pt instructed to drink oral contrast 1/4 bottle every 30 minutes, she is complying.

## 2021-04-12 NOTE — PROGRESS NOTES
"Patient has a \"No IV no Blood No BP\" Sleeve on on the same arm she has her IV. We (Klever and Anand) talked with the patient as her IV was on that arm and it is the same side she had a mastectomy and she told us \"the doctor told me it was fine\". Patient is ok with us using the IV on that side per the Doctor's OK to the patient.   "

## 2021-04-12 NOTE — CONSULTS
DATE OF SERVICE:  04/11/2021     INPATIENT ONCOLOGY CONSULTATION     REASON FOR CONSULT:  Abnormal findings on MRI of the spine, worry for   malignancy.     HISTORY OF PRESENT ILLNESS:  The patient is a very nice 80-year-old woman with   a history of diabetes type 2, hyperlipidemia, hypothyroidism, GERD,   arthritis, chronic back pain, DVT as well as history of breast cancer, which   she says was diagnosed and treated in 1989 back in [place] where she was   living at that time.  She cannot remember the stage of the disease, but she   was treated initially with right-sided mastectomy.  She recalls that there   were no lymph nodes involved.  Her doctors recommended six cycles of   chemotherapy, which she completed.  She did not have any recommendations for   hormonal therapy, so it is presumed that her disease was ER/OR negative.  She   tells me she later moved to this area.  She denies ever having an oncologist   in the [facility] although she does have an account in our office under my   partner, Dr. Guerar.  This does make me wonder whether she saw him in the past,   but it was before our current EMR, so there are no records.  This may have   even been when we had paper charts.     Her history picks up at about 2 weeks ago when she tripped on a rug in the   garage and had a fall.  She apparently was seen at an urgent care clinic and   in her words had a crack in a bone of her right lower arm.  They recommended a   sling, which she wore for a period of time.  She was doing fine until early   at night/in the early morning hours on 04/08, she woke up and tried to get out   of bed.  She had pain in the bilateral lower legs and she slumped to the   floor.  Since that time, she was noticing some weakness in the legs.  The left   leg weakness has for the most part has resolved, but she has had some ongoing   right leg weakness and pain in the right hip area.  She was seen at the   emergency room at [facility] on 04/08 for  further evaluation.  She had a CT scan   of the head that was negative.  She had a CT perfusion scan of the head that   was negative.  She had bilateral hip x-rays, which were negative.  She had a   right lower extremity ultrasound, which was negative.  She was admitted for   further workup.     She had an MRI of the right hip on 04/09 and this showed a fracture of the   right superior and inferior pubic rami.  She had an MRI of the lumbar spine on   04/09 as well, which showed degenerative disk disease including some   mild-to-moderate central canal stenosis at the T10-T11 disk with disk bulging.    She was also found to have what was described as a 9 mm mass-like lesion in   the left L4 vertebral body, possibly worrisome for malignancy in someone who   has had a previous cancer.     She underwent further workup with a CT scan of the cervical spine and the   thoracic spine on 04/10.  She had a couple more vertebral body lesions that   were suspicious again including one at C5 and one at C6.  She had multilevel   degenerative disk disease including a bulging disk with severe central canal   narrowing at C4-C5 with cord compression.     She underwent an MRI of the brain on 04/10.  This showed some bony sclerotic   lesions in the frontal bone of the skull, again possibly worrisome for mets.    She had no intracranial diseases.     It was decided to [facility].  A call has been   placed to Neurosurgery and the case was discussed with Dr. Marin.  He   apparently is going to see her today or tomorrow.  She had a neurology   consult.  It is felt that the possible bony sclerotic lesions that were seen,   that may be consistent with malignancy, are not causing any of her right lower   extremity weakness.  She does have some central canal stenosis of the neck   and she needs to be evaluated by Neurosurgery for that.  They have also   consulted Dr. Monaco of Orthopedics and he is going to see her  for the   pelvic bone fractures.  I have been asked to consult in the case.     PAST MEDICAL HISTORY:  1.  History of breast cancer.  2.  Diabetes type 2.  3.  Hyperlipidemia.  4.  Hypothyroidism.  5.  GERD.  6.  History of DVT.  7.  Osteoarthritis.  8.  Chronic low back pain.     PAST SURGICAL HISTORY:  1.  LAR laparoscopic resection on 02/10/2010.  2.  Breast reduction surgery.  3.  Left breast mastectomy 25 years ago.  4.  Breast reconstructive surgery.  5.  Breast implant revision in 08/2012.  6.  Blepharoplasty.  7.  Bladder sling repair.  8.  Partial colectomy in 2009.     ALLERGIES:  1.  GLIMEPIRIDE.  2.  METFORMIN -- CAUSES DIARRHEA.  2.  MORPHINE.     MEDICATIONS:  Here in the hospital:  1.  Lovenox 40 mg subQ every day.  2.  Insulin Lantus 10 units at bedtime.  3.  Insulin sliding scale.  4.  Levothyroxine 75 mcg every day.  5.  Lidoderm patches.  6.  Crestor 10 mg every day.  7.  Bowel regimen p.r.n.  8.  Cyclobenzaprine 10 mg t.i.d. p.r.n. pain.  9.  Zofran p.r.n.  10.  Oxycodone 5 mg p.o. q.4 hours p.r.n. pain.     FAMILY HISTORY:  Her mom had a history of stomach cancer.  No other   significant cancer history.     SOCIAL HISTORY:  She is a lifelong nonsmoker.  She drinks about one to two   alcoholic drinks per month.  She is  and lives here locally with her   .  She has 2 children.  She is retired.     REVIEW OF SYSTEMS:  Positive for the illness listed above.  She has some mild   headaches off and on.  She was having diarrhea on metformin, but this has   seemed to resolve.  She does have some back pain.  She has some of the right   hip pain radiating down the right leg.  She has no loss of feeling or   sensation in the arms or legs.  Otherwise, a complete review of systems per   the AMA and CMS criteria is negative.     PHYSICAL EXAMINATION:  VITAL SIGNS:  Her height is 5 feet 7 inches, her weight is 172 pounds, her   T-max is 97.4, pulse of 62, blood pressure 138/62, respirations 18,  satting   96% on room air.  GENERAL:  No acute distress, pleasant woman, appears stated age.  HEENT:  NCAT.  Sclerae are anicteric.  Conjunctivae clear.   Oropharynx clear   without any erythema, exudate or discharge.  NECK:  Supple, nontender, no JVP, no carotid bruits, no thyromegaly.  CHEST:  Clear to auscultation and percussion bilaterally.  No wheezes, rales   or rhonchi.  CVS:  Regular rate and rhythm.  No murmurs, gallops or rubs.  Normal S1, S2.  ABDOMEN:  Soft, nontender, nondistended, no hepatosplenomegaly.  No guarding,   rebound or masses.  LYMPH NODES:  No cervical, supraclavicular, infraclavicular, axillary or   inguinal lymphadenopathy.  EXTREMITIES:  No cyanosis, clubbing or edema.  Full range of motion except   somewhat limited range of motion of the right hip related to pain and some   weakness.  SKIN:  No rashes, bruising, petechiae, ulcerations, nonhealing wounds.  NEUROLOGIC:  Her cranial nerves II through XII are intact.  She has intact   sensation to light touch throughout.  She moves all 4 extremities   appropriately.  She has some decreased strength at the right hip flexor.  PSYCHIATRIC:  She is alert and oriented x3.  She has normal mood and affect.     LABORATORY DATA:  White blood cell count is 4.3, hemoglobin 13.4, hematocrit   40.1%, platelets 160,00 with 49% neutrophils, 38% lymphocytes, 10% monocytes,   2% eosinophils, 1% basophil.  Had interim input sodium 136, potassium 4.1,   chloride 102, CO2 of 25, BUN 11, creatinine 0.4, glucose 208, calcium 8.7, AST   16, ALT 17, alkaline phosphatase 92, bilirubin 0.8, albumin 4.5, protein 7.4.     ASSESSMENT AND PLAN:  The patient is a very nice 80-year-old woman with a   history of breast cancer 25-30 years ago, who presents now after some falls   with a right pelvic bone fracture with some decreased strength and pain in the   right leg.  She had an MRI of the brain and spine.  She had some sclerotic   versus mass-like lesions in three of the  vertebral bodies as well as in the   frontal skull bone.  In the right, contacts these could be worrisome for   malignancy.  They are not causing any neurologic compromise.  We have been   asked to consult in the case given her history of cancer.     At this point, I think it is too early to say that these lesions are cancers.    It is a little unusual to have bony malignancy and have a normal alkaline   phosphatase as well as a normal calcium.  These very well could be some other   type of benign lesions.  It would also be unusual for her to have recurrence   of breast cancer this far out, especially if she had triple negative disease   previously.     At this point, I think a CT-guided biopsy of one of the suspected lesions   might be appropriate.  It sounds like maybe the L4 lesion may be the easiest   one to get to.  I would recommend doing a CT-guided biopsy.  We can then see   what the results show.  If she does have malignancy, we could then discuss   treatment options including radiation or systemic therapies.  However,   currently i think it is too soon to make treatment suggestions.  I would   recommend also doing a CT scan of the chest, abdomen and pelvis for full   staging.  I will also order an SPEP with FELISA to rule out things like multiple   myeloma, although again on the lab work there are no worrisome findings.     In terms of her pelvic fracture, this is probably related to one of her falls.    She needs to be seen by Orthopedics to decide on any intervention that is   needed as well as activity limitations and followup to monitor for healing.     I do recommend her seeing the neurosurgeons.  She has severe central canal   stenosis with cord compression at the C4-C5 level, which does not seem to be   malignant in nature.  It sounds like this is related to degenerative disk   disease.  This may be contributing to some of her lower extremity weakness.    She needs to be seen by them for further  treatment planning and monitoring.     We will continue to follow along the case and monitor as the workup unfolds.     Thank you very much for referral of this nice woman.        ______________________________  MD BABITA Reza/STEPHY/YARED    DD:  04/11/2021 15:45  DT:  04/11/2021 19:12    Job#:  [id number]

## 2021-04-12 NOTE — CARE PLAN
Problem: Safety  Goal: Will remain free from falls  Outcome: PROGRESSING AS EXPECTED  Note: Pt educated to call with needs to get out of bed. Pt states understanding. Bed locked in lowest position. Non-skid socks in place. Personal belongings and call light within reach. Mobility sign on door.        Problem: Venous Thromboembolism (VTW)/Deep Vein Thrombosis (DVT) Prevention:  Goal: Patient will participate in Venous Thrombosis (VTE)/Deep Vein Thrombosis (DVT)Prevention Measures  Outcome: PROGRESSING AS EXPECTED  Flowsheets (Taken 4/12/2021 0830)  Mechanical Prophylaxis: SCDs, Sequential Compression Device  Pharmacologic Prophylaxis Used: LMWH: Enoxaparin(Lovenox)  Note: Pt prescribed Lovenox and is wearing SCDs for mechanical prophylaxis.

## 2021-04-12 NOTE — PROGRESS NOTES
Pt A&Ox 4, resting in bed. Discussed plan of care, pt verbalized agreement. Voiding appropriately, bm 4/11 . Safety and fall precautions in place. Rounding in place. All needs met at this moment.

## 2021-04-12 NOTE — DISCHARGE PLANNING
Anticipated Discharge Disposition: Pending medical team collaboration    Action: Discussed pt in IDT rounds. Pt requires biopsy per Dr. Guerra. Speech anticipates no further needs. Pending PT/OT eval.     Student SW completed CTT assessment at bedside. No SW/CM needs reported at this time.    Barriers to Discharge: POC/GOC, PT/OT evals    Plan: SW/CM to follow and assist pending therapy recommendations.    Care Transition Team Assessment      In the case of an emergency, pt's DPOA is Lauri Castro (Spouse) [phone].     This Student SW spoke with pt at bedside and obtained the information used in this assessment. Pt verified accuracy of facesheet.    Pt lives in a one cristela house with spouse Lauri. Pt uses the [facility] in [facility]. Prior to current hospitalization, pt was independent with ADLS/IADLS. Pt has a walker at home if needed. Pt is able to drive. Pt has prescription coverage through primary insurance [employer]. Pt denies history of substance abuse and mental health. Pt support system is Lauri. Pt has AD. DPOA is Lauri Castro. No SW/CM needs reported at this time.    Information Source  Information Given By: Patient  Informant's Name: Mavis Lizarraga  Who is responsible for making decisions for patient? : Patient    Readmission Evaluation  Is this a readmission?: No    Elopement Risk  Legal Hold: No  Ambulatory or Self Mobile in Wheelchair: Yes  Disoriented: No  Psychiatric Symptoms: None  History of Wandering: No  Elopement this Admit: No  Vocalizing Wanting to Leave: No  Displays Behaviors, Body Language Wanting to Leave: No-Not at Risk for Elopement  Elopement Risk: Not at Risk for Elopement    Interdisciplinary Discharge Planning  Primary Care Physician: Jonathan Bobo M.D.  Lives with - Patient's Self Care Capacity: Alone and Able to Care For Self  Support Systems: Spouse / Significant Other  Housing / Facility: 1 Story House  Durable Medical Equipment:  Walker    Discharge Preparedness  What is your plan after discharge?: Uncertain - pending medical team collaboration  What are your discharge supports?: Spouse  Prior Functional Level: Ambulatory, Drives Self, Independent with Activities of Daily Living, Independent with Medication Management  Difficulity with ADLs: None  Difficulity with IADLs: None    Functional Assesment  Prior Functional Level: Ambulatory, Drives Self, Independent with Activities of Daily Living, Independent with Medication Management    Finances  Financial Barriers to Discharge: No  Prescription Coverage: Yes    Vision / Hearing Impairment  Vision Impairment : Yes  Right Eye Vision: Impaired, Wears Glasses  Left Eye Vision: Impaired, Wears Glasses  Hearing Impairment : No         Advance Directive  Advance Directive?: DPOA for Health Care  Durable Power of  Name and Contact : Lauri Flowersdanyalexandria, [phone]    Domestic Abuse  Have you ever been the victim of abuse or violence?: No  Physical Abuse or Sexual Abuse: No  Verbal Abuse or Emotional Abuse: No  Possible Abuse/Neglect Reported to:: Not Applicable    Psychological Assessment  History of Substance Abuse: None  History of Psychiatric Problems: No  Non-compliant with Treatment: No    Discharge Risks or Barriers  Discharge risks or barriers?: No    Anticipated Discharge Information  Discharge Disposition: Still a Patient (30)  Discharge Address: [id number] [place] [address] [address] [id number]  Discharge Contact Phone Number: [phone]      **Note reviewed and approved by AHMET San

## 2021-04-12 NOTE — THERAPY
Speech Language Pathology   Clinical Swallow Evaluation     Patient Name: Mavis Lizarraga  AGE:  80 y.o., SEX:  female  Medical Record #: [id number]  Today's Date: 4/12/2021          Assessment    80 y.o. female with PMHx of hypothyroidism, hyperlipidemia, DVT, newly diagnosed diabetes, breast cancer status post surgery and chemo 25 years ago.  [facility] with chief complaint of lower extremity weakness and pain. MRI of spine showed metabolic C5-C6 with severe central canal narrowing at C4-C5 with cord compression, and millimeter laceration to left side of L4 vertebral body, and frontal calvarium metastasis.    Pt AAOx4, reports no difficulty swallowing.  No gross deficits noted in oral exam, voice and cough were strong.  Pt consumed PO trials of puree, soft bite sized solids, mixed consistencies (cereal with milk), thins and dry solids without any overt s/sx of aspiration.  Swallow trigger was timely, mastication was effective with solids and laryngeal elevation palpated as complete.  Recommend a regular diet with thins.  No further SLP needed in acute care setting at this time.    Plan  Regular diet with thins    Recommend Speech Therapy for Evaluation only.    Discharge Recommendations: Anticipate that the patient will have no further speech therapy needs after discharge from the hospital      Objective     04/12/21 0940   Prior Living Situation   Lives with - Patient's Self Care Capacity Alone and Able to Care For Self   Prior Level Of Function   Communication Within Functional Limits   Swallow Within Functional Limits   Dentition Intact   Hearing Within Functional Limits   Vision Within Functional Limits for Evaluation   Patient's Primary Language English   Oral Motor Eval    Is Patient Able to Complete Oral Motor Eval Yes, Within Normal Limits   Laryngeal Function   Voice Quality Within Functional Limits   Volutional Cough Within Functional Limits   Excursion Upon Swallow Complete   Oral Food  Presentation   Single Swallow Thin (0) Within Functional Limits   Serial Swallow Thin (0) Within Functional Limits   Pureed (4) Within Functional Limits   Soft & Bite-Sized (6) - (Dysphagia III) Within Functional Limits   Regular (7) Within Functional Limits   Self Feeding Independent   Tracheostomy   Tracheostomy  No   Dysphagia Strategies / Recommendations   Compensatory Strategies Head of Bed 90 Degrees During Eating / Drinking   Diet / Liquid Recommendation Thin (0);Regular (7)   Medication Administration  Whole with Liquid Wash   Dysphagia Rating   Nutritional Liquid Intake Rating Scale Non thickened beverages   Nutritional Food Intake Rating Scale Total oral diet with no restrictions

## 2021-04-12 NOTE — PROGRESS NOTES
Oncology/Hematology Progress Note               Author: Aniya Guerra M.D. Date & Time created: 4/12/2021  12:36 PM   Diagnosis-history of breast cancer diagnosed in 1989  ?  Bony mets  Interval History:  Per patient her lower extremity weakness is better.  She still has minimal weakness in her right leg but her left leg is fine.  Pain is well controlled.    Review of Systems:  Review of Systems   Constitutional: Positive for malaise/fatigue. Negative for fever and weight loss.   HENT: Negative for hearing loss and sore throat.    Respiratory: Negative for cough, hemoptysis and sputum production.    Genitourinary: Negative for dysuria and hematuria.   Musculoskeletal: Positive for back pain and joint pain. Negative for myalgias.   Skin: Negative for itching and rash.   Neurological: Positive for focal weakness and weakness. Negative for dizziness.   Psychiatric/Behavioral: The patient is nervous/anxious.        Physical Exam:  Physical Exam  Constitutional:       General: She is not in acute distress.     Appearance: Normal appearance. She is not ill-appearing.   Cardiovascular:      Rate and Rhythm: Normal rate and regular rhythm.   Pulmonary:      Effort: Pulmonary effort is normal. No respiratory distress.      Breath sounds: Normal breath sounds. No stridor. No wheezing, rhonchi or rales.   Abdominal:      General: Abdomen is flat. There is no distension.      Palpations: Abdomen is soft. There is no mass.      Tenderness: There is no abdominal tenderness. There is no guarding.      Hernia: No hernia is present.   Neurological:      Mental Status: She is alert and oriented to person, place, and time.      Motor: Weakness present.   Psychiatric:         Mood and Affect: Mood normal.         Behavior: Behavior normal.         Labs:          Recent Labs     04/10/21  0346 04/11/21  0328   SODIUM 137 136   POTASSIUM 4.0 4.1   CHLORIDE 103 102   CO2 26 25   BUN 10 11   CREATININE 0.43* 0.40*   PHOSPHORUS 3.6  --     CALCIUM 8.9 8.7     Recent Labs     04/10/21  0346 21  0328   GLUCOSE 190* 208*     Recent Labs     04/10/21  0346 21  0328   RBC 4.63 4.56   HEMOGLOBIN 13.7 13.4   HEMATOCRIT 40.6 40.1   PLATELETCT 162* 160*     Recent Labs     04/10/21  0346 21  0328   WBC 4.4* 4.3*   NEUTSPOLYS 47.40 48.80   LYMPHOCYTES 42.00* 38.30   MONOCYTES 8.40 10.30   EOSINOPHILS 1.80 1.90   BASOPHILS 0.20 0.50     Recent Labs     04/10/21  0346 21  0328   SODIUM 137 136   POTASSIUM 4.0 4.1   CHLORIDE 103 102   CO2 26 25   GLUCOSE 190* 208*   BUN 10 11   CREATININE 0.43* 0.40*   CALCIUM 8.9 8.7     Hemodynamics:  Temp (24hrs), Av.4 °C (97.6 °F), Min:36.3 °C (97.4 °F), Max:36.8 °C (98.2 °F)  Temperature: 36.4 °C (97.5 °F)  Pulse  Av.6  Min: 62  Max: 83   Blood Pressure : 104/53     Respiratory:    Respiration: 16, Pulse Oximetry: 94 %        RUL Breath Sounds: Clear, RML Breath Sounds: Clear, RLL Breath Sounds: Diminished, ADRIA Breath Sounds: Clear, LLL Breath Sounds: Diminished  Fluids:  No intake or output data in the 24 hours ending 21 1236  Weight: 78.2 kg (172 lb 6.4 oz)  GI/Nutrition:  Orders Placed This Encounter   Procedures   • Diet Order Diet: Regular     Standing Status:   Standing     Number of Occurrences:   1     Order Specific Question:   Diet:     Answer:   Regular [1]     Medical Decision Making, by Problem:  Active Hospital Problems    Diagnosis    • Pubic ramus fracture (HCC) [S32.599A]    • Lower extremity weakness [R29.898]    • Mixed hyperlipidemia [E78.2]    • Breast cancer (HCC) [C50.919]    • Hypothyroidism due to acquired atrophy of thyroid [E03.4]    • Cervical spinal stenosis [M48.02]        Plan:  ?bony mets-MRI of the spine and brain raised the question of bony mets.  Plan is to get a biopsy from these areas.  Orders placed.  Patient is aware of the treatment plan.  History of breast cancer-this was diagnosed about 31 years ago.  It is very unlikely that her breast cancer  would metastasize after so many years.  Her extremity weakness-this appears to be responding well to her current treatment.  Neurology have signed off on this case.  The patient tells me that neurosurgery have informed her that she is not a candidate for surgery at this time.  We will follow up    Quality-Core Measures

## 2021-04-13 ENCOUNTER — PATIENT OUTREACH (OUTPATIENT)
Dept: HEALTH INFORMATION MANAGEMENT | Facility: OTHER | Age: 81
End: 2021-04-13

## 2021-04-13 ENCOUNTER — APPOINTMENT (OUTPATIENT)
Dept: RADIOLOGY | Facility: MEDICAL CENTER | Age: 81
DRG: 536 | End: 2021-04-13
Attending: INTERNAL MEDICINE
Payer: MEDICARE

## 2021-04-13 LAB
ALBUMIN SERPL ELPH-MCNC: 3.9 G/DL (ref 3.75–5.01)
ALPHA1 GLOB SERPL ELPH-MCNC: 0.33 G/DL (ref 0.19–0.46)
ALPHA2 GLOB SERPL ELPH-MCNC: 0.64 G/DL (ref 0.48–1.05)
ANA INTERPRETIVE COMMENT Q5143: ABNORMAL
ANA PATTERN Q5144: ABNORMAL
ANA TITER Q5145: ABNORMAL
ANION GAP SERPL CALC-SCNC: 10 MMOL/L (ref 7–16)
ANTINUCLEAR ANTIBODY (ANA), HEP-2, IGG Q5142: DETECTED
ARSENIC BLD-MCNC: <10 UG/L
B-GLOBULIN SERPL ELPH-MCNC: 0.78 G/DL (ref 0.48–1.1)
BUN SERPL-MCNC: 19 MG/DL (ref 8–22)
CADMIUM BLD-MCNC: <1 UG/L
CALCIUM SERPL-MCNC: 9.3 MG/DL (ref 8.5–10.5)
CHLORIDE SERPL-SCNC: 103 MMOL/L (ref 96–112)
CO2 SERPL-SCNC: 24 MMOL/L (ref 20–33)
CREAT SERPL-MCNC: 0.54 MG/DL (ref 0.5–1.4)
DSDNA AB TITR SER CLIF: NORMAL {TITER}
GAMMA GLOB SERPL ELPH-MCNC: 0.86 G/DL (ref 0.62–1.51)
GLUCOSE BLD-MCNC: 252 MG/DL (ref 65–99)
GLUCOSE BLD-MCNC: 276 MG/DL (ref 65–99)
GLUCOSE BLD-MCNC: 341 MG/DL (ref 65–99)
GLUCOSE BLD-MCNC: 384 MG/DL (ref 65–99)
GLUCOSE SERPL-MCNC: 284 MG/DL (ref 65–99)
INTERPRETATION SERPL IFE-IMP: NORMAL
LEAD BLDV-MCNC: <2 UG/DL
MERCURY BLD-MCNC: <2.5 UG/L
MONOCLON BAND OBS SERPL: NORMAL
PATHOLOGY STUDY: NORMAL
POTASSIUM SERPL-SCNC: 4.6 MMOL/L (ref 3.6–5.5)
PROT SERPL-MCNC: 6.5 G/DL (ref 6.3–8.2)
SODIUM SERPL-SCNC: 137 MMOL/L (ref 135–145)

## 2021-04-13 PROCEDURE — 82962 GLUCOSE BLOOD TEST: CPT | Mod: 91

## 2021-04-13 PROCEDURE — A9270 NON-COVERED ITEM OR SERVICE: HCPCS | Performed by: PSYCHIATRY & NEUROLOGY

## 2021-04-13 PROCEDURE — 700102 HCHG RX REV CODE 250 W/ 637 OVERRIDE(OP): Performed by: STUDENT IN AN ORGANIZED HEALTH CARE EDUCATION/TRAINING PROGRAM

## 2021-04-13 PROCEDURE — A9503 TC99M MEDRONATE: HCPCS

## 2021-04-13 PROCEDURE — 700117 HCHG RX CONTRAST REV CODE 255: Performed by: INTERNAL MEDICINE

## 2021-04-13 PROCEDURE — 36415 COLL VENOUS BLD VENIPUNCTURE: CPT

## 2021-04-13 PROCEDURE — A9270 NON-COVERED ITEM OR SERVICE: HCPCS | Performed by: STUDENT IN AN ORGANIZED HEALTH CARE EDUCATION/TRAINING PROGRAM

## 2021-04-13 PROCEDURE — 80048 BASIC METABOLIC PNL TOTAL CA: CPT

## 2021-04-13 PROCEDURE — 700111 HCHG RX REV CODE 636 W/ 250 OVERRIDE (IP): Performed by: STUDENT IN AN ORGANIZED HEALTH CARE EDUCATION/TRAINING PROGRAM

## 2021-04-13 PROCEDURE — 97535 SELF CARE MNGMENT TRAINING: CPT

## 2021-04-13 PROCEDURE — 700102 HCHG RX REV CODE 250 W/ 637 OVERRIDE(OP): Performed by: PSYCHIATRY & NEUROLOGY

## 2021-04-13 PROCEDURE — 99233 SBSQ HOSP IP/OBS HIGH 50: CPT | Performed by: INTERNAL MEDICINE

## 2021-04-13 PROCEDURE — 97161 PT EVAL LOW COMPLEX 20 MIN: CPT

## 2021-04-13 PROCEDURE — 72126 CT NECK SPINE W/DYE: CPT | Mod: MG

## 2021-04-13 PROCEDURE — 770004 HCHG ROOM/CARE - ONCOLOGY PRIVATE *

## 2021-04-13 RX ORDER — DEXAMETHASONE 4 MG/1
4 TABLET ORAL EVERY 6 HOURS
Status: DISCONTINUED | OUTPATIENT
Start: 2021-04-13 | End: 2021-04-13

## 2021-04-13 RX ORDER — DEXAMETHASONE 4 MG/1
4 TABLET ORAL EVERY 8 HOURS
Status: DISCONTINUED | OUTPATIENT
Start: 2021-04-14 | End: 2021-04-14 | Stop reason: HOSPADM

## 2021-04-13 RX ADMIN — DEXAMETHASONE SODIUM PHOSPHATE 4 MG: 4 INJECTION, SOLUTION INTRA-ARTICULAR; INTRALESIONAL; INTRAMUSCULAR; INTRAVENOUS; SOFT TISSUE at 06:12

## 2021-04-13 RX ADMIN — DEXAMETHASONE SODIUM PHOSPHATE 4 MG: 4 INJECTION, SOLUTION INTRA-ARTICULAR; INTRALESIONAL; INTRAMUSCULAR; INTRAVENOUS; SOFT TISSUE at 11:40

## 2021-04-13 RX ADMIN — CYCLOBENZAPRINE 10 MG: 10 TABLET, FILM COATED ORAL at 15:33

## 2021-04-13 RX ADMIN — DEXAMETHASONE SODIUM PHOSPHATE 4 MG: 4 INJECTION, SOLUTION INTRA-ARTICULAR; INTRALESIONAL; INTRAMUSCULAR; INTRAVENOUS; SOFT TISSUE at 18:53

## 2021-04-13 RX ADMIN — INSULIN GLARGINE 10 UNITS: 100 INJECTION, SOLUTION SUBCUTANEOUS at 18:48

## 2021-04-13 RX ADMIN — LEVOTHYROXINE SODIUM 75 MCG: 0.07 TABLET ORAL at 06:12

## 2021-04-13 RX ADMIN — IOHEXOL 90 ML: 350 INJECTION, SOLUTION INTRAVENOUS at 17:53

## 2021-04-13 RX ADMIN — ROSUVASTATIN CALCIUM 10 MG: 10 TABLET, FILM COATED ORAL at 06:12

## 2021-04-13 RX ADMIN — OXYCODONE 5 MG: 5 TABLET ORAL at 21:15

## 2021-04-13 RX ADMIN — OXYCODONE 5 MG: 5 TABLET ORAL at 15:28

## 2021-04-13 RX ADMIN — Medication 1000 UNITS: at 06:11

## 2021-04-13 RX ADMIN — CYCLOBENZAPRINE 10 MG: 10 TABLET, FILM COATED ORAL at 21:15

## 2021-04-13 SDOH — ECONOMIC STABILITY: FOOD INSECURITY: WITHIN THE PAST 12 MONTHS, THE FOOD YOU BOUGHT JUST DIDN'T LAST AND YOU DIDN'T HAVE MONEY TO GET MORE.: NEVER TRUE

## 2021-04-13 SDOH — ECONOMIC STABILITY: TRANSPORTATION INSECURITY
IN THE PAST 12 MONTHS, HAS THE LACK OF TRANSPORTATION KEPT YOU FROM MEDICAL APPOINTMENTS OR FROM GETTING MEDICATIONS?: NO

## 2021-04-13 SDOH — ECONOMIC STABILITY: FOOD INSECURITY: WITHIN THE PAST 12 MONTHS, YOU WORRIED THAT YOUR FOOD WOULD RUN OUT BEFORE YOU GOT MONEY TO BUY MORE.: NEVER TRUE

## 2021-04-13 SDOH — ECONOMIC STABILITY: TRANSPORTATION INSECURITY
IN THE PAST 12 MONTHS, HAS LACK OF TRANSPORTATION KEPT YOU FROM MEETINGS, WORK, OR FROM GETTING THINGS NEEDED FOR DAILY LIVING?: NO

## 2021-04-13 SDOH — ECONOMIC STABILITY: INCOME INSECURITY: HOW HARD IS IT FOR YOU TO PAY FOR THE VERY BASICS LIKE FOOD, HOUSING, MEDICAL CARE, AND HEATING?: NOT HARD AT ALL

## 2021-04-13 ASSESSMENT — PAIN DESCRIPTION - PAIN TYPE
TYPE: ACUTE PAIN

## 2021-04-13 ASSESSMENT — ENCOUNTER SYMPTOMS
WEAKNESS: 0
SINUS PAIN: 0
CHILLS: 0
DIARRHEA: 1
SPEECH CHANGE: 0
DEPRESSION: 0
HEADACHES: 0
SHORTNESS OF BREATH: 0
WEIGHT LOSS: 0
FOCAL WEAKNESS: 1
NERVOUS/ANXIOUS: 0
FALLS: 0
CONSTIPATION: 0
NERVOUS/ANXIOUS: 1
SPUTUM PRODUCTION: 0
MYALGIAS: 0
WEAKNESS: 1
VOMITING: 0
EYE PAIN: 0
HEMOPTYSIS: 0
ORTHOPNEA: 0
BRUISES/BLEEDS EASILY: 0
BACK PAIN: 1
DIZZINESS: 0
PALPITATIONS: 0
ABDOMINAL PAIN: 0
FEVER: 0
COUGH: 0
SORE THROAT: 0
NAUSEA: 0

## 2021-04-13 ASSESSMENT — COGNITIVE AND FUNCTIONAL STATUS - GENERAL
MOBILITY SCORE: 24
SUGGESTED CMS G CODE MODIFIER MOBILITY: CH

## 2021-04-13 ASSESSMENT — GAIT ASSESSMENTS
GAIT LEVEL OF ASSIST: INDEPENDENT
DISTANCE (FEET): 50

## 2021-04-13 ASSESSMENT — ACTIVITIES OF DAILY LIVING (ADL): TOILETING: INDEPENDENT

## 2021-04-13 NOTE — PROGRESS NOTES
Hospital Medicine Daily Progress Note    Date of Service  4/12/2021    Chief Complaint  80 y.o. Woman with h/o Breast Cancer (1989), hypothyroidism, DM, fall resulting in pelvic rami fractures admitted 4/11/2021 with progressive BLE weakness. MRI demonstrated new L4, C5, and C6 bone lesions with severe central lumbar canal compression. Transferred from [facility] for NSGY and IR consideration.    Hospital Course  No notes on file    Interval Problem Update  Transferred from [facility] for oncology and IR consultation  Evaluated by SLP for ?dysphagia but denies dysphagia.  She and her  are frustrated that there was insufficient communication between transferring, receiving, and consultant providers to have a coherent plan for this morning.  They appreciate that [facility] nursing staff coordinated with me and Dr. Guerra this morning to address her diet and biopsy.  Her strength is significantly improved after starting steroids.  She is having diarrhea after taking stool softeners. Will make PRN.  She denies pain, dyspnea, bleeding, bladder dysfunction, N/V, F/C, depression/anxiety/SI.    I discussed diagnostic workup with Dr. Guerra. He recommended inpatient biopsy of bony lesion per IR and will coordinate the procedure.    I discussed evaluation with orthopedic surgeon Dr. Monaco. He recommends nonoperative management.    Consultants/Specialty  Neurosurgery - canal stenosis  Neurology - BLE weakness  Orthopedics - Pelvic Rami Fractures  Medical Oncology - Bony lesions concerning for metastatic disease    Code Status  Full Code    Disposition  Discharge Wednesday pending PT/OT recommendations and post-biopsy course    Review of Systems  Review of Systems   Constitutional: Negative for chills, fever and malaise/fatigue.   HENT: Negative for ear pain, nosebleeds, sinus pain and sore throat.    Eyes: Negative for pain.   Respiratory: Negative for hemoptysis and shortness of breath.    Cardiovascular: Negative for chest pain.    Gastrointestinal: Positive for diarrhea. Negative for abdominal pain, blood in stool, constipation, melena, nausea and vomiting.   Genitourinary: Positive for urgency.   Musculoskeletal: Negative for back pain, falls, joint pain, myalgias and neck pain.   Neurological: Positive for weakness. Negative for headaches.   Endo/Heme/Allergies: Does not bruise/bleed easily.   Psychiatric/Behavioral: Negative for depression and suicidal ideas. The patient is not nervous/anxious.         Physical Exam  Temp:  [36.4 °C (97.5 °F)-36.9 °C (98.4 °F)] 36.9 °C (98.4 °F)  Pulse:  [74-83] 81  Resp:  [16-20] 17  BP: (104-150)/(49-69) 115/49  SpO2:  [91 %-95 %] 95 %    Physical Exam  Vitals and nursing note reviewed. Exam conducted with a chaperone present ( at bedside).   Constitutional:       General: She is not in acute distress.     Appearance: She is not ill-appearing, toxic-appearing or diaphoretic.   HENT:      Head: Normocephalic.      Nose: Nose normal.      Mouth/Throat:      Mouth: Mucous membranes are moist.      Pharynx: Oropharynx is clear.   Eyes:      General: No scleral icterus.     Conjunctiva/sclera: Conjunctivae normal.   Cardiovascular:      Rate and Rhythm: Normal rate and regular rhythm.      Pulses: Normal pulses.      Heart sounds: Normal heart sounds. No murmur. No friction rub. No gallop.    Pulmonary:      Effort: Pulmonary effort is normal. No respiratory distress.      Breath sounds: Normal breath sounds. No wheezing, rhonchi or rales.   Abdominal:      General: Abdomen is flat. Bowel sounds are normal. There is no distension.      Palpations: Abdomen is soft.      Tenderness: There is no abdominal tenderness. There is no guarding or rebound.   Genitourinary:     Comments: No savage  Musculoskeletal:         General: No tenderness (No midline spinous, ischial, nor femoral tenderness.).      Cervical back: Neck supple.      Right lower leg: No edema.      Left lower leg: No edema.   Skin:      General: Skin is warm and dry.   Neurological:      Mental Status: She is alert.      Motor: No weakness (5/5 BLE dorsiflexion, plantarflexion, and hip flexion.).      Comments: Appropriately conversant   Psychiatric:         Mood and Affect: Mood normal.         Behavior: Behavior normal.         Thought Content: Thought content normal.         Judgment: Judgment normal.         Fluids  No intake or output data in the 24 hours ending 04/12/21 1926    Laboratory  Recent Labs     04/10/21  0346 04/11/21  0328   WBC 4.4* 4.3*   RBC 4.63 4.56   HEMOGLOBIN 13.7 13.4   HEMATOCRIT 40.6 40.1   MCV 87.7 87.9   MCH 29.6 29.4   MCHC 33.7 33.4*   RDW 38.9 38.8   PLATELETCT 162* 160*   MPV 9.8 9.6     Recent Labs     04/10/21  0346 04/11/21  0328   SODIUM 137 136   POTASSIUM 4.0 4.1   CHLORIDE 103 102   CO2 26 25   GLUCOSE 190* 208*   BUN 10 11   CREATININE 0.43* 0.40*   CALCIUM 8.9 8.7                   Imaging  CT-CHEST,ABDOMEN,PELVIS WITH   Final Result      1.  Tiny right middle lobe nodules are likely inflammatory.      2.  Previously described spinal lesions seen on MRI are not well visualized on CT.      3.  Subcentimeter hypodense lesion in the left hepatic lobe is consistent with a small cyst.      4.  No adenopathy.      5.  Atherosclerosis.      6.  Cholelithiasis      CT-NEEDLE BX-DEEP BONE    (Results Pending)        Assessment/Plan  Bone lesion- (present on admission)  Assessment & Plan  9 mm mass in L4 vertebral body as well as lesions in calvarium, C4, and C5  Concerning for relapsed BRCA given her history, though without hypercalcemia / elevated ALK  No paraprotein gap to suggest MM but SPEP pending  Oncology consulted, recommending biopsy  IR consult for CT-guided biopsy 4/13 - NPO midnight and LMWH held  Radiation Oncology consultation inpatient versus outpatient TBD with Medical Oncology     Pubic ramus fracture (HCC)- (present on admission)  Assessment & Plan  Fell PTA  MRI hip revealed Right superior &  inferior pubic rami Fx  Orthopedic surgeon Dr. Monaco consulted, recommended nonoperative management  Bisphosphonate consideration after discharge due to pathologic fractures and likely osteoporosis      Lower extremity weakness- (present on admission)  Assessment & Plan  Acute R>L extremity weakness resulting in fall  Denies groin paresthesia, bowel/bladder dysfunction to suggest cauda equina  CT head and CTA perfusion negative for CVA  MRI Lumbar demonstrated severe canal stenosis  Neurosurgery consulted and recommended dexamethasone, no operative intervention warranted  Weakness significantly improved with dexamethasone  Neurology consulted and s/o  PT/OT evaluation    Vitamin D deficiency- (present on admission)  Assessment & Plan  VitD-25-OH 24 in setting of multiple fractures  Ergocalciferol 50K units x 6-8 weeks      History of breast cancer- (present on admission)  Assessment & Plan  Diagnosed 1989, treated with surgery and chemotherapy  Now presenting with multiple bony metastasis and pathological fractures concerning for relapse  Oncology consulted, planning for biopsy of spinal lesion      Uncontrolled type 2 diabetes mellitus with hyperglycemia (HCC)- (present on admission)  Assessment & Plan  A1c 10.9  24h -339  Glipizide-metformin held on admission  Initiating on glargine 10 units QHS  Increased to moderate SSI due to increase in BG despite insulin today, likely insulin resistant as evidenced by A1c  Continue crestor    On statin therapy due to risk of future cardiovascular event- (present on admission)  Assessment & Plan  Continue crestor    Cervical spinal stenosis- (present on admission)  Assessment & Plan  Associated with C4-C5 cord compression  Patient denies any shortness of breath, dysphagia, upper extremity weakness, tingling, numbness  Discussed with neurosurgery PA , will start on dexamethasone   Reviewed neurosurgery note from today 4/11: Plan for cervical laminectomy/fusion if  medically suitable  F/u SLP eval , advance diet as tolerated    Acquired hypothyroidism- (present on admission)  Assessment & Plan  Continue synthroid       VTE prophylaxis: LMWH

## 2021-04-13 NOTE — ASSESSMENT & PLAN NOTE
9 mm mass in L4 vertebral body as well as lesions in calvarium, C4, and C5  Concerning for relapsed BRCA given her history, though without hypercalcemia / elevated ALK  No paraprotein gap to suggest MM but SPEP pending  Oncology consulted, recommending biopsy  IR consult for CT-guided biopsy 4/13 - NPO midnight and LMWH held  Radiation Oncology consultation inpatient versus outpatient TBD with Medical Oncology

## 2021-04-13 NOTE — CARE PLAN
Problem: Communication  Goal: The ability to communicate needs accurately and effectively will improve  Outcome: PROGRESSING AS EXPECTED  Note:   Patient able to communicate needs accurately and effectively as they arise.      Problem: Bowel/Gastric:  Goal: Normal bowel function is maintained or improved  Note: Pt no longer having loose stools.

## 2021-04-13 NOTE — PROGRESS NOTES
Pt A&Ox 4, resting in bed. Discussed plan of care, pt verbalized agreement. Voiding appropriately, bm 4/12 . Safety and fall precautions in place. Rounding in place. All needs met at this moment.

## 2021-04-13 NOTE — THERAPY
"Physical Therapy   Initial Evaluation     Patient Name: Mavis Lizarraga  Age:  80 y.o., Sex:  female  Medical Record #: [id number]  Today's Date: 4/13/2021     Precautions: Fall Risk, Weight Bearing As Tolerated Right Lower Extremity    Assessment  Patient is 80 y.o. female with a diagnosis of pelvic rami fractures following GLF at home.  Patient demonstrate good functional mobility, strength and balance.  She reports she has no pain.  Patient able to perform functional transfers @ Independent level and is ambulatory without AD inside hospital room without noted loss of balance or difficulty.  No further skilled acute physical therapy services at this time.  Plan    Recommend Physical Therapy for Evaluation only    DC Equipment Recommendations: (P) None  Discharge Recommendations: (P) Anticipate that the patient will have no further physical therapy needs after discharge from the hospital       Subjective    \"I feel really good.\"     Objective       04/13/21 1005   Prior Living Situation   Prior Services Home-Independent   Housing / Facility 1 Story House   Steps Into Home 0   Steps In Home 0   Equipment Owned Front-Wheel Walker   Lives with - Patient's Self Care Capacity Spouse   Comments Patient reports she was independent with mobility at prior level and working full time as a     Prior Level of Functional Mobility   Bed Mobility Independent   Transfer Status Independent   Ambulation Independent   Distance Ambulation (Feet)   (community)   Assistive Devices Used None   Stairs Independent   History of Falls   History of Falls Yes   Date of Last Fall 04/07/21  (Tripped on rug in garage then 2-3 days later fell from bed)   Gait Analysis   Gait Level Of Assist Independent   Assistive Device None   Distance (Feet) 50  (Around inside of hospital room)   # of Times Distance was Traveled 1   # of Stairs Climbed 0   Weight Bearing Status WBAT RLE   Bed Mobility    Supine to Sit Independent   Sit to Supine " Independent   Scooting Independent   Rolling Independent   Functional Mobility   Sit to Stand Independent

## 2021-04-13 NOTE — PROGRESS NOTES
Oncology/Hematology Progress Note               Author: Aniya Guerra M.D. Date & Time created: 4/13/2021  1:25 PM   Diagnosis-history of breast cancer diagnosed in 1989  ?  Bony mets  Interval History:  Her lower extremity weakness is better.  She still has minimal weakness in her right leg but her left leg is fine.  Pain is well controlled.   at the bedside.    Review of Systems:  Review of Systems   Constitutional: Positive for malaise/fatigue. Negative for fever and weight loss.   HENT: Negative for hearing loss and sore throat.    Respiratory: Negative for cough, hemoptysis and sputum production.    Cardiovascular: Negative for chest pain, palpitations and orthopnea.   Genitourinary: Negative for dysuria and hematuria.   Musculoskeletal: Positive for back pain and joint pain. Negative for myalgias.   Skin: Negative for itching and rash.   Neurological: Positive for focal weakness and weakness. Negative for dizziness and speech change.   Psychiatric/Behavioral: The patient is nervous/anxious.        Physical Exam:  Physical Exam  Constitutional:       General: She is not in acute distress.     Appearance: Normal appearance. She is not ill-appearing.   Cardiovascular:      Rate and Rhythm: Normal rate and regular rhythm.   Pulmonary:      Effort: Pulmonary effort is normal. No respiratory distress.      Breath sounds: Normal breath sounds. No stridor. No wheezing, rhonchi or rales.   Abdominal:      General: Abdomen is flat. There is no distension.      Palpations: Abdomen is soft. There is no mass.      Tenderness: There is no abdominal tenderness. There is no guarding.      Hernia: No hernia is present.   Skin:     Coloration: Skin is not jaundiced or pale.      Findings: No bruising or erythema.   Neurological:      Mental Status: She is alert and oriented to person, place, and time.      Motor: Weakness present.   Psychiatric:         Mood and Affect: Mood normal.         Behavior: Behavior normal.          Labs:          Recent Labs     218 21  0027   SODIUM 136 137   POTASSIUM 4.1 4.6   CHLORIDE 102 103   CO2 25 24   BUN 11 19   CREATININE 0.40* 0.54   CALCIUM 8.7 9.3     Recent Labs     218 21  0027   GLUCOSE 208* 284*     Recent Labs     21   RBC 4.56   HEMOGLOBIN 13.4   HEMATOCRIT 40.1   PLATELETCT 160*     Recent Labs     21   WBC 4.3*   NEUTSPOLYS 48.80   LYMPHOCYTES 38.30   MONOCYTES 10.30   EOSINOPHILS 1.90   BASOPHILS 0.50     Recent Labs     218 21  0027   SODIUM 136 137   POTASSIUM 4.1 4.6   CHLORIDE 102 103   CO2 25 24   GLUCOSE 208* 284*   BUN 11 19   CREATININE 0.40* 0.54   CALCIUM 8.7 9.3     Hemodynamics:  Temp (24hrs), Av.7 °C (98 °F), Min:36.5 °C (97.7 °F), Max:36.9 °C (98.4 °F)  Temperature: 36.5 °C (97.7 °F)  Pulse  Av.8  Min: 62  Max: 83   Blood Pressure : 130/56     Respiratory:    Respiration: 16, Pulse Oximetry: 92 %        RUL Breath Sounds: Clear, RML Breath Sounds: Clear, RLL Breath Sounds: Diminished, ADRIA Breath Sounds: Clear, LLL Breath Sounds: Diminished  Fluids:  No intake or output data in the 24 hours ending 21 1236     GI/Nutrition:  Orders Placed This Encounter   Procedures   • Diet Order Diet: Consistent CHO (Diabetic)     Standing Status:   Standing     Number of Occurrences:   1     Order Specific Question:   Diet:     Answer:   Consistent CHO (Diabetic) [4]     Medical Decision Making, by Problem:  Active Hospital Problems    Diagnosis    • Pubic ramus fracture (HCC) [S32.599A]    • Lower extremity weakness [R29.898]    • Mixed hyperlipidemia [E78.2]    • Breast cancer (HCC) [C50.919]    • Hypothyroidism due to acquired atrophy of thyroid [E03.4]    • Cervical spinal stenosis [M48.02]        Plan:  ?bony mets-MRI of the spine and brain raised the question of bony mets.  Biopsy of these abnormal areas was ordered but could not be done because of the small size of the lesions.  After  discussing with radiology, bone scan ordered which is pending.  Plan of care reviewed with the patient and her  in detail.    History of breast cancer-this was diagnosed about 31 years ago.  It is very unlikely that her breast cancer would metastasize after so many years.  Her extremity weakness-this appears to be responding well to her current treatment.  Neurology have signed off on this case.  .  We will follow up    Quality-Core Measures

## 2021-04-13 NOTE — THERAPY
Occupational Therapy Contact Note    OT consult received. RN reports pt is up self and doing well with ADL mobility. Pt reports she already showered standing up w/o difficulty. Edu on compensatory strategies for dressing should she have a difficult time at home. She denies need for formal OT eval at this time. Please re consult should patient change her mind.    Dulce Sharpe, OTR/L

## 2021-04-13 NOTE — CONSULTS
4/12/2021    Mavis Lizarraga is a 80 y.o. female who presents with right hip pain and is here for management. Patient denies numbness, parasthesias, loss of conciousness or other trauma    Past Medical History:   Diagnosis Date   • Anesthesia     poor tolerence to morphine   • Arthritis     back   • Backpain    • Breast cancer (HCC) 1980s    right   • Diverticulosis    • DVT of deep femoral vein (HCC)     leg   • Heart burn    • High cholesterol    • Pneumonia Feb 2006   • Thyroid condition    • Ulcer        Past Surgical History:   Procedure Laterality Date   • ANTERIOR AND POSTERIOR REPAIR  6/23/2014    Performed by Lauri Batista M.D. at SURGERY SAME DAY [facility]   • BLADDER SLING FEMALE  6/23/2014    Performed by Lauri Batista M.D. at SURGERY SAME DAY [facility]   • BREAST RECONSTRUCTION  8/14/2012    Performed by SARTHAK LEVIN at [facility]   • BREAST IMPLANT REVISION  8/14/2012    Performed by SARTHAK LEVIN at [facility]   • CAPSULECTOMY  8/14/2012    Performed by SARTHAK LEVIN at [facility]   • MASTOPEXY  8/14/2012    Performed by SARTHAK LEVIN at [facility]   • LIPOSUCTION  8/14/2012    Performed by SARTHAK LEVIN at [facility]   • RHYTIDECTOMY  8/14/2012    Performed by SARTHAK LEVIN at [facility]   • PLATYSMAPLASTY  8/14/2012    Performed by SARTHAK LEVIN at [facility]   • BLEPHAROPLASTY  8/14/2012    Performed by SARTHAK LEVIN at [facility]   • LOW ANTERIOR RESECTION LAPAROSCOPIC  2/10/2010    Performed by KOBI GOODE at SURGERY [facility]   • COLECTOMY N/A 2009    partial- post colonoscopy perforation- dr goode   • MASTECTOMY Right 1980s    neg nodes; 6 mos adjuvant chemo   • OTHER      mastectomy R breast   • OTHER ABDOMINAL SURGERY      I&D of ruptured diverticuli    • SC BREAST AUGMENTATION WITH IMPLANT     • SC CHEMOTHERAPY, UNSPECIFIED  PROCEDURE     • NC REDUCTION OF BREAST         Medications  No current facility-administered medications on file prior to encounter.     Current Outpatient Medications on File Prior to Encounter   Medication Sig Dispense Refill   • VITAMIN D PO Take 4,000 Units by mouth every morning.     • Ascorbic Acid (VITAMIN C) 1000 MG Tab Take 1,000 mg by mouth every morning.     • Cyanocobalamin (VITAMIN B-12 PO) Take 1 tablet by mouth every morning.     • CINNAMON PO Take 1 tablet by mouth every morning.     • Multiple Vitamins-Minerals (ICAPS AREDS FORMULA PO) Take 1 tablet by mouth every morning.     • NON SPECIFIED Take 1 Package by mouth every morning. [employer] Diabetes Health Pack Vitamins     • levothyroxine (SYNTHROID) 75 MCG Tab TAKE ONE TABLET BY MOUTH DAILY (SYNTHROID) (Patient taking differently: Take 75 mcg by mouth every morning on an empty stomach.) 90 Tab 4   • rosuvastatin (CRESTOR) 40 MG tablet TAKE ONE TABLET BY MOUTH DAILY (REPLACES SIMVASTATIN) (Patient taking differently: Take 40 mg by mouth every day.) 100 Tab 3   • glipizide-metformin (METAGLIP) 2.5-250 MG per tablet TAKE ONE TABLET BY MOUTH TWICE A DAY WITH MEALS (Patient taking differently: Take 1 tablet by mouth every morning.) 200 Tab 3   • meloxicam (MOBIC) 15 MG tablet TAKE ONE TABLET BY MOUTH DAILY (Patient taking differently: Take 15 mg by mouth 1 time a day as needed for Moderate Pain.) 90 Tab 3   • cyclobenzaprine (FLEXERIL) 10 MG Tab Take 1 Tab by mouth 3 times a day as needed for Mild Pain. (Patient not taking: Reported on 4/11/2021) 30 Tab 1   • cimetidine (TAGAMET) 400 MG Tab TAKE ONE TABLET BY MOUTH DAILY AS NEEDED.(GENERIC FOR TAGAMET) (Patient taking differently: Take 400 mg by mouth 1 time a day as needed.) 90 Tab 4   • vitamin E (VITAMIN E) 1000 UNIT Cap Take 1,000 Units by mouth every morning.     • Omega-3 Fatty Acids (FISH OIL PO) Take 1 Cap by mouth every day.         Allergies  Glimepiride, Metformin, and  "Morphine    ROS  Right hip pain. All other systems were reviewed and found to be negative    Family History   Problem Relation Age of Onset   • Heart Disease Mother 64   • Hypertension Mother    • Diabetes Mother    • Stroke Mother    • Cancer Mother         Stomach cancer   • Heart Disease Maternal Grandmother 63        heart attack       Social History     Socioeconomic History   • Marital status:      Spouse name: Not on file   • Number of children: Not on file   • Years of education: Not on file   • Highest education level: Not on file   Occupational History   • Not on file   Tobacco Use   • Smoking status: Never Smoker   • Smokeless tobacco: Never Used   Substance and Sexual Activity   • Alcohol use: Yes     Comment: very rarely on ocassion will have a Sravani   • Drug use: No   • Sexual activity: Yes     Partners: Male   Other Topics Concern   • Not on file   Social History Narrative   • Not on file     Social Determinants of Health     Financial Resource Strain:    • Difficulty of Paying Living Expenses:    Food Insecurity:    • Worried About Running Out of Food in the Last Year:    • Ran Out of Food in the Last Year:    Transportation Needs:    • Lack of Transportation (Medical):    • Lack of Transportation (Non-Medical):    Physical Activity:    • Days of Exercise per Week:    • Minutes of Exercise per Session:    Stress:    • Feeling of Stress :    Social Connections:    • Frequency of Communication with Friends and Family:    • Frequency of Social Gatherings with Friends and Family:    • Attends [employer] Services:    • Active Member of Clubs or Organizations:    • Attends Club or Organization Meetings:    • Marital Status:    Intimate Partner Violence:    • Fear of Current or Ex-Partner:    • Emotionally Abused:    • Physically Abused:    • Sexually Abused:        Physical Exam  Vitals  /49   Pulse 81   Temp 36.9 °C (98.4 °F) (Temporal)   Resp 17   Ht 1.703 m (5' 7.03\")   Wt 78.2 kg " (172 lb 6.4 oz)   SpO2 95%   General: Well Developed, Well Nourished, no acute distress  HEENT: Normocephalic, atraumatic  Eyes: Anicteric, PERRLA, EOMI  Neck: Supple, nontender, no masses  Lungs: CTA, no wheezes or crackles  Heart: RRR, no murmurs, rubs or gallops  Abdomen: Soft, NT, ND  Pelvis: Stable to AP and Lateral Compression  Skin: Intact, no open wounds  Extremities: LLE, FROM  Neuro: NVI  Vascular: 2+DP/PT, Capillary refill <2 seconds    Radiographs:  CT-CHEST,ABDOMEN,PELVIS WITH   Final Result      1.  Tiny right middle lobe nodules are likely inflammatory.      2.  Previously described spinal lesions seen on MRI are not well visualized on CT.      3.  Subcentimeter hypodense lesion in the left hepatic lobe is consistent with a small cyst.      4.  No adenopathy.      5.  Atherosclerosis.      6.  Cholelithiasis      CT-NEEDLE BX-DEEP BONE    (Results Pending)       Laboratory Values  Recent Labs     04/10/21  0346 04/11/21  0328   WBC 4.4* 4.3*   RBC 4.63 4.56   HEMOGLOBIN 13.7 13.4   HEMATOCRIT 40.6 40.1   MCV 87.7 87.9   MCH 29.6 29.4   MCHC 33.7 33.4*   RDW 38.9 38.8   PLATELETCT 162* 160*   MPV 9.8 9.6     Recent Labs     04/10/21  0346 04/11/21  0328   SODIUM 137 136   POTASSIUM 4.0 4.1   CHLORIDE 103 102   CO2 26 25   GLUCOSE 190* 208*   BUN 10 11             Impression: Right superior and inferior ramus fractures    Plan:No surgery required. WBAT. PT/OT, Pain control

## 2021-04-13 NOTE — PROGRESS NOTES
Community Health Worker Intake    • Social determinates of health intake completed.   • Identified barriers to: none.  • Contact information provided to Mavis Lizarraga   • Has PCP appointment scheduled for: 4/28 at 10:30 am  • Accepted Meds-To-Beds.   • Inpatient assessment completed.    JESSICA Monique spoke with pt at bedside to introduce CCM/GSC services. Pt accepted GSC services and referral has been sent. She also has an upcoming appt with her new PCP Dr. Webber in a couple weeks. Pt can drive herself to [employer] and was made aware of [employer]. She did not express financial barriers and has a great support system consisting of her  and family. Pt lives at home with her  and does not need additional resources/services at this time. [facility] provided [facility] & CCM brochure.    Plan: warm hand-off to [facility] 4/13.

## 2021-04-13 NOTE — PROGRESS NOTES
[facility] Medicine Daily Progress Note    Date of Service  4/13/2021    Chief Complaint  80 y.o. Woman with h/o Breast Cancer (1989), hypothyroidism, DM, fall resulting in pelvic rami fractures admitted 4/11/2021 with progressive BLE weakness. MRI demonstrated new L4, C5, and C6 bone lesions with severe central lumbar canal compression. Transferred from [facility] for NSGY and IR consideration.    Hospital Course  Ms. Mavis Lizarraga is a 80 y.o. female with history of hypothyroid, hyperlipidemia, DVT not on anticoagulation, breast cancer status post right mastectomy and chemotherapy who presented on 4/11/2021 with bilateral lower extremity weakness and pain.  Patient had a fall 2 weeks prior to presentation and sustained a fracture of her right forearm for which she was seen in an urgent care.  She initially presented to [facility] where MRI of her hip showed a fracture of right superior and inferior pubic rami.  MRI brain and spine was concerning for bony metastasis as well as central lumbar canal compression.  Oncology recommended transfer to [facility] for radiation oncology consult.  Neurosurgery was consulted and patient was started on steroids.  Neurosurgery felt that she has cervical spondylosis but no myelopathy or radiculopathy and recommended outpatient follow-up in 6 weeks.  Orthopedic surgery recommended nonoperative management for pubic rami fractures.  CT abdomen pelvis showed spinal lesions seen on MRI, right middle lobe lung nodules likely inflammatory, subcentimeter liver lesion.    Interval Problem Update  Patient was seen and examined at bedside.  I have personally reviewed and interpreted vitals, labs, and imaging.    4/13.  Afebrile.  Stable vitals.  On room air.  Increase insulin sliding scale.  Denies fever, chills, chest pains, shortness of breath.  Denies pain.  Weakness has resolved and she feels great.  Frustrated that lytic lesion biopsy cannot be done.  Will get bone scan and CT  cervicals to further evaluate.    Consultants/Specialty  Neurosurgery - canal stenosis  Neurology - BLE weakness  Orthopedics - Pelvic Rami Fractures  Medical Oncology - Bony lesions concerning for metastatic disease    Code Status  Full Code    Disposition  PT/OT found no needs    Review of Systems  Review of Systems   Constitutional: Negative for chills, fever and malaise/fatigue.   HENT: Negative for sinus pain and sore throat.    Eyes: Negative for pain.   Respiratory: Negative for hemoptysis and shortness of breath.    Cardiovascular: Negative for chest pain.   Gastrointestinal: Positive for diarrhea. Negative for abdominal pain, constipation, nausea and vomiting.   Genitourinary: Negative for dysuria, frequency and urgency.   Musculoskeletal: Negative for falls.   Skin: Negative for rash.   Neurological: Negative for weakness (resolved) and headaches.   Endo/Heme/Allergies: Does not bruise/bleed easily.   Psychiatric/Behavioral: Negative for depression. The patient is not nervous/anxious.         Physical Exam  Temp:  [36.5 °C (97.7 °F)-36.9 °C (98.4 °F)] 36.5 °C (97.7 °F)  Pulse:  [66-82] 66  Resp:  [16-20] 16  BP: (115-130)/(49-62) 130/56  SpO2:  [92 %-95 %] 92 %    Physical Exam  Vitals and nursing note reviewed. Exam conducted with a chaperone present ( at bedside).   Constitutional:       General: She is not in acute distress.     Appearance: She is not ill-appearing, toxic-appearing or diaphoretic.   HENT:      Head: Normocephalic.      Nose: Nose normal.      Mouth/Throat:      Mouth: Mucous membranes are moist.      Pharynx: Oropharynx is clear.   Eyes:      General: No scleral icterus.     Conjunctiva/sclera: Conjunctivae normal.   Cardiovascular:      Rate and Rhythm: Normal rate and regular rhythm.      Pulses: Normal pulses.      Heart sounds: Normal heart sounds. No murmur. No friction rub. No gallop.    Pulmonary:      Effort: Pulmonary effort is normal. No respiratory distress.       Breath sounds: Normal breath sounds. No wheezing, rhonchi or rales.   Abdominal:      General: Abdomen is flat. Bowel sounds are normal. There is no distension.      Palpations: Abdomen is soft.      Tenderness: There is no abdominal tenderness. There is no guarding or rebound.   Genitourinary:     Comments: No savage  Musculoskeletal:         General: No tenderness (No midline spinous, ischial, nor femoral tenderness.).      Cervical back: Neck supple.      Right lower leg: No edema.      Left lower leg: No edema.   Skin:     General: Skin is warm.   Neurological:      General: No focal deficit present.      Mental Status: She is alert and oriented to person, place, and time. Mental status is at baseline.      Cranial Nerves: No cranial nerve deficit.      Motor: No weakness (5/5 BLE dorsiflexion, plantarflexion, and hip flexion.).   Psychiatric:         Mood and Affect: Mood normal.         Behavior: Behavior normal.         Fluids  No intake or output data in the 24 hours ending 04/13/21 0942    Laboratory  Recent Labs     04/11/21  0328   WBC 4.3*   RBC 4.56   HEMOGLOBIN 13.4   HEMATOCRIT 40.1   MCV 87.9   MCH 29.4   MCHC 33.4*   RDW 38.8   PLATELETCT 160*   MPV 9.6     Recent Labs     04/11/21  0328 04/13/21  0027   SODIUM 136 137   POTASSIUM 4.1 4.6   CHLORIDE 102 103   CO2 25 24   GLUCOSE 208* 284*   BUN 11 19   CREATININE 0.40* 0.54   CALCIUM 8.7 9.3                   Imaging  CT-CHEST,ABDOMEN,PELVIS WITH   Final Result      1.  Tiny right middle lobe nodules are likely inflammatory.      2.  Previously described spinal lesions seen on MRI are not well visualized on CT.      3.  Subcentimeter hypodense lesion in the left hepatic lobe is consistent with a small cyst.      4.  No adenopathy.      5.  Atherosclerosis.      6.  Cholelithiasis           Assessment/Plan  Bone lesion- (present on admission)  Assessment & Plan  9 mm mass in L4 vertebral body as well as lesions in calvarium, C4, and C5  Concerning for  relapsed BRCA given her history, though without hypercalcemia / elevated ALK  No paraprotein gap to suggest MM but SPEP pending  Oncology consulted, recommending biopsy  IR consult for CT-guided biopsy 4/13 - NPO midnight and LMWH held  Radiation Oncology consultation inpatient versus outpatient TBD with Medical Oncology     Pubic ramus fracture (HCC)- (present on admission)  Assessment & Plan  Fell PTA  MRI hip revealed Right superior & inferior pubic rami Fx  Orthopedic surgeon Dr. Monaco consulted, recommended nonoperative management  Bisphosphonate consideration after discharge due to pathologic fractures and likely osteoporosis    Lower extremity weakness- (present on admission)  Assessment & Plan  Acute R>L extremity weakness resulting in fall  Denies groin paresthesia, bowel/bladder dysfunction to suggest cauda equina  CT head and CTA perfusion negative for CVA  MRI Lumbar demonstrated severe canal stenosis  Neurosurgery consulted and recommended dexamethasone, no operative intervention warranted  Weakness significantly improved with dexamethasone  Neurology consulted and s/o  PT/OT evaluation recommend no needs    Vitamin D deficiency- (present on admission)  Assessment & Plan  VitD-25-OH 24 in setting of multiple fractures  Ergocalciferol 50K units x 6-8 weeks    History of breast cancer- (present on admission)  Assessment & Plan  Diagnosed 1989, treated with surgery and chemotherapy  Now presenting with multiple bony metastasis and pathological fractures concerning for relapse  Oncology consulted, planning for biopsy of spinal lesion    Uncontrolled type 2 diabetes mellitus with hyperglycemia (HCC)- (present on admission)  Assessment & Plan  Lab Results   Component Value Date/Time    HBA1C 10.9 (H) 04/09/2021 1333    HBA1C 10.9 (H) 04/08/2021 0953    HBA1C 8.6 (H) 12/27/2019 0722     Results from last 7 days   Lab Units 04/13/21  1848 04/13/21  1135 04/13/21  0617 04/12/21  2047 04/12/21  1647  04/12/21  1157 04/12/21  0619 04/11/21  2105   ACCU CHECK GLUCOSE 788 mg/dL 341* 252* 276* 381* 338* 271* 208* 174*     Likely from dexamethasone  I have ordered insulin sliding scale with D50 and glucagon for hypoglycemia per protocol.  Diabetic diet  Diabetic education    On statin therapy due to risk of future cardiovascular event- (present on admission)  Assessment & Plan  Continue crestor    Cervical spinal stenosis- (present on admission)  Assessment & Plan  Associated with C4-C5 cord compression  Patient denies any shortness of breath, dysphagia, upper extremity weakness, tingling, numbness  Discussed with neurosurgery PA , will start on dexamethasone   Reviewed neurosurgery note from today 4/11: Plan for cervical laminectomy/fusion if medically suitable  F/u SLP eval , advance diet as tolerated    Acquired hypothyroidism- (present on admission)  Assessment & Plan  Continue synthroid       VTE prophylaxis: LMWH

## 2021-04-13 NOTE — CARE PLAN
Problem: Safety  Goal: Will remain free from injury  Outcome: PROGRESSING AS EXPECTED   Pt bed is locked and in the lowest position. Call light and personal belongings within reach. Pt is up standy by to bathroom. Will continue to monitor.     Problem: Pain Management  Goal: Pain level will decrease to patient's comfort goal  Outcome: PROGRESSING AS EXPECTED   Pt complaining of 6/10 pain in lower back from lying in bed. Pt encouraged to sit in chair and ambulate more frequently. PRN pain medication given. Will continue to monitor and intervene as needed.

## 2021-04-14 ENCOUNTER — PHARMACY VISIT (OUTPATIENT)
Dept: PHARMACY | Facility: MEDICAL CENTER | Age: 81
End: 2021-04-14
Payer: COMMERCIAL

## 2021-04-14 VITALS
RESPIRATION RATE: 16 BRPM | TEMPERATURE: 97.9 F | SYSTOLIC BLOOD PRESSURE: 119 MMHG | BODY MASS INDEX: 27.06 KG/M2 | OXYGEN SATURATION: 96 % | DIASTOLIC BLOOD PRESSURE: 58 MMHG | WEIGHT: 172.4 LBS | HEIGHT: 67 IN | HEART RATE: 60 BPM

## 2021-04-14 PROBLEM — M89.9 BONE LESION: Status: RESOLVED | Noted: 2021-04-12 | Resolved: 2021-04-14

## 2021-04-14 LAB
ANION GAP SERPL CALC-SCNC: 6 MMOL/L (ref 7–16)
ANION GAP SERPL CALC-SCNC: 7 MMOL/L (ref 7–16)
BASOPHILS # BLD AUTO: 0.1 % (ref 0–1.8)
BASOPHILS # BLD: 0.01 K/UL (ref 0–0.12)
BUN SERPL-MCNC: 17 MG/DL (ref 8–22)
BUN SERPL-MCNC: 19 MG/DL (ref 8–22)
CALCIUM SERPL-MCNC: 9.2 MG/DL (ref 8.5–10.5)
CALCIUM SERPL-MCNC: 9.3 MG/DL (ref 8.5–10.5)
CHLORIDE SERPL-SCNC: 97 MMOL/L (ref 96–112)
CHLORIDE SERPL-SCNC: 99 MMOL/L (ref 96–112)
CO2 SERPL-SCNC: 24 MMOL/L (ref 20–33)
CO2 SERPL-SCNC: 27 MMOL/L (ref 20–33)
CREAT SERPL-MCNC: 0.51 MG/DL (ref 0.5–1.4)
CREAT SERPL-MCNC: 0.56 MG/DL (ref 0.5–1.4)
ENA JO1 AB TITR SER: 0 AU/ML (ref 0–40)
ENA SCL70 IGG SER QL: 1 AU/ML (ref 0–40)
ENA SM IGG SER-ACNC: 1 AU/ML (ref 0–40)
ENA SS-B IGG SER IA-ACNC: 0 AU/ML (ref 0–40)
EOSINOPHIL # BLD AUTO: 0 K/UL (ref 0–0.51)
EOSINOPHIL NFR BLD: 0 % (ref 0–6.9)
ERYTHROCYTE [DISTWIDTH] IN BLOOD BY AUTOMATED COUNT: 39.6 FL (ref 35.9–50)
GLUCOSE BLD-MCNC: 244 MG/DL (ref 65–99)
GLUCOSE BLD-MCNC: 296 MG/DL (ref 65–99)
GLUCOSE SERPL-MCNC: 240 MG/DL (ref 65–99)
GLUCOSE SERPL-MCNC: 317 MG/DL (ref 65–99)
HCT VFR BLD AUTO: 38.2 % (ref 37–47)
HGB BLD-MCNC: 13.3 G/DL (ref 12–16)
IMM GRANULOCYTES # BLD AUTO: 0.04 K/UL (ref 0–0.11)
IMM GRANULOCYTES NFR BLD AUTO: 0.5 % (ref 0–0.9)
LYMPHOCYTES # BLD AUTO: 0.75 K/UL (ref 1–4.8)
LYMPHOCYTES NFR BLD: 10.1 % (ref 22–41)
MAGNESIUM SERPL-MCNC: 2.1 MG/DL (ref 1.5–2.5)
MCH RBC QN AUTO: 30.2 PG (ref 27–33)
MCHC RBC AUTO-ENTMCNC: 34.8 G/DL (ref 33.6–35)
MCV RBC AUTO: 86.6 FL (ref 81.4–97.8)
MONOCYTES # BLD AUTO: 0.41 K/UL (ref 0–0.85)
MONOCYTES NFR BLD AUTO: 5.5 % (ref 0–13.4)
NEUTROPHILS # BLD AUTO: 6.19 K/UL (ref 2–7.15)
NEUTROPHILS NFR BLD: 83.8 % (ref 44–72)
NRBC # BLD AUTO: 0 K/UL
NRBC BLD-RTO: 0 /100 WBC
PHOSPHATE SERPL-MCNC: 3.5 MG/DL (ref 2.5–4.5)
PLATELET # BLD AUTO: 215 K/UL (ref 164–446)
PMV BLD AUTO: 10.2 FL (ref 9–12.9)
POTASSIUM SERPL-SCNC: 4.3 MMOL/L (ref 3.6–5.5)
POTASSIUM SERPL-SCNC: 4.3 MMOL/L (ref 3.6–5.5)
RBC # BLD AUTO: 4.41 M/UL (ref 4.2–5.4)
SODIUM SERPL-SCNC: 128 MMOL/L (ref 135–145)
SODIUM SERPL-SCNC: 132 MMOL/L (ref 135–145)
SSA52 R0ENA AB IGG Q0420: 1 AU/ML (ref 0–40)
SSA60 R0ENA AB IGG Q0419: 1 AU/ML (ref 0–40)
U1 SNRNP IGG SER QL: NORMAL
VIT B1 BLD-MCNC: 145 NMOL/L (ref 70–180)
WBC # BLD AUTO: 7.4 K/UL (ref 4.8–10.8)

## 2021-04-14 PROCEDURE — A9270 NON-COVERED ITEM OR SERVICE: HCPCS | Performed by: PSYCHIATRY & NEUROLOGY

## 2021-04-14 PROCEDURE — RXMED WILLOW AMBULATORY MEDICATION CHARGE: Performed by: INTERNAL MEDICINE

## 2021-04-14 PROCEDURE — 85025 COMPLETE CBC W/AUTO DIFF WBC: CPT

## 2021-04-14 PROCEDURE — 700102 HCHG RX REV CODE 250 W/ 637 OVERRIDE(OP): Performed by: INTERNAL MEDICINE

## 2021-04-14 PROCEDURE — 700102 HCHG RX REV CODE 250 W/ 637 OVERRIDE(OP): Performed by: PSYCHIATRY & NEUROLOGY

## 2021-04-14 PROCEDURE — 99239 HOSP IP/OBS DSCHRG MGMT >30: CPT | Performed by: INTERNAL MEDICINE

## 2021-04-14 PROCEDURE — 700102 HCHG RX REV CODE 250 W/ 637 OVERRIDE(OP): Performed by: STUDENT IN AN ORGANIZED HEALTH CARE EDUCATION/TRAINING PROGRAM

## 2021-04-14 PROCEDURE — A9270 NON-COVERED ITEM OR SERVICE: HCPCS | Performed by: INTERNAL MEDICINE

## 2021-04-14 PROCEDURE — 82962 GLUCOSE BLOOD TEST: CPT

## 2021-04-14 PROCEDURE — A9270 NON-COVERED ITEM OR SERVICE: HCPCS | Performed by: STUDENT IN AN ORGANIZED HEALTH CARE EDUCATION/TRAINING PROGRAM

## 2021-04-14 PROCEDURE — 36415 COLL VENOUS BLD VENIPUNCTURE: CPT

## 2021-04-14 PROCEDURE — 83735 ASSAY OF MAGNESIUM: CPT

## 2021-04-14 PROCEDURE — 80048 BASIC METABOLIC PNL TOTAL CA: CPT | Mod: 91

## 2021-04-14 PROCEDURE — 84100 ASSAY OF PHOSPHORUS: CPT

## 2021-04-14 RX ORDER — GLIPIZIDE 10 MG/1
10 TABLET ORAL 2 TIMES DAILY
Qty: 60 TABLET | Refills: 0 | Status: SHIPPED | OUTPATIENT
Start: 2021-04-14 | End: 2021-04-28 | Stop reason: SDUPTHER

## 2021-04-14 RX ORDER — ERGOCALCIFEROL 1.25 MG/1
50000 CAPSULE ORAL
Qty: 5 CAPSULE | Refills: 0 | Status: SHIPPED | OUTPATIENT
Start: 2021-04-19 | End: 2023-02-28

## 2021-04-14 RX ORDER — OXYCODONE HYDROCHLORIDE 5 MG/1
5 TABLET ORAL EVERY 6 HOURS PRN
Qty: 20 TABLET | Refills: 0 | Status: SHIPPED | OUTPATIENT
Start: 2021-04-14 | End: 2021-04-19

## 2021-04-14 RX ORDER — METHYLPREDNISOLONE 4 MG/1
TABLET ORAL
Qty: 21 TABLET | Refills: 0 | Status: SHIPPED | OUTPATIENT
Start: 2021-04-14 | End: 2021-07-07

## 2021-04-14 RX ORDER — CYCLOBENZAPRINE HCL 10 MG
10 TABLET ORAL 3 TIMES DAILY PRN
Qty: 30 TABLET | Refills: 0 | Status: SHIPPED | OUTPATIENT
Start: 2021-04-14 | End: 2021-04-28 | Stop reason: SDUPTHER

## 2021-04-14 RX ADMIN — DEXAMETHASONE 4 MG: 4 TABLET ORAL at 05:59

## 2021-04-14 RX ADMIN — ROSUVASTATIN CALCIUM 10 MG: 10 TABLET, FILM COATED ORAL at 05:58

## 2021-04-14 RX ADMIN — Medication 1000 UNITS: at 05:58

## 2021-04-14 RX ADMIN — LEVOTHYROXINE SODIUM 75 MCG: 0.07 TABLET ORAL at 05:59

## 2021-04-14 ASSESSMENT — ENCOUNTER SYMPTOMS
MYALGIAS: 0
SPUTUM PRODUCTION: 0
COUGH: 0
HEMOPTYSIS: 0
PALPITATIONS: 0
WEAKNESS: 0
SPEECH CHANGE: 0
DEPRESSION: 0
DIZZINESS: 0
FEVER: 0
BACK PAIN: 1
ORTHOPNEA: 0
NERVOUS/ANXIOUS: 0
FOCAL WEAKNESS: 0
WEIGHT LOSS: 0
SORE THROAT: 0

## 2021-04-14 NOTE — PROGRESS NOTES
Oncology/Hematology Progress Note               Author: Aniya Guerra M.D. Date & Time created: 4/14/2021  12:29 PM   Diagnosis-history of breast cancer diagnosed in 1989  ?  Bony mets  Interval History:  Her lower extremity weakness has resolved.  She is able to ambulate without any assistance..    Pain is well controlled.   at the bedside.    Review of Systems:  Review of Systems   Constitutional: Positive for malaise/fatigue. Negative for fever and weight loss.   HENT: Negative for hearing loss and sore throat.    Respiratory: Negative for cough, hemoptysis and sputum production.    Cardiovascular: Negative for chest pain, palpitations and orthopnea.   Genitourinary: Negative for dysuria and hematuria.   Musculoskeletal: Positive for back pain and joint pain. Negative for myalgias.   Skin: Negative for itching and rash.   Neurological: Negative for dizziness, speech change, focal weakness and weakness.   Psychiatric/Behavioral: Negative for depression. The patient is not nervous/anxious.        Physical Exam:  Physical Exam  Constitutional:       General: She is not in acute distress.     Appearance: Normal appearance. She is not ill-appearing.   Cardiovascular:      Rate and Rhythm: Normal rate and regular rhythm.   Pulmonary:      Effort: Pulmonary effort is normal. No respiratory distress.      Breath sounds: Normal breath sounds. No stridor. No wheezing, rhonchi or rales.   Abdominal:      General: Abdomen is flat. There is no distension.      Palpations: Abdomen is soft. There is no mass.      Tenderness: There is no abdominal tenderness. There is no guarding.      Hernia: No hernia is present.   Skin:     Coloration: Skin is not jaundiced or pale.      Findings: No bruising or erythema.   Neurological:      Mental Status: She is alert and oriented to person, place, and time.      Motor: Weakness present.   Psychiatric:         Mood and Affect: Mood normal.         Behavior: Behavior normal.          Labs:          Recent Labs     21  0012 21  0846   SODIUM 137 128* 132*   POTASSIUM 4.6 4.3 4.3   CHLORIDE 103 97 99   CO2 24 24 27   BUN 19 19 17   CREATININE 0.54 0.51 0.56   MAGNESIUM  --  2.1  --    PHOSPHORUS  --  3.5  --    CALCIUM 9.3 9.2 9.3     Recent Labs     21  0012 21  0846   GLUCOSE 284* 317* 240*     Recent Labs     21  001   RBC 4.41   HEMOGLOBIN 13.3   HEMATOCRIT 38.2   PLATELETCT 215     Recent Labs     21  001   WBC 7.4   NEUTSPOLYS 83.80*   LYMPHOCYTES 10.10*   MONOCYTES 5.50   EOSINOPHILS 0.00   BASOPHILS 0.10     Recent Labs     21  0012 21  0846   SODIUM 137 128* 132*   POTASSIUM 4.6 4.3 4.3   CHLORIDE 103 97 99   CO2 24 24 27   GLUCOSE 284* 317* 240*   BUN 19 19 17   CREATININE 0.54 0.51 0.56   CALCIUM 9.3 9.2 9.3     Hemodynamics:  Temp (24hrs), Av.7 °C (98.1 °F), Min:36.6 °C (97.9 °F), Max:36.8 °C (98.2 °F)  Temperature: 36.6 °C (97.9 °F)  Pulse  Av.3  Min: 60  Max: 83   Blood Pressure : 119/58     Respiratory:    Respiration: 16, Pulse Oximetry: 96 %        RUL Breath Sounds: Clear, RML Breath Sounds: Clear, RLL Breath Sounds: Diminished, ADRIA Breath Sounds: Clear, LLL Breath Sounds: Diminished  Fluids:  No intake or output data in the 24 hours ending 21 1236     GI/Nutrition:  Orders Placed This Encounter   Procedures   • Diet Order Diet: Consistent CHO (Diabetic)     Standing Status:   Standing     Number of Occurrences:   1     Order Specific Question:   Diet:     Answer:   Consistent CHO (Diabetic) [4]   • Discontinue Diet Tray     Standing Status:   Standing     Number of Occurrences:   1     Medical Decision Making, by Problem:  Active Hospital Problems    Diagnosis    • Pubic ramus fracture (HCC) [S32.300E]    • Lower extremity weakness [R29.898]    • Mixed hyperlipidemia [E78.2]    • Breast cancer (HCC) [C50.919]    • Hypothyroidism due to acquired atrophy of thyroid  [E03.4]    • Cervical spinal stenosis [M48.02]        Plan:  ?bony mets-MRI of the spine and brain raised the question of bony mets.  Biopsy of these abnormal areas was ordered but could not be done because of the small size of the lesions.  Bone scan was reviewed with the patient and her .  Plan at this time would be to get a PET scan on her if possible and then to the appropriate biopsies if possible.    History of breast cancer-this was diagnosed about 31 years ago.  It is very unlikely that her breast cancer would metastasize after so many years.  Her extremity weakness-weakness is resolved.  He is able to ambulate without any assistance.  She has been evaluated by neurosurgery and by orthopedics.  Fracture acetabulum-she was seen by orthopedics.  She is able to ambulate without any problems..  Patient knows to call our office and make an appointment to see Dr. Rizzo in the near future.    Quality-Core Measures

## 2021-04-14 NOTE — DISCHARGE PLANNING
Meds-to-Beds: Discharge prescription orders listed below delivered to patient's bedside. MARY ELLEN Ruiz notified. Patient counseled.  Patient elected to have co-payment billed to patient account.      Zia Mavis Patel   Home Medication Instructions BRIANNE:[id number]    Printed on:04/14/21 1159   Medication Information                      methylPREDNISolone (MEDROL DOSEPAK) 4 MG Tablet Therapy Pack  Follow schedule on package instructions.             oxyCODONE immediate-release (ROXICODONE) 5 MG Tab  Take 1 tablet by mouth every 6 hours as needed for Severe Pain for up to 5 days.             vitamin D, Ergocalciferol, (DRISDOL) 1.25 MG (31799 UT) Cap capsule  Take 1 capsule by mouth every 7 days.                 Deepa Solorzano, PharmD

## 2021-04-14 NOTE — CARE PLAN
Problem: Safety  Goal: Will remain free from injury  Outcome: PROGRESSING AS EXPECTED   Pt bed is locked and in the lowest position. Call light and personal belongings within reach. Pt is up with stand by assist to bathroom. Will continue to monitor.     Problem: Pain Management  Goal: Pain level will decrease to patient's comfort goal  Outcome: PROGRESSING AS EXPECTED   Pt had minimal pain in lower back. PRN pain medication given. Will continue to monitor and intervene as needed.

## 2021-04-14 NOTE — DISCHARGE SUMMARY
Discharge Summary    CHIEF COMPLAINT ON ADMISSION  No chief complaint on file.      Reason for Admission  L4 mass     Admission Date  4/11/2021    CODE STATUS  Full Code    HPI & HOSPITAL COURSE  Ms. Mavis Lizarraga is a 80 y.o. female with history of hypothyroid, hyperlipidemia, DVT not on anticoagulation, breast cancer status post right mastectomy and chemotherapy who presented on 4/11/2021 with bilateral lower extremity weakness and pain.  Patient had a fall 2 weeks prior to presentation and sustained a fracture of her right forearm for which she was seen in an urgent care.  She initially presented to [facility] where MRI of her hip showed a fracture of right superior and inferior pubic rami.  MRI brain and spine was concerning for bony metastasis as well as central lumbar canal compression.  Oncology recommended transfer to [facility] for radiation oncology consult.  Neurosurgery was consulted and patient was started on steroids.  Neurosurgery felt that she has cervical spondylosis but no myelopathy or radiculopathy and recommended outpatient follow-up in 6 weeks.  Orthopedic surgery recommended nonoperative management for pubic rami fractures.  CT abdomen pelvis showed spinal lesions seen on MRI, right middle lobe lung nodules likely inflammatory, subcentimeter liver lesion.  Weakness of lower extremities resolved.  A bone scan was nonsuggestive of lytic lesions.  Blood sugars were elevated secondary to Decadron.  She will be discharged on a Medrol Dosepak.  Patient declined continuing Metformin but she came in on stating day for diarrhea.  Increased her dose of glipizide.  Counseled her that she was not well controlled even prior to admission.  She declined insulin, needles, fingersticks.  She should follow-up with her primary care for management and possible referral to endocrinology.  I do anticipate her blood sugars will improve after steroid taper but her blood sugars were not well controlled prior  to admission.  Counseled to follow-up with primary care provider for diabetes management.  Follow-up with oncology, neurosurgery, primary care as outpatient.      Therefore, she is discharged in fair and stable condition to home with close outpatient follow-up.    The patient met 2-midnight criteria for an inpatient stay at the time of discharge.    Discharge Date  4/14/2021    FOLLOW UP ITEMS POST DISCHARGE  None    DISCHARGE DIAGNOSES  Active Problems:    Pubic ramus fracture (HCC) POA: Yes    On statin therapy due to risk of future cardiovascular event POA: Yes    Uncontrolled type 2 diabetes mellitus with hyperglycemia (HCC) POA: Yes    History of breast cancer POA: Yes      Overview: right    Vitamin D deficiency POA: Yes    Cervical spinal stenosis POA: Yes    Acquired hypothyroidism POA: Yes  Resolved Problems:    Lower extremity weakness POA: Yes    Bone lesion POA: Yes      FOLLOW UP  Future Appointments   Date Time Provider Department Center   4/28/2021 10:30 AM Waylon Webber D.O. [facility] None     Waylon Webber D.O.  [address]  [place] [address]  [phone]    In 2 weeks  As needed, If symptoms worsen    Aniya Guerra M.D.  [address] [address]  [address]  [place] [address]  [phone]    In 2 weeks  As needed, If symptoms worsen    Shahab Marin III, M.D.  [address]  [place] [address]  [phone]    In 2 weeks  As needed, If symptoms worsen      MEDICATIONS ON DISCHARGE     Medication List      START taking these medications      Instructions   glipiZIDE 10 MG Tabs  Commonly known as: GLUCOTROL   Take 1 tablet by mouth 2 times a day.  Dose: 10 mg     methylPREDNISolone 4 MG Tbpk  Commonly known as: MEDROL DOSEPAK   Follow schedule on package instructions.     oxyCODONE immediate-release 5 MG Tabs  Commonly known as: ROXICODONE   Take 1 tablet by mouth every 6 hours as needed for Severe Pain for up to 5 days.  Dose: 5 mg     vitamin D (Ergocalciferol) 1.25 MG  (62684 UT) Caps capsule  Start taking on: April 19, 2021  Commonly known as: DRISDOL   Take 1 capsule by mouth every 7 days.  Dose: 50,000 Units        CHANGE how you take these medications      Instructions   cimetidine 400 MG Tabs  What changed:   · how much to take  · how to take this  · when to take this  · reasons to take this  · additional instructions  Commonly known as: TAGAMET   TAKE ONE TABLET BY MOUTH DAILY AS NEEDED.(GENERIC FOR TAGAMET)     levothyroxine 75 MCG Tabs  What changed: See the new instructions.  Commonly known as: SYNTHROID   TAKE ONE TABLET BY MOUTH DAILY (SYNTHROID)     meloxicam 15 MG tablet  What changed:   · how much to take  · how to take this  · when to take this  · reasons to take this  · additional instructions  Commonly known as: MOBIC   TAKE ONE TABLET BY MOUTH DAILY     rosuvastatin 40 MG tablet  What changed: See the new instructions.  Commonly known as: CRESTOR   TAKE ONE TABLET BY MOUTH DAILY (REPLACES SIMVASTATIN)        CONTINUE taking these medications      Instructions   CINNAMON PO   Take 1 tablet by mouth every morning.  Dose: 1 tablet     cyclobenzaprine 10 mg Tabs  Commonly known as: Flexeril   Take 1 tablet by mouth 3 times a day as needed for Mild Pain.  Dose: 10 mg     FISH OIL PO   Take 1 Cap by mouth every day.  Dose: 1 capsule     ICAPS AREDS FORMULA PO   Take 1 tablet by mouth every morning.  Dose: 1 tablet     NON SPECIFIED   Take 1 Package by mouth every morning. [facility] Diabetes Health Pack Vitamins  Dose: 1 Package     VITAMIN B-12 PO   Take 1 tablet by mouth every morning.  Dose: 1 tablet     Vitamin C 1000 MG Tabs   Take 1,000 mg by mouth every morning.  Dose: 1,000 mg     VITAMIN D PO   Take 4,000 Units by mouth every morning.  Dose: 4,000 Units     vitamin E 1000 Unit (450 mg) Caps  Commonly known as: VITAMIN E   Take 1,000 Units by mouth every morning.  Dose: 1,000 Units        STOP taking these medications    glipizide-metformin 2.5-250 MG per  tablet  Commonly known as: METAGLIP            Allergies  Allergies   Allergen Reactions   • Glimepiride Unspecified     drowsiness   • Metformin Diarrhea and Nausea     NAUSEA VOMIITG AND DIARRHEA   • Morphine Unspecified     Hallucinations       DIET  Orders Placed This Encounter   Procedures   • Diet Order Diet: Consistent CHO (Diabetic)     Standing Status:   Standing     Number of Occurrences:   1     Order Specific Question:   Diet:     Answer:   Consistent CHO (Diabetic) [4]       ACTIVITY  As tolerated.  Weight bearing as tolerated    CONSULTATIONS  Neurosurgery - canal stenosis  Neurology - BLE weakness  Orthopedics - Pelvic Rami Fractures  Medical Oncology - Bony lesions concerning for metastatic disease    PROCEDURES  None    LABORATORY  Lab Results   Component Value Date    SODIUM 132 (L) 04/14/2021    POTASSIUM 4.3 04/14/2021    CHLORIDE 99 04/14/2021    CO2 27 04/14/2021    GLUCOSE 240 (H) 04/14/2021    BUN 17 04/14/2021    CREATININE 0.56 04/14/2021        Lab Results   Component Value Date    WBC 7.4 04/14/2021    HEMOGLOBIN 13.3 04/14/2021    HEMATOCRIT 38.2 04/14/2021    PLATELETCT 215 04/14/2021        I discussed medications and side effects with the patient.  I discussed prognosis and importance of medical compliance with the patient.  I counseled the patient about diet, exercise, weight loss, smoking cessation, and life style modifications.  All questions and concerns have been addressed.  Total time of the discharge process was 36 minutes.

## 2021-04-14 NOTE — PROGRESS NOTES
Pt returned from bone scan with new IV in R hand. Previous PIV infiltrated and removed when off of unit. Pt has hx of mastectomy in R arm. Day RN spoke with MD regarding removing PIV d/t risk and pt planning to d/c home soon. MD aware of no IV access and changed IV decadron to PO.

## 2021-04-14 NOTE — DISCHARGE INSTRUCTIONS
Opioid Pain Medicine Management  Opioid pain medicines are strong medicines that are used to treat bad or very bad pain. When you take them for a short time, they can help you:  · Sleep better.  · Do better in physical therapy.  · Feel better during the first few days after you get hurt.  · Recover from surgery.  Only take these medicines if a doctor says that you can. You should only take them for a short time. This is because opioids can be hard to stop taking (they are addictive). The longer you take opioids, the harder it may be to stop taking them (opioid use disorder).  What are the risks?  Opioids can cause problems (side effects). Taking them for more than 3 days raises your chance of problems, such as:  · Trouble pooping (constipation).  · Feeling sick to your stomach (nausea).  · Vomiting.  · Feeling very sleepy.  · Confusion.  · Not being able to stop taking the medicine.  · Breathing problems.  Taking opioids for a long time can make it hard for you to do daily tasks. It can also put you at risk for:  · Car accidents.  · Depression.  · Suicide.  · Heart attack.  · Taking too much of the medicine (overdose), which can sometimes lead to death.  What is a pain treatment plan?  A pain treatment plan is a plan made by you and your doctor. Work with your doctor to make a plan for treating your pain. To help you do this:  · Talk about the goals of your treatment, including:  ? How much pain you might expect to have.  ? How you will manage the pain.  · Talk about the risks and benefits of taking these medicines for your condition.  · Remember that a good treatment plan uses more than one approach and lowers the risks of side effects.  · Tell your doctor about the amount of medicines you take and about any drug or alcohol use.  · Get your pain medicine prescriptions from only one doctor.  Pain can be managed with other treatments. Work with your doctor to find other ways to help your pain, such as:  · Physical  therapy.  · Counseling.  · Eating healthy foods.  · Brain exercises.  · Massage.  · Meditation.  · Other pain medicines.  · Doing gentle exercises.  Tapering your use of opioids  If you have been taking opioids for more than a few weeks, you may need to slowly decrease (taper) how much you take until you stop taking them. Doing this can lower your chance of having symptoms, such as:  · Pain and cramping in your belly (abdomen).  · Feeling sick to your stomach.  · Sweating.  · Feeling very sleepy.  · Feeling restless.  · Shaking you cannot control (tremors).  · Cravings for the medicine.  Do not try to stop taking them by yourself. Work with your doctor to stop. Your doctor will help you take less until you are not taking the medicine at all.  Follow these instructions at home:  Safety and storage    · While you are taking opioids:  ? Do not drive.  ? Do not use machines or power tools.  ? Do not sign important papers (legal documents).  ? Do not drink alcohol.  ? Do not take sleeping pills.  ? Do not take care of children by yourself.  ? Do not do activities where you need to climb or be in high places, like working on a ladder.  ? Do not go into any water, such as a lake, river, ocean, swimming pool, or hot tub.  · Keep your opioids locked up or in a place where children cannot reach them.  · Do not share your pain medicine with anyone.  Getting rid of leftover pills  Do not save any leftover pills. Get rid of leftover pills safely by:  · Taking them to a take-back program in your area.  · Bringing them to a pharmacy that has a container for throwing away pills (pill disposal).  · Throwing them in the trash. Check the label or package insert of your medicine to see whether this is safe to do. If it is safe to throw them out:  1. Take the pills out of their container.  2. Mix the pills with pet poop or food scraps.  3. Put this in the trash.  Activity  · Return to your normal activities as told by your doctor. Ask  your doctor what activities are safe for you.  · Avoid doing things that make your pain worse.  · Do exercises as told by your doctor.  General instructions  · You may need to take these actions to prevent or treat trouble pooping:  ? Drink enough fluid to keep your pee (urine) pale yellow.  ? Take over-the-counter or prescription medicines.  ? Eat foods that are high in fiber. These include beans, whole grains, and fresh fruits and vegetables.  ? Limit foods that are high in fat and sugar. These include fried or sweet foods.  · Keep all follow-up visits as told by your doctor. This is important.  Where to find support  If you have been taking opioids for a long time, think about getting help quitting from a local support group or counselor. Ask your doctor about this.  Where to find more information  Centers for Disease Control and Prevention (CDC): www.cdc.gov  Get help right away if:  Seek medical care right away if you are taking opioids and you, or people close to you, notice any of the following:  · You have trouble breathing.  · Your breathing is slower or more shallow than normal.  · You have a very slow heartbeat.  · You feel very confused.  · You pass out (faint).  · You are very sleepy.  · Your speech is not normal.  · You feel sick to your stomach and vomit.  · You have cold skin.  · You have blue lips or fingernails.  · Your muscles are weak (limp) and your body seems floppy.  · The black centers of your eyes (pupils) are smaller than normal.  If you think that you or someone else may have taken too much of an opioid medicine, get medical help right away. Call your local emergency services (949 in the [place] Do not drive yourself to the hospital.  If you ever feel like you may hurt yourself or others, or have thoughts about taking your own life, get help right away. You can go to your nearest emergency department or call:  · Your local emergency services (254 in the [place]  · The hotline of the National  Poison Control Center (1-712.414.1354 in the [place]  · A suicide crisis helpline, such as the National Suicide Prevention Lifeline at 1-785.187.7282. This is open 24 hours a day.  Summary  · Opioid are strong medicines that are used to treat bad or very bad pain.  · A pain treatment plan is a plan made by you and your doctor. Work with your doctor to make a plan for treating your pain.  · Work with your doctor to find other ways to help your pain.  · If you think that you or someone else may have taken too much of an opioid, get help right away.  This information is not intended to replace advice given to you by your health care provider. Make sure you discuss any questions you have with your health care provider.  Document Released: 10/09/2018 Document Revised: 01/17/2020 Document Reviewed: 01/17/2020  [employer] Patient Education © 2020 [employer]  Discharge Instructions    Discharged to home by car with relative. Discharged via walking, hospital escort: Refused.  Special equipment needed: Not Applicable    Be sure to schedule a follow-up appointment with your primary care doctor or any specialists as instructed.     Discharge Plan:   Diet Plan: Discussed  Activity Level: Discussed  Confirmed Follow up Appointment: Patient to Call and Schedule Appointment  Confirmed Symptoms Management: Discussed    I understand that a diet low in cholesterol, fat, and sodium is recommended for good health. Unless I have been given specific instructions below for another diet, I accept this instruction as my diet prescription.   Other diet: Consistent diabetic    Special Instructions: None    · Is patient discharged on Warfarin / Coumadin?   No   Diabetes Basics    Diabetes (diabetes mellitus) is a long-term (chronic) disease. It occurs when the body does not properly use sugar (glucose) that is released from food after you eat.  Diabetes may be caused by one or both of these problems:  · Your pancreas does not make enough of a  hormone called insulin.  · Your body does not react in a normal way to insulin that it makes.  Insulin lets sugars (glucose) go into cells in your body. This gives you energy. If you have diabetes, sugars cannot get into cells. This causes high blood sugar (hyperglycemia).  Follow these instructions at home:  How is diabetes treated?  You may need to take insulin or other diabetes medicines daily to keep your blood sugar in balance. Take your diabetes medicines every day as told by your doctor. List your diabetes medicines here:  Diabetes medicines  · Name of medicine: ______________________________  ? Amount (dose): _______________ Time (a.m./p.m.): _______________ Notes: ___________________________________  · Name of medicine: ______________________________  ? Amount (dose): _______________ Time (a.m./p.m.): _______________ Notes: ___________________________________  · Name of medicine: ______________________________  ? Amount (dose): _______________ Time (a.m./p.m.): _______________ Notes: ___________________________________  If you use insulin, you will learn how to give yourself insulin by injection. You may need to adjust the amount based on the food that you eat. List the types of insulin you use here:  Insulin  · Insulin type: ______________________________  ? Amount (dose): _______________ Time (a.m./p.m.): _______________ Notes: ___________________________________  · Insulin type: ______________________________  ? Amount (dose): _______________ Time (a.m./p.m.): _______________ Notes: ___________________________________  · Insulin type: ______________________________  ? Amount (dose): _______________ Time (a.m./p.m.): _______________ Notes: ___________________________________  · Insulin type: ______________________________  ? Amount (dose): _______________ Time (a.m./p.m.): _______________ Notes: ___________________________________  · Insulin type: ______________________________  ? Amount (dose):  _______________ Time (a.m./p.m.): _______________ Notes: ___________________________________  How do I manage my blood sugar?    Check your blood sugar levels using a blood glucose monitor as directed by your doctor.  Your doctor will set treatment goals for you. Generally, you should have these blood sugar levels:  · Before meals (preprandial):  mg/dL (4.4-7.2 mmol/L).  · After meals (postprandial): below 180 mg/dL (10 mmol/L).  · A1c level: less than 7%.  Write down the times that you will check your blood sugar levels:  Blood sugar checks  · Time: _______________ Notes: ___________________________________  · Time: _______________ Notes: ___________________________________  · Time: _______________ Notes: ___________________________________  · Time: _______________ Notes: ___________________________________  · Time: _______________ Notes: ___________________________________  · Time: _______________ Notes: ___________________________________    What do I need to know about low blood sugar?  Low blood sugar is called hypoglycemia. This is when blood sugar is at or below 70 mg/dL (3.9 mmol/L). Symptoms may include:  · Feeling:  ? Hungry.  ? Worried or nervous (anxious).  ? Sweaty and clammy.  ? Confused.  ? Dizzy.  ? Sleepy.  ? Sick to your stomach (nauseous).  · Having:  ? A fast heartbeat.  ? A headache.  ? A change in your vision.  ? Tingling or no feeling (numbness) around the mouth, lips, or tongue.  ? Jerky movements that you cannot control (seizure).  · Having trouble with:  ? Moving (coordination).  ? Sleeping.  ? Passing out (fainting).  ? Getting upset easily (irritability).  Treating low blood sugar  To treat low blood sugar, eat or drink something sugary right away. If you can think clearly and swallow safely, follow the 15:15 rule:  · Take 15 grams of a fast-acting carb (carbohydrate). Talk with your doctor about how much you should take.  · Some fast-acting carbs are:  ? Sugar tablets (glucose  pills). Take 3-4 glucose pills.  ? 6-8 pieces of hard candy.  ? 4-6 oz (120-150 mL) of fruit juice.  ? 4-6 oz (120-150 mL) of regular (not diet) soda.  ? 1 Tbsp (15 mL) honey or sugar.  · Check your blood sugar 15 minutes after you take the carb.  · If your blood sugar is still at or below 70 mg/dL (3.9 mmol/L), take 15 grams of a carb again.  · If your blood sugar does not go above 70 mg/dL (3.9 mmol/L) after 3 tries, get help right away.  · After your blood sugar goes back to normal, eat a meal or a snack within 1 hour.  Treating very low blood sugar  If your blood sugar is at or below 54 mg/dL (3 mmol/L), you have very low blood sugar (severe hypoglycemia). This is an emergency. Do not wait to see if the symptoms will go away. Get medical help right away. Call your local emergency services (911 in the [place] Do not drive yourself to the hospital.  Questions to ask your health care provider  · Do I need to meet with a diabetes educator?  · What equipment will I need to care for myself at home?  · What diabetes medicines do I need? When should I take them?  · How often do I need to check my blood sugar?  · What number can I call if I have questions?  · When is my next doctor's visit?  · Where can I find a support group for people with diabetes?  Where to find more information  · American Diabetes Association: www.diabetes.org  · American Association of Diabetes Educators: www.diabeteseducator.org/patient-resources  Contact a doctor if:  · Your blood sugar is at or above 240 mg/dL (13.3 mmol/L) for 2 days in a row.  · You have been sick or have had a fever for 2 days or more, and you are not getting better.  · You have any of these problems for more than 6 hours:  ? You cannot eat or drink.  ? You feel sick to your stomach (nauseous).  ? You throw up (vomit).  ? You have watery poop (diarrhea).  Get help right away if:  · Your blood sugar is lower than 54 mg/dL (3 mmol/L).  · You get confused.  · You have  trouble:  ? Thinking clearly.  ? Breathing.  Summary  · Diabetes (diabetes mellitus) is a long-term (chronic) disease. It occurs when the body does not properly use sugar (glucose) that is released from food after digestion.  · Take insulin and diabetes medicines as told.  · Check your blood sugar every day, as often as told.  · Keep all follow-up visits as told by your doctor. This is important.  This information is not intended to replace advice given to you by your health care provider. Make sure you discuss any questions you have with your health care provider.  Document Released: 03/22/2019 Document Revised: 02/07/2020 Document Reviewed: 03/22/2019  [employer] Patient Education © 2020 [employer]      Depression / Suicide Risk    As you are discharged from this [facility] facility, it is important to learn how to keep safe from harming yourself.    Recognize the warning signs:  · Abrupt changes in personality, positive or negative- including increase in energy   · Giving away possessions  · Change in eating patterns- significant weight changes-  positive or negative  · Change in sleeping patterns- unable to sleep or sleeping all the time   · Unwillingness or inability to communicate  · Depression  · Unusual sadness, discouragement and loneliness  · Talk of wanting to die  · Neglect of personal appearance   · Rebelliousness- reckless behavior  · Withdrawal from people/activities they love  · Confusion- inability to concentrate     If you or a loved one observes any of these behaviors or has concerns about self-harm, here's what you can do:  · Talk about it- your feelings and reasons for harming yourself  · Remove any means that you might use to hurt yourself (examples: pills, rope, extension cords, firearm)  · Get professional help from the community (Mental Health, Substance Abuse, psychological counseling)  · Do not be alone:Call your Safe Contact- someone whom you trust who will be there for you.  · Call  your local CRISIS HOTLINE 135-0013 or 953-837-7073  · Call your local Children's Mobile Crisis Response Team [place] (840) 364-2390 or www.Conjecta  · Call the toll free National Suicide Prevention Hotlines   · National Suicide Prevention Lifeline 606-967-VOHD (7196)  · National [place] Line Network 800-SUICIDE (377-4469)      Discharge Instructions per Dr. Dale Bowser D.O.    DIET: Diet Order Diet: Consistent CHO (Diabetic)    ACTIVITY: As tolerated    A proper diet that is low in grease, fat, and salt, along with 30 minutes of exercise per day will lead to weight loss, and better controlled blood sugar and blood pressure.    DIAGNOSIS: Weakness of lower extremities    Follow up with your Primary Care Provider Waylon Webber D.O. as scheduled or sooner if your symptoms persist or worsen.  Return to Emergency Room for sever chest pain, shortness of breath, signs of a stroke, or any other emergencies.

## 2021-04-21 ENCOUNTER — TELEPHONE (OUTPATIENT)
Dept: MEDICAL GROUP | Facility: LAB | Age: 81
End: 2021-04-21

## 2021-04-21 NOTE — TELEPHONE ENCOUNTER
NEW PATIENT VISIT PRE-VISIT PLANNING    1.  E[employer] Patient is checked in Patient Demographics?Yes    2.  Immunizations were updated in Epic using Reconcile Outside Information activity? Yes         3.  Is this appointment scheduled as a Hospital Follow-Up? Yes, visit was at [facility].     4.  Patient is due for the following Health Maintenance Topics:   Health Maintenance Due   Topic Date Due   • Annual Wellness Visit  09/26/2019   • URINE ACR / MICROALBUMIN  03/28/2020   • DIABETES MONOFILAMENT / LE EXAM  08/14/2020   • IMM HEP B VACCINE (2 of 3 - Risk 3-dose series) 09/23/2020   • RETINAL SCREENING  10/16/2020   • IMM ZOSTER VACCINES (3 of 3) 10/21/2020   • FASTING LIPID PROFILE  12/27/2020       5.  Reviewed/Updated the following with patient:       •   Preferred Pharmacy? Yes       •   Preferred Lab? Yes       •   Preferred Communication? Yes       •   Allergies? Yes       •   Medications? YES. Was Abstract Encounter opened and chart updated? YES    6.  Updated Care Team?       •   DME Company (gait device, O2, CPAP, etc.) N\A       •   Other Specialists (eye doctor, derm, GYN, cardiology, endo, etc): YES    7.  AHA ([employer]) form printed for Provider? Email sent to [facility] requesting form if needed

## 2021-04-27 ENCOUNTER — PATIENT MESSAGE (OUTPATIENT)
Dept: HEALTH INFORMATION MANAGEMENT | Facility: OTHER | Age: 81
End: 2021-04-27

## 2021-04-27 SDOH — HEALTH STABILITY: MENTAL HEALTH
STRESS IS WHEN SOMEONE FEELS TENSE, NERVOUS, ANXIOUS, OR CAN'T SLEEP AT NIGHT BECAUSE THEIR MIND IS TROUBLED. HOW STRESSED ARE YOU?: ONLY A LITTLE

## 2021-04-27 SDOH — ECONOMIC STABILITY: HOUSING INSECURITY
IN THE LAST 12 MONTHS, WAS THERE A TIME WHEN YOU DID NOT HAVE A STEADY PLACE TO SLEEP OR SLEPT IN A SHELTER (INCLUDING NOW)?: NO

## 2021-04-27 SDOH — ECONOMIC STABILITY: HOUSING INSECURITY

## 2021-04-27 SDOH — ECONOMIC STABILITY: TRANSPORTATION INSECURITY

## 2021-04-27 SDOH — ECONOMIC STABILITY: INCOME INSECURITY: IN THE LAST 12 MONTHS, WAS THERE A TIME WHEN YOU WERE NOT ABLE TO PAY THE MORTGAGE OR RENT ON TIME?: NO

## 2021-04-27 SDOH — HEALTH STABILITY: PHYSICAL HEALTH: ON AVERAGE, HOW MANY DAYS PER WEEK DO YOU ENGAGE IN MODERATE TO STRENUOUS EXERCISE (LIKE A BRISK WALK)?: 0 DAYS

## 2021-04-27 SDOH — HEALTH STABILITY: PHYSICAL HEALTH: ON AVERAGE, HOW MANY MINUTES DO YOU ENGAGE IN EXERCISE AT THIS LEVEL?: 10 MINUTES

## 2021-04-27 ASSESSMENT — SOCIAL DETERMINANTS OF HEALTH (SDOH)
IN A TYPICAL WEEK, HOW MANY TIMES DO YOU TALK ON THE PHONE WITH FAMILY, FRIENDS, OR NEIGHBORS?: TWICE A WEEK
DO YOU BELONG TO ANY CLUBS OR ORGANIZATIONS SUCH AS CHURCH GROUPS UNIONS, FRATERNAL OR ATHLETIC GROUPS, OR SCHOOL GROUPS?: NO
HOW OFTEN DO YOU ATTENT MEETINGS OF THE CLUB OR ORGANIZATION YOU BELONG TO?: NEVER
HOW OFTEN DO YOU HAVE SIX OR MORE DRINKS ON ONE OCCASION: NEVER
WITHIN THE PAST 12 MONTHS, THE FOOD YOU BOUGHT JUST DIDN'T LAST AND YOU DIDN'T HAVE MONEY TO GET MORE: NEVER TRUE
HOW OFTEN DO YOU GET TOGETHER WITH FRIENDS OR RELATIVES?: ONCE A WEEK
HOW OFTEN DO YOU HAVE A DRINK CONTAINING ALCOHOL: NEVER
HOW OFTEN DO YOU ATTEND CHURCH OR RELIGIOUS SERVICES?: NEVER
WITHIN THE PAST 12 MONTHS, YOU WORRIED THAT YOUR FOOD WOULD RUN OUT BEFORE YOU GOT THE MONEY TO BUY MORE: NEVER TRUE

## 2021-04-28 ENCOUNTER — OFFICE VISIT (OUTPATIENT)
Dept: MEDICAL GROUP | Facility: LAB | Age: 81
End: 2021-04-28
Payer: MEDICARE

## 2021-04-28 VITALS
WEIGHT: 168 LBS | SYSTOLIC BLOOD PRESSURE: 110 MMHG | DIASTOLIC BLOOD PRESSURE: 64 MMHG | OXYGEN SATURATION: 95 % | BODY MASS INDEX: 26.37 KG/M2 | RESPIRATION RATE: 14 BRPM | HEART RATE: 83 BPM | HEIGHT: 67 IN | TEMPERATURE: 97.3 F

## 2021-04-28 DIAGNOSIS — E78.2 MIXED HYPERLIPIDEMIA: ICD-10-CM

## 2021-04-28 DIAGNOSIS — E03.4 HYPOTHYROIDISM DUE TO ACQUIRED ATROPHY OF THYROID: ICD-10-CM

## 2021-04-28 DIAGNOSIS — E11.65 UNCONTROLLED TYPE 2 DIABETES MELLITUS WITH HYPERGLYCEMIA (HCC): ICD-10-CM

## 2021-04-28 DIAGNOSIS — G89.29 CHRONIC MIDLINE LOW BACK PAIN WITHOUT SCIATICA: ICD-10-CM

## 2021-04-28 DIAGNOSIS — Z79.899 ON STATIN THERAPY DUE TO RISK OF FUTURE CARDIOVASCULAR EVENT: ICD-10-CM

## 2021-04-28 DIAGNOSIS — Z12.31 ENCOUNTER FOR SCREENING MAMMOGRAM FOR MALIGNANT NEOPLASM OF BREAST: ICD-10-CM

## 2021-04-28 DIAGNOSIS — Z85.3 HISTORY OF BREAST CANCER: ICD-10-CM

## 2021-04-28 DIAGNOSIS — M62.830 SPASM OF BACK MUSCLES: ICD-10-CM

## 2021-04-28 DIAGNOSIS — M54.50 CHRONIC MIDLINE LOW BACK PAIN WITHOUT SCIATICA: ICD-10-CM

## 2021-04-28 LAB — GLUCOSE BLD-MCNC: 297 MG/DL (ref 70–100)

## 2021-04-28 PROCEDURE — 99214 OFFICE O/P EST MOD 30 MIN: CPT | Mod: 25 | Performed by: INTERNAL MEDICINE

## 2021-04-28 PROCEDURE — 82962 GLUCOSE BLOOD TEST: CPT | Performed by: INTERNAL MEDICINE

## 2021-04-28 PROCEDURE — 8041 PR SCP AHA: Performed by: INTERNAL MEDICINE

## 2021-04-28 RX ORDER — MELOXICAM 15 MG/1
15 TABLET ORAL
Qty: 100 TABLET | Refills: 3 | Status: SHIPPED | OUTPATIENT
Start: 2021-04-28 | End: 2021-08-20 | Stop reason: SDUPTHER

## 2021-04-28 RX ORDER — GLIPIZIDE 10 MG/1
10 TABLET ORAL 2 TIMES DAILY
Qty: 200 TABLET | Refills: 3 | Status: SHIPPED | OUTPATIENT
Start: 2021-04-28 | End: 2022-06-10

## 2021-04-28 RX ORDER — CYCLOBENZAPRINE HCL 10 MG
10 TABLET ORAL 3 TIMES DAILY PRN
Qty: 30 TABLET | Refills: 0 | Status: SHIPPED | OUTPATIENT
Start: 2021-04-28 | End: 2021-08-20 | Stop reason: SDUPTHER

## 2021-04-28 RX ORDER — GLUCOSAMINE HCL/CHONDROITIN SU 500-400 MG
CAPSULE ORAL
Qty: 100 EACH | Refills: 2 | Status: SHIPPED | OUTPATIENT
Start: 2021-04-28 | End: 2021-06-10 | Stop reason: SDUPTHER

## 2021-04-28 RX ORDER — PIOGLITAZONEHYDROCHLORIDE 15 MG/1
15 TABLET ORAL DAILY
Qty: 90 TABLET | Refills: 3 | Status: SHIPPED | OUTPATIENT
Start: 2021-04-28 | End: 2022-02-08

## 2021-04-28 RX ORDER — LANCETS 30 GAUGE
EACH MISCELLANEOUS
Qty: 100 EACH | Refills: 0 | Status: SHIPPED | OUTPATIENT
Start: 2021-04-28 | End: 2021-04-29 | Stop reason: SDUPTHER

## 2021-04-28 RX ORDER — LEVOTHYROXINE SODIUM 0.07 MG/1
75 TABLET ORAL
Qty: 100 TABLET | Refills: 3 | Status: SHIPPED | OUTPATIENT
Start: 2021-04-28 | End: 2022-06-22

## 2021-04-28 RX ORDER — ROSUVASTATIN CALCIUM 40 MG/1
40 TABLET, COATED ORAL DAILY
Qty: 100 TABLET | Refills: 3 | Status: SHIPPED | OUTPATIENT
Start: 2021-04-28 | End: 2022-03-18 | Stop reason: SDUPTHER

## 2021-04-28 ASSESSMENT — FIBROSIS 4 INDEX: FIB4 SCORE: 1.43

## 2021-04-28 NOTE — PROGRESS NOTES
Subjective:     Mavis Lizarraga is a 80 y.o. female here today for follow-up regarding her most recent hospitalization and Annual Health Assessment.    Patient and I have discussed that she has a history of a right pubic rami fracture, is doing well in this regard, patient and I have also discussed her being a diabetic.      Health Maintenance Summary                Annual Wellness Visit Overdue 9/26/2019      Done 9/25/2018 Visit Dx: Medicare annual wellness visit, subsequent     Patient has more history with this topic...    URINE ACR / MICROALBUMIN Overdue 3/28/2020      Done 3/28/2019 MICROALBUMIN CREAT RATIO URINE     Patient has more history with this topic...    DIABETES MONOFILAMENT / LE EXAM Overdue 8/14/2020      Done 8/14/2019 AMB DIABETIC MONOFILAMENT LOWER EXTREMITY EXAM     Patient has more history with this topic...    IMM HEP B VACCINE Overdue 9/23/2020      Done 8/26/2020 Imm Admin: Hepatitis B Vaccine (Adol/Adult)    RETINAL SCREENING Overdue 10/16/2020      Done 10/16/2019 REFERRAL FOR RETINAL SCREENING EXAM     Patient has more history with this topic...    IMM ZOSTER VACCINES Overdue 10/21/2020      Done 8/26/2020 Imm Admin: Zoster Vaccine Recombinant (RZV) (SHINGRIX)     Patient has more history with this topic...    FASTING LIPID PROFILE Overdue 12/27/2020      Done 12/27/2019 LIPID PROFILE     Patient has more history with this topic...    A1C SCREENING Next Due 10/9/2021      Done 4/9/2021 HEMOGLOBIN A1C     Patient has more history with this topic...    MAMMOGRAM Next Due 10/21/2021      Done 10/21/2019 MA-SCREENING MAMMO BILAT W/IMPLANTS W/LEVON W/CAD     Patient has more history with this topic...    SERUM CREATININE Next Due 4/14/2022      Done 4/14/2021 BASIC METABOLIC PANEL     Patient has more history with this topic...    BONE DENSITY Next Due 11/10/2022      Done 11/10/2017 DS-BONE DENSITY STUDY (DEXA)    IMM DTaP/Tdap/Td Vaccine Next Due 11/15/2027      Done 11/15/2017 Imm Admin:  Tdap Vaccine            Annual Health Assessment Questions:      1.  Are you currently engaging in any exercise or physical activity? No    2.  How would you describe your mood or emotional well-being today? good    3.  Have you had any falls in the last year? Yes    4.  Have you noticed any problems with your balance or had difficulty walking? No    5.  In the last six months have you experienced any leakage of urine? Yes    6. DPA/Advanced Directive: Patient has Advanced Directive on file.     Current medicines (including changes today)  Current Outpatient Medications   Medication Sig Dispense Refill   • vitamin D, Ergocalciferol, (DRISDOL) 1.25 MG (20736 UT) Cap capsule Take 1 capsule by mouth every 7 days. 5 capsule 0   • glipiZIDE (GLUCOTROL) 10 MG Tab Take 1 tablet by mouth 2 times a day. 60 tablet 0   • cyclobenzaprine (FLEXERIL) 10 mg Tab Take 1 tablet by mouth 3 times a day as needed for Mild Pain. 30 tablet 0   • VITAMIN D PO Take 4,000 Units by mouth every morning.     • Ascorbic Acid (VITAMIN C) 1000 MG Tab Take 1,000 mg by mouth every morning.     • Cyanocobalamin (VITAMIN B-12 PO) Take 1 tablet by mouth every morning.     • CINNAMON PO Take 1 tablet by mouth every morning.     • Multiple Vitamins-Minerals (ICAPS AREDS FORMULA PO) Take 1 tablet by mouth every morning.     • NON SPECIFIED Take 1 Package by mouth every morning. [employer] Diabetes Health Pack Vitamins     • levothyroxine (SYNTHROID) 75 MCG Tab TAKE ONE TABLET BY MOUTH DAILY (SYNTHROID) (Patient taking differently: Take 75 mcg by mouth every morning on an empty stomach.) 90 Tab 4   • rosuvastatin (CRESTOR) 40 MG tablet TAKE ONE TABLET BY MOUTH DAILY (REPLACES SIMVASTATIN) (Patient taking differently: Take 40 mg by mouth every day.) 100 Tab 3   • meloxicam (MOBIC) 15 MG tablet TAKE ONE TABLET BY MOUTH DAILY (Patient taking differently: Take 15 mg by mouth 1 time a day as needed for Moderate Pain.) 90 Tab 3   • cimetidine (TAGAMET) 400 MG  "Tab TAKE ONE TABLET BY MOUTH DAILY AS NEEDED.(GENERIC FOR TAGAMET) (Patient taking differently: Take 400 mg by mouth 1 time a day as needed.) 90 Tab 4   • vitamin E (VITAMIN E) 1000 UNIT Cap Take 1,000 Units by mouth every morning.     • Omega-3 Fatty Acids (FISH OIL PO) Take 1 Cap by mouth every day.     • methylPREDNISolone (MEDROL DOSEPAK) 4 MG Tablet Therapy Pack Follow schedule on package instructions. (Patient not taking: Reported on 4/21/2021) 21 tablet 0     No current facility-administered medications for this visit.       She  has a past medical history of Anesthesia, Arthritis, Backpain, Breast cancer (HCC) (1980s), Diverticulosis, DVT of deep femoral vein (HCC), Heart burn, High cholesterol, Pneumonia (Feb 2006), Thyroid condition, and Ulcer. She also has no past medical history of COPD.    Metformin and Morphine    She  reports that she has never smoked. She has never used smokeless tobacco. She reports current alcohol use. She reports that she does not use drugs.  Counseling given: Not Answered      ROS    No chest pain, no shortness of breath, no abdominal pain.     Objective:     Physical Exam:  /64   Pulse 83   Temp 36.3 °C (97.3 °F) (Temporal)   Resp 14   Ht 1.702 m (5' 7\")   Wt 76.2 kg (168 lb)   SpO2 95%  Body mass index is 26.31 kg/m².    Constitutional: Alert, no distress.  Skin: Warm, dry, good turgor, no rashes in visible areas.  Eye: Equal, round and reactive, conjunctiva clear, lids normal.  ENMT: Lips without lesions, good dentition, oropharynx clear.  Neck: Trachea midline, no masses, no thyromegaly. No cervical or supraclavicular lymphadenopathy.  Respiratory: Unlabored respiratory effort, lungs clear to auscultation, no wheezes, no rhonchi.  Cardiovascular: Normal S1, S2, no murmur, no edema.  Abdomen: Soft, non-tender, no masses, no hepatosplenomegaly.  Psych: Alert and oriented x3, normal affect and mood.    Assessment and Plan:     1. Mixed hyperlipidemia- not at goal on " zocor 40 mg- switch to crestor 40 mg/d  Continue Crestor 40 mg  - rosuvastatin (CRESTOR) 40 MG tablet; Take 1 tablet by mouth every day.  Dispense: 100 tablet; Refill: 3    2. Hypothyroidism due to acquired atrophy of thyroid  Continue Synthroid 75 mcg  - levothyroxine (SYNTHROID) 75 MCG Tab; Take 1 tablet by mouth every morning on an empty stomach.  Dispense: 100 tablet; Refill: 3    3. Spasm of back muscles  Prescription refilled for Flexeril  - cyclobenzaprine (FLEXERIL) 10 mg Tab; Take 1 tablet by mouth 3 times a day as needed for Mild Pain.  Dispense: 30 tablet; Refill: 0    4. Chronic midline low back pain without sciatica  Continue Mobic as needed  - meloxicam (MOBIC) 15 MG tablet; Take 1 tablet by mouth 1 time a day as needed for Moderate Pain.  Dispense: 100 tablet; Refill: 3    5. Uncontrolled type 2 diabetes mellitus with hyperglycemia (HCC)  Poorly controlled diabetes, patient's been checking her blood sugars  - glipiZIDE (GLUCOTROL) 10 MG Tab; Take 1 tablet by mouth 2 times a day.  Dispense: 200 tablet; Refill: 3  - rosuvastatin (CRESTOR) 40 MG tablet; Take 1 tablet by mouth every day.  Dispense: 100 tablet; Refill: 3  - levothyroxine (SYNTHROID) 75 MCG Tab; Take 1 tablet by mouth every morning on an empty stomach.  Dispense: 100 tablet; Refill: 3  - cyclobenzaprine (FLEXERIL) 10 mg Tab; Take 1 tablet by mouth 3 times a day as needed for Mild Pain.  Dispense: 30 tablet; Refill: 0  - meloxicam (MOBIC) 15 MG tablet; Take 1 tablet by mouth 1 time a day as needed for Moderate Pain.  Dispense: 100 tablet; Refill: 3  - MA-DIAGNOSTIC MAMMO BILAT W/TOMOSYNTHESIS W/CAD; Future  - Blood Glucose Meter Kit; Test blood sugar as recommended by provider. Per formulary preference blood glucose monitoring kit.  Dispense: 1 Kit; Refill: 0  - Blood Glucose Test Strips; Use one Per formulary preference  strip to test blood sugar once daily early morning before first meal.  Dispense: 100 Strip; Refill: 0  - Lancets; Use one  Per formulary preference  lancet to test blood sugar once daily early morning before first meal.  Dispense: 100 Each; Refill: 0  - Alcohol Swabs; Wipe site with prep pad prior to injection.  Dispense: 100 Each; Refill: 2  - POCT Glucose  - pioglitazone (ACTOS) 15 MG Tab; Take 1 tablet by mouth every day.  Dispense: 90 tablet; Refill: 3    6. History of breast cancer  Mammogram ordered  - glipiZIDE (GLUCOTROL) 10 MG Tab; Take 1 tablet by mouth 2 times a day.  Dispense: 200 tablet; Refill: 3  - rosuvastatin (CRESTOR) 40 MG tablet; Take 1 tablet by mouth every day.  Dispense: 100 tablet; Refill: 3  - levothyroxine (SYNTHROID) 75 MCG Tab; Take 1 tablet by mouth every morning on an empty stomach.  Dispense: 100 tablet; Refill: 3  - cyclobenzaprine (FLEXERIL) 10 mg Tab; Take 1 tablet by mouth 3 times a day as needed for Mild Pain.  Dispense: 30 tablet; Refill: 0  - meloxicam (MOBIC) 15 MG tablet; Take 1 tablet by mouth 1 time a day as needed for Moderate Pain.  Dispense: 100 tablet; Refill: 3  - MA-DIAGNOSTIC MAMMO BILAT W/TOMOSYNTHESIS W/CAD; Future  - Blood Glucose Meter Kit; Test blood sugar as recommended by provider. Per formulary preference blood glucose monitoring kit.  Dispense: 1 Kit; Refill: 0  - Blood Glucose Test Strips; Use one Per formulary preference  strip to test blood sugar once daily early morning before first meal.  Dispense: 100 Strip; Refill: 0  - Lancets; Use one Per formulary preference  lancet to test blood sugar once daily early morning before first meal.  Dispense: 100 Each; Refill: 0  - Alcohol Swabs; Wipe site with prep pad prior to injection.  Dispense: 100 Each; Refill: 2    7. Encounter for screening mammogram for malignant neoplasm of breast  Mammogram ordered  - glipiZIDE (GLUCOTROL) 10 MG Tab; Take 1 tablet by mouth 2 times a day.  Dispense: 200 tablet; Refill: 3  - rosuvastatin (CRESTOR) 40 MG tablet; Take 1 tablet by mouth every day.  Dispense: 100 tablet; Refill: 3  - levothyroxine  (SYNTHROID) 75 MCG Tab; Take 1 tablet by mouth every morning on an empty stomach.  Dispense: 100 tablet; Refill: 3  - cyclobenzaprine (FLEXERIL) 10 mg Tab; Take 1 tablet by mouth 3 times a day as needed for Mild Pain.  Dispense: 30 tablet; Refill: 0  - meloxicam (MOBIC) 15 MG tablet; Take 1 tablet by mouth 1 time a day as needed for Moderate Pain.  Dispense: 100 tablet; Refill: 3  - MA-DIAGNOSTIC MAMMO BILAT W/TOMOSYNTHESIS W/CAD; Future  - Blood Glucose Meter Kit; Test blood sugar as recommended by provider. Per formulary preference blood glucose monitoring kit.  Dispense: 1 Kit; Refill: 0  - Blood Glucose Test Strips; Use one Per formulary preference  strip to test blood sugar once daily early morning before first meal.  Dispense: 100 Strip; Refill: 0  - Lancets; Use one Per formulary preference  lancet to test blood sugar once daily early morning before first meal.  Dispense: 100 Each; Refill: 0  - Alcohol Swabs; Wipe site with prep pad prior to injection.  Dispense: 100 Each; Refill: 2    8. On statin therapy due to risk of future cardiovascular event  Continue Crestor  - rosuvastatin (CRESTOR) 40 MG tablet; Take 1 tablet by mouth every day.  Dispense: 100 tablet; Refill: 3      Discussion today about general wellness and lifestyle habits:    · Engage in regular physical activity and social activities.  · Prevent falls and reduce trip hazards; using ambulatory aides, hearing and vision testing if appropriate.  · Steps to improve urinary incontinence.  · Advanced care planning.    Follow-Up: No follow-ups on file.         PLEASE NOTE: This dictation was created using voice recognition software. I have made every reasonable attempt to correct obvious errors, but I expect that there are errors of grammar and possibly content that I did not discover before finalizing the note.

## 2021-04-29 DIAGNOSIS — M62.830 SPASM OF BACK MUSCLES: ICD-10-CM

## 2021-04-29 DIAGNOSIS — Z12.31 ENCOUNTER FOR SCREENING MAMMOGRAM FOR MALIGNANT NEOPLASM OF BREAST: ICD-10-CM

## 2021-04-29 DIAGNOSIS — E11.65 UNCONTROLLED TYPE 2 DIABETES MELLITUS WITH HYPERGLYCEMIA (HCC): ICD-10-CM

## 2021-04-29 DIAGNOSIS — E78.2 MIXED HYPERLIPIDEMIA: ICD-10-CM

## 2021-04-29 DIAGNOSIS — Z85.3 HISTORY OF BREAST CANCER: ICD-10-CM

## 2021-04-29 DIAGNOSIS — M54.50 CHRONIC MIDLINE LOW BACK PAIN WITHOUT SCIATICA: ICD-10-CM

## 2021-04-29 DIAGNOSIS — G89.29 CHRONIC MIDLINE LOW BACK PAIN WITHOUT SCIATICA: ICD-10-CM

## 2021-04-29 DIAGNOSIS — E03.4 HYPOTHYROIDISM DUE TO ACQUIRED ATROPHY OF THYROID: ICD-10-CM

## 2021-04-29 RX ORDER — LANCETS 30 GAUGE
EACH MISCELLANEOUS
Qty: 100 EACH | Refills: 0 | Status: SHIPPED | OUTPATIENT
Start: 2021-04-29 | End: 2021-06-08 | Stop reason: SDUPTHER

## 2021-04-30 ENCOUNTER — TELEPHONE (OUTPATIENT)
Dept: MEDICAL GROUP | Facility: LAB | Age: 81
End: 2021-04-30

## 2021-04-30 NOTE — TELEPHONE ENCOUNTER
Patient presented to clinic requesting training on use of her new blood glucose monitor as she is newly diagnosed with T2DM.     Provided patient with hands-on training in clinic on set-up, use and testing of her new blood glucose machine. Patient return-demonstrated appropriate use of how to self-check her blood glucose. Provided patient with Katina handouts on self-blood glucose monitoring and a monitor log. Educated patient on symptoms of hyper/hypoglycemia and when to alert her PCP or seek medical care. Patient will check fasting glucose once daily and keep a log. She has follow-up appointment with PCP in 1 month. Educated to call sooner with any questions/concerns.     Patient verbalized understanding and agrees to the plan.     MARY ELLEN Strickland

## 2021-05-03 ENCOUNTER — PATIENT OUTREACH (OUTPATIENT)
Dept: HEALTH INFORMATION MANAGEMENT | Facility: OTHER | Age: 81
End: 2021-05-03

## 2021-05-03 NOTE — PROGRESS NOTES
Outcome: Left Message to schedule Comprehensive Health Assessment    Please transfer to Patient Outreach Team at [phone] when patient returns call.        Attempt # 2

## 2021-05-11 ENCOUNTER — HOSPITAL ENCOUNTER (OUTPATIENT)
Dept: RADIOLOGY | Facility: MEDICAL CENTER | Age: 81
End: 2021-05-11
Attending: INTERNAL MEDICINE
Payer: MEDICARE

## 2021-05-11 DIAGNOSIS — Z85.3 HISTORY OF BREAST CANCER: ICD-10-CM

## 2021-05-11 DIAGNOSIS — M54.50 CHRONIC MIDLINE LOW BACK PAIN WITHOUT SCIATICA: ICD-10-CM

## 2021-05-11 DIAGNOSIS — E78.2 MIXED HYPERLIPIDEMIA: ICD-10-CM

## 2021-05-11 DIAGNOSIS — E11.65 UNCONTROLLED TYPE 2 DIABETES MELLITUS WITH HYPERGLYCEMIA (HCC): ICD-10-CM

## 2021-05-11 DIAGNOSIS — E03.4 HYPOTHYROIDISM DUE TO ACQUIRED ATROPHY OF THYROID: ICD-10-CM

## 2021-05-11 DIAGNOSIS — G89.29 CHRONIC MIDLINE LOW BACK PAIN WITHOUT SCIATICA: ICD-10-CM

## 2021-05-11 DIAGNOSIS — M62.830 SPASM OF BACK MUSCLES: ICD-10-CM

## 2021-05-11 DIAGNOSIS — Z12.31 ENCOUNTER FOR SCREENING MAMMOGRAM FOR MALIGNANT NEOPLASM OF BREAST: ICD-10-CM

## 2021-05-11 PROCEDURE — 77063 BREAST TOMOSYNTHESIS BI: CPT | Mod: 52

## 2021-05-29 DIAGNOSIS — E78.2 MIXED HYPERLIPIDEMIA: ICD-10-CM

## 2021-06-01 ENCOUNTER — TELEPHONE (OUTPATIENT)
Dept: MEDICAL GROUP | Facility: LAB | Age: 81
End: 2021-06-01

## 2021-06-01 RX ORDER — GLIPIZIDE AND METFORMIN HCL 2.5; 25 MG/1; MG/1
1 TABLET, FILM COATED ORAL EVERY MORNING
Qty: 200 TABLET | Refills: 2 | Status: SHIPPED
Start: 2021-06-01 | End: 2021-06-08

## 2021-06-01 NOTE — TELEPHONE ENCOUNTER
ESTABLISHED PATIENT PRE-VISIT PLANNING     Patient was NOT contacted to complete PVP.     Note: Patient will not be contacted if there is no indication to call.     1.  Reviewed notes from the last few office visits within the medical group: Yes    2.  If any orders were placed at last visit or intended to be done for this visit (i.e. 6 mos follow-up), do we have Results/Consult Notes?         •  Labs - Labs were not ordered at last office visit.  Note: If patient appointment is for lab review and patient did not complete labs, check with provider if OK to reschedule patient until labs completed.       •  Imaging - Imaging was not ordered at last office visit.       •  Referrals - No referrals were ordered at last office visit.    3. Is this appointment scheduled as a Hospital Follow-Up? No    4.  Immunizations were updated in Epic using Reconcile Outside Information activity? Yes    5.  Patient is due for the following Health Maintenance Topics:   Health Maintenance Due   Topic Date Due   • URINE ACR / MICROALBUMIN  03/28/2020   • DIABETES MONOFILAMENT / LE EXAM  08/14/2020   • RETINAL SCREENING  10/16/2020   • IMM ZOSTER VACCINES (3 of 3) 10/21/2020   • FASTING LIPID PROFILE  12/27/2020     6.  AHA (Pulse8) form printed for Provider? No, already completed

## 2021-06-08 ENCOUNTER — OFFICE VISIT (OUTPATIENT)
Dept: MEDICAL GROUP | Facility: LAB | Age: 81
End: 2021-06-08
Payer: MEDICARE

## 2021-06-08 VITALS
SYSTOLIC BLOOD PRESSURE: 130 MMHG | HEART RATE: 60 BPM | HEIGHT: 67 IN | RESPIRATION RATE: 14 BRPM | DIASTOLIC BLOOD PRESSURE: 74 MMHG | OXYGEN SATURATION: 96 % | WEIGHT: 174 LBS | BODY MASS INDEX: 27.31 KG/M2 | TEMPERATURE: 97.5 F

## 2021-06-08 DIAGNOSIS — G89.29 CHRONIC MIDLINE LOW BACK PAIN WITHOUT SCIATICA: ICD-10-CM

## 2021-06-08 DIAGNOSIS — Z12.31 ENCOUNTER FOR SCREENING MAMMOGRAM FOR MALIGNANT NEOPLASM OF BREAST: ICD-10-CM

## 2021-06-08 DIAGNOSIS — E03.4 HYPOTHYROIDISM DUE TO ACQUIRED ATROPHY OF THYROID: ICD-10-CM

## 2021-06-08 DIAGNOSIS — E55.9 VITAMIN D DEFICIENCY: ICD-10-CM

## 2021-06-08 DIAGNOSIS — E11.65 UNCONTROLLED TYPE 2 DIABETES MELLITUS WITH HYPERGLYCEMIA (HCC): ICD-10-CM

## 2021-06-08 DIAGNOSIS — E78.2 MIXED HYPERLIPIDEMIA: ICD-10-CM

## 2021-06-08 DIAGNOSIS — E11.9 CONTROLLED TYPE 2 DIABETES MELLITUS WITHOUT COMPLICATION, WITHOUT LONG-TERM CURRENT USE OF INSULIN (HCC): ICD-10-CM

## 2021-06-08 DIAGNOSIS — Z85.3 HISTORY OF BREAST CANCER: ICD-10-CM

## 2021-06-08 DIAGNOSIS — M62.830 SPASM OF BACK MUSCLES: ICD-10-CM

## 2021-06-08 DIAGNOSIS — M54.50 CHRONIC MIDLINE LOW BACK PAIN WITHOUT SCIATICA: ICD-10-CM

## 2021-06-08 LAB
HBA1C MFR BLD: 7.7 % (ref 0–5.6)
INT CON NEG: ABNORMAL
INT CON POS: ABNORMAL

## 2021-06-08 PROCEDURE — 83036 HEMOGLOBIN GLYCOSYLATED A1C: CPT | Mod: GZ | Performed by: INTERNAL MEDICINE

## 2021-06-08 PROCEDURE — 99213 OFFICE O/P EST LOW 20 MIN: CPT | Performed by: INTERNAL MEDICINE

## 2021-06-08 RX ORDER — LANCETS 30 GAUGE
EACH MISCELLANEOUS
Qty: 100 EACH | Refills: 0 | Status: SHIPPED | OUTPATIENT
Start: 2021-06-08 | End: 2021-06-10 | Stop reason: SDUPTHER

## 2021-06-08 RX ORDER — CHOLECALCIFEROL (VITAMIN D3) 50 MCG
2000 TABLET ORAL DAILY
COMMUNITY

## 2021-06-08 ASSESSMENT — FIBROSIS 4 INDEX: FIB4 SCORE: 1.43

## 2021-06-09 NOTE — PROGRESS NOTES
CC: Mavis Lizarraga is a 80 y.o. female is suffering from   Chief Complaint   Patient presents with   • Diabetes     1 month follow up         SUBJECTIVE:  1. Controlled type 2 diabetes mellitus without complication, without long-term current use of insulin (HCC)  Mavis is here for follow-up has a history of poorly controlled diabetes recent glycosylated hemoglobin is at 7.7 which is really quite good    2. Vitamin D deficiency  Patient with a history of vitamin D deficiency is continue on vitamin D        Past social, family, history:   Social History     Tobacco Use   • Smoking status: Never Smoker   • Smokeless tobacco: Never Used   Vaping Use   • Vaping Use: Never used   Substance Use Topics   • Alcohol use: Yes     Comment: very rarely on ocassion will have a Sravani   • Drug use: No         MEDICATIONS:    Current Outpatient Medications:   •  Cholecalciferol (VITAMIN D) 2000 UNIT Tab, Take  by mouth every day., Disp: , Rfl:   •  glipiZIDE (GLUCOTROL) 10 MG Tab, Take 1 tablet by mouth 2 times a day., Disp: 200 tablet, Rfl: 3  •  rosuvastatin (CRESTOR) 40 MG tablet, Take 1 tablet by mouth every day., Disp: 100 tablet, Rfl: 3  •  levothyroxine (SYNTHROID) 75 MCG Tab, Take 1 tablet by mouth every morning on an empty stomach., Disp: 100 tablet, Rfl: 3  •  cyclobenzaprine (FLEXERIL) 10 mg Tab, Take 1 tablet by mouth 3 times a day as needed for Mild Pain., Disp: 30 tablet, Rfl: 0  •  meloxicam (MOBIC) 15 MG tablet, Take 1 tablet by mouth 1 time a day as needed for Moderate Pain., Disp: 100 tablet, Rfl: 3  •  pioglitazone (ACTOS) 15 MG Tab, Take 1 tablet by mouth every day., Disp: 90 tablet, Rfl: 3  •  Ascorbic Acid (VITAMIN C) 1000 MG Tab, Take 1,000 mg by mouth every morning., Disp: , Rfl:   •  Cyanocobalamin (VITAMIN B-12 PO), Take 1 tablet by mouth every morning., Disp: , Rfl:   •  CINNAMON PO, Take 1 tablet by mouth every morning., Disp: , Rfl:   •  Multiple Vitamins-Minerals (ICAPS AREDS FORMULA PO),  Take 1 tablet by mouth every morning., Disp: , Rfl:   •  NON SPECIFIED, Take 1 Package by mouth every morning. [employer] Diabetes Health Pack Vitamins, Disp: , Rfl:   •  cimetidine (TAGAMET) 400 MG Tab, TAKE ONE TABLET BY MOUTH DAILY AS NEEDED.(GENERIC FOR TAGAMET) (Patient taking differently: Take 400 mg by mouth 1 time a day as needed.), Disp: 90 Tab, Rfl: 4  •  vitamin E (VITAMIN E) 1000 UNIT Cap, Take 1,000 Units by mouth every morning., Disp: , Rfl:   •  Blood Glucose Meter Kit, Test blood sugar once daily and prn ssx of high or low blood sugar. Contour NEXT blood glucose monitoring kit., Disp: 1 Kit, Rfl: 0  •  Blood Glucose Test Strips, Use one Per formulary preference  strip to test blood sugar once daily early morning before first meal., Disp: 100 Strip, Rfl: 0  •  Lancets, Use one Per formulary preference  lancet to test blood sugar once daily early morning before first meal., Disp: 100 Each, Rfl: 0  •  glipizide-metformin (METAGLIP) 2.5-250 MG per tablet, Take 1 tablet by mouth every morning., Disp: 200 tablet, Rfl: 2  •  Alcohol Swabs, Wipe site with prep pad prior to injection., Disp: 100 Each, Rfl: 2  •  methylPREDNISolone (MEDROL DOSEPAK) 4 MG Tablet Therapy Pack, Follow schedule on package instructions. (Patient not taking: Reported on 4/21/2021), Disp: 21 tablet, Rfl: 0  •  vitamin D, Ergocalciferol, (DRISDOL) 1.25 MG (58161 UT) Cap capsule, Take 1 capsule by mouth every 7 days., Disp: 5 capsule, Rfl: 0  •  VITAMIN D PO, Take 4,000 Units by mouth every morning., Disp: , Rfl:   •  Omega-3 Fatty Acids (FISH OIL PO), Take 1 Cap by mouth every day., Disp: , Rfl:     PROBLEMS:  Patient Active Problem List    Diagnosis Date Noted   • Vitamin D deficiency 04/12/2021   • Cervical spinal stenosis 04/11/2021   • Pubic ramus fracture (HCC) 04/09/2021   • History of breast cancer    • Spasm of back muscles 03/27/2019   • Uncontrolled type 2 diabetes mellitus with hyperglycemia (HCC) 01/28/2016   • On statin  "therapy due to risk of future cardiovascular event 01/15/2013   • Acquired hypothyroidism 01/15/2013   • Gastroesophageal reflux disease with esophagitis 01/15/2013       REVIEW OF SYSTEMS:  Gen.:  No Nausea, Vomiting, fever, Chills.  Heart: No chest pain.  Lungs:  No shortness of Breath.  Psychological: Rajan unusual Anxiety depression     PHYSICAL EXAM   Constitutional: Alert, cooperative, not in acute distress.  Cardiovascular:  Rate Rhythm is regular without murmurs rubs clicks.     Thorax & Lungs: Clear to auscultation, no wheezing, rhonchi, or rales  HENT: Normocephalic, Atraumatic.  Eyes: PERRLA, EOMI, Conjunctiva normal.   Neck: Trachia is midline no swelling of the thyroid.   Lymphatic: No lymphadenopathy noted.   Neurologic: Alert & oriented x 3, cranial nerves II through XII are intact, Normal motor function, Normal sensory function, No focal deficits noted.   Psychiatric: Affect normal, Judgment normal, Mood normal.     VITAL SIGNS:/74   Pulse 60   Temp 36.4 °C (97.5 °F) (Temporal)   Resp 14   Ht 1.702 m (5' 7\")   Wt 78.9 kg (174 lb)   LMP  (LMP Unknown)   SpO2 96%   BMI 27.25 kg/m²     Labs: Reviewed    Assessment:                                                     Plan:    1. Controlled type 2 diabetes mellitus without complication, without long-term current use of insulin ([facility])  Recheck hemoglobin A1c in 6 months  - POCT Hemoglobin A1C  - Hemoglobin A1c; Future    2. Vitamin D deficiency  Medically stable        "

## 2021-06-10 DIAGNOSIS — G89.29 CHRONIC MIDLINE LOW BACK PAIN WITHOUT SCIATICA: ICD-10-CM

## 2021-06-10 DIAGNOSIS — E11.65 UNCONTROLLED TYPE 2 DIABETES MELLITUS WITH HYPERGLYCEMIA (HCC): ICD-10-CM

## 2021-06-10 DIAGNOSIS — E78.2 MIXED HYPERLIPIDEMIA: ICD-10-CM

## 2021-06-10 DIAGNOSIS — Z85.3 HISTORY OF BREAST CANCER: ICD-10-CM

## 2021-06-10 DIAGNOSIS — M62.830 SPASM OF BACK MUSCLES: ICD-10-CM

## 2021-06-10 DIAGNOSIS — E03.4 HYPOTHYROIDISM DUE TO ACQUIRED ATROPHY OF THYROID: ICD-10-CM

## 2021-06-10 DIAGNOSIS — Z12.31 ENCOUNTER FOR SCREENING MAMMOGRAM FOR MALIGNANT NEOPLASM OF BREAST: ICD-10-CM

## 2021-06-10 DIAGNOSIS — M54.50 CHRONIC MIDLINE LOW BACK PAIN WITHOUT SCIATICA: ICD-10-CM

## 2021-06-10 RX ORDER — LANCETS 30 GAUGE
EACH MISCELLANEOUS
Qty: 100 EACH | Refills: 3 | Status: SHIPPED | OUTPATIENT
Start: 2021-06-10 | End: 2022-02-02 | Stop reason: SDUPTHER

## 2021-06-10 RX ORDER — GLUCOSAMINE HCL/CHONDROITIN SU 500-400 MG
CAPSULE ORAL
Qty: 100 EACH | Refills: 3 | Status: SHIPPED | OUTPATIENT
Start: 2021-06-10 | End: 2023-02-28

## 2021-06-30 ENCOUNTER — HOSPITAL ENCOUNTER (EMERGENCY)
Facility: MEDICAL CENTER | Age: 81
End: 2021-06-30
Attending: EMERGENCY MEDICINE
Payer: MEDICARE

## 2021-06-30 VITALS
RESPIRATION RATE: 16 BRPM | HEIGHT: 67 IN | HEART RATE: 62 BPM | DIASTOLIC BLOOD PRESSURE: 70 MMHG | TEMPERATURE: 98.2 F | WEIGHT: 176.37 LBS | BODY MASS INDEX: 27.68 KG/M2 | OXYGEN SATURATION: 98 % | SYSTOLIC BLOOD PRESSURE: 132 MMHG

## 2021-06-30 DIAGNOSIS — S39.012A STRAIN OF LUMBAR REGION, INITIAL ENCOUNTER: ICD-10-CM

## 2021-06-30 LAB
APPEARANCE UR: CLEAR
BACTERIA #/AREA URNS HPF: ABNORMAL /HPF
BILIRUB UR QL STRIP.AUTO: NEGATIVE
COLOR UR: YELLOW
EPI CELLS #/AREA URNS HPF: ABNORMAL /HPF
GLUCOSE UR STRIP.AUTO-MCNC: NEGATIVE MG/DL
HYALINE CASTS #/AREA URNS LPF: ABNORMAL /LPF
KETONES UR STRIP.AUTO-MCNC: NEGATIVE MG/DL
LEUKOCYTE ESTERASE UR QL STRIP.AUTO: ABNORMAL
MICRO URNS: ABNORMAL
NITRITE UR QL STRIP.AUTO: NEGATIVE
PH UR STRIP.AUTO: 7 [PH] (ref 5–8)
PROT UR QL STRIP: NEGATIVE MG/DL
RBC # URNS HPF: ABNORMAL /HPF
RBC UR QL AUTO: NEGATIVE
SP GR UR STRIP.AUTO: 1.01
WBC #/AREA URNS HPF: ABNORMAL /HPF

## 2021-06-30 PROCEDURE — 81001 URINALYSIS AUTO W/SCOPE: CPT

## 2021-06-30 PROCEDURE — 700111 HCHG RX REV CODE 636 W/ 250 OVERRIDE (IP): Performed by: EMERGENCY MEDICINE

## 2021-06-30 PROCEDURE — 99284 EMERGENCY DEPT VISIT MOD MDM: CPT

## 2021-06-30 PROCEDURE — 700102 HCHG RX REV CODE 250 W/ 637 OVERRIDE(OP): Performed by: EMERGENCY MEDICINE

## 2021-06-30 PROCEDURE — 96372 THER/PROPH/DIAG INJ SC/IM: CPT

## 2021-06-30 PROCEDURE — A9270 NON-COVERED ITEM OR SERVICE: HCPCS | Performed by: EMERGENCY MEDICINE

## 2021-06-30 RX ORDER — HYDROCODONE BITARTRATE AND ACETAMINOPHEN 5; 325 MG/1; MG/1
1 TABLET ORAL ONCE
Status: COMPLETED | OUTPATIENT
Start: 2021-06-30 | End: 2021-06-30

## 2021-06-30 RX ORDER — PREDNISONE 50 MG/1
50 TABLET ORAL ONCE
Status: COMPLETED | OUTPATIENT
Start: 2021-06-30 | End: 2021-06-30

## 2021-06-30 RX ORDER — KETOROLAC TROMETHAMINE 30 MG/ML
15 INJECTION, SOLUTION INTRAMUSCULAR; INTRAVENOUS ONCE
Status: COMPLETED | OUTPATIENT
Start: 2021-06-30 | End: 2021-06-30

## 2021-06-30 RX ORDER — IBUPROFEN 600 MG/1
600 TABLET ORAL EVERY 6 HOURS PRN
Qty: 30 TABLET | Refills: 0 | Status: SHIPPED | OUTPATIENT
Start: 2021-06-30 | End: 2021-08-20

## 2021-06-30 RX ORDER — PREDNISONE 20 MG/1
40 TABLET ORAL DAILY
Qty: 10 TABLET | Refills: 0 | Status: SHIPPED | OUTPATIENT
Start: 2021-06-30 | End: 2021-07-05

## 2021-06-30 RX ORDER — HYDROCODONE BITARTRATE AND ACETAMINOPHEN 5; 325 MG/1; MG/1
1 TABLET ORAL EVERY 6 HOURS PRN
Qty: 16 TABLET | Refills: 0 | Status: SHIPPED | OUTPATIENT
Start: 2021-06-30 | End: 2021-07-04

## 2021-06-30 RX ADMIN — PREDNISONE 50 MG: 50 TABLET ORAL at 08:43

## 2021-06-30 RX ADMIN — KETOROLAC TROMETHAMINE 15 MG: 30 INJECTION, SOLUTION INTRAMUSCULAR; INTRAVENOUS at 08:43

## 2021-06-30 RX ADMIN — HYDROCODONE BITARTRATE AND ACETAMINOPHEN 1 TABLET: 5; 325 TABLET ORAL at 08:19

## 2021-06-30 ASSESSMENT — ENCOUNTER SYMPTOMS
ABDOMINAL PAIN: 0
NAUSEA: 0
FEVER: 0
TINGLING: 0
VOMITING: 0
WEAKNESS: 0
BACK PAIN: 1
FALLS: 0
COUGH: 0
HEADACHES: 0

## 2021-06-30 ASSESSMENT — FIBROSIS 4 INDEX: FIB4 SCORE: 1.43

## 2021-06-30 NOTE — ED NOTES
Released with all follow-up, RX to pharmacy of choice. Encouraged to follow-up with PCP and have her MRI as scheduled.

## 2021-06-30 NOTE — ED TRIAGE NOTES
Chief Complaint   Patient presents with   • Back Pain     1 week of low back pain, unknown why, denies trauma. Painnow radiates down left leg.

## 2021-07-07 ENCOUNTER — OFFICE VISIT (OUTPATIENT)
Dept: MEDICAL GROUP | Facility: LAB | Age: 81
End: 2021-07-07
Payer: MEDICARE

## 2021-07-07 VITALS
SYSTOLIC BLOOD PRESSURE: 128 MMHG | HEIGHT: 67 IN | HEART RATE: 76 BPM | WEIGHT: 170 LBS | RESPIRATION RATE: 12 BRPM | DIASTOLIC BLOOD PRESSURE: 68 MMHG | OXYGEN SATURATION: 96 % | TEMPERATURE: 97.5 F | BODY MASS INDEX: 26.68 KG/M2

## 2021-07-07 DIAGNOSIS — M54.50 ACUTE LEFT-SIDED LOW BACK PAIN WITHOUT SCIATICA: ICD-10-CM

## 2021-07-07 PROCEDURE — 99213 OFFICE O/P EST LOW 20 MIN: CPT | Performed by: FAMILY MEDICINE

## 2021-07-07 RX ORDER — HYDROCODONE BITARTRATE AND ACETAMINOPHEN 5; 325 MG/1; MG/1
1 TABLET ORAL EVERY 4 HOURS PRN
COMMUNITY
End: 2021-07-14

## 2021-07-07 RX ORDER — PREDNISONE 20 MG/1
20 TABLET ORAL DAILY
COMMUNITY
End: 2023-01-09

## 2021-07-07 ASSESSMENT — PATIENT HEALTH QUESTIONNAIRE - PHQ9: CLINICAL INTERPRETATION OF PHQ2 SCORE: 0

## 2021-07-07 ASSESSMENT — FIBROSIS 4 INDEX: FIB4 SCORE: 1.43

## 2021-07-07 NOTE — PROGRESS NOTES
"Subjective:     CC: Back pain    HPI:   Mavis is a 81 yo female patient of Dr. Webber who presents today for back pain and ER follow up.    She has a remote history of breast CA and chronic back pain and had been seen in the [facility] ER on 4/8 with bilateral lower leg weakness following a fall.  She was found to have a fracture of the right superior and inferior pubic rami as well as 3 right-sided ribs.  She had an MRI of the and higher spine and brain, showed nonspecific sclerotic lesions in cervical vertebrae as well as a 9 mm lesion at L4.  She also had degenerative disc disease with bulging disc at T10/T11 causing severe central canal narrowing.  Sclerotic lesions were also seen in the frontal bone of the skull.  She was evaluated by both neurosurgery and orthopedics who did not recommend any intervention.  She had a bone scan that showed no concerning lesions.  She also had a PET/CT scan showed no activity at the suspicious areas.    She did see oncology in May who thought that given the above information the lesions were likely benign related to past injury.  Did recommend repeat MRI of the brain and the spine in 3 months.    Most recently seen in the ER 7 days ago for a different left-sided low back pain.  She had no red flag signs or symptoms and she was discharged with prednisone course and Norco.  Pain started about 2 weeks ago, no inciting event or trauma.  Described as dull and 10/10 to the left low back.  Possibly with some \"tingly pain \"that radiates in the left groin and anterior left leg.  She had weakness at visit in April but does say this has improved.  No incontinence, no saddle anesthesia, no fevers or chills.  She did not see improvement with the Norco.  She is currently just taking Motrin, does not think this is helping either.    Medications, past medical history, allergies, and social history have been reviewed and updated.    ROS:  See HPI      Objective:       Exam:  /68 (BP " "Location: Left arm, Patient Position: Sitting, BP Cuff Size: Adult)   Pulse 76   Temp 36.4 °C (97.5 °F)   Resp 12   Ht 1.702 m (5' 7\")   Wt 77.1 kg (170 lb)   LMP  (LMP Unknown)   SpO2 96%   BMI 26.63 kg/m²  Body mass index is 26.63 kg/m².    Constitutional: Alert. Well appearing. No distress.  Skin: Warm, dry, good turgor, no visible rashes.  Eye: Equal, round and reactive to light, conjunctiva clear, lids normal.  ENMT: Moist mucous membranes. Normal dentition.  Respiratory: Normal effort.   Back: No lumbar midline tenderness, mild left-sided paraspinal tenderness.  Normal range of motion of the lumbar spine.  Negative straight leg test bilaterally.  Normal, nonpainful range of motion to bilateral hips.  5/5 strength both lower extremities and sensation is grossly intact to light touch to both lower extremities.  Patellar DTRs are 2+ and symmetric.  Neuro: Moves all four extremities. No facial droop.  Psych: Answers questions appropriately. Normal affect and mood.      Assessment & Plan:     80 y.o. female patient of Dr. Webber with the following -     1. Acute left-sided low back pain without sciatica    She had recently followed with oncology due to bony lesions seen on imaging after fall in April.  Further work-up including PET/CT scan did not show anything concerning and it was thought there were likely not malignant in nature.    She presented again to the ER with new left-sided back pain.  No red flag signs or symptoms.  She does have repeat lumbar and brain imaging planned next month.  I do not see an indication to move up the imaging at this point.  We discussed pain control with meloxicam, Tylenol, and Flexeril.  Activity as tolerated including stretching the low back.  Discussed following up ifsymptoms progress or she develops any new concerning symptoms.  Follow-up as scheduled with her PCP.      Please note that this note was created using voice recognition software.      "

## 2021-07-14 ENCOUNTER — TELEPHONE (OUTPATIENT)
Dept: MEDICAL GROUP | Facility: LAB | Age: 81
End: 2021-07-14

## 2021-07-14 DIAGNOSIS — G89.29 CHRONIC LOW BACK PAIN, UNSPECIFIED BACK PAIN LATERALITY, UNSPECIFIED WHETHER SCIATICA PRESENT: ICD-10-CM

## 2021-07-14 DIAGNOSIS — M54.50 CHRONIC LOW BACK PAIN, UNSPECIFIED BACK PAIN LATERALITY, UNSPECIFIED WHETHER SCIATICA PRESENT: ICD-10-CM

## 2021-07-14 RX ORDER — TRAMADOL HYDROCHLORIDE 50 MG/1
50 TABLET ORAL EVERY 4 HOURS PRN
Qty: 30 TABLET | Refills: 0 | Status: SHIPPED | OUTPATIENT
Start: 2021-07-14 | End: 2022-09-09 | Stop reason: SDUPTHER

## 2021-07-14 NOTE — TELEPHONE ENCOUNTER
1. Caller Name: Mavis Lizarraga                        Call Back Number: [phone] (home) [phone] (work)        How would the patient prefer to be contacted with a response:     Pt is still having sciatic pain. Asking for something stronger then OTC tylenol.

## 2021-07-14 NOTE — TELEPHONE ENCOUNTER
Shanti:   Please call Mavis, prescription for tramadol is been sent to her [facility].   Regards, Waylon Webber, DO

## 2021-08-09 ENCOUNTER — HOSPITAL ENCOUNTER (OUTPATIENT)
Dept: RADIOLOGY | Facility: MEDICAL CENTER | Age: 81
End: 2021-08-09
Attending: INTERNAL MEDICINE
Payer: MEDICARE

## 2021-08-09 DIAGNOSIS — C50.811 MALIGNANT NEOPLASM OF OVERLAPPING SITES OF RIGHT FEMALE BREAST, UNSPECIFIED ESTROGEN RECEPTOR STATUS (HCC): ICD-10-CM

## 2021-08-09 DIAGNOSIS — I82.890 SPLENIC VEIN THROMBOSIS: ICD-10-CM

## 2021-08-09 PROCEDURE — 700117 HCHG RX CONTRAST REV CODE 255: Performed by: INTERNAL MEDICINE

## 2021-08-09 PROCEDURE — 72156 MRI NECK SPINE W/O & W/DYE: CPT | Mod: MH

## 2021-08-09 PROCEDURE — 70553 MRI BRAIN STEM W/O & W/DYE: CPT | Mod: MH

## 2021-08-09 PROCEDURE — 72157 MRI CHEST SPINE W/O & W/DYE: CPT | Mod: MH

## 2021-08-09 PROCEDURE — A9576 INJ PROHANCE MULTIPACK: HCPCS | Performed by: INTERNAL MEDICINE

## 2021-08-09 PROCEDURE — 72158 MRI LUMBAR SPINE W/O & W/DYE: CPT | Mod: MH

## 2021-08-09 RX ADMIN — GADOTERIDOL 17 ML: 279.3 INJECTION, SOLUTION INTRAVENOUS at 15:47

## 2021-08-16 ENCOUNTER — TELEPHONE (OUTPATIENT)
Dept: MEDICAL GROUP | Facility: LAB | Age: 81
End: 2021-08-16

## 2021-08-16 NOTE — TELEPHONE ENCOUNTER
ESTABLISHED PATIENT PRE-VISIT PLANNING     Patient was NOT contacted to complete PVP.     Note: Patient will not be contacted if there is no indication to call.     1.  Reviewed notes from the last few office visits within the medical group: Yes    2.  If any orders were placed at last visit or intended to be done for this visit (i.e. 6 mos follow-up), do we have Results/Consult Notes?         •  Labs - Labs ordered, NOT completed. Patient advised to complete prior to next appointment.  Note: If patient appointment is for lab review and patient did not complete labs, check with provider if OK to reschedule patient until labs completed.       •  Imaging - Imaging was not ordered at last office visit.       •  Referrals - No referrals were ordered at last office visit.    3. Is this appointment scheduled as a Hospital Follow-Up? No    4.  Immunizations were updated in Epic using Reconcile Outside Information activity? Yes    5.  Patient is due for the following Health Maintenance Topics:   Health Maintenance Due   Topic Date Due   • URINE ACR / MICROALBUMIN  03/28/2020   • DIABETES MONOFILAMENT / LE EXAM  08/14/2020   • RETINAL SCREENING  10/16/2020   • IMM ZOSTER VACCINES (3 of 3) 10/21/2020   • FASTING LIPID PROFILE  12/27/2020     6.  AHA (Pulse8) form printed for Provider? No, patient does not have any open alerts

## 2021-08-20 DIAGNOSIS — M62.830 SPASM OF BACK MUSCLES: ICD-10-CM

## 2021-08-20 DIAGNOSIS — M54.50 CHRONIC MIDLINE LOW BACK PAIN WITHOUT SCIATICA: ICD-10-CM

## 2021-08-20 DIAGNOSIS — E03.4 HYPOTHYROIDISM DUE TO ACQUIRED ATROPHY OF THYROID: ICD-10-CM

## 2021-08-20 DIAGNOSIS — Z85.3 HISTORY OF BREAST CANCER: ICD-10-CM

## 2021-08-20 DIAGNOSIS — E78.2 MIXED HYPERLIPIDEMIA: ICD-10-CM

## 2021-08-20 DIAGNOSIS — Z12.31 ENCOUNTER FOR SCREENING MAMMOGRAM FOR MALIGNANT NEOPLASM OF BREAST: ICD-10-CM

## 2021-08-20 DIAGNOSIS — G89.29 CHRONIC MIDLINE LOW BACK PAIN WITHOUT SCIATICA: ICD-10-CM

## 2021-08-20 DIAGNOSIS — E11.65 UNCONTROLLED TYPE 2 DIABETES MELLITUS WITH HYPERGLYCEMIA (HCC): ICD-10-CM

## 2021-08-20 RX ORDER — CYCLOBENZAPRINE HCL 10 MG
10 TABLET ORAL 3 TIMES DAILY PRN
Qty: 30 TABLET | Refills: 0 | Status: SHIPPED | OUTPATIENT
Start: 2021-08-20 | End: 2021-11-03

## 2021-08-20 RX ORDER — MELOXICAM 15 MG/1
15 TABLET ORAL
Qty: 100 TABLET | Refills: 3 | Status: ON HOLD | OUTPATIENT
Start: 2021-08-20 | End: 2023-03-02

## 2021-08-20 NOTE — TELEPHONE ENCOUNTER
Received request via: Pharmacy    Was the patient seen in the last year in this department? Yes  7/7/21  Does the patient have an active prescription (recently filled or refills available) for medication(s) requested? No

## 2021-08-23 ENCOUNTER — OFFICE VISIT (OUTPATIENT)
Dept: MEDICAL GROUP | Facility: LAB | Age: 81
End: 2021-08-23
Payer: MEDICARE

## 2021-08-23 VITALS
HEIGHT: 67 IN | DIASTOLIC BLOOD PRESSURE: 75 MMHG | HEART RATE: 76 BPM | WEIGHT: 170 LBS | RESPIRATION RATE: 12 BRPM | BODY MASS INDEX: 26.68 KG/M2 | TEMPERATURE: 97.4 F | OXYGEN SATURATION: 97 % | SYSTOLIC BLOOD PRESSURE: 132 MMHG

## 2021-08-23 DIAGNOSIS — M54.50 ACUTE LEFT-SIDED LOW BACK PAIN, UNSPECIFIED WHETHER SCIATICA PRESENT: ICD-10-CM

## 2021-08-23 DIAGNOSIS — R93.89 ABNORMAL MRI: ICD-10-CM

## 2021-08-23 DIAGNOSIS — Z85.3 HISTORY OF BREAST CANCER: ICD-10-CM

## 2021-08-23 PROCEDURE — 99214 OFFICE O/P EST MOD 30 MIN: CPT | Performed by: INTERNAL MEDICINE

## 2021-08-23 ASSESSMENT — FIBROSIS 4 INDEX: FIB4 SCORE: 1.43

## 2021-08-24 NOTE — PROGRESS NOTES
CC: Mavis Lizarraga is a 80 y.o. female is suffering from   Chief Complaint   Patient presents with   • Follow-Up     MRI          SUBJECTIVE:  1. Acute left-sided low back pain, unspecified whether sciatica present  Mavis is here for follow-up, appears to have significant improvement in her low back pain with chiropractic treatment and manipulation.  We have discussed that she does have an MRI which is abnormal    2. Abnormal MRI  Patient's MRI of the lumbar spine was reviewed, there is a concern about possible abnormality associate with the L4 vertebral body will we will continue to monitor this    3. History of breast cancer  Patient with a history of breast cancer is being followed by Dr. Nilesh Rizzo        Past social, family, history: Boyfriend  Social History     Tobacco Use   • Smoking status: Never Smoker   • Smokeless tobacco: Never Used   Vaping Use   • Vaping Use: Never used   Substance Use Topics   • Alcohol use: Yes     Comment: very rarely on ocassion will have a Sravani   • Drug use: No         MEDICATIONS:    Current Outpatient Medications:   •  meloxicam (MOBIC) 15 MG tablet, Take 1 Tablet by mouth 1 time a day as needed for Moderate Pain., Disp: 100 Tablet, Rfl: 3  •  cyclobenzaprine (FLEXERIL) 10 mg Tab, Take 1 Tablet by mouth 3 times a day as needed for Mild Pain., Disp: 30 Tablet, Rfl: 0  •  predniSONE (DELTASONE) 20 MG Tab, Take 20 mg by mouth every day., Disp: , Rfl:   •  Blood Glucose Test Strips, Use one Per formulary preference  strip to test blood sugar once daily early morning before first meal., Disp: 100 Strip, Rfl: 3  •  Lancets, Use one Per formulary preference  lancet to test blood sugar once daily early morning before first meal., Disp: 100 Each, Rfl: 3  •  Alcohol Swabs, Wipe site with prep pad prior to injection., Disp: 100 Each, Rfl: 3  •  Blood Glucose Meter Kit, Test blood sugar once daily and prn ssx of high or low blood sugar. Contour NEXT blood glucose monitoring  kit., Disp: 1 Kit, Rfl: 0  •  Cholecalciferol (VITAMIN D) 2000 UNIT Tab, Take  by mouth every day., Disp: , Rfl:   •  glipiZIDE (GLUCOTROL) 10 MG Tab, Take 1 tablet by mouth 2 times a day., Disp: 200 tablet, Rfl: 3  •  rosuvastatin (CRESTOR) 40 MG tablet, Take 1 tablet by mouth every day., Disp: 100 tablet, Rfl: 3  •  levothyroxine (SYNTHROID) 75 MCG Tab, Take 1 tablet by mouth every morning on an empty stomach., Disp: 100 tablet, Rfl: 3  •  pioglitazone (ACTOS) 15 MG Tab, Take 1 tablet by mouth every day., Disp: 90 tablet, Rfl: 3  •  vitamin D, Ergocalciferol, (DRISDOL) 1.25 MG (42318 UT) Cap capsule, Take 1 capsule by mouth every 7 days., Disp: 5 capsule, Rfl: 0  •  VITAMIN D PO, Take 4,000 Units by mouth every morning., Disp: , Rfl:   •  Ascorbic Acid (VITAMIN C) 1000 MG Tab, Take 1,000 mg by mouth every morning., Disp: , Rfl:   •  Cyanocobalamin (VITAMIN B-12 PO), Take 1 tablet by mouth every morning., Disp: , Rfl:   •  CINNAMON PO, Take 1 tablet by mouth every morning., Disp: , Rfl:   •  Multiple Vitamins-Minerals (ICAPS AREDS FORMULA PO), Take 1 tablet by mouth every morning., Disp: , Rfl:   •  NON SPECIFIED, Take 1 Package by mouth every morning. [facility] Health Pack Vitamins, Disp: , Rfl:   •  vitamin E (VITAMIN E) 1000 UNIT Cap, Take 1,000 Units by mouth every morning., Disp: , Rfl:   •  Omega-3 Fatty Acids (FISH OIL PO), Take 1 Cap by mouth every day., Disp: , Rfl:     PROBLEMS:  Patient Active Problem List    Diagnosis Date Noted   • Vitamin D deficiency 04/12/2021   • Cervical spinal stenosis 04/11/2021   • Pubic ramus fracture (HCC) 04/09/2021   • History of breast cancer    • Spasm of back muscles 03/27/2019   • Uncontrolled type 2 diabetes mellitus with hyperglycemia (HCC) 01/28/2016   • On statin therapy due to risk of future cardiovascular event 01/15/2013   • Acquired hypothyroidism 01/15/2013   • Gastroesophageal reflux disease with esophagitis 01/15/2013       REVIEW OF  "SYSTEMS:  Gen.:  No Nausea, Vomiting, fever, Chills.  Heart: No chest pain.  Lungs:  No shortness of Breath.  Psychological: Rajan unusual Anxiety depression     PHYSICAL EXAM   Constitutional: Alert, cooperative, not in acute distress.  Cardiovascular:  Rate Rhythm is regular without murmurs rubs clicks.     Thorax & Lungs: Clear to auscultation, no wheezing, rhonchi, or rales  HENT: Normocephalic, Atraumatic.  Eyes: PERRLA, EOMI, Conjunctiva normal.   Neck: Trachia is midline no swelling of the thyroid.   Lymphatic: No lymphadenopathy noted.   Musculoskeletal: Retained forward flexion extension side bending rotation, minimal pain with palpation of trochanteric bursa bilaterally  Neurologic: Alert & oriented x 3, cranial nerves II through XII are intact, Normal motor function, Normal sensory function, No focal deficits noted.   Psychiatric: Affect normal, Judgment normal, Mood normal.     VITAL SIGNS:/75   Pulse 76   Temp 36.3 °C (97.4 °F)   Resp 12   Ht 1.702 m (5' 7\")   Wt 77.1 kg (170 lb)   LMP  (LMP Unknown)   SpO2 97%   BMI 26.63 kg/m²     Labs: Reviewed    Assessment:                                                     Plan:    1. Acute left-sided low back pain, unspecified whether sciatica present  Ongoing low back pain but with significant improvement with chiropractic manipulation no change in medical therapy    2. Abnormal MRI  Reviewed MRI of the lumbar spine with the patient    3. History of breast cancer  History of breast cancer to be followed by Dr. Nilesh Rizzo        "

## 2021-09-03 ENCOUNTER — HOSPITAL ENCOUNTER (OUTPATIENT)
Dept: LAB | Facility: MEDICAL CENTER | Age: 81
End: 2021-09-03
Attending: INTERNAL MEDICINE
Payer: MEDICARE

## 2021-09-03 DIAGNOSIS — E11.9 CONTROLLED TYPE 2 DIABETES MELLITUS WITHOUT COMPLICATION, WITHOUT LONG-TERM CURRENT USE OF INSULIN (HCC): ICD-10-CM

## 2021-09-03 LAB
ALBUMIN SERPL BCP-MCNC: 4.3 G/DL (ref 3.2–4.9)
ALBUMIN/GLOB SERPL: 2.3 G/DL
ALP SERPL-CCNC: 50 U/L (ref 30–99)
ALT SERPL-CCNC: 11 U/L (ref 2–50)
ANION GAP SERPL CALC-SCNC: 9 MMOL/L (ref 7–16)
AST SERPL-CCNC: 13 U/L (ref 12–45)
BASOPHILS # BLD AUTO: 0.3 % (ref 0–1.8)
BASOPHILS # BLD: 0.01 K/UL (ref 0–0.12)
BILIRUB SERPL-MCNC: 0.8 MG/DL (ref 0.1–1.5)
BUN SERPL-MCNC: 13 MG/DL (ref 8–22)
CALCIUM SERPL-MCNC: 9.2 MG/DL (ref 8.4–10.2)
CHLORIDE SERPL-SCNC: 106 MMOL/L (ref 96–112)
CO2 SERPL-SCNC: 27 MMOL/L (ref 20–33)
CREAT SERPL-MCNC: 0.42 MG/DL (ref 0.5–1.4)
EOSINOPHIL # BLD AUTO: 0.05 K/UL (ref 0–0.51)
EOSINOPHIL NFR BLD: 1.5 % (ref 0–6.9)
ERYTHROCYTE [DISTWIDTH] IN BLOOD BY AUTOMATED COUNT: 42.6 FL (ref 35.9–50)
EST. AVERAGE GLUCOSE BLD GHB EST-MCNC: 157 MG/DL
GLOBULIN SER CALC-MCNC: 1.9 G/DL (ref 1.9–3.5)
GLUCOSE SERPL-MCNC: 159 MG/DL (ref 65–99)
HBA1C MFR BLD: 7.1 % (ref 4–5.6)
HCT VFR BLD AUTO: 39.6 % (ref 37–47)
HGB BLD-MCNC: 13.2 G/DL (ref 12–16)
IMM GRANULOCYTES # BLD AUTO: 0.01 K/UL (ref 0–0.11)
IMM GRANULOCYTES NFR BLD AUTO: 0.3 % (ref 0–0.9)
LDH SERPL L TO P-CCNC: 200 U/L (ref 107–266)
LYMPHOCYTES # BLD AUTO: 1.33 K/UL (ref 1–4.8)
LYMPHOCYTES NFR BLD: 38.9 % (ref 22–41)
MCH RBC QN AUTO: 29.9 PG (ref 27–33)
MCHC RBC AUTO-ENTMCNC: 33.3 G/DL (ref 33.6–35)
MCV RBC AUTO: 89.8 FL (ref 81.4–97.8)
MONOCYTES # BLD AUTO: 0.28 K/UL (ref 0–0.85)
MONOCYTES NFR BLD AUTO: 8.2 % (ref 0–13.4)
NEUTROPHILS # BLD AUTO: 1.74 K/UL (ref 2–7.15)
NEUTROPHILS NFR BLD: 50.8 % (ref 44–72)
NRBC # BLD AUTO: 0 K/UL
NRBC BLD-RTO: 0 /100 WBC
PLATELET # BLD AUTO: 152 K/UL (ref 164–446)
PMV BLD AUTO: 9.5 FL (ref 9–12.9)
POTASSIUM SERPL-SCNC: 4.4 MMOL/L (ref 3.6–5.5)
PROT SERPL-MCNC: 6.2 G/DL (ref 6–8.2)
RBC # BLD AUTO: 4.41 M/UL (ref 4.2–5.4)
SODIUM SERPL-SCNC: 142 MMOL/L (ref 135–145)
WBC # BLD AUTO: 3.4 K/UL (ref 4.8–10.8)

## 2021-09-03 PROCEDURE — 83615 LACTATE (LD) (LDH) ENZYME: CPT

## 2021-09-03 PROCEDURE — 36415 COLL VENOUS BLD VENIPUNCTURE: CPT

## 2021-09-03 PROCEDURE — 83036 HEMOGLOBIN GLYCOSYLATED A1C: CPT

## 2021-09-03 PROCEDURE — 80053 COMPREHEN METABOLIC PANEL: CPT

## 2021-09-03 PROCEDURE — 85025 COMPLETE CBC W/AUTO DIFF WBC: CPT

## 2021-11-03 DIAGNOSIS — Z85.3 HISTORY OF BREAST CANCER: ICD-10-CM

## 2021-11-03 DIAGNOSIS — E11.65 UNCONTROLLED TYPE 2 DIABETES MELLITUS WITH HYPERGLYCEMIA (HCC): ICD-10-CM

## 2021-11-03 DIAGNOSIS — E03.4 HYPOTHYROIDISM DUE TO ACQUIRED ATROPHY OF THYROID: ICD-10-CM

## 2021-11-03 DIAGNOSIS — G89.29 CHRONIC MIDLINE LOW BACK PAIN WITHOUT SCIATICA: ICD-10-CM

## 2021-11-03 DIAGNOSIS — M62.830 SPASM OF BACK MUSCLES: ICD-10-CM

## 2021-11-03 DIAGNOSIS — E78.2 MIXED HYPERLIPIDEMIA: ICD-10-CM

## 2021-11-03 DIAGNOSIS — M54.50 CHRONIC MIDLINE LOW BACK PAIN WITHOUT SCIATICA: ICD-10-CM

## 2021-11-03 DIAGNOSIS — Z12.31 ENCOUNTER FOR SCREENING MAMMOGRAM FOR MALIGNANT NEOPLASM OF BREAST: ICD-10-CM

## 2021-11-03 RX ORDER — CYCLOBENZAPRINE HCL 10 MG
TABLET ORAL
Qty: 30 TABLET | Refills: 3 | Status: SHIPPED | OUTPATIENT
Start: 2021-11-03 | End: 2022-09-09 | Stop reason: SDUPTHER

## 2021-11-03 NOTE — TELEPHONE ENCOUNTER
Received request via: Pharmacy    Was the patient seen in the last year in this department? Yes  LOV 08/23/2021  Does the patient have an active prescription (recently filled or refills available) for medication(s) requested? No

## 2021-12-07 ENCOUNTER — TELEPHONE (OUTPATIENT)
Dept: MEDICAL GROUP | Facility: LAB | Age: 81
End: 2021-12-07

## 2021-12-07 NOTE — TELEPHONE ENCOUNTER
ESTABLISHED PATIENT PRE-VISIT PLANNING     Patient was NOT contacted to complete PVP.     Note: Patient will not be contacted if there is no indication to call.     1.  Reviewed notes from the last few office visits within the medical group: Yes    2.  If any orders were placed at last visit or intended to be done for this visit (i.e. 6 mos follow-up), do we have Results/Consult Notes?         •  Labs - Labs were not ordered at last office visit.  Note: If patient appointment is for lab review and patient did not complete labs, check with provider if OK to reschedule patient until labs completed.       •  Imaging - Imaging was not ordered at last office visit.       •  Referrals - No referrals were ordered at last office visit.    3. Is this appointment scheduled as a Hospital Follow-Up? No    4.  Immunizations were updated in Epic using Reconcile Outside Information activity? Yes    5.  Patient is due for the following Health Maintenance Topics:   Health Maintenance Due   Topic Date Due   • URINE ACR / MICROALBUMIN  03/28/2020   • DIABETES MONOFILAMENT / LE EXAM  08/14/2020   • RETINAL SCREENING  10/16/2020   • IMM ZOSTER VACCINES (3 of 3) 10/21/2020   • FASTING LIPID PROFILE  12/27/2020   • IMM INFLUENZA (1) 09/01/2021         6.  AHA (Pulse8) form printed for Provider? No, patient does not have any open alerts

## 2021-12-10 ENCOUNTER — OFFICE VISIT (OUTPATIENT)
Dept: MEDICAL GROUP | Facility: LAB | Age: 81
End: 2021-12-10
Payer: MEDICARE

## 2021-12-10 VITALS
TEMPERATURE: 96.7 F | OXYGEN SATURATION: 93 % | DIASTOLIC BLOOD PRESSURE: 70 MMHG | WEIGHT: 178.2 LBS | HEART RATE: 81 BPM | HEIGHT: 67 IN | BODY MASS INDEX: 27.97 KG/M2 | SYSTOLIC BLOOD PRESSURE: 120 MMHG

## 2021-12-10 DIAGNOSIS — Z23 NEED FOR INFLUENZA VACCINATION: ICD-10-CM

## 2021-12-10 DIAGNOSIS — E11.65 UNCONTROLLED TYPE 2 DIABETES MELLITUS WITH HYPERGLYCEMIA (HCC): ICD-10-CM

## 2021-12-10 DIAGNOSIS — M62.830 SPASM OF BACK MUSCLES: ICD-10-CM

## 2021-12-10 PROCEDURE — 99214 OFFICE O/P EST MOD 30 MIN: CPT | Mod: 25 | Performed by: INTERNAL MEDICINE

## 2021-12-10 PROCEDURE — G0008 ADMIN INFLUENZA VIRUS VAC: HCPCS | Performed by: INTERNAL MEDICINE

## 2021-12-10 PROCEDURE — 90662 IIV NO PRSV INCREASED AG IM: CPT | Performed by: INTERNAL MEDICINE

## 2021-12-10 ASSESSMENT — FIBROSIS 4 INDEX: FIB4 SCORE: 2.06

## 2021-12-10 NOTE — PROGRESS NOTES
CC: Mavis Lizarraga is a 81 y.o. female is suffering from   Chief Complaint   Patient presents with   • Follow-Up         SUBJECTIVE:  1. Need for influenza vaccination  Mavis is here for follow-up has need for influenza vaccination which was given    2. Uncontrolled type 2 diabetes mellitus with hyperglycemia (HCC)  Patient with a history of type 2 diabetes with good control currently    3. Spasm of back muscles  History of back spasm treated effectively with chiropractic manipulation with Dr. Raphael Mejia        Past social, family, history:   Social History     Tobacco Use   • Smoking status: Never Smoker   • Smokeless tobacco: Never Used   Vaping Use   • Vaping Use: Never used   Substance Use Topics   • Alcohol use: Yes     Comment: very rarely on ocassion will have a Sravani   • Drug use: No         MEDICATIONS:    Current Outpatient Medications:   •  cyclobenzaprine (FLEXERIL) 10 mg Tab, TAKE ONE TABLET BY MOUTH THREE TIMES A DAY AS NEEDED FOR MILD PAIN (FLEXERIL), Disp: 30 Tablet, Rfl: 3  •  meloxicam (MOBIC) 15 MG tablet, Take 1 Tablet by mouth 1 time a day as needed for Moderate Pain., Disp: 100 Tablet, Rfl: 3  •  predniSONE (DELTASONE) 20 MG Tab, Take 20 mg by mouth every day., Disp: , Rfl:   •  Blood Glucose Test Strips, Use one Per formulary preference  strip to test blood sugar once daily early morning before first meal., Disp: 100 Strip, Rfl: 3  •  Lancets, Use one Per formulary preference  lancet to test blood sugar once daily early morning before first meal., Disp: 100 Each, Rfl: 3  •  Alcohol Swabs, Wipe site with prep pad prior to injection., Disp: 100 Each, Rfl: 3  •  Blood Glucose Meter Kit, Test blood sugar once daily and prn ssx of high or low blood sugar. Contour NEXT blood glucose monitoring kit., Disp: 1 Kit, Rfl: 0  •  Cholecalciferol (VITAMIN D) 2000 UNIT Tab, Take  by mouth every day., Disp: , Rfl:   •  glipiZIDE (GLUCOTROL) 10 MG Tab, Take 1 tablet by mouth 2 times a day.,  Disp: 200 tablet, Rfl: 3  •  rosuvastatin (CRESTOR) 40 MG tablet, Take 1 tablet by mouth every day., Disp: 100 tablet, Rfl: 3  •  levothyroxine (SYNTHROID) 75 MCG Tab, Take 1 tablet by mouth every morning on an empty stomach., Disp: 100 tablet, Rfl: 3  •  pioglitazone (ACTOS) 15 MG Tab, Take 1 tablet by mouth every day., Disp: 90 tablet, Rfl: 3  •  vitamin D, Ergocalciferol, (DRISDOL) 1.25 MG (72856 UT) Cap capsule, Take 1 capsule by mouth every 7 days., Disp: 5 capsule, Rfl: 0  •  VITAMIN D PO, Take 4,000 Units by mouth every morning., Disp: , Rfl:   •  Ascorbic Acid (VITAMIN C) 1000 MG Tab, Take 1,000 mg by mouth every morning., Disp: , Rfl:   •  Cyanocobalamin (VITAMIN B-12 PO), Take 1 tablet by mouth every morning., Disp: , Rfl:   •  CINNAMON PO, Take 1 tablet by mouth every morning., Disp: , Rfl:   •  Multiple Vitamins-Minerals (ICAPS AREDS FORMULA PO), Take 1 tablet by mouth every morning., Disp: , Rfl:   •  NON SPECIFIED, Take 1 Package by mouth every morning. [facility] Diabetes Health Pack Vitamins, Disp: , Rfl:   •  vitamin E (VITAMIN E) 1000 UNIT Cap, Take 1,000 Units by mouth every morning., Disp: , Rfl:   •  Omega-3 Fatty Acids (FISH OIL PO), Take 1 Cap by mouth every day., Disp: , Rfl:     PROBLEMS:  Patient Active Problem List    Diagnosis Date Noted   • Vitamin D deficiency 04/12/2021   • Cervical spinal stenosis 04/11/2021   • Pubic ramus fracture (HCC) 04/09/2021   • History of breast cancer    • Spasm of back muscles 03/27/2019   • Uncontrolled type 2 diabetes mellitus with hyperglycemia (HCC) 01/28/2016   • On statin therapy due to risk of future cardiovascular event 01/15/2013   • Acquired hypothyroidism 01/15/2013   • Gastroesophageal reflux disease with esophagitis 01/15/2013       REVIEW OF SYSTEMS:  Gen.:  No Nausea, Vomiting, fever, Chills.  Heart: No chest pain.  Lungs:  No shortness of Breath.  Psychological: Rajan unusual Anxiety depression     PHYSICAL EXAM   Constitutional: Alert,  "cooperative, not in acute distress.  Cardiovascular:  Rate Rhythm is regular without murmurs rubs clicks.     Thorax & Lungs: Clear to auscultation, no wheezing, rhonchi, or rales  HENT: Normocephalic, Atraumatic.  Eyes: PERRLA, EOMI, Conjunctiva normal.   Neck: Trachia is midline no swelling of the thyroid.   Lymphatic: No lymphadenopathy noted.   Neurologic: Alert & oriented x 3, cranial nerves II through XII are intact, Normal motor function, Normal sensory function, No focal deficits noted.   Psychiatric: Affect normal, Judgment normal, Mood normal.     VITAL SIGNS:/70   Pulse 81   Temp 35.9 °C (96.7 °F) (Temporal)   Ht 1.702 m (5' 7\")   Wt 80.8 kg (178 lb 3.2 oz)   LMP  (LMP Unknown)   SpO2 93%   BMI 27.91 kg/m²     Labs: Reviewed    Assessment:                                                     Plan:    1. Need for influenza vaccination  Vaccination given  - INFLUENZA VACCINE, HIGH DOSE (65+ ONLY)    2. Uncontrolled type 2 diabetes mellitus with hyperglycemia (HCC)  Currently well controlled recheck hemoglobin A1c  - Hemoglobin A1c; Future  - Lipid Profile; Future  - Microalbumin Creat Ratio Urine - Lab Collect; Future    3. Spasm of back muscles  Referral back to Dr. Harman upon request.  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          "

## 2021-12-13 ENCOUNTER — TELEPHONE (OUTPATIENT)
Dept: MEDICAL GROUP | Facility: LAB | Age: 81
End: 2021-12-13

## 2021-12-13 NOTE — TELEPHONE ENCOUNTER
Called Betty Shafer's office at [phone] and they informed me that she is retired and her records are now held at [facility].  I will call them to see if I can track down her records.

## 2021-12-13 NOTE — TELEPHONE ENCOUNTER
----- Message from Waylon Webber D.O. sent at 12/10/2021  3:33 PM PST -----  June:  Call Betty Duran MD's office and ask for notes for Mavis.   Regards, Waylon Webber, DO

## 2021-12-14 ENCOUNTER — TELEPHONE (OUTPATIENT)
Dept: MEDICAL GROUP | Facility: LAB | Age: 81
End: 2021-12-14

## 2021-12-14 DIAGNOSIS — N30.00 ACUTE CYSTITIS WITHOUT HEMATURIA: ICD-10-CM

## 2021-12-14 RX ORDER — NITROFURANTOIN 25; 75 MG/1; MG/1
100 CAPSULE ORAL 2 TIMES DAILY
Qty: 10 CAPSULE | Refills: 0 | Status: SHIPPED | OUTPATIENT
Start: 2021-12-14 | End: 2022-02-08

## 2021-12-14 NOTE — TELEPHONE ENCOUNTER
June:  Please call Mavis, I have written out for a urinalysis to be done in the lab, if she is able to get it done that would be appreciated.  Antibiotics have been sent to her [facility] pharmacy [facility]..   Regards, Waylon Webber, DO

## 2021-12-17 ENCOUNTER — TELEPHONE (OUTPATIENT)
Dept: MEDICAL GROUP | Facility: LAB | Age: 81
End: 2021-12-17

## 2021-12-17 NOTE — TELEPHONE ENCOUNTER
I called [facility] again after not hearing back after my last message requesting records.  I was transferred to records and left another message requesting patients records for Dr. Betty Duran.

## 2022-02-02 DIAGNOSIS — M62.830 SPASM OF BACK MUSCLES: ICD-10-CM

## 2022-02-02 DIAGNOSIS — Z85.3 HISTORY OF BREAST CANCER: ICD-10-CM

## 2022-02-02 DIAGNOSIS — M54.50 CHRONIC MIDLINE LOW BACK PAIN WITHOUT SCIATICA: ICD-10-CM

## 2022-02-02 DIAGNOSIS — G89.29 CHRONIC MIDLINE LOW BACK PAIN WITHOUT SCIATICA: ICD-10-CM

## 2022-02-02 DIAGNOSIS — E11.65 UNCONTROLLED TYPE 2 DIABETES MELLITUS WITH HYPERGLYCEMIA (HCC): ICD-10-CM

## 2022-02-02 DIAGNOSIS — E03.4 HYPOTHYROIDISM DUE TO ACQUIRED ATROPHY OF THYROID: ICD-10-CM

## 2022-02-02 DIAGNOSIS — E78.2 MIXED HYPERLIPIDEMIA: ICD-10-CM

## 2022-02-02 DIAGNOSIS — Z12.31 ENCOUNTER FOR SCREENING MAMMOGRAM FOR MALIGNANT NEOPLASM OF BREAST: ICD-10-CM

## 2022-02-02 RX ORDER — LANCETS 30 GAUGE
EACH MISCELLANEOUS
Qty: 100 EACH | Refills: 3 | Status: SHIPPED | OUTPATIENT
Start: 2022-02-02 | End: 2023-02-28

## 2022-02-02 NOTE — TELEPHONE ENCOUNTER
Received request via: Patient    Was the patient seen in the last year in this department? Yes  LOV 12/10/2021  Does the patient have an active prescription (recently filled or refills available) for medication(s) requested? No

## 2022-02-08 DIAGNOSIS — N30.00 ACUTE CYSTITIS WITHOUT HEMATURIA: ICD-10-CM

## 2022-02-08 DIAGNOSIS — E11.65 UNCONTROLLED TYPE 2 DIABETES MELLITUS WITH HYPERGLYCEMIA (HCC): ICD-10-CM

## 2022-02-08 RX ORDER — PIOGLITAZONEHYDROCHLORIDE 15 MG/1
TABLET ORAL
Qty: 100 TABLET | Refills: 0 | Status: SHIPPED | OUTPATIENT
Start: 2022-02-08 | End: 2022-04-08 | Stop reason: SDUPTHER

## 2022-02-08 RX ORDER — NITROFURANTOIN 25; 75 MG/1; MG/1
CAPSULE ORAL
Qty: 10 CAPSULE | Refills: 0 | Status: SHIPPED | OUTPATIENT
Start: 2022-02-08 | End: 2023-02-28

## 2022-02-10 ENCOUNTER — TELEPHONE (OUTPATIENT)
Dept: MEDICAL GROUP | Facility: LAB | Age: 82
End: 2022-02-10
Payer: MEDICARE

## 2022-02-10 NOTE — TELEPHONE ENCOUNTER
"· order paperwork received from [facility] requiring provider signature.     · All appropriate fields completed by Medical Assistant: N/A CMA printed and distributed to MA    · Paperwork placed in \"MA to Provider\" folder/basket. Awaiting provider completion/signature.   · RX needs documentation of face to face for Contour next monitor.   · Form placed at your desk  "

## 2022-03-04 ENCOUNTER — PATIENT MESSAGE (OUTPATIENT)
Dept: MEDICAL GROUP | Facility: LAB | Age: 82
End: 2022-03-04
Payer: MEDICARE

## 2022-03-04 DIAGNOSIS — K21.9 GASTROESOPHAGEAL REFLUX DISEASE WITHOUT ESOPHAGITIS: ICD-10-CM

## 2022-03-04 RX ORDER — CIMETIDINE 400 MG/1
TABLET, FILM COATED ORAL
Qty: 90 TABLET | Refills: 3 | Status: SHIPPED
Start: 2022-03-04 | End: 2023-02-28

## 2022-03-04 NOTE — TELEPHONE ENCOUNTER
From: Mavis Lizarraga  To: Physician Waylon Webber  Sent: 3/4/2022 8:08 AM PST  Subject: Rx Refill - [id number]    Dr Webber - I forgot to include this prescription when I came over to your office. I only use it once in a blue moon when/if I have stomach problem and forgot about it until recently.    Could you please include this on my chart and notify [facility] of the changeover in doctors.    Thank you, Mavis Lizarraga

## 2022-03-18 DIAGNOSIS — Z79.899 ON STATIN THERAPY DUE TO RISK OF FUTURE CARDIOVASCULAR EVENT: ICD-10-CM

## 2022-03-18 DIAGNOSIS — E78.2 MIXED HYPERLIPIDEMIA: ICD-10-CM

## 2022-03-18 DIAGNOSIS — E03.4 HYPOTHYROIDISM DUE TO ACQUIRED ATROPHY OF THYROID: ICD-10-CM

## 2022-03-18 DIAGNOSIS — M54.50 CHRONIC MIDLINE LOW BACK PAIN WITHOUT SCIATICA: ICD-10-CM

## 2022-03-18 DIAGNOSIS — M62.830 SPASM OF BACK MUSCLES: ICD-10-CM

## 2022-03-18 DIAGNOSIS — Z12.31 ENCOUNTER FOR SCREENING MAMMOGRAM FOR MALIGNANT NEOPLASM OF BREAST: ICD-10-CM

## 2022-03-18 DIAGNOSIS — Z85.3 HISTORY OF BREAST CANCER: ICD-10-CM

## 2022-03-18 DIAGNOSIS — G89.29 CHRONIC MIDLINE LOW BACK PAIN WITHOUT SCIATICA: ICD-10-CM

## 2022-03-18 DIAGNOSIS — E11.65 UNCONTROLLED TYPE 2 DIABETES MELLITUS WITH HYPERGLYCEMIA (HCC): ICD-10-CM

## 2022-03-18 RX ORDER — ROSUVASTATIN CALCIUM 40 MG/1
40 TABLET, COATED ORAL DAILY
Qty: 100 TABLET | Refills: 3 | Status: SHIPPED | OUTPATIENT
Start: 2022-03-18 | End: 2023-04-11

## 2022-03-22 ENCOUNTER — TELEPHONE (OUTPATIENT)
Dept: MEDICAL GROUP | Facility: LAB | Age: 82
End: 2022-03-22
Payer: MEDICARE

## 2022-03-22 NOTE — TELEPHONE ENCOUNTER
I spoke with Mavis and she said she had eye exam last fall 2021.     I called [facility] and she was seen last November and they will fax records to us at [phone]    Records in chart and updated care gaps

## 2022-04-01 ENCOUNTER — TELEPHONE (OUTPATIENT)
Dept: MEDICAL GROUP | Facility: LAB | Age: 82
End: 2022-04-01
Payer: MEDICARE

## 2022-04-01 NOTE — TELEPHONE ENCOUNTER
ESTABLISHED PATIENT PRE-VISIT PLANNING     Patient was contacted to complete PVP.     Note: Patient will not be contacted if there is no indication to call.     1.  Reviewed notes from the last few office visits within the medical group: Yes    2.  If any orders were placed at last visit or intended to be done for this visit (i.e. 6 mos follow-up), do we have Results/Consult Notes?         •  Labs - Labs ordered, NOT completed. Patient advised to complete prior to next appointment.  Note: If patient appointment is for lab review and patient did not complete labs, check with provider if OK to reschedule patient until labs completed.       •  Imaging - Imaging was not ordered at last office visit.       •  Referrals - No referrals were ordered at last office visit.    3. Is this appointment scheduled as a Hospital Follow-Up? No    4.  Immunizations were updated in Epic using Reconcile Outside Information activity? Yes    5.  Patient is due for the following Health Maintenance Topics:   Health Maintenance Due   Topic Date Due   • URINE ACR / MICROALBUMIN  03/28/2020   • DIABETES MONOFILAMENT / LE EXAM  08/14/2020   • IMM ZOSTER VACCINES (3 of 3) 10/21/2020   • FASTING LIPID PROFILE  12/27/2020   • A1C SCREENING  03/03/2022         6.  AHA (Pulse8) form printed for Provider? Email sent to [facility] requesting form

## 2022-04-06 ENCOUNTER — HOSPITAL ENCOUNTER (OUTPATIENT)
Dept: LAB | Facility: MEDICAL CENTER | Age: 82
End: 2022-04-06
Attending: INTERNAL MEDICINE
Payer: MEDICARE

## 2022-04-06 DIAGNOSIS — E11.65 UNCONTROLLED TYPE 2 DIABETES MELLITUS WITH HYPERGLYCEMIA (HCC): ICD-10-CM

## 2022-04-06 LAB
CHOLEST SERPL-MCNC: 158 MG/DL (ref 100–199)
CREAT UR-MCNC: 89.44 MG/DL
EST. AVERAGE GLUCOSE BLD GHB EST-MCNC: 166 MG/DL
FASTING STATUS PATIENT QL REPORTED: NORMAL
HBA1C MFR BLD: 7.4 % (ref 4–5.6)
HDLC SERPL-MCNC: 53 MG/DL
LDLC SERPL CALC-MCNC: 78 MG/DL
MICROALBUMIN UR-MCNC: 1.4 MG/DL
MICROALBUMIN/CREAT UR: 16 MG/G (ref 0–30)
TRIGL SERPL-MCNC: 137 MG/DL (ref 0–149)

## 2022-04-06 PROCEDURE — 83036 HEMOGLOBIN GLYCOSYLATED A1C: CPT

## 2022-04-06 PROCEDURE — 82570 ASSAY OF URINE CREATININE: CPT

## 2022-04-06 PROCEDURE — 80061 LIPID PANEL: CPT

## 2022-04-06 PROCEDURE — 36415 COLL VENOUS BLD VENIPUNCTURE: CPT

## 2022-04-06 PROCEDURE — 82043 UR ALBUMIN QUANTITATIVE: CPT

## 2022-04-08 ENCOUNTER — OFFICE VISIT (OUTPATIENT)
Dept: MEDICAL GROUP | Facility: LAB | Age: 82
End: 2022-04-08
Payer: MEDICARE

## 2022-04-08 VITALS
WEIGHT: 178.8 LBS | BODY MASS INDEX: 28.06 KG/M2 | HEART RATE: 89 BPM | DIASTOLIC BLOOD PRESSURE: 76 MMHG | TEMPERATURE: 96.1 F | OXYGEN SATURATION: 96 % | HEIGHT: 67 IN | SYSTOLIC BLOOD PRESSURE: 104 MMHG

## 2022-04-08 DIAGNOSIS — E11.9 CONTROLLED TYPE 2 DIABETES MELLITUS WITHOUT COMPLICATION, WITHOUT LONG-TERM CURRENT USE OF INSULIN (HCC): ICD-10-CM

## 2022-04-08 DIAGNOSIS — G89.29 CHRONIC MIDLINE LOW BACK PAIN, UNSPECIFIED WHETHER SCIATICA PRESENT: ICD-10-CM

## 2022-04-08 DIAGNOSIS — M54.50 CHRONIC MIDLINE LOW BACK PAIN, UNSPECIFIED WHETHER SCIATICA PRESENT: ICD-10-CM

## 2022-04-08 PROCEDURE — 99214 OFFICE O/P EST MOD 30 MIN: CPT | Performed by: INTERNAL MEDICINE

## 2022-04-08 RX ORDER — PIOGLITAZONEHYDROCHLORIDE 30 MG/1
TABLET ORAL
Qty: 90 TABLET | Refills: 3 | Status: SHIPPED
Start: 2022-04-08 | End: 2023-02-28

## 2022-04-08 ASSESSMENT — FIBROSIS 4 INDEX: FIB4 SCORE: 2.06

## 2022-04-08 ASSESSMENT — PATIENT HEALTH QUESTIONNAIRE - PHQ9: CLINICAL INTERPRETATION OF PHQ2 SCORE: 0

## 2022-04-08 NOTE — PROGRESS NOTES
CC: Mavis Lizarraga is a 81 y.o. female is suffering from   Chief Complaint   Patient presents with   • Follow-Up         SUBJECTIVE:  1. Chronic midline low back pain, unspecified whether sciatica present  Mavis is here for follow-up has a history of low back pain, no evident sciatica, has undergone chiropractic manipulation.  We have discussed her undergoing x-rays which I will let her do at her leisure, patient has had symptoms only for 3 weeks denies any problems with bowel or bladder dysfunction.    2. Controlled type 2 diabetes mellitus without complication, without long-term current use of insulin (HCC)  History of type 2 diabetes with reasonable control, will try to improve control by increasing Actos from 15 to 30 mg.  Discussed prior issues associated with bladder cancer.        Past social, family, history: Boyfriend is Lauri  Social History     Tobacco Use   • Smoking status: Never Smoker   • Smokeless tobacco: Never Used   Vaping Use   • Vaping Use: Never used   Substance Use Topics   • Alcohol use: Yes     Comment: very rarely on ocassion will have a Sravani   • Drug use: No         MEDICATIONS:    Current Outpatient Medications:   •  pioglitazone (ACTOS) 30 MG Tab, TAKE ONE TABLET BY MOUTH DAILY (ACTOS), Disp: 90 Tablet, Rfl: 3  •  rosuvastatin (CRESTOR) 40 MG tablet, Take 1 Tablet by mouth every day., Disp: 100 Tablet, Rfl: 3  •  cimetidine (TAGAMET) 400 MG Tab, TAKE ONE TABLET BY MOUTH DAILY AS NEEDED.(GENERIC FOR TAGAMET), Disp: 90 Tablet, Rfl: 3  •  nitrofurantoin (MACROBID) 100 MG Cap, TAKE ONE CAPSULE BY MOUTH TWICE A DAY, Disp: 10 Capsule, Rfl: 0  •  Blood Glucose Test Strips, Use one Per formulary preference  strip to test blood sugar once daily early morning before first meal., Disp: 100 Strip, Rfl: 3  •  Lancets, Use one Per formulary preference  lancet to test blood sugar once daily early morning before first meal., Disp: 100 Each, Rfl: 3  •  cyclobenzaprine (FLEXERIL) 10 mg Tab,  TAKE ONE TABLET BY MOUTH THREE TIMES A DAY AS NEEDED FOR MILD PAIN (FLEXERIL), Disp: 30 Tablet, Rfl: 3  •  meloxicam (MOBIC) 15 MG tablet, Take 1 Tablet by mouth 1 time a day as needed for Moderate Pain., Disp: 100 Tablet, Rfl: 3  •  predniSONE (DELTASONE) 20 MG Tab, Take 20 mg by mouth every day., Disp: , Rfl:   •  Alcohol Swabs, Wipe site with prep pad prior to injection., Disp: 100 Each, Rfl: 3  •  Blood Glucose Meter Kit, Test blood sugar once daily and prn ssx of high or low blood sugar. Contour NEXT blood glucose monitoring kit., Disp: 1 Kit, Rfl: 0  •  Cholecalciferol (VITAMIN D) 2000 UNIT Tab, Take  by mouth every day., Disp: , Rfl:   •  glipiZIDE (GLUCOTROL) 10 MG Tab, Take 1 tablet by mouth 2 times a day., Disp: 200 tablet, Rfl: 3  •  levothyroxine (SYNTHROID) 75 MCG Tab, Take 1 tablet by mouth every morning on an empty stomach., Disp: 100 tablet, Rfl: 3  •  vitamin D, Ergocalciferol, (DRISDOL) 1.25 MG (46604 UT) Cap capsule, Take 1 capsule by mouth every 7 days., Disp: 5 capsule, Rfl: 0  •  VITAMIN D PO, Take 4,000 Units by mouth every morning., Disp: , Rfl:   •  Ascorbic Acid (VITAMIN C) 1000 MG Tab, Take 1,000 mg by mouth every morning., Disp: , Rfl:   •  Cyanocobalamin (VITAMIN B-12 PO), Take 1 tablet by mouth every morning., Disp: , Rfl:   •  CINNAMON PO, Take 1 tablet by mouth every morning., Disp: , Rfl:   •  Multiple Vitamins-Minerals (ICAPS AREDS FORMULA PO), Take 1 tablet by mouth every morning., Disp: , Rfl:   •  NON SPECIFIED, Take 1 Package by mouth every morning. [facility] Pack Vitamins, Disp: , Rfl:   •  vitamin E (VITAMIN E) 1000 UNIT Cap, Take 1,000 Units by mouth every morning., Disp: , Rfl:   •  Omega-3 Fatty Acids (FISH OIL PO), Take 1 Cap by mouth every day., Disp: , Rfl:     PROBLEMS:  Patient Active Problem List    Diagnosis Date Noted   • Vitamin D deficiency 04/12/2021   • Cervical spinal stenosis 04/11/2021   • Pubic ramus fracture (HCC) 04/09/2021   • History of  "breast cancer    • Spasm of back muscles 03/27/2019   • Uncontrolled type 2 diabetes mellitus with hyperglycemia (HCC) 01/28/2016   • On statin therapy due to risk of future cardiovascular event 01/15/2013   • Acquired hypothyroidism 01/15/2013   • Gastroesophageal reflux disease with esophagitis 01/15/2013       REVIEW OF SYSTEMS:  Gen.:  No Nausea, Vomiting, fever, Chills.  Heart: No chest pain.  Lungs:  No shortness of Breath.  Psychological: Rajan unusual Anxiety depression     PHYSICAL EXAM   Constitutional: Alert, cooperative, not in acute distress.  Cardiovascular:  Rate Rhythm is regular without murmurs rubs clicks.     Thorax & Lungs: Clear to auscultation, no wheezing, rhonchi, or rales  HENT: Normocephalic, Atraumatic.  Eyes: PERRLA, EOMI, Conjunctiva normal.   Neck: Trachia is midline no swelling of the thyroid.   Lymphatic: No lymphadenopathy noted.   Neurologic: Alert & oriented x 3, cranial nerves II through XII are intact, Normal motor function, Normal sensory function, No focal deficits noted.   Psychiatric: Affect normal, Judgment normal, Mood normal.     VITAL SIGNS:/76 (BP Location: Left arm, Patient Position: Sitting, BP Cuff Size: Adult)   Pulse 89   Temp (!) 35.6 °C (96.1 °F) (Temporal)   Ht 1.702 m (5' 7\")   Wt 81.1 kg (178 lb 12.8 oz)   LMP  (LMP Unknown)   SpO2 96%   BMI 28.00 kg/m²     Labs: Reviewed    Assessment:                                                     Plan:    1. Chronic midline low back pain, unspecified whether sciatica present  Patient with chronic back pain with limitation in forward flexion, retain side bending rotation, minimal pain with palpation of the SI joints no pain associate with the trochanteric bursa- DX-LUMBAR SPINE-2 OR 3 VIEWS; Future  - Diabetic Monofilament Lower Extremity Exam    2. Controlled type 2 diabetes mellitus without complication, without long-term current use of insulin (HCC)  Patient with a history of type 2 diabetes no evidence " of diabetic neuropathy no evidence of ulceration of the feet  - pioglitazone (ACTOS) 30 MG Tab; TAKE ONE TABLET BY MOUTH DAILY (ACTOS)  Dispense: 90 Tablet; Refill: 3  - Diabetic Monofilament Lower Extremity Exam  - HEMOGLOBIN A1C; Future          Back pain x 3 weeks.

## 2022-05-16 RX ORDER — PIOGLITAZONEHYDROCHLORIDE 15 MG/1
TABLET ORAL
Qty: 100 TABLET | Refills: 3 | Status: SHIPPED
Start: 2022-05-16 | End: 2023-02-28

## 2022-05-16 NOTE — TELEPHONE ENCOUNTER
Received request via: Pharmacy    Was the patient seen in the last year in this department? Yes  LOV : 4/8/2022   Does the patient have an active prescription (recently filled or refills available) for medication(s) requested? Yes.

## 2022-06-10 DIAGNOSIS — Z85.3 HISTORY OF BREAST CANCER: ICD-10-CM

## 2022-06-10 DIAGNOSIS — G89.29 CHRONIC MIDLINE LOW BACK PAIN WITHOUT SCIATICA: ICD-10-CM

## 2022-06-10 DIAGNOSIS — E03.4 HYPOTHYROIDISM DUE TO ACQUIRED ATROPHY OF THYROID: ICD-10-CM

## 2022-06-10 DIAGNOSIS — E78.2 MIXED HYPERLIPIDEMIA: ICD-10-CM

## 2022-06-10 DIAGNOSIS — M54.50 CHRONIC MIDLINE LOW BACK PAIN WITHOUT SCIATICA: ICD-10-CM

## 2022-06-10 DIAGNOSIS — E11.65 UNCONTROLLED TYPE 2 DIABETES MELLITUS WITH HYPERGLYCEMIA (HCC): ICD-10-CM

## 2022-06-10 DIAGNOSIS — M62.830 SPASM OF BACK MUSCLES: ICD-10-CM

## 2022-06-10 DIAGNOSIS — Z12.31 ENCOUNTER FOR SCREENING MAMMOGRAM FOR MALIGNANT NEOPLASM OF BREAST: ICD-10-CM

## 2022-06-10 RX ORDER — GLIPIZIDE 10 MG/1
TABLET ORAL
Qty: 200 TABLET | Refills: 3 | Status: SHIPPED
Start: 2022-06-10 | End: 2023-02-28

## 2022-06-10 NOTE — TELEPHONE ENCOUNTER
Received request via: Pharmacy    Was the patient seen in the last year in this department? Yes  LOV 04/08/2022  Does the patient have an active prescription (recently filled or refills available) for medication(s) requested? No

## 2022-06-17 ENCOUNTER — TELEPHONE (OUTPATIENT)
Dept: HEALTH INFORMATION MANAGEMENT | Facility: OTHER | Age: 82
End: 2022-06-17

## 2022-06-22 DIAGNOSIS — M62.830 SPASM OF BACK MUSCLES: ICD-10-CM

## 2022-06-22 DIAGNOSIS — G89.29 CHRONIC MIDLINE LOW BACK PAIN WITHOUT SCIATICA: ICD-10-CM

## 2022-06-22 DIAGNOSIS — Z85.3 HISTORY OF BREAST CANCER: ICD-10-CM

## 2022-06-22 DIAGNOSIS — E11.65 UNCONTROLLED TYPE 2 DIABETES MELLITUS WITH HYPERGLYCEMIA (HCC): ICD-10-CM

## 2022-06-22 DIAGNOSIS — Z12.31 ENCOUNTER FOR SCREENING MAMMOGRAM FOR MALIGNANT NEOPLASM OF BREAST: ICD-10-CM

## 2022-06-22 DIAGNOSIS — E78.2 MIXED HYPERLIPIDEMIA: ICD-10-CM

## 2022-06-22 DIAGNOSIS — M54.50 CHRONIC MIDLINE LOW BACK PAIN WITHOUT SCIATICA: ICD-10-CM

## 2022-06-22 DIAGNOSIS — E03.4 HYPOTHYROIDISM DUE TO ACQUIRED ATROPHY OF THYROID: ICD-10-CM

## 2022-06-22 RX ORDER — LEVOTHYROXINE SODIUM 0.07 MG/1
TABLET ORAL
Qty: 90 TABLET | Refills: 3 | Status: SHIPPED | OUTPATIENT
Start: 2022-06-22 | End: 2023-06-19

## 2022-07-07 ENCOUNTER — TELEPHONE (OUTPATIENT)
Dept: MEDICAL GROUP | Facility: LAB | Age: 82
End: 2022-07-07
Payer: MEDICARE

## 2022-07-07 NOTE — TELEPHONE ENCOUNTER
ESTABLISHED PATIENT PRE-VISIT PLANNING     Patient was NOT contacted to complete PVP.     Note: Patient will not be contacted if there is no indication to call.     1.  Reviewed notes from the last few office visits within the medical group: Yes    2.  If any orders were placed at last visit or intended to be done for this visit (i.e. 6 mos follow-up), do we have Results/Consult Notes?         •  Labs - Labs ordered, but not to be completed until in office? .  Note: If patient appointment is for lab review and patient did not complete labs, check with provider if OK to reschedule patient until labs completed.       •  Imaging - Imaging ordered, NOT completed. Patient advised to complete prior to next appointment. Scheduled        •  Referrals - No referrals were ordered at last office visit.    3. Is this appointment scheduled as a Hospital Follow-Up? Yes, visit was at [facility].     4.  Immunizations were updated in Epic using Reconcile Outside Information activity? Yes    5.  Patient is due for the following Health Maintenance Topics:   Health Maintenance Due   Topic Date Due   • Annual Wellness Visit  09/26/2019   • IMM ZOSTER VACCINES (3 of 3) 10/21/2020   • COVID-19 Vaccine (4 - Booster for Pfizer series) 02/15/2022         6.  AHA (Pulse8) form printed for Provider? Email sent to [facility] requesting form

## 2022-07-24 ENCOUNTER — PATIENT MESSAGE (OUTPATIENT)
Dept: MEDICAL GROUP | Facility: LAB | Age: 82
End: 2022-07-24
Payer: MEDICARE

## 2022-08-26 ENCOUNTER — HOSPITAL ENCOUNTER (OUTPATIENT)
Dept: RADIOLOGY | Facility: MEDICAL CENTER | Age: 82
End: 2022-08-26
Attending: INTERNAL MEDICINE
Payer: MEDICARE

## 2022-08-26 DIAGNOSIS — Z12.31 VISIT FOR SCREENING MAMMOGRAM: ICD-10-CM

## 2022-08-26 PROCEDURE — 77063 BREAST TOMOSYNTHESIS BI: CPT | Mod: 52

## 2022-09-02 ENCOUNTER — HOSPITAL ENCOUNTER (OUTPATIENT)
Dept: LAB | Facility: MEDICAL CENTER | Age: 82
End: 2022-09-02
Attending: INTERNAL MEDICINE
Payer: MEDICARE

## 2022-09-02 DIAGNOSIS — E11.9 CONTROLLED TYPE 2 DIABETES MELLITUS WITHOUT COMPLICATION, WITHOUT LONG-TERM CURRENT USE OF INSULIN (HCC): ICD-10-CM

## 2022-09-02 DIAGNOSIS — N30.00 ACUTE CYSTITIS WITHOUT HEMATURIA: ICD-10-CM

## 2022-09-02 LAB
APPEARANCE UR: CLEAR
BILIRUB UR QL STRIP.AUTO: NEGATIVE
COLOR UR: YELLOW
EPI CELLS #/AREA URNS HPF: NORMAL /HPF
EST. AVERAGE GLUCOSE BLD GHB EST-MCNC: 160 MG/DL
GLUCOSE UR STRIP.AUTO-MCNC: NEGATIVE MG/DL
HBA1C MFR BLD: 7.2 % (ref 4–5.6)
KETONES UR STRIP.AUTO-MCNC: NEGATIVE MG/DL
LEUKOCYTE ESTERASE UR QL STRIP.AUTO: ABNORMAL
MICRO URNS: ABNORMAL
MUCOUS THREADS #/AREA URNS HPF: NORMAL /HPF
NITRITE UR QL STRIP.AUTO: NEGATIVE
PH UR STRIP.AUTO: 6 [PH] (ref 5–8)
PROT UR QL STRIP: NEGATIVE MG/DL
RBC # URNS HPF: NORMAL /HPF
RBC UR QL AUTO: NEGATIVE
SP GR UR STRIP.AUTO: 1.01
WBC #/AREA URNS HPF: NORMAL /HPF

## 2022-09-02 PROCEDURE — 81001 URINALYSIS AUTO W/SCOPE: CPT

## 2022-09-02 PROCEDURE — 36415 COLL VENOUS BLD VENIPUNCTURE: CPT

## 2022-09-02 PROCEDURE — 83036 HEMOGLOBIN GLYCOSYLATED A1C: CPT

## 2022-09-09 ENCOUNTER — HOSPITAL ENCOUNTER (OUTPATIENT)
Facility: MEDICAL CENTER | Age: 82
End: 2022-09-09
Attending: INTERNAL MEDICINE
Payer: MEDICARE

## 2022-09-09 ENCOUNTER — OFFICE VISIT (OUTPATIENT)
Dept: MEDICAL GROUP | Facility: LAB | Age: 82
End: 2022-09-09
Payer: MEDICARE

## 2022-09-09 VITALS
WEIGHT: 185.2 LBS | HEART RATE: 70 BPM | SYSTOLIC BLOOD PRESSURE: 132 MMHG | BODY MASS INDEX: 29.07 KG/M2 | RESPIRATION RATE: 12 BRPM | DIASTOLIC BLOOD PRESSURE: 76 MMHG | OXYGEN SATURATION: 96 % | TEMPERATURE: 97.2 F | HEIGHT: 67 IN

## 2022-09-09 DIAGNOSIS — L72.3 SEBACEOUS CYST: ICD-10-CM

## 2022-09-09 DIAGNOSIS — I49.9 CARDIAC ARRHYTHMIA, UNSPECIFIED CARDIAC ARRHYTHMIA TYPE: ICD-10-CM

## 2022-09-09 DIAGNOSIS — E78.2 MIXED HYPERLIPIDEMIA: ICD-10-CM

## 2022-09-09 DIAGNOSIS — M54.50 CHRONIC LOW BACK PAIN, UNSPECIFIED BACK PAIN LATERALITY, UNSPECIFIED WHETHER SCIATICA PRESENT: ICD-10-CM

## 2022-09-09 DIAGNOSIS — E03.4 HYPOTHYROIDISM DUE TO ACQUIRED ATROPHY OF THYROID: ICD-10-CM

## 2022-09-09 DIAGNOSIS — G89.29 CHRONIC LOW BACK PAIN, UNSPECIFIED BACK PAIN LATERALITY, UNSPECIFIED WHETHER SCIATICA PRESENT: ICD-10-CM

## 2022-09-09 PROCEDURE — 93000 ELECTROCARDIOGRAM COMPLETE: CPT | Performed by: INTERNAL MEDICINE

## 2022-09-09 PROCEDURE — 99214 OFFICE O/P EST MOD 30 MIN: CPT | Performed by: INTERNAL MEDICINE

## 2022-09-09 RX ORDER — CEPHALEXIN 500 MG/1
500 CAPSULE ORAL 4 TIMES DAILY
Qty: 20 CAPSULE | Refills: 0 | Status: SHIPPED
Start: 2022-09-09 | End: 2023-02-28

## 2022-09-09 RX ORDER — CYCLOBENZAPRINE HCL 10 MG
TABLET ORAL
Qty: 30 TABLET | Refills: 3 | Status: SHIPPED | OUTPATIENT
Start: 2022-09-09 | End: 2024-01-29

## 2022-09-09 RX ORDER — TRAMADOL HYDROCHLORIDE 50 MG/1
50 TABLET ORAL EVERY 4 HOURS PRN
Qty: 30 TABLET | Refills: 0 | Status: SHIPPED | OUTPATIENT
Start: 2022-09-09 | End: 2022-12-19 | Stop reason: SDUPTHER

## 2022-09-09 ASSESSMENT — FIBROSIS 4 INDEX: FIB4 SCORE: 2.09

## 2022-09-09 NOTE — PROGRESS NOTES
CC: Mavis Lizarraga is a 82 y.o. female is suffering from   Chief Complaint   Patient presents with    Diabetes     5 months follow up    Other     Skin growth on nose         SUBJECTIVE:  1. Chronic low back pain, unspecified back pain laterality, unspecified whether sciatica present  Mavis is here for follow-up, is having problems with chronic low back pain, patient is to continue with tramadol and on a as needed basis    2. Mixed hyperlipidemia- not at goal on zocor 40 mg- switch to crestor 40 mg/d  Patient with a history of dyslipidemia is to continue on aggressive statin drug therapy does have significant plaque buildup on left anterior descending artery reviewing CT of the chest    3. Hypothyroidism due to acquired atrophy of thyroid  History of hypothyroidism no change in medical therapy    4. Spasm of back muscles  As #1 above    5. Chronic midline low back pain without sciatica  #1 above    6. Uncontrolled type 2 diabetes mellitus with hyperglycemia (HCC)  Reasonable control over type 2 diabetes    7. History of breast cancer  Screening mammogram ordered    8. Encounter for screening mammogram for malignant neoplasm of breast  See #7 above    9. Sebaceous cyst  Opened suspected sebaceous cyst bridge of the nose        Past social, family, history:   Social History     Tobacco Use    Smoking status: Never    Smokeless tobacco: Never   Vaping Use    Vaping Use: Never used   Substance Use Topics    Alcohol use: Yes     Comment: very rarely on ocassion will have a Sravani    Drug use: No         MEDICATIONS:    Current Outpatient Medications:     traMADol (ULTRAM) 50 MG Tab, Take 1 Tablet by mouth every four hours as needed for Moderate Pain for up to 7 days., Disp: 30 Tablet, Rfl: 0    cyclobenzaprine (FLEXERIL) 10 mg Tab, TAKE ONE TABLET BY MOUTH THREE TIMES A DAY AS NEEDED FOR MILD PAIN (FLEXERIL), Disp: 30 Tablet, Rfl: 3    cephALEXin (KEFLEX) 500 MG Cap, Take 1 Capsule by mouth 4 times a day.,  Disp: 20 Capsule, Rfl: 0    levothyroxine (SYNTHROID) 75 MCG Tab, TAKE ONE TABLET BY MOUTH EVERY MORNING ON AN EMPTY STOMACH, Disp: 90 Tablet, Rfl: 3    glipiZIDE (GLUCOTROL) 10 MG Tab, TAKE ONE TABLET BY MOUTH TWICE A DAY, Disp: 200 Tablet, Rfl: 3    pioglitazone (ACTOS) 15 MG Tab, TAKE ONE TABLET BY MOUTH DAILY (ACTOS), Disp: 100 Tablet, Rfl: 3    meloxicam (MOBIC) 15 MG tablet, Take 1 Tablet by mouth 1 time a day as needed for Moderate Pain., Disp: 100 Tablet, Rfl: 3    Cholecalciferol (VITAMIN D) 2000 UNIT Tab, Take  by mouth every day., Disp: , Rfl:     VITAMIN D PO, Take 4,000 Units by mouth every morning., Disp: , Rfl:     Ascorbic Acid (VITAMIN C) 1000 MG Tab, Take 1,000 mg by mouth every morning., Disp: , Rfl:     Cyanocobalamin (VITAMIN B-12 PO), Take 1 tablet by mouth every morning., Disp: , Rfl:     CINNAMON PO, Take 1 tablet by mouth every morning., Disp: , Rfl:     Multiple Vitamins-Minerals (ICAPS AREDS FORMULA PO), Take 1 tablet by mouth every morning., Disp: , Rfl:     vitamin E (VITAMIN E) 1000 UNIT Cap, Take 1,000 Units by mouth every morning., Disp: , Rfl:     Omega-3 Fatty Acids (FISH OIL PO), Take 1 Cap by mouth every day., Disp: , Rfl:     pioglitazone (ACTOS) 30 MG Tab, TAKE ONE TABLET BY MOUTH DAILY (ACTOS), Disp: 90 Tablet, Rfl: 3    rosuvastatin (CRESTOR) 40 MG tablet, Take 1 Tablet by mouth every day., Disp: 100 Tablet, Rfl: 3    cimetidine (TAGAMET) 400 MG Tab, TAKE ONE TABLET BY MOUTH DAILY AS NEEDED.(GENERIC FOR TAGAMET), Disp: 90 Tablet, Rfl: 3    nitrofurantoin (MACROBID) 100 MG Cap, TAKE ONE CAPSULE BY MOUTH TWICE A DAY, Disp: 10 Capsule, Rfl: 0    Blood Glucose Test Strips, Use one Per formulary preference  strip to test blood sugar once daily early morning before first meal., Disp: 100 Strip, Rfl: 3    Lancets, Use one Per formulary preference  lancet to test blood sugar once daily early morning before first meal., Disp: 100 Each, Rfl: 3    predniSONE (DELTASONE) 20 MG Tab, Take  "20 mg by mouth every day., Disp: , Rfl:     Alcohol Swabs, Wipe site with prep pad prior to injection., Disp: 100 Each, Rfl: 3    Blood Glucose Meter Kit, Test blood sugar once daily and prn ssx of high or low blood sugar. Contour NEXT blood glucose monitoring kit., Disp: 1 Kit, Rfl: 0    vitamin D, Ergocalciferol, (DRISDOL) 1.25 MG (91960 UT) Cap capsule, Take 1 capsule by mouth every 7 days., Disp: 5 capsule, Rfl: 0    NON SPECIFIED, Take 1 Package by mouth every morning. [facility] Health Pack Vitamins, Disp: , Rfl:     PROBLEMS:  Patient Active Problem List    Diagnosis Date Noted    Vitamin D deficiency 04/12/2021    Cervical spinal stenosis 04/11/2021    Pubic ramus fracture (HCC) 04/09/2021    History of breast cancer     Spasm of back muscles 03/27/2019    Uncontrolled type 2 diabetes mellitus with hyperglycemia (HCC) 01/28/2016    On statin therapy due to risk of future cardiovascular event 01/15/2013    Acquired hypothyroidism 01/15/2013    Gastroesophageal reflux disease with esophagitis 01/15/2013       REVIEW OF SYSTEMS:  Gen.:  No Nausea, Vomiting, fever, Chills.  Heart: No chest pain.  Lungs:  No shortness of Breath.  Psychological: Rajan unusual Anxiety depression     PHYSICAL EXAM   Constitutional: Alert, cooperative, not in acute distress.  Cardiovascular:  Rate Rhythm is irregularly irregular without murmurs rubs clicks.     Thorax & Lungs: Clear to auscultation, no wheezing, rhonchi, or rales  HENT: Normocephalic, Atraumatic.  Eyes: PERRLA, EOMI, Conjunctiva normal.   Neck: Trachia is midline no swelling of the thyroid.   Lymphatic: No lymphadenopathy noted.   Neurologic: Alert & oriented x 3, cranial nerves II through XII are intact, Normal motor function, Normal sensory function, No focal deficits noted.   Psychiatric: Affect normal, Judgment normal, Mood normal.     VITAL SIGNS:/76   Pulse 70   Temp 36.2 °C (97.2 °F) (Temporal)   Resp 12   Ht 1.702 m (5' 7\")   Wt 84 kg " (185 lb 3.2 oz)   LMP  (LMP Unknown)   SpO2 96%   BMI 29.01 kg/m²     Labs: Reviewed    Assessment:                                                     Plan:    1. Chronic low back pain, unspecified back pain laterality, unspecified whether sciatica present  Continue tramadol  - traMADol (ULTRAM) 50 MG Tab; Take 1 Tablet by mouth every four hours as needed for Moderate Pain for up to 7 days.  Dispense: 30 Tablet; Refill: 0  - EKG    2. Mixed hyperlipidemia- not at goal on zocor 40 mg- switch to crestor 40 mg/d  Continue Flexeril and as needed basis  - cyclobenzaprine (FLEXERIL) 10 mg Tab; TAKE ONE TABLET BY MOUTH THREE TIMES A DAY AS NEEDED FOR MILD PAIN (FLEXERIL)  Dispense: 30 Tablet; Refill: 3  - EKG  - Comp Metabolic Panel; Future  - Hemoglobin A1c; Future    3. Hypothyroidism due to acquired atrophy of thyroid  Stable  - cyclobenzaprine (FLEXERIL) 10 mg Tab; TAKE ONE TABLET BY MOUTH THREE TIMES A DAY AS NEEDED FOR MILD PAIN (FLEXERIL)  Dispense: 30 Tablet; Refill: 3  - EKG    4. Sebaceous cyst  Opened and treated in the office  - cephALEXin (KEFLEX) 500 MG Cap; Take 1 Capsule by mouth 4 times a day.  Dispense: 20 Capsule; Refill: 0  - CULTURE WOUND W/ GRAM STAIN; Future    5. Cardiac arrhythmia, unspecified cardiac arrhythmia type  EKG sinus rhythm, sinus bigeminy, no evidence of ST segment elevation or depression  - EKG

## 2022-09-12 ENCOUNTER — TELEPHONE (OUTPATIENT)
Dept: MEDICAL GROUP | Facility: LAB | Age: 82
End: 2022-09-12
Payer: MEDICARE

## 2022-09-12 NOTE — TELEPHONE ENCOUNTER
Pt's wound culture from her nose would need to be recollected.  The lab did not receive this in time.

## 2022-09-13 NOTE — TELEPHONE ENCOUNTER
3rd attempt, Called to scheduled a COMPREHENSIVE HEALTH ASSESSMENT, scheduled TONG and updated PCP

## 2022-10-14 ENCOUNTER — APPOINTMENT (RX ONLY)
Dept: URBAN - METROPOLITAN AREA CLINIC 35 | Facility: CLINIC | Age: 82
Setting detail: DERMATOLOGY
End: 2022-10-14

## 2022-10-14 PROBLEM — D48.5 NEOPLASM OF UNCERTAIN BEHAVIOR OF SKIN: Status: ACTIVE | Noted: 2022-10-14

## 2022-10-14 PROCEDURE — ? BIOPSY BY SHAVE METHOD

## 2022-10-14 PROCEDURE — 11103 TANGNTL BX SKIN EA SEP/ADDL: CPT

## 2022-10-14 PROCEDURE — 11102 TANGNTL BX SKIN SINGLE LES: CPT

## 2022-10-14 NOTE — PROCEDURE: BIOPSY BY SHAVE METHOD
Detail Level: Detailed
Depth Of Biopsy: dermis
Was A Bandage Applied: Yes
Size Of Lesion In Cm: 0.3
X Size Of Lesion In Cm: 0
Biopsy Type: H and E
Biopsy Method: Personna blade
Anesthesia Type: 0.5% lidocaine without epinephrine
Anesthesia Volume In Cc: 1
Hemostasis: Aluminum Chloride and Electrocautery
Wound Care: Petrolatum
Dressing: pressure dressing with telfa
Destruction After The Procedure: No
Type Of Destruction Used: Curettage
Curettage Text: The wound bed was treated with curettage after the biopsy was performed.
Cryotherapy Text: The wound bed was treated with cryotherapy after the biopsy was performed.
Electrodesiccation Text: The wound bed was treated with electrodesiccation after the biopsy was performed.
Electrodesiccation And Curettage Text: The wound bed was treated with electrodesiccation and curettage after the biopsy was performed.
Silver Nitrate Text: The wound bed was treated with silver nitrate after the biopsy was performed.
Lab: [id number]
Lab Facility: 
Consent: Written consent was obtained and risks were reviewed including but not limited to scarring, infection, bleeding, scabbing, incomplete removal, nerve damage and allergy to anesthesia.
Post-Care Instructions: I reviewed with the patient in detail post-care instructions. Patient is to keep the biopsy site dry overnight. Gentle cleansing daily.  Apply petroleum ointment daily until healed. Patient may apply hydrogen peroxide soaks to remove any crusting.
Notification Instructions: Patient will be notified of biopsy results. However, patient instructed to call the office if not contacted within 2 weeks.
Billing Type: Third-Party Bill
Information: Selecting Yes will display possible errors in your note based on the variables you have selected. This validation is only offered as a suggestion for you. PLEASE NOTE THAT THE VALIDATION TEXT WILL BE REMOVED WHEN YOU FINALIZE YOUR NOTE. IF YOU WANT TO FAX A PRELIMINARY NOTE YOU WILL NEED TO TOGGLE THIS TO 'NO' IF YOU DO NOT WANT IT IN YOUR FAXED NOTE.

## 2022-11-29 ENCOUNTER — APPOINTMENT (RX ONLY)
Dept: URBAN - METROPOLITAN AREA CLINIC 36 | Facility: CLINIC | Age: 82
Setting detail: DERMATOLOGY
End: 2022-11-29

## 2022-11-29 PROBLEM — C44.91 BASAL CELL CARCINOMA OF SKIN, UNSPECIFIED: Status: ACTIVE | Noted: 2022-11-29

## 2022-11-29 PROCEDURE — 11900 INJECT SKIN LESIONS </W 7: CPT

## 2022-11-29 PROCEDURE — ? INJECTION

## 2022-11-29 NOTE — PROCEDURE: INJECTION
Hide Second Medication?: No
Dose Administered (Numbers Only - Mg, G, Mcg, Units, Cc): 0.2
Route: IL
Consent: The risks of the medication was reviewed with the patient.
Procedure Information: Please note that the numeric value listed in the Medication (1) and associated J-code units and Medication (2) and associated J-code units variables are j-code amounts and do not represent either the concentration or the total amount of the medications injected.  I strongly recommend selecting no to the Render J-code information in note question. This will allow your note to be more clear. If you are billing j-codes with your injection codes you need to document the total amount of the medication injected. This amount should match the j-code units. For example, if you are injecting Triamcinolone 40mg as an intramuscular injection you would select 40 for the dose field and mg for the units. This would allow you to document  with 4 units (40mg = 10mg x 4). The total volume is not used to calculate j-codes only the amount of the medication administered.
Medication (1) And Associated J-Code Units: Bleomycin, 15 units
units
Treatment Number: 1
Units: mg
Render J-Code Information In Note?: yes
Detail Level: None
Dose Administered (Numbers Only - Mg, G, Mcg, Units, Cc): 0
Post-Care Instructions: I reviewed with the patient in detail post-care instructions. Patient understands to keep the injection sites clean and call the clinic if there is any redness, swelling or pain.

## 2022-12-08 ENCOUNTER — TELEPHONE (OUTPATIENT)
Dept: MEDICAL GROUP | Facility: LAB | Age: 82
End: 2022-12-08
Payer: MEDICARE

## 2022-12-08 NOTE — TELEPHONE ENCOUNTER
I spoke with Mavis and she said she just had an eye exam November 8, at [facility].     I called and requested records to be faxed to [phone].    Records in chart.

## 2022-12-09 ENCOUNTER — APPOINTMENT (RX ONLY)
Dept: URBAN - METROPOLITAN AREA CLINIC 35 | Facility: CLINIC | Age: 82
Setting detail: DERMATOLOGY
End: 2022-12-09

## 2022-12-09 DIAGNOSIS — L57.0 ACTINIC KERATOSIS: ICD-10-CM

## 2022-12-09 PROCEDURE — ? COUNSELING

## 2022-12-09 PROCEDURE — 17000 DESTRUCT PREMALG LESION: CPT

## 2022-12-09 PROCEDURE — ? LIQUID NITROGEN

## 2022-12-09 ASSESSMENT — LOCATION SIMPLE DESCRIPTION DERM: LOCATION SIMPLE: NOSE

## 2022-12-09 ASSESSMENT — LOCATION ZONE DERM: LOCATION ZONE: NOSE

## 2022-12-09 ASSESSMENT — LOCATION DETAILED DESCRIPTION DERM: LOCATION DETAILED: LEFT NASAL DORSUM

## 2022-12-13 ENCOUNTER — APPOINTMENT (RX ONLY)
Dept: URBAN - METROPOLITAN AREA CLINIC 36 | Facility: CLINIC | Age: 82
Setting detail: DERMATOLOGY
End: 2022-12-13

## 2022-12-13 PROBLEM — C44.91 BASAL CELL CARCINOMA OF SKIN, UNSPECIFIED: Status: ACTIVE | Noted: 2022-12-13

## 2022-12-13 PROCEDURE — 11900 INJECT SKIN LESIONS </W 7: CPT | Mod: 79

## 2022-12-13 PROCEDURE — ? INJECTION

## 2022-12-13 NOTE — PROCEDURE: INJECTION
Route: IL
Dose Administered (Numbers Only - Mg, G, Mcg, Units, Cc): 0.2
Detail Level: None
Procedure Information: Please note that the numeric value listed in the Medication (1) and associated J-code units and Medication (2) and associated J-code units variables are j-code amounts and do not represent either the concentration or the total amount of the medications injected.  I strongly recommend selecting no to the Render J-code information in note question. This will allow your note to be more clear. If you are billing j-codes with your injection codes you need to document the total amount of the medication injected. This amount should match the j-code units. For example, if you are injecting Triamcinolone 40mg as an intramuscular injection you would select 40 for the dose field and mg for the units. This would allow you to document  with 4 units (40mg = 10mg x 4). The total volume is not used to calculate j-codes only the amount of the medication administered.
Medication (1) And Associated J-Code Units: Bleomycin, 15 units
Hide Second Medication?: No
units
Render J-Code Information In Note?: yes
Dose Administered (Numbers Only - Mg, G, Mcg, Units, Cc): 0
Consent: The risks of the medication was reviewed with the patient.
Post-Care Instructions: I reviewed with the patient in detail post-care instructions. Patient understands to keep the injection sites clean and call the clinic if there is any redness, swelling or pain.
Total Volume Injected In Cc (Will Not Affected Billing): .3
Units: mg
Treatment Number: 2

## 2022-12-14 ENCOUNTER — TELEPHONE (OUTPATIENT)
Dept: MEDICAL GROUP | Facility: LAB | Age: 82
End: 2022-12-14
Payer: MEDICARE

## 2022-12-14 DIAGNOSIS — M19.90 ARTHRITIS: ICD-10-CM

## 2022-12-14 RX ORDER — CELECOXIB 100 MG/1
100 CAPSULE ORAL 2 TIMES DAILY
Qty: 60 CAPSULE | Refills: 2 | Status: SHIPPED
Start: 2022-12-14 | End: 2023-02-28

## 2022-12-14 NOTE — TELEPHONE ENCOUNTER
1. Caller Name:  [phone] (home) [phone] (work)                        Call Back Number: [phone] (E      How would the patient prefer to be contacted with a response: Phone call OK to leave a detailed message    Pt stated her arthritis is really bad today. Her entire body is hurting. Is there anything she can take for this. She wants you to know she still has her pain pills, did not take them all.   [facility] is her preferred pharmacy.

## 2022-12-14 NOTE — TELEPHONE ENCOUNTER
Lena:  Please call Mavis and written out for Celebrex which I have sent to her [facility] pharmacy.   Regards, Waylon Webber, DO

## 2022-12-19 DIAGNOSIS — G89.29 CHRONIC LOW BACK PAIN, UNSPECIFIED BACK PAIN LATERALITY, UNSPECIFIED WHETHER SCIATICA PRESENT: ICD-10-CM

## 2022-12-19 DIAGNOSIS — M54.50 CHRONIC LOW BACK PAIN, UNSPECIFIED BACK PAIN LATERALITY, UNSPECIFIED WHETHER SCIATICA PRESENT: ICD-10-CM

## 2022-12-19 RX ORDER — TRAMADOL HYDROCHLORIDE 50 MG/1
50 TABLET ORAL EVERY 4 HOURS PRN
Qty: 30 TABLET | Refills: 2 | Status: SHIPPED | OUTPATIENT
Start: 2022-12-19 | End: 2023-01-04

## 2022-12-19 NOTE — TELEPHONE ENCOUNTER
Received request via: Patient    Was the patient seen in the last year in this department? Yes  9/9/22  Does the patient have an active prescription (recently filled or refills available) for medication(s) requested? No    Does the patient have long-term Plus and need 100 day supply (blood pressure, diabetes and cholesterol meds only)?

## 2023-01-04 ENCOUNTER — HOSPITAL ENCOUNTER (OUTPATIENT)
Dept: LAB | Facility: MEDICAL CENTER | Age: 83
End: 2023-01-04
Attending: INTERNAL MEDICINE
Payer: MEDICARE

## 2023-01-04 ENCOUNTER — OFFICE VISIT (OUTPATIENT)
Dept: MEDICAL GROUP | Facility: LAB | Age: 83
End: 2023-01-04
Payer: MEDICARE

## 2023-01-04 ENCOUNTER — TELEPHONE (OUTPATIENT)
Dept: MEDICAL GROUP | Facility: LAB | Age: 83
End: 2023-01-04
Payer: MEDICARE

## 2023-01-04 VITALS
WEIGHT: 172 LBS | DIASTOLIC BLOOD PRESSURE: 58 MMHG | TEMPERATURE: 97.4 F | SYSTOLIC BLOOD PRESSURE: 104 MMHG | BODY MASS INDEX: 27 KG/M2 | RESPIRATION RATE: 12 BRPM | HEIGHT: 67 IN | HEART RATE: 65 BPM | OXYGEN SATURATION: 94 %

## 2023-01-04 DIAGNOSIS — E11.9 TYPE 2 DIABETES MELLITUS WITHOUT COMPLICATION, WITHOUT LONG-TERM CURRENT USE OF INSULIN (HCC): ICD-10-CM

## 2023-01-04 DIAGNOSIS — R94.31 EKG ABNORMALITIES: ICD-10-CM

## 2023-01-04 DIAGNOSIS — M35.3 PMR (POLYMYALGIA RHEUMATICA) (HCC): ICD-10-CM

## 2023-01-04 DIAGNOSIS — I49.9 CARDIAC ARRHYTHMIA, UNSPECIFIED CARDIAC ARRHYTHMIA TYPE: ICD-10-CM

## 2023-01-04 LAB
ALBUMIN SERPL BCP-MCNC: 4.3 G/DL (ref 3.2–4.9)
ALBUMIN/GLOB SERPL: 1.5 G/DL
ALP SERPL-CCNC: 60 U/L (ref 30–99)
ALT SERPL-CCNC: 10 U/L (ref 2–50)
ANION GAP SERPL CALC-SCNC: 10 MMOL/L (ref 7–16)
AST SERPL-CCNC: 14 U/L (ref 12–45)
BASOPHILS # BLD AUTO: 0.2 % (ref 0–1.8)
BASOPHILS # BLD: 0.01 K/UL (ref 0–0.12)
BILIRUB SERPL-MCNC: 0.7 MG/DL (ref 0.1–1.5)
BUN SERPL-MCNC: 15 MG/DL (ref 8–22)
CALCIUM ALBUM COR SERPL-MCNC: 9.5 MG/DL (ref 8.5–10.5)
CALCIUM SERPL-MCNC: 9.7 MG/DL (ref 8.5–10.5)
CHLORIDE SERPL-SCNC: 99 MMOL/L (ref 96–112)
CO2 SERPL-SCNC: 26 MMOL/L (ref 20–33)
CREAT SERPL-MCNC: 0.54 MG/DL (ref 0.5–1.4)
CRP SERPL HS-MCNC: 0.88 MG/DL (ref 0–0.75)
EOSINOPHIL # BLD AUTO: 0.06 K/UL (ref 0–0.51)
EOSINOPHIL NFR BLD: 1.1 % (ref 0–6.9)
ERYTHROCYTE [DISTWIDTH] IN BLOOD BY AUTOMATED COUNT: 43.2 FL (ref 35.9–50)
ERYTHROCYTE [SEDIMENTATION RATE] IN BLOOD BY WESTERGREN METHOD: 21 MM/HOUR (ref 0–25)
GFR SERPLBLD CREATININE-BSD FMLA CKD-EPI: 92 ML/MIN/1.73 M 2
GLOBULIN SER CALC-MCNC: 2.8 G/DL (ref 1.9–3.5)
GLUCOSE SERPL-MCNC: 187 MG/DL (ref 65–99)
HCT VFR BLD AUTO: 42.2 % (ref 37–47)
HGB BLD-MCNC: 13.8 G/DL (ref 12–16)
IMM GRANULOCYTES # BLD AUTO: 0.02 K/UL (ref 0–0.11)
IMM GRANULOCYTES NFR BLD AUTO: 0.4 % (ref 0–0.9)
LYMPHOCYTES # BLD AUTO: 1.5 K/UL (ref 1–4.8)
LYMPHOCYTES NFR BLD: 27.4 % (ref 22–41)
MCH RBC QN AUTO: 29.1 PG (ref 27–33)
MCHC RBC AUTO-ENTMCNC: 32.7 G/DL (ref 33.6–35)
MCV RBC AUTO: 88.8 FL (ref 81.4–97.8)
MONOCYTES # BLD AUTO: 0.38 K/UL (ref 0–0.85)
MONOCYTES NFR BLD AUTO: 6.9 % (ref 0–13.4)
NEUTROPHILS # BLD AUTO: 3.51 K/UL (ref 2–7.15)
NEUTROPHILS NFR BLD: 64 % (ref 44–72)
NRBC # BLD AUTO: 0 K/UL
NRBC BLD-RTO: 0 /100 WBC
PLATELET # BLD AUTO: 188 K/UL (ref 164–446)
PMV BLD AUTO: 10.5 FL (ref 9–12.9)
POTASSIUM SERPL-SCNC: 4.3 MMOL/L (ref 3.6–5.5)
PROT SERPL-MCNC: 7.1 G/DL (ref 6–8.2)
RBC # BLD AUTO: 4.75 M/UL (ref 4.2–5.4)
RHEUMATOID FACT SER IA-ACNC: 12 IU/ML (ref 0–14)
SODIUM SERPL-SCNC: 135 MMOL/L (ref 135–145)
WBC # BLD AUTO: 5.5 K/UL (ref 4.8–10.8)

## 2023-01-04 PROCEDURE — 80053 COMPREHEN METABOLIC PANEL: CPT

## 2023-01-04 PROCEDURE — 86039 ANTINUCLEAR ANTIBODIES (ANA): CPT

## 2023-01-04 PROCEDURE — 99214 OFFICE O/P EST MOD 30 MIN: CPT | Performed by: INTERNAL MEDICINE

## 2023-01-04 PROCEDURE — 36415 COLL VENOUS BLD VENIPUNCTURE: CPT

## 2023-01-04 PROCEDURE — 86140 C-REACTIVE PROTEIN: CPT

## 2023-01-04 PROCEDURE — 86431 RHEUMATOID FACTOR QUANT: CPT

## 2023-01-04 PROCEDURE — 86038 ANTINUCLEAR ANTIBODIES: CPT

## 2023-01-04 PROCEDURE — 85025 COMPLETE CBC W/AUTO DIFF WBC: CPT

## 2023-01-04 PROCEDURE — 93000 ELECTROCARDIOGRAM COMPLETE: CPT | Performed by: INTERNAL MEDICINE

## 2023-01-04 PROCEDURE — 85652 RBC SED RATE AUTOMATED: CPT

## 2023-01-04 RX ORDER — METHYLPREDNISOLONE 4 MG/1
TABLET ORAL
Qty: 21 TABLET | Refills: 0 | Status: SHIPPED
Start: 2023-01-04 | End: 2023-01-09

## 2023-01-04 ASSESSMENT — FIBROSIS 4 INDEX: FIB4 SCORE: 2.09

## 2023-01-04 ASSESSMENT — PATIENT HEALTH QUESTIONNAIRE - PHQ9: CLINICAL INTERPRETATION OF PHQ2 SCORE: 0

## 2023-01-04 NOTE — TELEPHONE ENCOUNTER
ESTABLISHED PATIENT PRE-VISIT PLANNING     Patient was NOT contacted to complete PVP.     Note: Patient will not be contacted if there is no indication to call.     1.  Reviewed notes from the last few office visits within the medical group: Yes    2.  If any orders were placed at last visit or intended to be done for this visit (i.e. 6 mos follow-up), do we have Results/Consult Notes?           Labs - Labs ordered, NOT completed. Patient advised to complete prior to next appointment. Too late to remind   Note: If patient appointment is for lab review and patient did not complete labs, check with provider if OK to reschedule patient until labs completed.         Imaging - Imaging was not ordered at last office visit.         Referrals - No referrals were ordered at last office visit.    3. Is this appointment scheduled as a Hospital Follow-Up? No    4.  Immunizations were updated in Epic using Reconcile Outside Information activity? Yes    5.  Patient is due for the following Health Maintenance Topics:   Health Maintenance Due   Topic Date Due    Annual Wellness Visit  09/26/2019    IMM HEP B VACCINE (2 of 3 - 19+ 3-dose series) 09/23/2020    IMM ZOSTER VACCINES (3 of 3) 10/21/2020    COVID-19 Vaccine (4 - Booster for Pfizer series) 12/10/2021    SERUM CREATININE  09/03/2022    BONE DENSITY  11/10/2022   6.  AHA (Pulse8) form printed for Provider? N/A

## 2023-01-05 ENCOUNTER — TELEPHONE (OUTPATIENT)
Dept: MEDICAL GROUP | Facility: LAB | Age: 83
End: 2023-01-05
Payer: MEDICARE

## 2023-01-05 DIAGNOSIS — E11.9 TYPE 2 DIABETES MELLITUS WITHOUT COMPLICATION, WITHOUT LONG-TERM CURRENT USE OF INSULIN (HCC): ICD-10-CM

## 2023-01-05 NOTE — TELEPHONE ENCOUNTER
ESTABLISHED PATIENT PRE-VISIT PLANNING     Patient was NOT contacted to complete PVP.     Note: Patient will not be contacted if there is no indication to call.     1.  Reviewed notes from the last few office visits within the medical group: Yes    2.  If any orders were placed at last visit or intended to be done for this visit (i.e. 6 mos follow-up), do we have Results/Consult Notes?           Labs - Labs ordered, completed on 1/4/23 and results are in chart.  Note: If patient appointment is for lab review and patient did not complete labs, check with provider if OK to reschedule patient until labs completed.         Imaging - Imaging ordered, completed and results are in chart.         Referrals - No referrals were ordered at last office visit.    3. Is this appointment scheduled as a Hospital Follow-Up? No    4.  Immunizations were updated in Epic using Reconcile Outside Information activity? Yes    5.  Patient is due for the following Health Maintenance Topics:   Health Maintenance Due   Topic Date Due    Annual Wellness Visit  09/26/2019    IMM HEP B VACCINE (2 of 3 - 19+ 3-dose series) 09/23/2020    IMM ZOSTER VACCINES (3 of 3) 10/21/2020    COVID-19 Vaccine (4 - Booster for Pfizer series) 12/10/2021    BONE DENSITY  11/10/2022   6.  AHA (Pulse8) form printed for Provider? N/A

## 2023-01-06 DIAGNOSIS — E11.9 TYPE 2 DIABETES MELLITUS WITHOUT COMPLICATION, WITHOUT LONG-TERM CURRENT USE OF INSULIN (HCC): ICD-10-CM

## 2023-01-06 NOTE — TELEPHONE ENCOUNTER
Received request via: Pharmacy    Was the patient seen in the last year in this department? Yes    Does the patient have an active prescription (recently filled or refills available) for medication(s) requested? No    Does the patient have [facility] and need 100 day supply (blood pressure, diabetes and cholesterol meds only)? Yes, quantity updated to 100 days

## 2023-01-07 ENCOUNTER — PATIENT MESSAGE (OUTPATIENT)
Dept: MEDICAL GROUP | Facility: LAB | Age: 83
End: 2023-01-07
Payer: MEDICARE

## 2023-01-07 LAB — NUCLEAR IGG SER QL IA: DETECTED

## 2023-01-08 LAB
ANA INTERPRETIVE COMMENT Q5143: NORMAL
ANTINUCLEAR ANTIBODY (ANA), HEP-2, IGG Q5142: NORMAL

## 2023-01-09 DIAGNOSIS — R10.13 DYSPEPSIA: ICD-10-CM

## 2023-01-09 DIAGNOSIS — M35.3 PMR (POLYMYALGIA RHEUMATICA) (HCC): ICD-10-CM

## 2023-01-09 RX ORDER — OMEPRAZOLE 20 MG/1
20 CAPSULE, DELAYED RELEASE ORAL DAILY
Qty: 30 CAPSULE | Refills: 3 | Status: SHIPPED | OUTPATIENT
Start: 2023-01-09

## 2023-01-09 RX ORDER — PREDNISONE 5 MG/1
15 TABLET ORAL
Qty: 90 TABLET | Refills: 1 | Status: SHIPPED | OUTPATIENT
Start: 2023-01-09 | End: 2023-03-03 | Stop reason: SDUPTHER

## 2023-01-13 ENCOUNTER — OFFICE VISIT (OUTPATIENT)
Dept: MEDICAL GROUP | Facility: LAB | Age: 83
End: 2023-01-13
Payer: MEDICARE

## 2023-01-13 VITALS
SYSTOLIC BLOOD PRESSURE: 105 MMHG | WEIGHT: 167 LBS | DIASTOLIC BLOOD PRESSURE: 70 MMHG | HEIGHT: 67 IN | TEMPERATURE: 96.7 F | RESPIRATION RATE: 12 BRPM | HEART RATE: 82 BPM | OXYGEN SATURATION: 94 % | BODY MASS INDEX: 26.21 KG/M2

## 2023-01-13 DIAGNOSIS — E11.9 CONTROLLED TYPE 2 DIABETES MELLITUS WITHOUT COMPLICATION, WITHOUT LONG-TERM CURRENT USE OF INSULIN (HCC): ICD-10-CM

## 2023-01-13 DIAGNOSIS — M35.3 POLYMYALGIA RHEUMATICA (HCC): ICD-10-CM

## 2023-01-13 PROCEDURE — 99213 OFFICE O/P EST LOW 20 MIN: CPT | Performed by: INTERNAL MEDICINE

## 2023-01-13 ASSESSMENT — FIBROSIS 4 INDEX: FIB4 SCORE: 1.91

## 2023-01-14 NOTE — PROGRESS NOTES
CC: Mavis Lizarraga is a 82 y.o. female is suffering from   Chief Complaint   Patient presents with    Follow-Up     1 week follow up         SUBJECTIVE:  1. Controlled type 2 diabetes mellitus without complication, without long-term current use of insulin (HCC)  Also is here for follow-up has a history of type 2 diabetes states that she is able to tolerate metformin without significant nausea vomiting    2. Polymyalgia rheumatica (HCC)  Patient and I have discussed that she has done incredibly well with the use of methylprednisolone, patient is to be on prednisone 15 mg each day we will plan on tapering in approximately 1 month.        Past social, family, history: , Lauri  Social History     Tobacco Use    Smoking status: Never    Smokeless tobacco: Never   Vaping Use    Vaping Use: Never used   Substance Use Topics    Alcohol use: Yes     Comment: very rarely on ocassion will have a Sravani    Drug use: No         MEDICATIONS:    Current Outpatient Medications:     predniSONE (DELTASONE) 5 MG Tab, Take 3 Tablets by mouth 3 times a day., Disp: 90 Tablet, Rfl: 1    omeprazole (PRILOSEC) 20 MG delayed-release capsule, Take 1 Capsule by mouth every day., Disp: 30 Capsule, Rfl: 3    metFORMIN (GLUCOPHAGE) 500 MG Tab, TAKE ONE TABLET BY MOUTH TWICE A DAY WITH MEALS., Disp: 100 Tablet, Rfl: 3    celecoxib (CELEBREX) 100 MG Cap, Take 1 Capsule by mouth 2 times a day for 90 days., Disp: 60 Capsule, Rfl: 2    cyclobenzaprine (FLEXERIL) 10 mg Tab, TAKE ONE TABLET BY MOUTH THREE TIMES A DAY AS NEEDED FOR MILD PAIN (FLEXERIL), Disp: 30 Tablet, Rfl: 3    levothyroxine (SYNTHROID) 75 MCG Tab, TAKE ONE TABLET BY MOUTH EVERY MORNING ON AN EMPTY STOMACH, Disp: 90 Tablet, Rfl: 3    glipiZIDE (GLUCOTROL) 10 MG Tab, TAKE ONE TABLET BY MOUTH TWICE A DAY, Disp: 200 Tablet, Rfl: 3    rosuvastatin (CRESTOR) 40 MG tablet, Take 1 Tablet by mouth every day., Disp: 100 Tablet, Rfl: 3    Cholecalciferol (VITAMIN D) 2000 UNIT  Tab, Take  by mouth every day., Disp: , Rfl:     VITAMIN D PO, Take 4,000 Units by mouth every morning., Disp: , Rfl:     Ascorbic Acid (VITAMIN C) 1000 MG Tab, Take 1,000 mg by mouth every morning., Disp: , Rfl:     Cyanocobalamin (VITAMIN B-12 PO), Take 1 tablet by mouth every morning., Disp: , Rfl:     CINNAMON PO, Take 1 tablet by mouth every morning., Disp: , Rfl:     Multiple Vitamins-Minerals (ICAPS AREDS FORMULA PO), Take 1 tablet by mouth every morning., Disp: , Rfl:     NON SPECIFIED, Take 1 Package by mouth every morning. [facility] Diabetes Health Pack Vitamins, Disp: , Rfl:     vitamin E (VITAMIN E) 1000 UNIT Cap, Take 1,000 Units by mouth every morning., Disp: , Rfl:     Omega-3 Fatty Acids (FISH OIL PO), Take 1 Cap by mouth every day., Disp: , Rfl:     cephALEXin (KEFLEX) 500 MG Cap, Take 1 Capsule by mouth 4 times a day. (Patient not taking: Reported on 1/13/2023), Disp: 20 Capsule, Rfl: 0    pioglitazone (ACTOS) 15 MG Tab, TAKE ONE TABLET BY MOUTH DAILY (ACTOS) (Patient not taking: Reported on 1/13/2023), Disp: 100 Tablet, Rfl: 3    pioglitazone (ACTOS) 30 MG Tab, TAKE ONE TABLET BY MOUTH DAILY (ACTOS), Disp: 90 Tablet, Rfl: 3    cimetidine (TAGAMET) 400 MG Tab, TAKE ONE TABLET BY MOUTH DAILY AS NEEDED.(GENERIC FOR TAGAMET), Disp: 90 Tablet, Rfl: 3    nitrofurantoin (MACROBID) 100 MG Cap, TAKE ONE CAPSULE BY MOUTH TWICE A DAY (Patient not taking: Reported on 1/13/2023), Disp: 10 Capsule, Rfl: 0    Blood Glucose Test Strips, Use one Per formulary preference  strip to test blood sugar once daily early morning before first meal., Disp: 100 Strip, Rfl: 3    Lancets, Use one Per formulary preference  lancet to test blood sugar once daily early morning before first meal., Disp: 100 Each, Rfl: 3    meloxicam (MOBIC) 15 MG tablet, Take 1 Tablet by mouth 1 time a day as needed for Moderate Pain. (Patient not taking: Reported on 1/13/2023), Disp: 100 Tablet, Rfl: 3    Alcohol Swabs, Wipe site with prep pad  "prior to injection., Disp: 100 Each, Rfl: 3    Blood Glucose Meter Kit, Test blood sugar once daily and prn ssx of high or low blood sugar. Contour NEXT blood glucose monitoring kit., Disp: 1 Kit, Rfl: 0    vitamin D, Ergocalciferol, (DRISDOL) 1.25 MG (32070 UT) Cap capsule, Take 1 capsule by mouth every 7 days. (Patient not taking: Reported on 1/13/2023), Disp: 5 capsule, Rfl: 0    PROBLEMS:  Patient Active Problem List    Diagnosis Date Noted    Vitamin D deficiency 04/12/2021    Cervical spinal stenosis 04/11/2021    Pubic ramus fracture (HCC) 04/09/2021    History of breast cancer     Spasm of back muscles 03/27/2019    Uncontrolled type 2 diabetes mellitus with hyperglycemia (HCC) 01/28/2016    On statin therapy due to risk of future cardiovascular event 01/15/2013    Acquired hypothyroidism 01/15/2013    Gastroesophageal reflux disease with esophagitis 01/15/2013       REVIEW OF SYSTEMS:  Gen.:  No Nausea, Vomiting, fever, Chills.  Heart: No chest pain.  Lungs:  No shortness of Breath.  Psychological: Rajan unusual Anxiety depression     PHYSICAL EXAM   Constitutional: Alert, cooperative, not in acute distress.  Cardiovascular:  Rate Rhythm is regular without murmurs rubs clicks.     Thorax & Lungs: Clear to auscultation, no wheezing, rhonchi, or rales  HENT: Normocephalic, Atraumatic.  Eyes: PERRLA, EOMI, Conjunctiva normal.   Neck: Trachia is midline no swelling of the thyroid.   Lymphatic: No lymphadenopathy noted.   Neurologic: Alert & oriented x 3, cranial nerves II through XII are intact, Normal motor function, Normal sensory function, No focal deficits noted.   Psychiatric: Affect normal, Judgment normal, Mood normal.     VITAL SIGNS:/70   Pulse 82   Temp 35.9 °C (96.7 °F) (Temporal)   Resp 12   Ht 1.702 m (5' 7\")   Wt 75.8 kg (167 lb)   LMP  (LMP Unknown)   SpO2 94%   BMI 26.16 kg/m²     Labs: Reviewed    Assessment:                                                     Plan:    1. " Controlled type 2 diabetes mellitus without complication, without long-term current use of insulin (HCC)  Continued metformin  - HEMOGLOBIN A1C; Future  - VITAMIN D,25 HYDROXY (DEFICIENCY); Future    2. Polymyalgia rheumatica (HCC)  Significantly improved with methylprednisolone patient is to be on prednisone 15 mg each day anticipate tapering in 1 month

## 2023-02-22 ENCOUNTER — TELEPHONE (OUTPATIENT)
Dept: MEDICAL GROUP | Facility: LAB | Age: 83
End: 2023-02-22
Payer: MEDICARE

## 2023-02-22 NOTE — TELEPHONE ENCOUNTER
ESTABLISHED PATIENT PRE-VISIT PLANNING     Patient was contacted to complete PVP.     Note: Patient will not be contacted if there is no indication to call.     1.  Reviewed notes from the last few office visits within the medical group: Yes    2.  If any orders were placed at last visit or intended to be done for this visit (i.e. 6 mos follow-up), do we have Results/Consult Notes?           Labs - Labs ordered, NOT completed. Patient advised to complete prior to next appointment. Scheduled 3/2/23?  Note: If patient appointment is for lab review and patient did not complete labs, check with provider if OK to reschedule patient until labs completed.         Imaging - Imaging was not ordered at last office visit.         Referrals - No referrals were ordered at last office visit.    3. Is this appointment scheduled as a Hospital Follow-Up? No    4.  Immunizations were updated in Epic using Reconcile Outside Information activity? Yes    5.  Patient is due for the following Health Maintenance Topics:   Health Maintenance Due   Topic Date Due    Annual Wellness Visit  09/26/2019    IMM HEP B VACCINE (2 of 3 - 19+ 3-dose series) 09/23/2020    IMM ZOSTER VACCINES (3 of 3) 10/21/2020    COVID-19 Vaccine (4 - Booster for Pfizer series) 12/10/2021    BONE DENSITY  11/10/2022    A1C SCREENING  03/02/2023   6.  AHA (Pulse8) form printed for Provider? N/A

## 2023-02-28 ENCOUNTER — HOSPITAL ENCOUNTER (OUTPATIENT)
Dept: LAB | Facility: MEDICAL CENTER | Age: 83
DRG: 309 | End: 2023-02-28
Attending: INTERNAL MEDICINE
Payer: MEDICARE

## 2023-02-28 ENCOUNTER — APPOINTMENT (OUTPATIENT)
Dept: RADIOLOGY | Facility: MEDICAL CENTER | Age: 83
DRG: 309 | End: 2023-02-28
Attending: EMERGENCY MEDICINE
Payer: MEDICARE

## 2023-02-28 ENCOUNTER — HOSPITAL ENCOUNTER (INPATIENT)
Facility: MEDICAL CENTER | Age: 83
LOS: 2 days | DRG: 309 | End: 2023-03-02
Attending: EMERGENCY MEDICINE | Admitting: INTERNAL MEDICINE
Payer: MEDICARE

## 2023-02-28 ENCOUNTER — OFFICE VISIT (OUTPATIENT)
Dept: MEDICAL GROUP | Facility: LAB | Age: 83
End: 2023-02-28
Payer: MEDICARE

## 2023-02-28 VITALS
SYSTOLIC BLOOD PRESSURE: 125 MMHG | WEIGHT: 162 LBS | DIASTOLIC BLOOD PRESSURE: 85 MMHG | HEIGHT: 67 IN | BODY MASS INDEX: 25.43 KG/M2 | HEART RATE: 144 BPM | OXYGEN SATURATION: 96 % | TEMPERATURE: 97.5 F

## 2023-02-28 DIAGNOSIS — R42 LIGHTHEADEDNESS: ICD-10-CM

## 2023-02-28 DIAGNOSIS — M35.3 POLYMYALGIA RHEUMATICA (HCC): ICD-10-CM

## 2023-02-28 DIAGNOSIS — M35.3 POLYMYALGIA (HCC): ICD-10-CM

## 2023-02-28 DIAGNOSIS — I48.91 ATRIAL FIBRILLATION WITH RVR (HCC): ICD-10-CM

## 2023-02-28 DIAGNOSIS — I48.91 ATRIAL FIBRILLATION, RAPID (HCC): ICD-10-CM

## 2023-02-28 DIAGNOSIS — E11.9 CONTROLLED TYPE 2 DIABETES MELLITUS WITHOUT COMPLICATION, WITHOUT LONG-TERM CURRENT USE OF INSULIN (HCC): ICD-10-CM

## 2023-02-28 DIAGNOSIS — E11.65 UNCONTROLLED TYPE 2 DIABETES MELLITUS WITH HYPERGLYCEMIA (HCC): ICD-10-CM

## 2023-02-28 DIAGNOSIS — I48.91 ATRIAL FIBRILLATION WITH RAPID VENTRICULAR RESPONSE (HCC): ICD-10-CM

## 2023-02-28 DIAGNOSIS — O22.30 DVT (DEEP VEIN THROMBOSIS) IN PREGNANCY: ICD-10-CM

## 2023-02-28 LAB
25(OH)D3 SERPL-MCNC: 47 NG/ML (ref 30–100)
ALBUMIN SERPL BCP-MCNC: 4.1 G/DL (ref 3.2–4.9)
ALBUMIN/GLOB SERPL: 2 G/DL
ALP SERPL-CCNC: 58 U/L (ref 30–99)
ALT SERPL-CCNC: 19 U/L (ref 2–50)
ANION GAP SERPL CALC-SCNC: 12 MMOL/L (ref 7–16)
AST SERPL-CCNC: 14 U/L (ref 12–45)
BASOPHILS # BLD AUTO: 0.2 % (ref 0–1.8)
BASOPHILS # BLD: 0.01 K/UL (ref 0–0.12)
BILIRUB SERPL-MCNC: 1 MG/DL (ref 0.1–1.5)
BUN SERPL-MCNC: 15 MG/DL (ref 8–22)
CALCIUM ALBUM COR SERPL-MCNC: 9.3 MG/DL (ref 8.5–10.5)
CALCIUM SERPL-MCNC: 9.4 MG/DL (ref 8.5–10.5)
CHLORIDE SERPL-SCNC: 101 MMOL/L (ref 96–112)
CO2 SERPL-SCNC: 24 MMOL/L (ref 20–33)
CREAT SERPL-MCNC: 0.57 MG/DL (ref 0.5–1.4)
EKG IMPRESSION: NORMAL
EOSINOPHIL # BLD AUTO: 0.01 K/UL (ref 0–0.51)
EOSINOPHIL NFR BLD: 0.2 % (ref 0–6.9)
ERYTHROCYTE [DISTWIDTH] IN BLOOD BY AUTOMATED COUNT: 49.3 FL (ref 35.9–50)
EST. AVERAGE GLUCOSE BLD GHB EST-MCNC: 275 MG/DL
EST. AVERAGE GLUCOSE BLD GHB EST-MCNC: 278 MG/DL
GFR SERPLBLD CREATININE-BSD FMLA CKD-EPI: 91 ML/MIN/1.73 M 2
GLOBULIN SER CALC-MCNC: 2.1 G/DL (ref 1.9–3.5)
GLUCOSE BLD STRIP.AUTO-MCNC: 277 MG/DL (ref 65–99)
GLUCOSE BLD STRIP.AUTO-MCNC: 317 MG/DL (ref 65–99)
GLUCOSE SERPL-MCNC: 357 MG/DL (ref 65–99)
HBA1C MFR BLD: 11.2 % (ref 4–5.6)
HBA1C MFR BLD: 11.3 % (ref 4–5.6)
HCT VFR BLD AUTO: 46.8 % (ref 37–47)
HGB BLD-MCNC: 15.7 G/DL (ref 12–16)
IMM GRANULOCYTES # BLD AUTO: 0.05 K/UL (ref 0–0.11)
IMM GRANULOCYTES NFR BLD AUTO: 0.9 % (ref 0–0.9)
LYMPHOCYTES # BLD AUTO: 0.97 K/UL (ref 1–4.8)
LYMPHOCYTES NFR BLD: 17.5 % (ref 22–41)
MAGNESIUM SERPL-MCNC: 2.1 MG/DL (ref 1.5–2.5)
MCH RBC QN AUTO: 29.6 PG (ref 27–33)
MCHC RBC AUTO-ENTMCNC: 33.5 G/DL (ref 33.6–35)
MCV RBC AUTO: 88.3 FL (ref 81.4–97.8)
MONOCYTES # BLD AUTO: 0.3 K/UL (ref 0–0.85)
MONOCYTES NFR BLD AUTO: 5.4 % (ref 0–13.4)
NEUTROPHILS # BLD AUTO: 4.2 K/UL (ref 2–7.15)
NEUTROPHILS NFR BLD: 75.8 % (ref 44–72)
NRBC # BLD AUTO: 0 K/UL
NRBC BLD-RTO: 0 /100 WBC
PLATELET # BLD AUTO: 154 K/UL (ref 164–446)
PMV BLD AUTO: 9.9 FL (ref 9–12.9)
POTASSIUM SERPL-SCNC: 4.1 MMOL/L (ref 3.6–5.5)
PROT SERPL-MCNC: 6.2 G/DL (ref 6–8.2)
RBC # BLD AUTO: 5.3 M/UL (ref 4.2–5.4)
SODIUM SERPL-SCNC: 137 MMOL/L (ref 135–145)
TROPONIN T SERPL-MCNC: 23 NG/L (ref 6–19)
WBC # BLD AUTO: 5.5 K/UL (ref 4.8–10.8)

## 2023-02-28 PROCEDURE — 82306 VITAMIN D 25 HYDROXY: CPT

## 2023-02-28 PROCEDURE — 84484 ASSAY OF TROPONIN QUANT: CPT

## 2023-02-28 PROCEDURE — 99214 OFFICE O/P EST MOD 30 MIN: CPT | Mod: 25 | Performed by: INTERNAL MEDICINE

## 2023-02-28 PROCEDURE — A9270 NON-COVERED ITEM OR SERVICE: HCPCS | Performed by: INTERNAL MEDICINE

## 2023-02-28 PROCEDURE — 700105 HCHG RX REV CODE 258: Performed by: INTERNAL MEDICINE

## 2023-02-28 PROCEDURE — 700102 HCHG RX REV CODE 250 W/ 637 OVERRIDE(OP): Performed by: INTERNAL MEDICINE

## 2023-02-28 PROCEDURE — 83735 ASSAY OF MAGNESIUM: CPT

## 2023-02-28 PROCEDURE — 770000 HCHG ROOM/CARE - INTERMEDIATE ICU *

## 2023-02-28 PROCEDURE — 83036 HEMOGLOBIN GLYCOSYLATED A1C: CPT | Mod: 91

## 2023-02-28 PROCEDURE — 99285 EMERGENCY DEPT VISIT HI MDM: CPT

## 2023-02-28 PROCEDURE — 700101 HCHG RX REV CODE 250: Performed by: EMERGENCY MEDICINE

## 2023-02-28 PROCEDURE — 99222 1ST HOSP IP/OBS MODERATE 55: CPT | Mod: AI | Performed by: INTERNAL MEDICINE

## 2023-02-28 PROCEDURE — 83036 HEMOGLOBIN GLYCOSYLATED A1C: CPT

## 2023-02-28 PROCEDURE — 700111 HCHG RX REV CODE 636 W/ 250 OVERRIDE (IP): Performed by: EMERGENCY MEDICINE

## 2023-02-28 PROCEDURE — 36415 COLL VENOUS BLD VENIPUNCTURE: CPT

## 2023-02-28 PROCEDURE — 700111 HCHG RX REV CODE 636 W/ 250 OVERRIDE (IP): Performed by: INTERNAL MEDICINE

## 2023-02-28 PROCEDURE — 82962 GLUCOSE BLOOD TEST: CPT

## 2023-02-28 PROCEDURE — 700105 HCHG RX REV CODE 258: Performed by: EMERGENCY MEDICINE

## 2023-02-28 PROCEDURE — 85025 COMPLETE CBC W/AUTO DIFF WBC: CPT

## 2023-02-28 PROCEDURE — 71045 X-RAY EXAM CHEST 1 VIEW: CPT

## 2023-02-28 PROCEDURE — 96366 THER/PROPH/DIAG IV INF ADDON: CPT

## 2023-02-28 PROCEDURE — 80053 COMPREHEN METABOLIC PANEL: CPT

## 2023-02-28 PROCEDURE — 93005 ELECTROCARDIOGRAM TRACING: CPT | Performed by: EMERGENCY MEDICINE

## 2023-02-28 PROCEDURE — 96365 THER/PROPH/DIAG IV INF INIT: CPT

## 2023-02-28 PROCEDURE — 96376 TX/PRO/DX INJ SAME DRUG ADON: CPT

## 2023-02-28 PROCEDURE — 96375 TX/PRO/DX INJ NEW DRUG ADDON: CPT

## 2023-02-28 RX ORDER — PREDNISONE 5 MG/1
5 TABLET ORAL 2 TIMES DAILY
Status: DISCONTINUED | OUTPATIENT
Start: 2023-02-28 | End: 2023-03-02 | Stop reason: HOSPADM

## 2023-02-28 RX ORDER — INSULIN LISPRO 100 [IU]/ML
2-9 INJECTION, SOLUTION INTRAVENOUS; SUBCUTANEOUS
Status: DISCONTINUED | OUTPATIENT
Start: 2023-02-28 | End: 2023-03-02 | Stop reason: HOSPADM

## 2023-02-28 RX ORDER — LEVOTHYROXINE SODIUM 0.07 MG/1
75 TABLET ORAL
Status: DISCONTINUED | OUTPATIENT
Start: 2023-03-01 | End: 2023-03-02 | Stop reason: HOSPADM

## 2023-02-28 RX ORDER — ONDANSETRON 4 MG/1
4 TABLET, ORALLY DISINTEGRATING ORAL EVERY 4 HOURS PRN
Status: DISCONTINUED | OUTPATIENT
Start: 2023-02-28 | End: 2023-03-02 | Stop reason: HOSPADM

## 2023-02-28 RX ORDER — DILTIAZEM HYDROCHLORIDE 5 MG/ML
10 INJECTION INTRAVENOUS ONCE
Status: COMPLETED | OUTPATIENT
Start: 2023-02-28 | End: 2023-02-28

## 2023-02-28 RX ORDER — INSULIN LISPRO 100 [IU]/ML
0.2 INJECTION, SOLUTION INTRAVENOUS; SUBCUTANEOUS
Status: DISCONTINUED | OUTPATIENT
Start: 2023-03-01 | End: 2023-03-02 | Stop reason: HOSPADM

## 2023-02-28 RX ORDER — METOPROLOL TARTRATE 1 MG/ML
5 INJECTION, SOLUTION INTRAVENOUS ONCE
Status: COMPLETED | OUTPATIENT
Start: 2023-02-28 | End: 2023-02-28

## 2023-02-28 RX ORDER — CHOLECALCIFEROL (VITAMIN D3) 125 MCG
1000 CAPSULE ORAL EVERY MORNING
Status: DISCONTINUED | OUTPATIENT
Start: 2023-03-01 | End: 2023-03-02 | Stop reason: HOSPADM

## 2023-02-28 RX ORDER — BISACODYL 10 MG
10 SUPPOSITORY, RECTAL RECTAL
Status: DISCONTINUED | OUTPATIENT
Start: 2023-02-28 | End: 2023-03-02 | Stop reason: HOSPADM

## 2023-02-28 RX ORDER — ROSUVASTATIN CALCIUM 10 MG/1
40 TABLET, COATED ORAL DAILY
Status: DISCONTINUED | OUTPATIENT
Start: 2023-03-01 | End: 2023-03-02 | Stop reason: HOSPADM

## 2023-02-28 RX ORDER — OMEPRAZOLE 20 MG/1
20 CAPSULE, DELAYED RELEASE ORAL
Status: DISCONTINUED | OUTPATIENT
Start: 2023-02-28 | End: 2023-03-02 | Stop reason: HOSPADM

## 2023-02-28 RX ORDER — SODIUM CHLORIDE 9 MG/ML
1000 INJECTION, SOLUTION INTRAVENOUS ONCE
Status: COMPLETED | OUTPATIENT
Start: 2023-02-28 | End: 2023-02-28

## 2023-02-28 RX ORDER — VITAMIN B COMPLEX
2000 TABLET ORAL DAILY
Status: DISCONTINUED | OUTPATIENT
Start: 2023-03-01 | End: 2023-03-02 | Stop reason: HOSPADM

## 2023-02-28 RX ORDER — SODIUM CHLORIDE 9 MG/ML
1000 INJECTION, SOLUTION INTRAVENOUS ONCE
Status: COMPLETED | OUTPATIENT
Start: 2023-02-28 | End: 2023-03-01

## 2023-02-28 RX ORDER — HYDRALAZINE HYDROCHLORIDE 20 MG/ML
10 INJECTION INTRAMUSCULAR; INTRAVENOUS EVERY 4 HOURS PRN
Status: DISCONTINUED | OUTPATIENT
Start: 2023-02-28 | End: 2023-03-02 | Stop reason: HOSPADM

## 2023-02-28 RX ORDER — ACETAMINOPHEN 325 MG/1
650 TABLET ORAL EVERY 6 HOURS PRN
Status: DISCONTINUED | OUTPATIENT
Start: 2023-02-28 | End: 2023-03-02 | Stop reason: HOSPADM

## 2023-02-28 RX ORDER — AMOXICILLIN 250 MG
2 CAPSULE ORAL 2 TIMES DAILY
Status: DISCONTINUED | OUTPATIENT
Start: 2023-02-28 | End: 2023-03-02 | Stop reason: HOSPADM

## 2023-02-28 RX ORDER — ENOXAPARIN SODIUM 100 MG/ML
40 INJECTION SUBCUTANEOUS DAILY
Status: DISCONTINUED | OUTPATIENT
Start: 2023-02-28 | End: 2023-02-28

## 2023-02-28 RX ORDER — POLYETHYLENE GLYCOL 3350 17 G/17G
1 POWDER, FOR SOLUTION ORAL
Status: DISCONTINUED | OUTPATIENT
Start: 2023-02-28 | End: 2023-03-02 | Stop reason: HOSPADM

## 2023-02-28 RX ORDER — ONDANSETRON 2 MG/ML
4 INJECTION INTRAMUSCULAR; INTRAVENOUS EVERY 4 HOURS PRN
Status: DISCONTINUED | OUTPATIENT
Start: 2023-02-28 | End: 2023-03-02 | Stop reason: HOSPADM

## 2023-02-28 RX ORDER — CYCLOBENZAPRINE HCL 10 MG
10 TABLET ORAL 3 TIMES DAILY PRN
Status: DISCONTINUED | OUTPATIENT
Start: 2023-02-28 | End: 2023-03-02 | Stop reason: HOSPADM

## 2023-02-28 RX ADMIN — SODIUM CHLORIDE 1000 ML: 9 INJECTION, SOLUTION INTRAVENOUS at 22:33

## 2023-02-28 RX ADMIN — ENOXAPARIN SODIUM 40 MG: 100 INJECTION SUBCUTANEOUS at 21:29

## 2023-02-28 RX ADMIN — INSULIN LISPRO 3 UNITS: 100 INJECTION, SOLUTION INTRAVENOUS; SUBCUTANEOUS at 21:34

## 2023-02-28 RX ADMIN — METOPROLOL TARTRATE 5 MG: 1 INJECTION, SOLUTION INTRAVENOUS at 16:42

## 2023-02-28 RX ADMIN — METOPROLOL TARTRATE 12.5 MG: 25 TABLET, FILM COATED ORAL at 21:29

## 2023-02-28 RX ADMIN — PREDNISONE 5 MG: 5 TABLET ORAL at 22:27

## 2023-02-28 RX ADMIN — SODIUM CHLORIDE 1000 ML: 9 INJECTION, SOLUTION INTRAVENOUS at 16:42

## 2023-02-28 RX ADMIN — INSULIN GLARGINE-YFGN 15 UNITS: 100 INJECTION, SOLUTION SUBCUTANEOUS at 21:34

## 2023-02-28 RX ADMIN — DILTIAZEM HYDROCHLORIDE 10 MG/HR: 5 INJECTION INTRAVENOUS at 18:39

## 2023-02-28 RX ADMIN — DILTIAZEM HYDROCHLORIDE 10 MG: 5 INJECTION INTRAVENOUS at 17:33

## 2023-02-28 RX ADMIN — DILTIAZEM HYDROCHLORIDE 10 MG: 5 INJECTION INTRAVENOUS at 18:06

## 2023-02-28 ASSESSMENT — ENCOUNTER SYMPTOMS
HEMOPTYSIS: 0
POLYDIPSIA: 0
HEARTBURN: 0
BACK PAIN: 0
WEIGHT LOSS: 0
COUGH: 0
BLURRED VISION: 0
DOUBLE VISION: 0
NECK PAIN: 0
CHILLS: 0
TREMORS: 0
PHOTOPHOBIA: 0
FOCAL WEAKNESS: 0
NAUSEA: 0
ORTHOPNEA: 0
VOMITING: 0
SPUTUM PRODUCTION: 0
SPEECH CHANGE: 0
NERVOUS/ANXIOUS: 0
DIZZINESS: 1
SHORTNESS OF BREATH: 1
FEVER: 0
HALLUCINATIONS: 0
PALPITATIONS: 1
BRUISES/BLEEDS EASILY: 0
FLANK PAIN: 0
HEADACHES: 0

## 2023-02-28 ASSESSMENT — CHA2DS2 SCORE
AGE 75 OR GREATER: YES
CHA2DS2 VASC SCORE: 4
AGE 65 TO 74: NO
SEX: FEMALE
DIABETES: YES
VASCULAR DISEASE: NO
PRIOR STROKE OR TIA OR THROMBOEMBOLISM: NO
HYPERTENSION: NO
CHF OR LEFT VENTRICULAR DYSFUNCTION: NO

## 2023-02-28 ASSESSMENT — HEART SCORE
AGE: 65+
HISTORY: SLIGHTLY SUSPICIOUS
ECG: NORMAL
RISK FACTORS: 1-2 RISK FACTORS
HEART SCORE: 4
TROPONIN: 1-3 TIMES NORMAL LIMIT

## 2023-02-28 ASSESSMENT — FIBROSIS 4 INDEX
FIB4 SCORE: 1.91
FIB4 SCORE: 1.91

## 2023-02-28 ASSESSMENT — LIFESTYLE VARIABLES: SUBSTANCE_ABUSE: 0

## 2023-02-28 NOTE — ED TRIAGE NOTES
Chief Complaint   Patient presents with    Sent by MD     Pt went to follow up with PCP regarding a new prescription of prednisone that was causing the pt's blood sugar to be high. Pt found to be in afib RVR at MD's office. Pt states she was seen for afib 2 months ago and is not on any blood thinners. Pt endorses lightheadedness for 5 days prior to this even.

## 2023-02-28 NOTE — PROGRESS NOTES
CC: Mavis Lizarraga is a 82 y.o. female is suffering from   Chief Complaint   Patient presents with    Follow-Up     Rheumatica     Diabetes     Sugars are very elevated    Irregular Heart Beat         SUBJECTIVE:  1. Atrial fibrillation with rapid ventricular response (HCC)  Mavis is here for follow-up, states she has had about a 1 week history of feeling poorly, not wanting to eat, denies any vomiting.  States that she has felt weak and lightheaded.    2. Polymyalgia (HCC)  Patient with a history of polymyalgia with significant improvement with the use of 15 mg of prednisone    3. Uncontrolled type 2 diabetes mellitus with hyperglycemia (HCC)  History of type 2 diabetes        Past social, family, history: Significant other is Vinay Hernándezalexandria  Social History     Tobacco Use    Smoking status: Never    Smokeless tobacco: Never   Vaping Use    Vaping Use: Never used   Substance Use Topics    Alcohol use: Yes     Comment: very rarely on ocassion will have a Sravani    Drug use: No         MEDICATIONS:    Current Outpatient Medications:     predniSONE (DELTASONE) 5 MG Tab, Take 3 Tablets by mouth 3 times a day., Disp: 90 Tablet, Rfl: 1    omeprazole (PRILOSEC) 20 MG delayed-release capsule, Take 1 Capsule by mouth every day., Disp: 30 Capsule, Rfl: 3    cyclobenzaprine (FLEXERIL) 10 mg Tab, TAKE ONE TABLET BY MOUTH THREE TIMES A DAY AS NEEDED FOR MILD PAIN (FLEXERIL), Disp: 30 Tablet, Rfl: 3    levothyroxine (SYNTHROID) 75 MCG Tab, TAKE ONE TABLET BY MOUTH EVERY MORNING ON AN EMPTY STOMACH, Disp: 90 Tablet, Rfl: 3    glipiZIDE (GLUCOTROL) 10 MG Tab, TAKE ONE TABLET BY MOUTH TWICE A DAY, Disp: 200 Tablet, Rfl: 3    rosuvastatin (CRESTOR) 40 MG tablet, Take 1 Tablet by mouth every day., Disp: 100 Tablet, Rfl: 3    Cholecalciferol (VITAMIN D) 2000 UNIT Tab, Take  by mouth every day., Disp: , Rfl:     VITAMIN D PO, Take 4,000 Units by mouth every morning., Disp: , Rfl:     Ascorbic Acid (VITAMIN C) 1000 MG Tab, Take  1,000 mg by mouth every morning., Disp: , Rfl:     Cyanocobalamin (VITAMIN B-12 PO), Take 1 tablet by mouth every morning., Disp: , Rfl:     CINNAMON PO, Take 1 tablet by mouth every morning., Disp: , Rfl:     Multiple Vitamins-Minerals (ICAPS AREDS FORMULA PO), Take 1 tablet by mouth every morning., Disp: , Rfl:     vitamin E (VITAMIN E) 1000 UNIT Cap, Take 1,000 Units by mouth every morning., Disp: , Rfl:     Omega-3 Fatty Acids (FISH OIL PO), Take 1 Cap by mouth every day., Disp: , Rfl:     metFORMIN (GLUCOPHAGE) 500 MG Tab, TAKE ONE TABLET BY MOUTH TWICE A DAY WITH MEALS., Disp: 100 Tablet, Rfl: 3    cephALEXin (KEFLEX) 500 MG Cap, Take 1 Capsule by mouth 4 times a day. (Patient not taking: Reported on 1/13/2023), Disp: 20 Capsule, Rfl: 0    pioglitazone (ACTOS) 15 MG Tab, TAKE ONE TABLET BY MOUTH DAILY (ACTOS) (Patient not taking: Reported on 1/13/2023), Disp: 100 Tablet, Rfl: 3    pioglitazone (ACTOS) 30 MG Tab, TAKE ONE TABLET BY MOUTH DAILY (ACTOS), Disp: 90 Tablet, Rfl: 3    cimetidine (TAGAMET) 400 MG Tab, TAKE ONE TABLET BY MOUTH DAILY AS NEEDED.(GENERIC FOR TAGAMET), Disp: 90 Tablet, Rfl: 3    nitrofurantoin (MACROBID) 100 MG Cap, TAKE ONE CAPSULE BY MOUTH TWICE A DAY (Patient not taking: Reported on 1/13/2023), Disp: 10 Capsule, Rfl: 0    Blood Glucose Test Strips, Use one Per formulary preference  strip to test blood sugar once daily early morning before first meal., Disp: 100 Strip, Rfl: 3    Lancets, Use one Per formulary preference  lancet to test blood sugar once daily early morning before first meal., Disp: 100 Each, Rfl: 3    meloxicam (MOBIC) 15 MG tablet, Take 1 Tablet by mouth 1 time a day as needed for Moderate Pain. (Patient not taking: Reported on 1/13/2023), Disp: 100 Tablet, Rfl: 3    Alcohol Swabs, Wipe site with prep pad prior to injection., Disp: 100 Each, Rfl: 3    Blood Glucose Meter Kit, Test blood sugar once daily and prn ssx of high or low blood sugar. Contour NEXT blood glucose  "monitoring kit., Disp: 1 Kit, Rfl: 0    vitamin D, Ergocalciferol, (DRISDOL) 1.25 MG (64428 UT) Cap capsule, Take 1 capsule by mouth every 7 days. (Patient not taking: Reported on 1/13/2023), Disp: 5 capsule, Rfl: 0    NON SPECIFIED, Take 1 Package by mouth every morning. [employer] Diabetes Health Pack Vitamins, Disp: , Rfl:     PROBLEMS:  Patient Active Problem List    Diagnosis Date Noted    Vitamin D deficiency 04/12/2021    Cervical spinal stenosis 04/11/2021    Pubic ramus fracture (HCC) 04/09/2021    History of breast cancer     Spasm of back muscles 03/27/2019    Uncontrolled type 2 diabetes mellitus with hyperglycemia (HCC) 01/28/2016    On statin therapy due to risk of future cardiovascular event 01/15/2013    Acquired hypothyroidism 01/15/2013    Gastroesophageal reflux disease with esophagitis 01/15/2013       REVIEW OF SYSTEMS:  Gen.:  No Nausea, Vomiting, fever, Chills.  Heart: No chest pain.  Lungs:  No shortness of Breath.  Psychological: Rajan unusual Anxiety depression     PHYSICAL EXAM   Constitutional: Alert, cooperative, not in acute distress.  Cardiovascular:  Rate Rhythm is regular without murmurs rubs clicks.     Thorax & Lungs: Clear to auscultation, no wheezing, rhonchi, or rales  HENT: Normocephalic, Atraumatic.  Eyes: PERRLA, EOMI, Conjunctiva normal.   Neck: Trachia is midline no swelling of the thyroid.   Lymphatic: No lymphadenopathy noted.   Neurologic: Alert & oriented x 3, cranial nerves II through XII are intact, Normal motor function, Normal sensory function, No focal deficits noted.   Psychiatric: Affect normal, Judgment normal, Mood normal.     VITAL SIGNS:/85   Pulse (!) 144   Temp 36.4 °C (97.5 °F) (Temporal)   Ht 1.702 m (5' 7\")   Wt 73.5 kg (162 lb)   LMP  (LMP Unknown)   SpO2 96%   BMI 25.37 kg/m²     Labs: Reviewed    Assessment:                                                     Plan:    1. Atrial fibrillation with rapid ventricular response (HCC)  EKG: " Atrial fibrillation with rapid ventricular response approximately 148 bpm normal axis no evidence of ST segment elevation.    2. Polymyalgia (HCC)  Significantly improved with low-dose prednisone    3. Uncontrolled type 2 diabetes mellitus with hyperglycemia (HCC)  Poorly controlled type 2 diabetes likely secondary to #2    Please note Case was reviewed and discussed with [facility] transfer center.  Patient was transported by [facility] by ambulance.     One week history at least. Rajan chest pain. Rajan nausea vomiting sob.

## 2023-03-01 ENCOUNTER — APPOINTMENT (OUTPATIENT)
Dept: RADIOLOGY | Facility: MEDICAL CENTER | Age: 83
DRG: 309 | End: 2023-03-01
Attending: INTERNAL MEDICINE
Payer: MEDICARE

## 2023-03-01 ENCOUNTER — APPOINTMENT (OUTPATIENT)
Dept: CARDIOLOGY | Facility: MEDICAL CENTER | Age: 83
DRG: 309 | End: 2023-03-01
Attending: INTERNAL MEDICINE
Payer: MEDICARE

## 2023-03-01 PROBLEM — E87.6 HYPOKALEMIA: Status: ACTIVE | Noted: 2023-03-01

## 2023-03-01 PROBLEM — O22.30 DVT (DEEP VEIN THROMBOSIS) IN PREGNANCY: Status: ACTIVE | Noted: 2023-03-01

## 2023-03-01 LAB
ALBUMIN SERPL BCP-MCNC: 2.8 G/DL (ref 3.2–4.9)
ALBUMIN/GLOB SERPL: 2.3 G/DL
ALP SERPL-CCNC: 40 U/L (ref 30–99)
ALT SERPL-CCNC: 18 U/L (ref 2–50)
AMMONIA PLAS-SCNC: 11 UMOL/L (ref 11–45)
ANION GAP SERPL CALC-SCNC: 6 MMOL/L (ref 7–16)
AST SERPL-CCNC: 17 U/L (ref 12–45)
BASOPHILS # BLD AUTO: 0.2 % (ref 0–1.8)
BASOPHILS # BLD: 0.01 K/UL (ref 0–0.12)
BILIRUB SERPL-MCNC: 0.6 MG/DL (ref 0.1–1.5)
BUN SERPL-MCNC: 12 MG/DL (ref 8–22)
CALCIUM ALBUM COR SERPL-MCNC: 8.1 MG/DL (ref 8.5–10.5)
CALCIUM SERPL-MCNC: 7.1 MG/DL (ref 8.5–10.5)
CHLORIDE SERPL-SCNC: 111 MMOL/L (ref 96–112)
CO2 SERPL-SCNC: 22 MMOL/L (ref 20–33)
CREAT SERPL-MCNC: 0.5 MG/DL (ref 0.5–1.4)
EOSINOPHIL # BLD AUTO: 0.02 K/UL (ref 0–0.51)
EOSINOPHIL NFR BLD: 0.4 % (ref 0–6.9)
ERYTHROCYTE [DISTWIDTH] IN BLOOD BY AUTOMATED COUNT: 50.4 FL (ref 35.9–50)
GFR SERPLBLD CREATININE-BSD FMLA CKD-EPI: 94 ML/MIN/1.73 M 2
GLOBULIN SER CALC-MCNC: 1.2 G/DL (ref 1.9–3.5)
GLUCOSE BLD STRIP.AUTO-MCNC: 169 MG/DL (ref 65–99)
GLUCOSE BLD STRIP.AUTO-MCNC: 214 MG/DL (ref 65–99)
GLUCOSE BLD STRIP.AUTO-MCNC: 223 MG/DL (ref 65–99)
GLUCOSE BLD STRIP.AUTO-MCNC: 95 MG/DL (ref 65–99)
GLUCOSE SERPL-MCNC: 201 MG/DL (ref 65–99)
HCT VFR BLD AUTO: 37.1 % (ref 37–47)
HGB BLD-MCNC: 12.2 G/DL (ref 12–16)
IMM GRANULOCYTES # BLD AUTO: 0.04 K/UL (ref 0–0.11)
IMM GRANULOCYTES NFR BLD AUTO: 0.7 % (ref 0–0.9)
LYMPHOCYTES # BLD AUTO: 1.26 K/UL (ref 1–4.8)
LYMPHOCYTES NFR BLD: 22.2 % (ref 22–41)
MCH RBC QN AUTO: 29.7 PG (ref 27–33)
MCHC RBC AUTO-ENTMCNC: 32.9 G/DL (ref 33.6–35)
MCV RBC AUTO: 90.3 FL (ref 81.4–97.8)
MONOCYTES # BLD AUTO: 0.39 K/UL (ref 0–0.85)
MONOCYTES NFR BLD AUTO: 6.9 % (ref 0–13.4)
NEUTROPHILS # BLD AUTO: 3.96 K/UL (ref 2–7.15)
NEUTROPHILS NFR BLD: 69.6 % (ref 44–72)
NRBC # BLD AUTO: 0 K/UL
NRBC BLD-RTO: 0 /100 WBC
PLATELET # BLD AUTO: 120 K/UL (ref 164–446)
PMV BLD AUTO: 10.1 FL (ref 9–12.9)
POTASSIUM SERPL-SCNC: 3.3 MMOL/L (ref 3.6–5.5)
PROT SERPL-MCNC: 4 G/DL (ref 6–8.2)
RBC # BLD AUTO: 4.11 M/UL (ref 4.2–5.4)
SODIUM SERPL-SCNC: 139 MMOL/L (ref 135–145)
WBC # BLD AUTO: 5.7 K/UL (ref 4.8–10.8)

## 2023-03-01 PROCEDURE — 93971 EXTREMITY STUDY: CPT | Mod: LT

## 2023-03-01 PROCEDURE — 700111 HCHG RX REV CODE 636 W/ 250 OVERRIDE (IP): Performed by: INTERNAL MEDICINE

## 2023-03-01 PROCEDURE — 80053 COMPREHEN METABOLIC PANEL: CPT

## 2023-03-01 PROCEDURE — 700102 HCHG RX REV CODE 250 W/ 637 OVERRIDE(OP): Performed by: HOSPITALIST

## 2023-03-01 PROCEDURE — A9270 NON-COVERED ITEM OR SERVICE: HCPCS | Performed by: INTERNAL MEDICINE

## 2023-03-01 PROCEDURE — 700102 HCHG RX REV CODE 250 W/ 637 OVERRIDE(OP): Performed by: INTERNAL MEDICINE

## 2023-03-01 PROCEDURE — 85025 COMPLETE CBC W/AUTO DIFF WBC: CPT

## 2023-03-01 PROCEDURE — 700105 HCHG RX REV CODE 258: Performed by: INTERNAL MEDICINE

## 2023-03-01 PROCEDURE — 82962 GLUCOSE BLOOD TEST: CPT | Mod: 91

## 2023-03-01 PROCEDURE — 770000 HCHG ROOM/CARE - INTERMEDIATE ICU *

## 2023-03-01 PROCEDURE — A9270 NON-COVERED ITEM OR SERVICE: HCPCS | Performed by: HOSPITALIST

## 2023-03-01 PROCEDURE — 82140 ASSAY OF AMMONIA: CPT

## 2023-03-01 PROCEDURE — 99233 SBSQ HOSP IP/OBS HIGH 50: CPT | Performed by: HOSPITALIST

## 2023-03-01 PROCEDURE — 93971 EXTREMITY STUDY: CPT | Mod: 26,LT | Performed by: INTERNAL MEDICINE

## 2023-03-01 RX ORDER — SODIUM CHLORIDE 9 MG/ML
500 INJECTION, SOLUTION INTRAVENOUS ONCE
Status: COMPLETED | OUTPATIENT
Start: 2023-03-01 | End: 2023-03-01

## 2023-03-01 RX ORDER — POTASSIUM CHLORIDE 20 MEQ/1
40 TABLET, EXTENDED RELEASE ORAL ONCE
Status: COMPLETED | OUTPATIENT
Start: 2023-03-01 | End: 2023-03-01

## 2023-03-01 RX ORDER — METOPROLOL TARTRATE 50 MG/1
50 TABLET, FILM COATED ORAL EVERY 8 HOURS
Status: DISCONTINUED | OUTPATIENT
Start: 2023-03-01 | End: 2023-03-02 | Stop reason: HOSPADM

## 2023-03-01 RX ADMIN — INSULIN LISPRO 2 UNITS: 100 INJECTION, SOLUTION INTRAVENOUS; SUBCUTANEOUS at 07:58

## 2023-03-01 RX ADMIN — INSULIN LISPRO 3 UNITS: 100 INJECTION, SOLUTION INTRAVENOUS; SUBCUTANEOUS at 18:00

## 2023-03-01 RX ADMIN — INSULIN LISPRO 5 UNITS: 100 INJECTION, SOLUTION INTRAVENOUS; SUBCUTANEOUS at 08:00

## 2023-03-01 RX ADMIN — PREDNISONE 5 MG: 5 TABLET ORAL at 06:39

## 2023-03-01 RX ADMIN — ROSUVASTATIN 40 MG: 10 TABLET, FILM COATED ORAL at 06:33

## 2023-03-01 RX ADMIN — METOPROLOL TARTRATE 50 MG: 50 TABLET, FILM COATED ORAL at 21:07

## 2023-03-01 RX ADMIN — METOPROLOL TARTRATE 25 MG: 25 TABLET, FILM COATED ORAL at 15:58

## 2023-03-01 RX ADMIN — SODIUM CHLORIDE 500 ML: 9 INJECTION, SOLUTION INTRAVENOUS at 01:45

## 2023-03-01 RX ADMIN — INSULIN LISPRO 3 UNITS: 100 INJECTION, SOLUTION INTRAVENOUS; SUBCUTANEOUS at 12:00

## 2023-03-01 RX ADMIN — Medication 2000 UNITS: at 06:34

## 2023-03-01 RX ADMIN — INSULIN GLARGINE-YFGN 15 UNITS: 100 INJECTION, SOLUTION SUBCUTANEOUS at 16:32

## 2023-03-01 RX ADMIN — PREDNISONE 5 MG: 5 TABLET ORAL at 16:26

## 2023-03-01 RX ADMIN — INSULIN LISPRO 5 UNITS: 100 INJECTION, SOLUTION INTRAVENOUS; SUBCUTANEOUS at 16:25

## 2023-03-01 RX ADMIN — METOPROLOL TARTRATE 25 MG: 25 TABLET, FILM COATED ORAL at 13:01

## 2023-03-01 RX ADMIN — METOPROLOL TARTRATE 12.5 MG: 25 TABLET, FILM COATED ORAL at 08:25

## 2023-03-01 RX ADMIN — METOPROLOL TARTRATE 12.5 MG: 25 TABLET, FILM COATED ORAL at 06:34

## 2023-03-01 RX ADMIN — LEVOTHYROXINE SODIUM 75 MCG: 0.07 TABLET ORAL at 06:33

## 2023-03-01 RX ADMIN — POTASSIUM CHLORIDE 40 MEQ: 1500 TABLET, EXTENDED RELEASE ORAL at 08:02

## 2023-03-01 RX ADMIN — APIXABAN 5 MG: 5 TABLET, FILM COATED ORAL at 06:33

## 2023-03-01 RX ADMIN — HYDROCORTISONE SODIUM SUCCINATE 50 MG: 100 INJECTION, POWDER, FOR SOLUTION INTRAMUSCULAR; INTRAVENOUS at 02:27

## 2023-03-01 RX ADMIN — APIXABAN 10 MG: 5 TABLET, FILM COATED ORAL at 17:10

## 2023-03-01 RX ADMIN — CYANOCOBALAMIN TAB 500 MCG 1000 MCG: 500 TAB at 06:34

## 2023-03-01 RX ADMIN — INSULIN LISPRO 5 UNITS: 100 INJECTION, SOLUTION INTRAVENOUS; SUBCUTANEOUS at 12:17

## 2023-03-01 ASSESSMENT — ENCOUNTER SYMPTOMS
ORTHOPNEA: 0
SHORTNESS OF BREATH: 0
FEVER: 0

## 2023-03-01 ASSESSMENT — FIBROSIS 4 INDEX: FIB4 SCORE: 2.7

## 2023-03-01 NOTE — ED PROVIDER NOTES
ED Provider Note    Scribed for Jerrica Preciado M.D. by Samantha Pal. 2/28/2023, 4:01 PM.    Primary care provider: Waylon Webber D.O.  Means of arrival: EMS  History obtained from: Patient  History limited by: None    CHIEF COMPLAINT  Chief Complaint   Patient presents with    Sent by MD     Pt went to follow up with PCP regarding a new prescription of prednisone that was causing the pt's blood sugar to be high. Pt found to be in afib RVR at MD's office. Pt states she was seen for afib 2 months ago and is not on any blood thinners. Pt endorses lightheadedness for 5 days prior to this even.        HPI/ROS  Mavis Lizarraga is a 82 y.o. female who presents to the Emergency Department for evaluation of lightheadedness.  Patient reports that she has been having lightheadedness for the past 5 days.  She had a regular follow-up with her primary care physician today to discuss her polymyalgia rheumatica and was noted to be in atrial fibrillation with rapid ventricular response at her primary care physician's office and therefore was sent here for further evaluation.  Patient reports no prior history of heart disease or atrial fibrillation.  Aside from lightheadedness she denies any other acute symptoms.  Denies chest pain, headache, nausea, vomiting or abdominal pain.      EXTERNAL RECORDS REVIEWED  Review of hospital records show that the patient saw her PCP today. Patient has a history of polymyalgia rheumatica and is currently on prednisone. Pt has type 2 diabetes.     LIMITATION TO HISTORY   Select: : None    OUTSIDE HISTORIAN(S):  None    PAST MEDICAL HISTORY   has a past medical history of Anesthesia, Arthritis, Backpain, Breast cancer (HCC) (1980s), Diverticulosis, DVT of deep femoral vein (HCC), Heart burn, High cholesterol, Pneumonia (Feb 2006), Thyroid condition, and Ulcer.    SURGICAL HISTORY   has a past surgical history that includes other abdominal surgery; other; low anterior resection  laparoscopic (2/10/2010); breast reduction; chemotherapy, unspecified procedure; breast reconstruction (8/14/2012); breast implant revision (8/14/2012); capsulectomy (8/14/2012); mastopexy (8/14/2012); liposuction (8/14/2012); rhytidectomy (8/14/2012); platysmaplasty (8/14/2012); blepharoplasty (8/14/2012); breast augmentation with implant; mastectomy (Right, 1980s); anterior and posterior repair (6/23/2014); bladder sling female (6/23/2014); and colectomy (N/A, 2009).    SOCIAL HISTORY  Social History     Tobacco Use    Smoking status: Never    Smokeless tobacco: Never   Vaping Use    Vaping Use: Never used   Substance Use Topics    Alcohol use: Yes     Comment: very rarely on ocassion will have a Sravani    Drug use: No      Social History     Substance and Sexual Activity   Drug Use No     FAMILY HISTORY  Family History   Problem Relation Age of Onset    Heart Disease Mother 64    Hypertension Mother     Diabetes Mother     Stroke Mother     Cancer Mother         Stomach cancer    Heart Disease Maternal Grandmother 63        heart attack     CURRENT MEDICATIONS  Home Medications       Reviewed by Delisa Dawson R.N. (Registered Nurse) on 02/28/23 at 1539  Med List Status: Not Addressed     Medication Last Dose Status   Alcohol Swabs  Active   Ascorbic Acid (VITAMIN C) 1000 MG Tab  Active   Blood Glucose Meter Kit  Active   Blood Glucose Test Strips  Active   cephALEXin (KEFLEX) 500 MG Cap  Active   Cholecalciferol (VITAMIN D) 2000 UNIT Tab  Active   cimetidine (TAGAMET) 400 MG Tab  Active   CINNAMON PO  Active   Cyanocobalamin (VITAMIN B-12 PO)  Active   cyclobenzaprine (FLEXERIL) 10 mg Tab  Active   glipiZIDE (GLUCOTROL) 10 MG Tab  Active   Lancets  Active   levothyroxine (SYNTHROID) 75 MCG Tab  Active   meloxicam (MOBIC) 15 MG tablet  Active   metFORMIN (GLUCOPHAGE) 500 MG Tab  Active   Multiple Vitamins-Minerals (ICAPS AREDS FORMULA PO)  Active   nitrofurantoin (MACROBID) 100 MG Cap  Active   NON  "SPECIFIED  Active   Omega-3 Fatty Acids (FISH OIL PO)  Active   omeprazole (PRILOSEC) 20 MG delayed-release capsule  Active   pioglitazone (ACTOS) 15 MG Tab  Active   pioglitazone (ACTOS) 30 MG Tab  Active   predniSONE (DELTASONE) 5 MG Tab  Active   rosuvastatin (CRESTOR) 40 MG tablet  Active   VITAMIN D PO  Active   vitamin D, Ergocalciferol, (DRISDOL) 1.25 MG (69358 UT) Cap capsule  Active   vitamin E (VITAMIN E) 1000 UNIT Cap  Active                  ALLERGIES  Allergies   Allergen Reactions    Metformin Diarrhea and Nausea     NAUSEA VOMIITG AND DIARRHEA    Morphine Unspecified     Hallucinations     PHYSICAL EXAM  VITAL SIGNS: /84   Pulse (!) 148   Resp 15   Ht 1.702 m (5' 7\")   Wt 73.5 kg (162 lb)   LMP  (LMP Unknown)   SpO2 94%   BMI 25.37 kg/m²   Vitals reviewed by myself.  Physical Exam  Nursing note and vitals reviewed.  Constitutional: Well-developed and well-nourished. No acute distress.   HENT: Head is normocephalic and atraumatic.  Eyes: extra-ocular movements intact  Cardiovascular: Tachycardic rate and irregular rhythm. No murmur heard.  Pulmonary/Chest: Breath sounds normal. No wheezes or rales.   Abdominal: Soft and non-tender. No distention.    Musculoskeletal: Extremities exhibit normal range of motion without edema or tenderness.   Neurological: Awake and alert  Skin: Skin is warm and dry. No rash.     DIAGNOSTIC STUDIES:  LABS  Labs Reviewed   CBC WITH DIFFERENTIAL - Abnormal; Notable for the following components:       Result Value    MCHC 33.5 (*)     Platelet Count 154 (*)     Neutrophils-Polys 75.80 (*)     Lymphocytes 17.50 (*)     Lymphs (Absolute) 0.97 (*)     All other components within normal limits    Narrative:     Biotin intake of greater than 5 mg per day may interfere with  troponin levels, causing false low values.   COMP METABOLIC PANEL - Abnormal; Notable for the following components:    Glucose 357 (*)     All other components within normal limits    Narrative:  "    Biotin intake of greater than 5 mg per day may interfere with  troponin levels, causing false low values.   TROPONIN - Abnormal; Notable for the following components:    Troponin T 23 (*)     All other components within normal limits    Narrative:     Biotin intake of greater than 5 mg per day may interfere with  troponin levels, causing false low values.   POCT GLUCOSE DEVICE RESULTS - Abnormal; Notable for the following components:    POC Glucose, Blood 317 (*)     All other components within normal limits   CORRECTED CALCIUM    Narrative:     Biotin intake of greater than 5 mg per day may interfere with  troponin levels, causing false low values.   ESTIMATED GFR    Narrative:     Biotin intake of greater than 5 mg per day may interfere with  troponin levels, causing false low values.     All labs reviewed and independently interpreted by myself    EKG Interpretation:    12 Lead EKG independently interpreted by myself to show:  EKG at 4:14 PM: Atrial fibrillation with rapid ventricular response at a rate of 137, normal axis, normal intervals, QRS 75, QTc 453, no acute ST-T segment changes, no evidence of acute ischemia, atrial fibrillation is new for this patient, last EKG from April 2021 demonstrates normal sinus rhythm    RADIOLOGY  Final interpretation by radiology demonstrates:    DX-CHEST-PORTABLE (1 VIEW)   Final Result      No acute cardiac or pulmonary abnormalities are identified.        The radiologist's interpretation of all radiological studies have been reviewed by me.    COURSE & MEDICAL DECISION MAKING    ED Observation Status? Yes; I am placing the patient in to an observation status due to a diagnostic uncertainty as well as therapeutic intensity. Patient placed in observation status at 4:06 PM, 2/28/2023.     Observation plan is as follows: Administer fluids. Monitor patient and observe lab and radiology results.    Upon Reevaluation, the patient's condition has: not improved; and will be  escalated to hospitalization.    Patient discharged from ED Observation status at 6:57 PM (Time) 2/28/23 (Date).     INITIAL ASSESSMENT AND PLAN    Patient is a 82-year-old female who presents for evaluation of lightheadedness.  Differential diagnosis includes arrhythmia, electrolyte derangement, hyperglycemia, dehydration.  Diagnostic work-up includes labs and EKG.       REASSESSMENTS   4:01 PM -  Patient initially seen and evaluated at bedside. Discussed plan of care, including labs and radiology. Patient verbalizes understanding and agreement to this plan of care. Patient will be treated with Lopressor injection 5 mg.     4:21 PM - Patient reevaluated at bedside. She will be treated with IV fluids for tachycardia.  She has not received metoprolol yet as there has been difficulty obtaining IV access.    4:53 PM - Patient reassessed at bedside. Her heart rate is currently in the 120-140 range; however, her pressure has dropped to 90 systolic. We will push fluids and administer diltiazem once her pressure has improved.     5:17 PM - Patient's blood pressure has improved. She will be treated with diltiazem.     5:51 PM - Patient reassessed at bedside. Her heart rate is in the 115-130 range after receiving diltiazem. Patient's blood pressure was 117 systolic.     6:44 PM - Upon reassessment, the patient's heart rate was in the 95-110s range on diltiazem drip. Will plan to admit patient for further evaluation. The patient had the opportunity to ask any questions. The plan for hospitalization was discussed with the patient given their current presentation and diagnostic study results. Patient is understanding and agreeable to the plan for hospitalization.     6:55 PM - I discussed the patient's case and the above findings with Dr. Doshi (Intensivist) who agrees to evaluate the patient for hospitalization.    7:05 PM - Spoke with Dr. Leyva (Hospitalist) regarding the patient's condition.     HYDRATION: Based on  the patient's presentation of Tachycardia the patient was given IV fluids. IV Hydration was used because oral hydration was not adequate alone. Upon recheck following hydration, the patient was improved.    ER COURSE AND FINAL DISPOSITION   Patient's initial vitals notable for tachycardia with irregular rhythm.  EKG is consistent with atrial fibrillation with rapid ventricular response.  Initially I trialed a dose of metoprolol 5 mg IV however patient had no improvement in her heart rate with this and dropped her systolic blood pressure to the 90s and therefore I elected to switch to diltiazem after IV fluid resuscitation.  After 2 diltiazem boluses heart rate improved to the 120 range however given her persistent tachycardia I started her on diltiazem drip.  On the drip her heart rate stabilized in the 90-110s range.  Labs returned and were independently interpreted by myself to demonstrate:  -Mildly elevated troponin of 23 likely related to rapid heart rate, EKG is nonischemic and patient is not having chest pain, heart score of 4  -Patient's glucose is elevated at 357, her diabetes is likely slightly uncontrolled given her recent prednisone treatment  -Electrolytes and renal function within normal limits  -Labs are otherwise unremarkable    At this time patient will require hospitalization for ongoing management of her rapid atrial fibrillation requiring diltiazem drip.  I discussed the case with intensivist Dr. Doshi and we will place her in the [facility] stepdown unit for ongoing management.  I discussed the case with Dr. Leyva who has accepted patient for hospitalization.  Patient is in guarded condition.    ADDITIONAL PROBLEM LIST AND RESOURCE UTILIZATION    Additional problems aside from the chief complaint that I have addressed: None    I have discussed management of the patient with the following physicians and GURPREET's: Dr. Doshi (Intensivist), Dr. Leyva (Hospitalist)    Discussion of management  with other \[facility] [place] [facility]\"" or appropriate source(s): None     Escalation of care considered, and ultimately not performed: See above.     Barriers to care at this time, including but not limited to:  None .     Decision tools and prescription drugs considered including, but not limited to: See above.    Please see review of records as noted above    DISPOSITION:  Patient will be hospitalized by Dr. Doshi in critical condition.    CRITICAL CARE  The very real possibilty of a deterioration of this patient's condition required the highest level of my preparedness for sudden, emergent intervention.  I provided critical care services, which included medication orders, frequent reevaluations of the patient's condition and response to treatment, ordering and reviewing test results, and discussing the case with various consultants.  The critical care time associated with the care of the patient was 40 minutes. Review chart for interventions. This time is exclusive of any other billable procedures.    FINAL IMPRESSION  1. Atrial fibrillation, rapid (HCC)    2. Lightheadedness    3.      CCT: 40 minutes    Samantha SETHI (Jose Miguel), am scribing for, and in the presence of, Jerrica Preciado M.D..    Electronically signed by: Samanhta Pal (Jose Miguel), 2/28/2023    Jerrica SETHI M.D. personally performed the services described in this documentation, as scribed by Samantha Pal in my presence, and it is both accurate and complete.    The note accurately reflects work and decisions made by me.  Jerrica Preciado M.D.  3/1/2023  12:00 AM

## 2023-03-01 NOTE — ASSESSMENT & PLAN NOTE
Patient has been on metformin, reported compliance.  A1c 11.   Continue Lantus and sliding scale insulin  Monitor CBGs and adjust accordingly

## 2023-03-01 NOTE — PROGRESS NOTES
Hospital Medicine Daily Progress Note    Date of Service  3/1/2023    Chief Complaint  Mavis Lizarraga is a 82 y.o. female admitted 2/28/2023 with A-fib    Hospital Course  Mavis Lizarraga is a 82 y.o. female with past medical history of type 2 diabetes, hypothyroidism, polymyalgia rheumatica on prednisone taper, who presented 2/28/2023 with A-fib with RVR.  She had a follow-up appointment with her PCP when she was noted to be tachycardic heart rates of 150 she was referred to the emergency department admitted to IMCU was briefly on Cardizem drip    Interval Problem Update    Patient was briefly on Cardizem drip transition to metoprolol  In A-fib with RVR heart rate 1 20-1 40  I have increased her metoprolol  She denies any chest pain or dyspnea  Lower extremity duplex positive for DVT I will change Eliquis to 10 mg twice daily loading dose  Patient's glucoses have been elevated since she has been on prednisone for PMR        I have discussed this patient's plan of care and discharge plan at IDT rounds today with Case Management, Nursing, Nursing leadership, and other members of the IDT team.    Consultants/Specialty       Code Status  Full Code    Disposition  Patient is not medically cleared for discharge.   Anticipate discharge to to home with close outpatient follow-up.  I have placed the appropriate orders for post-discharge needs.    Review of Systems  Review of Systems   Constitutional:  Negative for fever.   Respiratory:  Negative for shortness of breath.    Cardiovascular:  Negative for chest pain and orthopnea.   Musculoskeletal:  Positive for joint pain.   All other systems reviewed and are negative.     Physical Exam  Temp:  [36.4 °C (97.5 °F)-36.9 °C (98.4 °F)] 36.6 °C (97.9 °F)  Pulse:  [] 93  Resp:  [12-27] 27  BP: ()/(60-90) 103/67  SpO2:  [93 %-97 %] 97 %    Physical Exam  Vitals and nursing note reviewed.   Constitutional:       General: She is not in acute distress.  HENT:       Head: Normocephalic and atraumatic.      Nose: Nose normal. No rhinorrhea.      Mouth/Throat:      Pharynx: No oropharyngeal exudate or posterior oropharyngeal erythema.   Eyes:      General:         Right eye: No discharge.         Left eye: No discharge.   Cardiovascular:      Rate and Rhythm: Tachycardia present. Rhythm irregular.      Heart sounds: Normal heart sounds. No murmur heard.    No friction rub. No gallop.   Pulmonary:      Effort: Pulmonary effort is normal. No respiratory distress.      Breath sounds: No stridor. No wheezing, rhonchi or rales.   Chest:      Chest wall: No tenderness.   Abdominal:      General: There is no distension.      Palpations: Abdomen is soft. There is no mass.      Tenderness: There is no abdominal tenderness. There is no rebound.   Musculoskeletal:         General: Swelling present. No tenderness.      Cervical back: Neck supple. No rigidity.   Skin:     General: Skin is warm and dry.      Coloration: Skin is not cyanotic or jaundiced.      Nails: There is no clubbing.   Neurological:      General: No focal deficit present.      Mental Status: She is alert and oriented to person, place, and time.      Cranial Nerves: No cranial nerve deficit.      Motor: No weakness.   Psychiatric:         Mood and Affect: Mood normal.         Behavior: Behavior normal.       Fluids  No intake or output data in the 24 hours ending 03/01/23 1017    Laboratory  Recent Labs     02/28/23  1642 03/01/23  0150   WBC 5.5 5.7   RBC 5.30 4.11*   HEMOGLOBIN 15.7 12.2   HEMATOCRIT 46.8 37.1   MCV 88.3 90.3   MCH 29.6 29.7   MCHC 33.5* 32.9*   RDW 49.3 50.4*   PLATELETCT 154* 120*   MPV 9.9 10.1     Recent Labs     02/28/23  1642 03/01/23  0150   SODIUM 137 139   POTASSIUM 4.1 3.3*   CHLORIDE 101 111   CO2 24 22   GLUCOSE 357* 201*   BUN 15 12   CREATININE 0.57 0.50   CALCIUM 9.4 7.1*                   Imaging  US-EXTREMITY VENOUS LOWER UNILAT LEFT   Final Result      DX-CHEST-PORTABLE (1 VIEW)   Final  Result      No acute cardiac or pulmonary abnormalities are identified.      EC-ECHOCARDIOGRAM COMPLETE W/O CONT    (Results Pending)        Assessment/Plan  * Atrial fibrillation with RVR (HCC)- (present on admission)  Assessment & Plan   [id number] score 4    Reviewed RBA of anticoagulation with Eliquis she agrees  Given positive DVT will increase to loading dose  Follow-up on echocardiogram  Check TSH  Increase metoprolol    Hypokalemia  Assessment & Plan  Replete and monitor    DVT (deep vein thrombosis) in pregnancy  Assessment & Plan  Change Eliquis to loading dose  Outpatient follow-up with PCP for routine cancer screening    Polymyalgia rheumatica (HCC)  Assessment & Plan  Continue prednisone and follow-up with PCP    Uncontrolled type 2 diabetes mellitus with hyperglycemia (HCC)- (present on admission)  Assessment & Plan  Patient has been on metformin, reported compliance.  A1c 11.   Continue Lantus and sliding scale insulin  Monitor CBGs and adjust accordingly    Acquired hypothyroidism- (present on admission)  Assessment & Plan  Continue levothyroxine  Recheck TSH         VTE prophylaxis: therapeutic anticoagulation with Eliquis    I have performed a physical exam and reviewed and updated ROS and Plan today (3/1/2023). In review of yesterday's note (2/28/2023), there are no changes except as documented above.

## 2023-03-01 NOTE — PROGRESS NOTES
[facility] Acceptance Note    Called by Dr. Jerrica Arshad for admission to IMCU for atrial fibrillation with RVR on diltiazem drip at 10mg/hr.  Will admit to IMCU under the care of the hospitalist.      Airam Doshi MD  Pulmonary and Critical Care Medicine

## 2023-03-01 NOTE — ED NOTES
Med rec updated and complete. Allergies  reviewed.  Confirmed name and date of birth.   Pt denies antibiotic use in last 30 days.  Pt reports that he prednisone dose today was changed to 5 mg BID per her doctor.      Home Pharmacy [phone]

## 2023-03-01 NOTE — PROGRESS NOTES
4 Eyes Skin Assessment Completed by MARY ELLEN Flores and MARY ELLEN Hendersno.    Head WDL  Ears WDL  Nose WDL  Mouth WDL  Neck WDL  Breast/Chest WDL  Shoulder Blades WDL  Spine WDL  (R) Arm/Elbow/Hand WDL  (L) Arm/Elbow/Hand WDL  Abdomen WDL  Groin WDL  Scrotum/Coccyx/Buttocks WDL  (R) Leg WDL  (L) Leg Redness, Non-Blanching, Shiny, and Edema  (R) Heel/Foot/Toe WDL  (L) Heel/Foot/Toe WDL          Devices In Places ECG, Blood Pressure Cuff, Pulse Ox, and SCD's      Interventions In Place Low Air Loss Mattress    Possible Skin Injury No    Pictures Uploaded Into Epic Yes  Wound Consult Placed Yes  RN Wound Prevention Protocol Ordered Yes

## 2023-03-01 NOTE — H&P
Hospital Medicine History & Physical Note    Date of Service  2/28/2023    Primary Care Physician  Waylon Webber D.O.    Consultants  Critical care Dr. Doshi    Code Status  Full code    Chief Complaint  Chief Complaint   Patient presents with    Sent by MD     Pt went to follow up with PCP regarding a new prescription of prednisone that was causing the pt's blood sugar to be high. Pt found to be in afib RVR at MD's office. Pt states she was seen for afib 2 months ago and is not on any blood thinners. Pt endorses lightheadedness for 5 days prior to this even.        History of Presenting Illness  Mavis Lizarraga is a 82 y.o. female with past medical history of type 2 diabetes, hypothyroidism, polymyalgia rheumatica on prednisone taper, who presented 2/28/2023 with A-fib with RVR.  Patient is unsure if she had A-fib before.  She came with complaints of orthostatic lightheadedness, intermittent palpitations in the last 5 days, and found to have A-fib with RVR up to 155 beats per minute, started on diltiazem and is being admitted to IMCU for titration.  Blood sugar noted to be 357, A1c 11.2.  Chest x-ray showed no acute process.  Troponin 23.  I discussed anticoagulation and patient agreed to start apixaban.    I discussed the plan of care with patient.    Review of Systems  Review of Systems   Constitutional:  Negative for chills, fever and weight loss.   HENT:  Negative for ear pain, hearing loss and tinnitus.    Eyes:  Negative for blurred vision, double vision and photophobia.   Respiratory:  Positive for shortness of breath (Sometimes dyspnea on exertion). Negative for cough, hemoptysis and sputum production.    Cardiovascular:  Positive for palpitations. Negative for chest pain and orthopnea.   Gastrointestinal:  Negative for heartburn, nausea and vomiting.   Genitourinary:  Negative for dysuria, flank pain, frequency and hematuria.   Musculoskeletal:  Negative for back pain, joint pain and neck pain.    Skin:  Negative for itching and rash.   Neurological:  Positive for dizziness. Negative for tremors, speech change, focal weakness and headaches.   Endo/Heme/Allergies:  Negative for environmental allergies and polydipsia. Does not bruise/bleed easily.   Psychiatric/Behavioral:  Negative for hallucinations and substance abuse. The patient is not nervous/anxious.      Past Medical History   has a past medical history of Anesthesia, Arthritis, Backpain, Breast cancer (HCC) (1980s), Diverticulosis, DVT of deep femoral vein (HCC), Heart burn, High cholesterol, Pneumonia (Feb 2006), Thyroid condition, and Ulcer.    Surgical History   has a past surgical history that includes other abdominal surgery; other; low anterior resection laparoscopic (2/10/2010); pr breast reduction; pr chemotherapy, unspecified procedure; breast reconstruction (8/14/2012); breast implant revision (8/14/2012); capsulectomy (8/14/2012); mastopexy (8/14/2012); liposuction (8/14/2012); rhytidectomy (8/14/2012); platysmaplasty (8/14/2012); blepharoplasty (8/14/2012); pr breast augmentation with implant; mastectomy (Right, 1980s); anterior and posterior repair (6/23/2014); bladder sling female (6/23/2014); and colectomy (N/A, 2009).     Family History  family history includes Cancer in her mother; Diabetes in her mother; Heart Disease (age of onset: 63) in her maternal grandmother; Heart Disease (age of onset: 64) in her mother; Hypertension in her mother; Stroke in her mother.   Family history reviewed with patient. There is no family history that is pertinent to the chief complaint.     Social History   reports that she has never smoked. She has never used smokeless tobacco. She reports current alcohol use. She reports that she does not use drugs.    Allergies  Allergies   Allergen Reactions    Metformin Diarrhea and Nausea     NAUSEA VOMIITG AND DIARRHEA    Morphine Unspecified     Hallucinations       Medications  Prior to Admission Medications    Prescriptions Last Dose Informant Patient Reported? Taking?   Alcohol Swabs   No No   Sig: Wipe site with prep pad prior to injection.   Ascorbic Acid (VITAMIN C) 1000 MG Tab  Patient Yes No   Sig: Take 1,000 mg by mouth every morning.   Blood Glucose Meter Kit   No No   Sig: Test blood sugar once daily and prn ssx of high or low blood sugar. Contour NEXT blood glucose monitoring kit.   Blood Glucose Test Strips   No No   Sig: Use one Per formulary preference  strip to test blood sugar once daily early morning before first meal.   CINNAMON PO  Patient Yes No   Sig: Take 1 tablet by mouth every morning.   Cholecalciferol (VITAMIN D) 2000 UNIT Tab   Yes No   Sig: Take  by mouth every day.   Cyanocobalamin (VITAMIN B-12 PO)  Patient Yes No   Sig: Take 1 tablet by mouth every morning.   Lancets   No No   Sig: Use one Per formulary preference  lancet to test blood sugar once daily early morning before first meal.   Multiple Vitamins-Minerals (ICAPS AREDS FORMULA PO)  Patient Yes No   Sig: Take 1 tablet by mouth every morning.   NON SPECIFIED  Patient Yes No   Sig: Take 1 Package by mouth every morning. [employer] [facility] Pack Vitamins   Omega-3 Fatty Acids (FISH OIL PO)  Patient Yes No   Sig: Take 1 Cap by mouth every day.   VITAMIN D PO  Patient Yes No   Sig: Take 4,000 Units by mouth every morning.   cephALEXin (KEFLEX) 500 MG Cap   No No   Sig: Take 1 Capsule by mouth 4 times a day.   Patient not taking: Reported on 1/13/2023   cimetidine (TAGAMET) 400 MG Tab   No No   Sig: TAKE ONE TABLET BY MOUTH DAILY AS NEEDED.(GENERIC FOR TAGAMET)   cyclobenzaprine (FLEXERIL) 10 mg Tab   No No   Sig: TAKE ONE TABLET BY MOUTH THREE TIMES A DAY AS NEEDED FOR MILD PAIN (FLEXERIL)   glipiZIDE (GLUCOTROL) 10 MG Tab   No No   Sig: TAKE ONE TABLET BY MOUTH TWICE A DAY   levothyroxine (SYNTHROID) 75 MCG Tab   No No   Sig: TAKE ONE TABLET BY MOUTH EVERY MORNING ON AN EMPTY STOMACH   meloxicam (MOBIC) 15 MG tablet   No No   Sig:  Take 1 Tablet by mouth 1 time a day as needed for Moderate Pain.   Patient not taking: Reported on 1/13/2023   metFORMIN (GLUCOPHAGE) 500 MG Tab   No No   Sig: TAKE ONE TABLET BY MOUTH TWICE A DAY WITH MEALS.   nitrofurantoin (MACROBID) 100 MG Cap   No No   Sig: TAKE ONE CAPSULE BY MOUTH TWICE A DAY   Patient not taking: Reported on 1/13/2023   omeprazole (PRILOSEC) 20 MG delayed-release capsule   No No   Sig: Take 1 Capsule by mouth every day.   pioglitazone (ACTOS) 15 MG Tab   No No   Sig: TAKE ONE TABLET BY MOUTH DAILY (ACTOS)   Patient not taking: Reported on 1/13/2023   pioglitazone (ACTOS) 30 MG Tab   No No   Sig: TAKE ONE TABLET BY MOUTH DAILY (ACTOS)   predniSONE (DELTASONE) 5 MG Tab   No No   Sig: Take 3 Tablets by mouth 3 times a day.   rosuvastatin (CRESTOR) 40 MG tablet   No No   Sig: Take 1 Tablet by mouth every day.   vitamin D, Ergocalciferol, (DRISDOL) 1.25 MG (48721 UT) Cap capsule   No No   Sig: Take 1 capsule by mouth every 7 days.   Patient not taking: Reported on 1/13/2023   vitamin E (VITAMIN E) 1000 UNIT Cap  Patient Yes No   Sig: Take 1,000 Units by mouth every morning.      Facility-Administered Medications: None       Physical Exam  Temp:  [36.4 °C (97.5 °F)-36.9 °C (98.4 °F)] 36.9 °C (98.4 °F)  Pulse:  [] 126  Resp:  [15-20] 20  BP: ()/(63-90) 109/70  SpO2:  [93 %-96 %] 95 %  Blood Pressure : 109/70   Temperature: 36.9 °C (98.4 °F)   Pulse: (!) 126   Respiration: 20   Pulse Oximetry: 95 %       Physical Exam  Vitals and nursing note reviewed.   Constitutional:       General: She is not in acute distress.     Appearance: Normal appearance.   HENT:      Head: Normocephalic and atraumatic.      Nose: Nose normal.      Mouth/Throat:      Mouth: Mucous membranes are dry.   Eyes:      Extraocular Movements: Extraocular movements intact.      Pupils: Pupils are equal, round, and reactive to light.   Cardiovascular:      Rate and Rhythm: Tachycardia present. Rhythm irregular.    Pulmonary:      Effort: Pulmonary effort is normal.      Breath sounds: Normal breath sounds.   Abdominal:      General: Abdomen is flat. There is no distension.      Tenderness: There is no abdominal tenderness.   Musculoskeletal:         General: No swelling or deformity. Normal range of motion.      Cervical back: Normal range of motion and neck supple.   Skin:     General: Skin is warm and dry.   Neurological:      General: No focal deficit present.      Mental Status: She is alert and oriented to person, place, and time.   Psychiatric:         Mood and Affect: Mood normal.         Behavior: Behavior normal.       Laboratory:  Recent Labs     02/28/23  1642   WBC 5.5   RBC 5.30   HEMOGLOBIN 15.7   HEMATOCRIT 46.8   MCV 88.3   MCH 29.6   MCHC 33.5*   RDW 49.3   PLATELETCT 154*   MPV 9.9     Recent Labs     02/28/23  1642   SODIUM 137   POTASSIUM 4.1   CHLORIDE 101   CO2 24   GLUCOSE 357*   BUN 15   CREATININE 0.57   CALCIUM 9.4     Recent Labs     02/28/23  1642   ALTSGPT 19   ASTSGOT 14   ALKPHOSPHAT 58   TBILIRUBIN 1.0   GLUCOSE 357*         No results for input(s): NTPROBNP in the last 72 hours.      Recent Labs     02/28/23  1642   TROPONINT 23*       Imaging:  DX-CHEST-PORTABLE (1 VIEW)   Final Result      No acute cardiac or pulmonary abnormalities are identified.          X-Ray:  I have personally reviewed the images and compared with prior images.    Assessment/Plan:  Justification for Admission Status  I anticipate this patient will require at least two midnights for appropriate medical management, necessitating inpatient admission because A-fib with RVR    Patient will need a Intermediate Care (Adult and Pediatrics) bed on MEDICAL service .  The need is secondary to A-fib with RVR.    * Atrial fibrillation with RVR (HCC)- (present on admission)  Assessment & Plan  Onset of paroxysmal is unknown.  Continue diltiazem drip, follow-up with telemetry  Start p.o. metoprolol 12.5 mg twice daily, titrate dose  as appropriate  [id number] score 4, patient agreed with benefits of anticoagulation  Starting apixaban  Follow-up transthoracic echo  Recheck TSH  Monitor and replace electrolytes    Polymyalgia rheumatica (HCC)  Assessment & Plan  Denies muscle pain today.  Continue prednisone taper    Uncontrolled type 2 diabetes mellitus with hyperglycemia (HCC)- (present on admission)  Assessment & Plan  Patient has been on metformin, reported compliance.  A1c 11.  Apparently, diabetes is exacerbated by prednisone taper for PMR that patient has been taking for over 1 month.  Will transition to Lantus 15 units and sliding scale    Acquired hypothyroidism- (present on admission)  Assessment & Plan  Continue levothyroxine  Recheck TSH        VTE prophylaxis: therapeutic anticoagulation with apixaban

## 2023-03-01 NOTE — ASSESSMENT & PLAN NOTE
[id number] score 4    Reviewed RBA of anticoagulation with Eliquis she agrees  Given positive DVT will increase to loading dose  Follow-up on echocardiogram  Check TSH  Increase metoprolol

## 2023-03-01 NOTE — HOSPITAL COURSE
Mavis Lizarraga is a 82 y.o. female with past medical history of type 2 diabetes, hypothyroidism, polymyalgia rheumatica on prednisone taper, who presented 2/28/2023 with A-fib with RVR.  She had a follow-up appointment with her PCP when she was noted to be tachycardic heart rates of 150 she was referred to the emergency department admitted to [facility] was briefly on Cardizem drip

## 2023-03-01 NOTE — PROGRESS NOTES
Pt BP 89/52mmhg, MAP 65, HR sustaining 70's. Diltiazem gtt stopped. MD aware. Will continue to monitor.

## 2023-03-01 NOTE — CARE PLAN
The patient is Stable - Low risk of patient condition declining or worsening    Shift Goals  Clinical Goals: hemodynamic stability  Patient Goals: use of call light    Progress made toward(s) clinical / shift goals:    Problem: Knowledge Deficit - Standard  Goal: Patient and family/care givers will demonstrate understanding of plan of care, disease process/condition, diagnostic tests and medications  Outcome: Progressing       Patient is not progressing towards the following goals:

## 2023-03-02 ENCOUNTER — PHARMACY VISIT (OUTPATIENT)
Dept: PHARMACY | Facility: MEDICAL CENTER | Age: 83
End: 2023-03-02
Payer: COMMERCIAL

## 2023-03-02 ENCOUNTER — APPOINTMENT (OUTPATIENT)
Dept: CARDIOLOGY | Facility: MEDICAL CENTER | Age: 83
DRG: 309 | End: 2023-03-02
Attending: INTERNAL MEDICINE
Payer: MEDICARE

## 2023-03-02 ENCOUNTER — PATIENT MESSAGE (OUTPATIENT)
Dept: MEDICAL GROUP | Facility: LAB | Age: 83
End: 2023-03-02

## 2023-03-02 VITALS
TEMPERATURE: 97.9 F | SYSTOLIC BLOOD PRESSURE: 99 MMHG | HEART RATE: 92 BPM | DIASTOLIC BLOOD PRESSURE: 67 MMHG | OXYGEN SATURATION: 97 % | BODY MASS INDEX: 26.89 KG/M2 | WEIGHT: 171.3 LBS | RESPIRATION RATE: 12 BRPM | HEIGHT: 67 IN

## 2023-03-02 DIAGNOSIS — E11.65 UNCONTROLLED TYPE 2 DIABETES MELLITUS WITH HYPERGLYCEMIA (HCC): ICD-10-CM

## 2023-03-02 LAB
ANION GAP SERPL CALC-SCNC: 6 MMOL/L (ref 7–16)
BUN SERPL-MCNC: 15 MG/DL (ref 8–22)
CALCIUM SERPL-MCNC: 8.9 MG/DL (ref 8.5–10.5)
CHLORIDE SERPL-SCNC: 109 MMOL/L (ref 96–112)
CO2 SERPL-SCNC: 24 MMOL/L (ref 20–33)
CREAT SERPL-MCNC: 0.6 MG/DL (ref 0.5–1.4)
GFR SERPLBLD CREATININE-BSD FMLA CKD-EPI: 90 ML/MIN/1.73 M 2
GLUCOSE BLD STRIP.AUTO-MCNC: 83 MG/DL (ref 65–99)
GLUCOSE SERPL-MCNC: 99 MG/DL (ref 65–99)
LV EJECT FRACT  99904: 60
LV EJECT FRACT MOD 2C 99903: 27.89
LV EJECT FRACT MOD 4C 99902: 40.38
LV EJECT FRACT MOD BP 99901: 46.45
MAGNESIUM SERPL-MCNC: 2.1 MG/DL (ref 1.5–2.5)
POTASSIUM SERPL-SCNC: 3.9 MMOL/L (ref 3.6–5.5)
SODIUM SERPL-SCNC: 139 MMOL/L (ref 135–145)
TSH SERPL DL<=0.005 MIU/L-ACNC: 0.6 UIU/ML (ref 0.38–5.33)

## 2023-03-02 PROCEDURE — 99239 HOSP IP/OBS DSCHRG MGMT >30: CPT | Performed by: HOSPITALIST

## 2023-03-02 PROCEDURE — 80048 BASIC METABOLIC PNL TOTAL CA: CPT

## 2023-03-02 PROCEDURE — 700102 HCHG RX REV CODE 250 W/ 637 OVERRIDE(OP): Performed by: INTERNAL MEDICINE

## 2023-03-02 PROCEDURE — 93306 TTE W/DOPPLER COMPLETE: CPT | Mod: 26 | Performed by: INTERNAL MEDICINE

## 2023-03-02 PROCEDURE — RXMED WILLOW AMBULATORY MEDICATION CHARGE: Performed by: HOSPITALIST

## 2023-03-02 PROCEDURE — 83735 ASSAY OF MAGNESIUM: CPT

## 2023-03-02 PROCEDURE — 82962 GLUCOSE BLOOD TEST: CPT

## 2023-03-02 PROCEDURE — A9270 NON-COVERED ITEM OR SERVICE: HCPCS | Performed by: INTERNAL MEDICINE

## 2023-03-02 PROCEDURE — A9270 NON-COVERED ITEM OR SERVICE: HCPCS | Performed by: HOSPITALIST

## 2023-03-02 PROCEDURE — 700102 HCHG RX REV CODE 250 W/ 637 OVERRIDE(OP): Performed by: HOSPITALIST

## 2023-03-02 PROCEDURE — 84443 ASSAY THYROID STIM HORMONE: CPT

## 2023-03-02 PROCEDURE — 99222 1ST HOSP IP/OBS MODERATE 55: CPT | Performed by: INTERNAL MEDICINE

## 2023-03-02 PROCEDURE — 93306 TTE W/DOPPLER COMPLETE: CPT

## 2023-03-02 PROCEDURE — 700111 HCHG RX REV CODE 636 W/ 250 OVERRIDE (IP): Performed by: INTERNAL MEDICINE

## 2023-03-02 RX ORDER — INSULIN GLARGINE 100 [IU]/ML
12 INJECTION, SOLUTION SUBCUTANEOUS EVERY EVENING
Qty: 3 EACH | Refills: 0 | Status: ACTIVE | OUTPATIENT
Start: 2023-03-02 | End: 2023-03-02 | Stop reason: SDUPTHER

## 2023-03-02 RX ORDER — ACETAMINOPHEN 500 MG
500-1000 TABLET ORAL EVERY 6 HOURS PRN
Qty: 30 TABLET | Refills: 0 | Status: SHIPPED
Start: 2023-03-02 | End: 2023-03-20

## 2023-03-02 RX ORDER — METOPROLOL TARTRATE 50 MG/1
75 TABLET, FILM COATED ORAL 2 TIMES DAILY
Qty: 90 TABLET | Refills: 1 | Status: SHIPPED | OUTPATIENT
Start: 2023-03-02 | End: 2023-03-24 | Stop reason: SDUPTHER

## 2023-03-02 RX ADMIN — LEVOTHYROXINE SODIUM 75 MCG: 0.07 TABLET ORAL at 05:39

## 2023-03-02 RX ADMIN — APIXABAN 10 MG: 5 TABLET, FILM COATED ORAL at 05:40

## 2023-03-02 RX ADMIN — Medication 2000 UNITS: at 05:39

## 2023-03-02 RX ADMIN — CYANOCOBALAMIN TAB 500 MCG 1000 MCG: 500 TAB at 05:39

## 2023-03-02 RX ADMIN — PREDNISONE 5 MG: 5 TABLET ORAL at 05:40

## 2023-03-02 RX ADMIN — METOPROLOL TARTRATE 50 MG: 50 TABLET, FILM COATED ORAL at 05:40

## 2023-03-02 RX ADMIN — ROSUVASTATIN 40 MG: 10 TABLET, FILM COATED ORAL at 05:38

## 2023-03-02 ASSESSMENT — ENCOUNTER SYMPTOMS
CARDIOVASCULAR NEGATIVE: 1
ABDOMINAL DISTENTION: 0
NUMBNESS: 0
NAUSEA: 0
ARTHRALGIAS: 0
NEUROLOGICAL NEGATIVE: 1
PALPITATIONS: 0
PSYCHIATRIC NEGATIVE: 1
SHORTNESS OF BREATH: 0
LIGHT-HEADEDNESS: 0
ALLERGIC/IMMUNOLOGIC NEGATIVE: 1
EYES NEGATIVE: 1
HEMATOLOGIC/LYMPHATIC NEGATIVE: 1
CONSTITUTIONAL NEGATIVE: 1
ENDOCRINE NEGATIVE: 1
DIZZINESS: 0
GASTROINTESTINAL NEGATIVE: 1
RESPIRATORY NEGATIVE: 1

## 2023-03-02 ASSESSMENT — FIBROSIS 4 INDEX: FIB4 SCORE: 2.7

## 2023-03-02 NOTE — PROGRESS NOTES
Patient discharged home with spouse via personal vehicle.  Discussed discharge instructions in detail, pt voiced understanding.  Diabetes Educator present to complete additional diabetes education.  Prescriptions sent to preferred pharmacy.  All belongings sent with patient.

## 2023-03-02 NOTE — CONSULTS
Cardiology Initial Consultation    Date of Service  3/2/2023    Referring Physician  Neel Bonilla M.D.    Reason for Consultation  Reduced EF    History of Presenting Illness  Mavis Lizarraga is a 82 y.o. female with a past medical history of PMR who presented 2/28/2023 with from home after being told to come into the ER by her physician for tachycardia.  She is found to be in atrial fibrillation with rapid ventricular response.  Because of lower extremity edema they ordered a DVT ultrasound that showed a DVT that she was treated with Eliquis for.  As part of evaluation she was rate controlled with metoprolol and had an echocardiogram showed variable heartbeat ejection fractions but generally hovered around 40%.  She is relatively asymptomatic.  She is rate controlled currently.  She denies chest pain palpitations or PND.  She does not smoke drink or do drugs.  She has no premature family history.    Review of Systems  Review of Systems   Constitutional: Negative.    HENT: Negative.     Eyes: Negative.    Respiratory: Negative.  Negative for shortness of breath.    Cardiovascular: Negative.  Negative for chest pain, palpitations and leg swelling.   Gastrointestinal: Negative.  Negative for abdominal distention and nausea.   Endocrine: Negative.    Genitourinary: Negative.    Musculoskeletal:  Negative for arthralgias.   Skin: Negative.    Allergic/Immunologic: Negative.    Neurological: Negative.  Negative for dizziness, light-headedness and numbness.   Hematological: Negative.    Psychiatric/Behavioral: Negative.       Past Medical History   has a past medical history of Anesthesia, Arthritis, Backpain, Breast cancer (HCC) (1980s), Diverticulosis, DVT of deep femoral vein (HCC), Heart burn, High cholesterol, Pneumonia (Feb 2006), Thyroid condition, and Ulcer.    She has no past medical history of COPD.    Surgical History   has a past surgical history that includes other abdominal surgery; other; low  anterior resection laparoscopic (2/10/2010); pr breast reduction; pr chemotherapy, unspecified procedure; breast reconstruction (8/14/2012); breast implant revision (8/14/2012); capsulectomy (8/14/2012); mastopexy (8/14/2012); liposuction (8/14/2012); rhytidectomy (8/14/2012); platysmaplasty (8/14/2012); blepharoplasty (8/14/2012); pr breast augmentation with implant; mastectomy (Right, 1980s); anterior and posterior repair (6/23/2014); bladder sling female (6/23/2014); and colectomy (N/A, 2009).    Family History  family history includes Cancer in her mother; Diabetes in her mother; Heart Disease (age of onset: 63) in her maternal grandmother; Heart Disease (age of onset: 64) in her mother; Hypertension in her mother; Stroke in her mother.    Social History   reports that she has never smoked. She has never used smokeless tobacco. She reports current alcohol use. She reports that she does not use drugs.    Medications  Prior to Admission Medications   Prescriptions Last Dose Informant Patient Reported? Taking?   Ascorbic Acid (VITAMIN C) 1000 MG Tab 2/28/2023 at 0800 Patient Yes No   Sig: Take 1,000 mg by mouth every morning.   CINNAMON PO 2/28/2023 at 0800 Patient Yes No   Sig: Take 1 tablet by mouth every morning.   Cholecalciferol (VITAMIN D) 2000 UNIT Tab 2/28/2023 at 0800 Patient Yes No   Sig: Take 2,000 Units by mouth every day.   Cyanocobalamin (VITAMIN B-12 PO) 2/28/2023 at 0800 Patient Yes No   Sig: Take 1 tablet by mouth every morning.   Multiple Vitamins-Minerals (ICAPS AREDS FORMULA PO) 2/28/2023 at 0800 Patient Yes No   Sig: Take 1 tablet by mouth every morning.   Omega-3 Fatty Acids (FISH OIL PO) 2/28/2023 at 0800 Patient Yes No   Sig: Take 1 Cap by mouth every day.   cyclobenzaprine (FLEXERIL) 10 mg Tab 2/22/2023 at 2030 Patient No No   Sig: TAKE ONE TABLET BY MOUTH THREE TIMES A DAY AS NEEDED FOR MILD PAIN (FLEXERIL)   levothyroxine (SYNTHROID) 75 MCG Tab 2/28/2023 at 0700 Patient No No   Sig: TAKE  ONE TABLET BY MOUTH EVERY MORNING ON AN EMPTY STOMACH   meloxicam (MOBIC) 15 MG tablet 2/22/2023 at 2030 Patient No No   Sig: Take 1 Tablet by mouth 1 time a day as needed for Moderate Pain.   Patient taking differently: Take 15 mg by mouth 1 time a day as needed for Moderate Pain. Indications: Joint Damage causing Pain and Loss of Function   metFORMIN (GLUCOPHAGE) 500 MG Tab 2/28/2023 at 0800 Patient No No   Sig: TAKE ONE TABLET BY MOUTH TWICE A DAY WITH MEALS.   omeprazole (PRILOSEC) 20 MG delayed-release capsule 2/24/2023 at 0800 Patient No No   Sig: Take 1 Capsule by mouth every day.   Patient taking differently: Take 20 mg by mouth 1 time a day as needed. Indications: Heartburn   predniSONE (DELTASONE) 5 MG Tab 2/28/2023 at 0800 Patient No No   Sig: Take 3 Tablets by mouth 3 times a day.   Patient taking differently: Take 5 mg by mouth 2 times a day.   rosuvastatin (CRESTOR) 40 MG tablet 2/28/2023 at 0800 Patient No No   Sig: Take 1 Tablet by mouth every day.   vitamin E (VITAMIN E) 1000 UNIT Cap 2/28/2023 at 0800 Patient Yes No   Sig: Take 1,000 Units by mouth every morning.      Facility-Administered Medications: None       Allergies  Allergies   Allergen Reactions    Metformin Diarrhea and Nausea     NAUSEA VOMIITG AND DIARRHEA    Morphine Unspecified     Hallucinations       Vital signs in last 24 hours  Temp:  [36.6 °C (97.9 °F)-36.7 °C (98 °F)] 36.6 °C (97.9 °F)  Pulse:  [] 92  Resp:  [12-18] 12  BP: ()/(55-74) 99/67  SpO2:  [94 %-99 %] 97 %    Physical Exam  Physical Exam  Vitals and nursing note reviewed.   Constitutional:       General: She is not in acute distress.     Appearance: Normal appearance. She is normal weight. She is not ill-appearing, toxic-appearing or diaphoretic.   HENT:      Head: Normocephalic and atraumatic.      Right Ear: Ear canal and external ear normal.      Left Ear: Ear canal and external ear normal.      Nose: Nose normal. No congestion or rhinorrhea.       Mouth/Throat:      Mouth: Mucous membranes are moist.      Pharynx: Oropharynx is clear. No oropharyngeal exudate or posterior oropharyngeal erythema.   Eyes:      General: No scleral icterus.        Right eye: No discharge.         Left eye: No discharge.      Extraocular Movements: Extraocular movements intact.      Conjunctiva/sclera: Conjunctivae normal.      Pupils: Pupils are equal, round, and reactive to light.   Neck:      Vascular: No carotid bruit.   Cardiovascular:      Rate and Rhythm: Normal rate and regular rhythm.      Pulses: Normal pulses.      Heart sounds: Normal heart sounds. No murmur heard.    No gallop.   Pulmonary:      Effort: Pulmonary effort is normal. No respiratory distress.      Breath sounds: Normal breath sounds. No stridor. No wheezing, rhonchi or rales.   Abdominal:      General: Abdomen is flat. Bowel sounds are normal. There is no distension.      Palpations: Abdomen is soft. There is no mass.      Tenderness: There is no abdominal tenderness. There is no guarding or rebound.      Hernia: No hernia is present.   Musculoskeletal:         General: No swelling or tenderness. Normal range of motion.      Cervical back: Normal range of motion and neck supple. No rigidity. No muscular tenderness.      Right lower leg: No edema.      Left lower leg: No edema.   Lymphadenopathy:      Cervical: No cervical adenopathy.   Skin:     General: Skin is warm and dry.      Capillary Refill: Capillary refill takes 2 to 3 seconds.      Coloration: Skin is not jaundiced or pale.      Findings: No bruising or lesion.   Neurological:      General: No focal deficit present.      Mental Status: She is alert and oriented to person, place, and time. Mental status is at baseline.      Cranial Nerves: No cranial nerve deficit.      Motor: No weakness.   Psychiatric:         Mood and Affect: Mood normal.         Behavior: Behavior normal.         Thought Content: Thought content normal.         Judgment:  Judgment normal.       Lab Review  Lab Results   Component Value Date/Time    WBC 5.7 03/01/2023 01:50 AM    RBC 4.11 (L) 03/01/2023 01:50 AM    HEMOGLOBIN 12.2 03/01/2023 01:50 AM    HEMATOCRIT 37.1 03/01/2023 01:50 AM    MCV 90.3 03/01/2023 01:50 AM    MCH 29.7 03/01/2023 01:50 AM    MCHC 32.9 (L) 03/01/2023 01:50 AM    MPV 10.1 03/01/2023 01:50 AM      Lab Results   Component Value Date/Time    SODIUM 139 03/02/2023 05:40 AM    POTASSIUM 3.9 03/02/2023 05:40 AM    CHLORIDE 109 03/02/2023 05:40 AM    CO2 24 03/02/2023 05:40 AM    GLUCOSE 99 03/02/2023 05:40 AM    BUN 15 03/02/2023 05:40 AM    CREATININE 0.60 03/02/2023 05:40 AM      Lab Results   Component Value Date/Time    ASTSGOT 17 03/01/2023 01:50 AM    ALTSGPT 18 03/01/2023 01:50 AM     Lab Results   Component Value Date/Time    CHOLSTRLTOT 158 04/06/2022 06:19 AM    LDL 78 04/06/2022 06:19 AM    HDL 53 04/06/2022 06:19 AM    TRIGLYCERIDE 137 04/06/2022 06:19 AM    TROPONINT 23 (H) 02/28/2023 04:42 PM       No results for input(s): NTPROBNP in the last 72 hours.    Cardiac Imaging and Procedures Review  EKG:  My personal interpretation of the EKG dated 3/1/2023 is atrial fibrillation with rapid ventricular response    Echocardiogram: Dated 3/2/2023 personally viewed inter myself showing an EF varying between 30 and 40% varying on the RR interval.    Cardiac Catheterization: N/A    Assessment/Plan  No new Assessment & Plan notes have been filed under this hospital service since the last note was generated.  Service: Cardiology  82-year-old female with stage B heart failure with reduced ejection fraction and no symptoms.  She is currently rate controlled on metoprolol.  We will maintain the same dose of beta-blocker.  She will stay on the Eliquis.  She will follow-up in the cardiology clinic in a couple of weeks to see how he is feeling.  We will consider cardioversion as an outpatient should she develop any symptoms.    Thank you for allowing me to  participate in the care of this patient.    Cardiology will sign off on this patient    Please contact me with any questions.    Alex Pendleton M.D.   Cardiologist, [facility]  [phone] - [phone]

## 2023-03-02 NOTE — CARE PLAN
The patient is Stable - Low risk of patient condition declining or worsening    Shift Goals  Clinical Goals: hemodynamic stability  Patient Goals: use of call light    Progress made toward(s) clinical / shift goals:    Problem: Knowledge Deficit - Standard  Goal: Patient and family/care givers will demonstrate understanding of plan of care, disease process/condition, diagnostic tests and medications  Outcome: Progressing     Problem: Pain - Standard  Goal: Alleviation of pain or a reduction in pain to the patient’s comfort goal  Outcome: Progressing     Problem: Hemodynamics  Goal: Patient's hemodynamics, fluid balance and neurologic status will be stable or improve  Outcome: Progressing       Patient is not progressing towards the following goals:

## 2023-03-02 NOTE — CARE PLAN
Afib 100-130's.    Problem: Knowledge Deficit - Standard  Goal: Patient and family/care givers will demonstrate understanding of plan of care, disease process/condition, diagnostic tests and medications  Outcome: Progressing     Problem: Pain - Standard  Goal: Alleviation of pain or a reduction in pain to the patient’s comfort goal  Outcome: Progressing     Problem: Hemodynamics  Goal: Patient's hemodynamics, fluid balance and neurologic status will be stable or improve  Outcome: Progressing

## 2023-03-02 NOTE — DISCHARGE SUMMARY
Discharge Summary    CHIEF COMPLAINT ON ADMISSION  Chief Complaint   Patient presents with    Sent by MD     Pt went to follow up with PCP regarding a new prescription of prednisone that was causing the pt's blood sugar to be high. Pt found to be in afib RVR at MD's office. Pt states she was seen for afib 2 months ago and is not on any blood thinners. Pt endorses lightheadedness for 5 days prior to this even.        Reason for Admission  EMS     Admission Date  2/28/2023    CODE STATUS  Full Code    HPI & HOSPITAL COURSE    Mavis Lizarraga is a 82 y.o. female with past medical history of type 2 diabetes, hypothyroidism, polymyalgia rheumatica on prednisone taper, who presented 2/28/2023 with A-fib with RVR.  She had a follow-up appointment with her PCP when she was noted to be tachycardic heart rates of 150 she was referred to the emergency department admitted to [facility] was briefly on Cardizem drip.  The patient was started on metoprolol and the dose was titrated to 75 mg twice daily.  Lower extremity duplex was positive for left lower extremity DVT.  She was started on Eliquis for both her DVT and atrial fibrillation.  Echocardiogram revealed ejection fraction of 40%.  Patient was evaluated by Dr. Pendleton from cardiology and case discussed with him.  He recommends continuing rate control with metoprolol and anticoagulation and outpatient follow-up with cardiology.  Patient had been recently on prednisone for polymyalgia rheumatica her diabetes is uncontrolled with a hemoglobin A1c of 11.2.  She will be started on Lantus and will continue her metformin and I recommended monitoring her CBGs and close outpatient follow-up with her PCP.  Given marginal blood pressure and to allow optimization of metoprolol for rate control the patient was not started on ACE inhibitor or Aldactone.  Discussed with Dr. Pendleton she will be reevaluated as an outpatient with cardiology  On my evaluation the patient denies any dyspnea or chest  pain and is currently on room air and hemodynamically stable.  She is clinically stable for discharge with close outpatient follow-up with PCP and cardiology.    Therefore, she is discharged in good and stable condition to home with close outpatient follow-up.    The patient met 2-midnight criteria for an inpatient stay at the time of discharge.    Discharge Date  3/2/2023    FOLLOW UP ITEMS POST DISCHARGE  Follow-up with PCP and monitor CBGs as steroids are tapered  Follow-up with cardiology    DISCHARGE DIAGNOSES  Principal Problem:    Atrial fibrillation with RVR (HCC) POA: Yes  Active Problems:    Acquired hypothyroidism POA: Yes    Uncontrolled type 2 diabetes mellitus with hyperglycemia (HCC) POA: Yes    Polymyalgia rheumatica (HCC) POA: Unknown    DVT (deep vein thrombosis) in pregnancy POA: Unknown    Hypokalemia POA: Unknown  Resolved Problems:    * No resolved hospital problems. *      FOLLOW UP  No future appointments.  Waylon Webber D.O.  [address] [address]  [phone]    Schedule an appointment as soon as possible for a visit in 1 week(s)        MEDICATIONS ON DISCHARGE     Medication List        START taking these medications        Instructions   acetaminophen 500 MG Tabs  Commonly known as: TYLENOL   Take 1-2 Tablets by mouth every 6 hours as needed for Mild Pain or Moderate Pain.  Dose: 500-1,000 mg     apixaban 5mg Tabs  Start taking on: March 8, 2023  Commonly known as: ELIQUIS   Take 2 Tablets by mouth 2 times a day for 6 days, THEN 1 Tablet 2 times a day for 30 days. Indications: DVT/PE     Insulin Pen Needle 32 G x 4 mm   Doctor's comments: Per patient/formulary preference. ICD-10 code: E11.65 Uncontrolled type 2 Diabetes Mellitus  Use one pen needle in pen device to inject insulin once daily.     Lantus SoloStar 100 UNIT/ML Sopn injection  Generic drug: insulin glargine   Inject 12 Units under the skin every evening.  Dose: 12 Units     metoprolol tartrate  50 MG Tabs  Commonly known as: LOPRESSOR   Take 1.5 Tablets by mouth 2 times a day.  Dose: 75 mg            CONTINUE taking these medications        Instructions   CINNAMON PO   Take 1 tablet by mouth every morning.  Dose: 1 Tablet     cyclobenzaprine 10 mg Tabs  Commonly known as: Flexeril   TAKE ONE TABLET BY MOUTH THREE TIMES A DAY AS NEEDED FOR MILD PAIN (FLEXERIL)     FISH OIL PO   Take 1 Cap by mouth every day.  Dose: 1 Capsule     ICAPS AREDS FORMULA PO   Take 1 tablet by mouth every morning.  Dose: 1 Tablet     levothyroxine 75 MCG Tabs  Commonly known as: SYNTHROID   TAKE ONE TABLET BY MOUTH EVERY MORNING ON AN EMPTY STOMACH     metFORMIN 500 MG Tabs  Commonly known as: GLUCOPHAGE   TAKE ONE TABLET BY MOUTH TWICE A DAY WITH MEALS.     rosuvastatin 40 MG tablet  Commonly known as: CRESTOR   Take 1 Tablet by mouth every day.  Dose: 40 mg     VITAMIN B-12 PO   Take 1 tablet by mouth every morning.  Dose: 1 Tablet     Vitamin C 1000 MG Tabs   Take 1,000 mg by mouth every morning.  Dose: 1,000 mg     vitamin D 2000 UNIT Tabs   Take 2,000 Units by mouth every day.  Dose: 2,000 Units            STOP taking these medications      meloxicam 15 MG tablet  Commonly known as: MOBIC     vitamin E 1000 Unit (450 mg) Caps  Commonly known as: VITAMIN E            ASK your doctor about these medications        Instructions   omeprazole 20 MG delayed-release capsule  Commonly known as: PRILOSEC   Take 1 Capsule by mouth every day.  Dose: 20 mg     predniSONE 5 MG Tabs  Commonly known as: DELTASONE   Take 3 Tablets by mouth 3 times a day.  Dose: 15 mg              Allergies  Allergies   Allergen Reactions    Metformin Diarrhea and Nausea     NAUSEA VOMIITG AND DIARRHEA    Morphine Unspecified     Hallucinations       DIET  Orders Placed This Encounter   Procedures    Diet Order Diet: Consistent CHO (Diabetic)     Standing Status:   Standing     Number of Occurrences:   1     Order Specific Question:   Diet:     Answer:    Consistent CHO (Diabetic) [4]    Discontinue Diet Tray     Standing Status:   Standing     Number of Occurrences:   1       ACTIVITY  As tolerated.  Weight bearing as tolerated    CONSULTATIONS  Cardiology    PROCEDURES  none    LABORATORY  Lab Results   Component Value Date    SODIUM 139 03/02/2023    POTASSIUM 3.9 03/02/2023    CHLORIDE 109 03/02/2023    CO2 24 03/02/2023    GLUCOSE 99 03/02/2023    BUN 15 03/02/2023    CREATININE 0.60 03/02/2023        Lab Results   Component Value Date    WBC 5.7 03/01/2023    HEMOGLOBIN 12.2 03/01/2023    HEMATOCRIT 37.1 03/01/2023    PLATELETCT 120 (L) 03/01/2023        Total time of the discharge process exceeds 40 minutes.

## 2023-03-03 ENCOUNTER — TELEPHONE (OUTPATIENT)
Dept: VASCULAR LAB | Facility: MEDICAL CENTER | Age: 83
End: 2023-03-03
Payer: MEDICARE

## 2023-03-03 ENCOUNTER — PATIENT OUTREACH (OUTPATIENT)
Dept: MEDICAL GROUP | Facility: LAB | Age: 83
End: 2023-03-03
Payer: MEDICARE

## 2023-03-03 DIAGNOSIS — M35.3 PMR (POLYMYALGIA RHEUMATICA) (HCC): ICD-10-CM

## 2023-03-03 RX ORDER — PREDNISONE 5 MG/1
5 TABLET ORAL 2 TIMES DAILY
Qty: 60 TABLET | Refills: 0 | Status: SHIPPED | OUTPATIENT
Start: 2023-03-03 | End: 2023-03-17 | Stop reason: SDUPTHER

## 2023-03-03 NOTE — PROGRESS NOTES
"Patient outreach for TCM follow up.  Reviewed discharge instructions and new and current medications.  Patient verbalized understanding.   Confirmed follow up appointment for 3/17/23 .Appt was scheduled as a 20 min by MA on 3/3/23, okay per Rupali Webber. Next HFU was over 14 days out from discharge. Needs BP cuff. CHW Monica notified and she will be working on getting this for her.    Per DM educator note on 3/2/23:   CDE called [facility] ([facility]), who was able to use a coupon to get Eliquis free, and use other coupons for Lantus to be $15.00. Semglee is insulin of choice for SCP. informed and will have her PCP change order  to Semglee for next month so they could use [facility] (it was closer). \" Confirmed that the patient did reach out to PCP about this change.      Insulin administration education done prior to discharge, as per DM note.  Patient was told it was okay to take Lantus in a.m. per Michaela Carpenter Diabetic Nurse Specialist, this was discussed with the patient at discharge.     Patient states she was told to stop Metformin, which is not indicated on d/c med list. According to Michaela, who spoke to the patient, Metformin had been stopped prior to discharge due to severe diarrhea. This will need to be discussed w/ Dr. Webber. Staff message sent to him.   "

## 2023-03-03 NOTE — PROGRESS NOTES
Subjective:     Mavis Lizarraga is a 82 y.o. female who presents for Hospital Follow-up.    POST DISCHARGE CALL:  Discharge Date:3/2/2023   Date of Outreach Call: 3/3/2023 10:34 AM  Now that you're home, how are you doing? Good  Did you receive any new prescriptions? Yes  Were you able to fill those medications? Yes  How did you fill those prescriptions? Pharmacy  If not able to fill prescriptions, why? *    Do you have questions about your medications? No  Do you have a follow-up appointment scheduled?Yes  Comment:20min appt. cleared w/ clinic staff  Any issues or paperwork you wish to discuss with your PCP? confirm semglee rx. for future fills at [facility] (acquired by [facility] in 2014[facility]s due to SCP coverage. Plan for Eliquis refills.  Does patient qualify for CCM Program? Yes  If patient qualifies, was CCM Program Introduced? No  If patient does not qualify, comment? Discussed w/ CHW Monica to f/u  Number of Attempts: 1  Current or previous attempts completed within two business days of discharge? Yes  Provided education regarding treatment plan, medication, self-management, ADLs? Yes  Has patient completed Advance Directive? If yes, advise them to bring to appointment. *  Care Manager phone number provided? Yes  Is there anything else I can help you with? No  Chief Complaint? afib w/ RVR-sent by PCP  Admitting Dx? afib w/ RVR  Discharge Diagnosis? afib w/ RVR  Additional Comments? see TCM progress note    HPI:   Recently hospitalized for new onset atrial fibrillation    Current medicines (including reconciliation performed today)  Current Outpatient Medications   Medication Sig Dispense Refill    predniSONE (DELTASONE) 5 MG Tab Take 1 Tablet by mouth 2 times a day. 60 Tablet 0    metoprolol tartrate (LOPRESSOR) 50 MG Tab Take 1.5 Tablets by mouth 2 times a day. 90 Tablet 1    acetaminophen (TYLENOL) 500 MG Tab Take 1-2 Tablets by mouth every 6 hours as needed for Mild Pain or Moderate Pain. 30 Tablet 0    [START ON 3/8/2023] apixaban (ELIQUIS)  5mg Tab Take 2 Tablets by mouth 2 times a day for 6 days, THEN 1 Tablet 2 times a day for 30 days. Indications: DVT/PE 60 Tablet 1    insulin glargine 100 UNIT/ML SC SOPN injection Inject 12 Units under the skin every evening. 9 mL 0    Insulin Pen Needle 32 G x 4 mm Use one pen needle in pen device to inject insulin once daily. 100 Each 0    omeprazole (PRILOSEC) 20 MG delayed-release capsule Take 1 Capsule by mouth every day. (Patient taking differently: Take 20 mg by mouth 1 time a day as needed. Indications: Heartburn) 30 Capsule 3    metFORMIN (GLUCOPHAGE) 500 MG Tab TAKE ONE TABLET BY MOUTH TWICE A DAY WITH MEALS. 100 Tablet 3    cyclobenzaprine (FLEXERIL) 10 mg Tab TAKE ONE TABLET BY MOUTH THREE TIMES A DAY AS NEEDED FOR MILD PAIN (FLEXERIL) 30 Tablet 3    levothyroxine (SYNTHROID) 75 MCG Tab TAKE ONE TABLET BY MOUTH EVERY MORNING ON AN EMPTY STOMACH 90 Tablet 3    rosuvastatin (CRESTOR) 40 MG tablet Take 1 Tablet by mouth every day. 100 Tablet 3    Cholecalciferol (VITAMIN D) 2000 UNIT Tab Take 2,000 Units by mouth every day.      Ascorbic Acid (VITAMIN C) 1000 MG Tab Take 1,000 mg by mouth every morning.      Cyanocobalamin (VITAMIN B-12 PO) Take 1 tablet by mouth every morning.      CINNAMON PO Take 1 tablet by mouth every morning.      Multiple Vitamins-Minerals (ICAPS AREDS FORMULA PO) Take 1 tablet by mouth every morning.      Omega-3 Fatty Acids (FISH OIL PO) Take 1 Cap by mouth every day.       No current facility-administered medications for this visit.       Allergies:   Metformin and Morphine    Social History     Tobacco Use    Smoking status: Never    Smokeless tobacco: Never   Vaping Use    Vaping Use: Never used   Substance Use Topics    Alcohol use: Yes     Comment: very rarely on ocassion will have a Sravani    Drug use: No       ROS:  Patient is currently without nausea vomiting fever chills chest pain tightness    Objective:     There were no vitals filed for this visit.  There is no  height or weight on file to calculate BMI.    Physical Exam:  Heart is irregularly irregular, lungs clear to auscultation extremities trace to 1+ edema to the middle one third of the tibias    Assessment and Plan:   1. PMR (polymyalgia rheumatica) (HCC)  Decrease prednisone from 10 to 5 mg  - predniSONE (DELTASONE) 5 MG Tab; Take 1 Tablet by mouth every day.  Dispense: 30 Tablet; Refill: 2    2. Longstanding persistent atrial fibrillation (HCC)  Continue on anticoagulation therapy    3. Uncontrolled type 2 diabetes mellitus with hyperglycemia ([facility])  No change, will be dropping prednisone hopefully insulin requirements will stay the same or less than.      - Chart and discharge summary were reviewed.   - Hospitalization and results reviewed with patient.   - Medications reviewed including instructions regarding high risk medications, dosing and side effects.  - Recommended Services: No services needed at this time  - Advance directive/POLST on file?  No     Follow-up:No follow-ups on file.    Face-to-face transitional care management services with [facility] (today's visit is within 14 days post discharge & LACE+ score of 28-58) medical decision complexity were provided.     LACE+ Historical Score Over Time (0-28: Low, 29-58: Medium, 59+: High): 74

## 2023-03-03 NOTE — PROGRESS NOTES
Diabetes education: Spoke with Mavis this AM regarding some confusion over Metformin. Explained how it works and what it does. She states she was told to stop taking it. AVS has it continued.  Pt states she stopped the Metformin before admit as she was having severe diarrhea after taking it. Asked pt to send message to PCP regarding this. Pt to see PCP next week.  Pt was able to give her insulin this AM without  problem. She sent message to PCP asking to change Lantus to Semglee for next prescription. Confirmed she is to get/start this after current pens are done as this is the same medication ( both glargine).

## 2023-03-03 NOTE — CONSULTS
"Diabetes education: Pt has a hx of diabetes on Metformin prior to admit.  Pt was admitted with blood sugar of 357 and Hga1c of 11.2 %.  Pt was started on Prednisone which increased her blood sugars.  Pt was on Glargine 15 units pm with Lispro 5 units tid meals and Lispro per sliding scale coverage ac and hs.  Pt was sent to discharge lounge before seen by CDE. CDE called pt  who was still at [facility]. CDE met with pt and  outside of discharge lounge to instruct on insulin ( storage, shelf life, site rotation), and how to use insulin pens. Pt struggled with pushing \"plunger\" and giving insulin.  taught as well and was able to do this without difficulty. Pt states she has a meter and supplies at home. Handout given.  Reviewed what effects blood sugars, need to eat three meals with protein and controlled carbs.  Pt thought discharge prescriptions were to be sent to [facility], but were sent to [facility] pharmacy. Pt called [facility] and Lantus would need a PA other wise cost would be $525.00. Eliquis was not available at [facility].  CDE called [facility]. With MD ( or CDE )calling [facility] to hold Lantus and Eliquis, as well as MD sending to prescriptions, these could be filled at [facility].  Pt and  informed and will wait at the cafeteria.  CDE sent text to MD who send prescriptions. CDE called [facility] ( [facility]), who was able to use a coupon to get Eliquis free, and use other coupons for Lantus to be $15.00. Semglee is insulin of choice for SCP.   informed and will have her PCP change order  to Semglee for next month so they could use [facility] (it was closer). CDE informed them prescriptions to be ready in 10 minutes and directions given to pharmacy and where pt could wait for  to bring car. Questions answered and number given for further questions.  "

## 2023-03-03 NOTE — TELEPHONE ENCOUNTER
[facility]    Received anticoag referral for Eliquis due to DVT & Afib from Dr. Bonilla on 3/2/23    Scheduled pt for initial visit on 3/20 - pt's earliest available.    Insurance: [facility]  PCP: [facility]  Locations to be seen: Any    [facility] at [phone], fax [phone]    Fausto Harris, AndreaD, BCACP

## 2023-03-04 ENCOUNTER — PATIENT MESSAGE (OUTPATIENT)
Dept: MEDICAL GROUP | Facility: LAB | Age: 83
End: 2023-03-04
Payer: MEDICARE

## 2023-03-04 DIAGNOSIS — E11.65 UNCONTROLLED TYPE 2 DIABETES MELLITUS WITH HYPERGLYCEMIA (HCC): ICD-10-CM

## 2023-03-06 ENCOUNTER — PATIENT OUTREACH (OUTPATIENT)
Dept: HEALTH INFORMATION MANAGEMENT | Facility: OTHER | Age: 83
End: 2023-03-06
Payer: MEDICARE

## 2023-03-06 DIAGNOSIS — E11.65 UNCONTROLLED TYPE 2 DIABETES MELLITUS WITH HYPERGLYCEMIA (HCC): ICD-10-CM

## 2023-03-06 DIAGNOSIS — I48.91 ATRIAL FIBRILLATION WITH RVR (HCC): ICD-10-CM

## 2023-03-06 NOTE — PROGRESS NOTES
CHTIM Price reached out to pt regarding enrollment in personal care management. CHW received referral from Jana HYDE. CHW could not reach pt at this time. CHW left pt a VM. CHW will call pt on Thursday 3/9/23.

## 2023-03-07 ENCOUNTER — PATIENT MESSAGE (OUTPATIENT)
Dept: MEDICAL GROUP | Facility: LAB | Age: 83
End: 2023-03-07
Payer: MEDICARE

## 2023-03-08 ENCOUNTER — TELEPHONE (OUTPATIENT)
Dept: MEDICAL GROUP | Facility: LAB | Age: 83
End: 2023-03-08
Payer: MEDICARE

## 2023-03-08 NOTE — TELEPHONE ENCOUNTER
ESTABLISHED PATIENT PRE-VISIT PLANNING     Patient was NOT contacted to complete PVP.     Note: Patient will not be contacted if there is no indication to call.     1.  Reviewed notes from the last few office visits within the medical group: Yes    2.  If any orders were placed at last visit or intended to be done for this visit (i.e. 6 mos follow-up), do we have Results/Consult Notes?           Labs - Labs ordered, completed on 2/28/23 and results are in chart.  Note: If patient appointment is for lab review and patient did not complete labs, check with provider if OK to reschedule patient until labs completed.         Imaging - Imaging was not ordered at last office visit.         Referrals - No referrals were ordered at last office visit.    3. Is this appointment scheduled as a Hospital Follow-Up? Yes, visit was at [facility].     4.  Immunizations were updated in Epic using Reconcile Outside Information activity? Yes    5.  Patient is due for the following Health Maintenance Topics:   Health Maintenance Due   Topic Date Due    Annual Wellness Visit  09/26/2019    IMM HEP B VACCINE (2 of 3 - 19+ 3-dose series) 09/23/2020    IMM ZOSTER VACCINES (3 of 3) 10/21/2020    COVID-19 Vaccine (4 - Booster for Pfizer series) 12/10/2021    BONE DENSITY  11/10/2022    FASTING LIPID PROFILE  04/06/2023    URINE ACR / MICROALBUMIN  04/06/2023    DIABETES MONOFILAMENT / LE EXAM  04/08/2023     6.  AHA (Pulse8) form printed for Provider? N/A

## 2023-03-08 NOTE — PROGRESS NOTES
CHW Dee reached out to pt regarding enrollment in personal care management. CHW could not reach pt at this time. CHW left pt a VM. CHW will attempt to reach pt on 3/10.

## 2023-03-10 NOTE — PROGRESS NOTES
CHW Dee reached out to pt regarding enrollment in personal care management. CHW could not reach pt at this time. CHW left VM for pt. CHW has attempted to contact pt 3x with no response. CHW left contact info for pt. CHW will not follow pt at this time.

## 2023-03-14 ENCOUNTER — APPOINTMENT (RX ONLY)
Dept: URBAN - METROPOLITAN AREA CLINIC 36 | Facility: CLINIC | Age: 83
Setting detail: DERMATOLOGY
End: 2023-03-14

## 2023-03-14 PROBLEM — C44.311 BASAL CELL CARCINOMA OF SKIN OF NOSE: Status: ACTIVE | Noted: 2023-03-14

## 2023-03-14 PROCEDURE — ? OBSERVATION

## 2023-03-14 PROCEDURE — 99212 OFFICE O/P EST SF 10 MIN: CPT

## 2023-03-17 ENCOUNTER — OFFICE VISIT (OUTPATIENT)
Dept: MEDICAL GROUP | Facility: LAB | Age: 83
End: 2023-03-17
Payer: MEDICARE

## 2023-03-17 VITALS
DIASTOLIC BLOOD PRESSURE: 80 MMHG | HEIGHT: 67 IN | BODY MASS INDEX: 26.53 KG/M2 | SYSTOLIC BLOOD PRESSURE: 116 MMHG | OXYGEN SATURATION: 96 % | RESPIRATION RATE: 12 BRPM | WEIGHT: 169 LBS | TEMPERATURE: 97.1 F

## 2023-03-17 DIAGNOSIS — I48.11 LONGSTANDING PERSISTENT ATRIAL FIBRILLATION (HCC): ICD-10-CM

## 2023-03-17 DIAGNOSIS — M35.3 PMR (POLYMYALGIA RHEUMATICA) (HCC): ICD-10-CM

## 2023-03-17 DIAGNOSIS — E11.65 UNCONTROLLED TYPE 2 DIABETES MELLITUS WITH HYPERGLYCEMIA (HCC): ICD-10-CM

## 2023-03-17 PROCEDURE — 99495 TRANSJ CARE MGMT MOD F2F 14D: CPT | Performed by: INTERNAL MEDICINE

## 2023-03-17 RX ORDER — PREDNISONE 5 MG/1
5 TABLET ORAL DAILY
Qty: 30 TABLET | Refills: 2 | Status: SHIPPED | OUTPATIENT
Start: 2023-03-17 | End: 2023-07-28 | Stop reason: SDUPTHER

## 2023-03-17 ASSESSMENT — FIBROSIS 4 INDEX: FIB4 SCORE: 2.7

## 2023-03-18 ENCOUNTER — PATIENT MESSAGE (OUTPATIENT)
Dept: MEDICAL GROUP | Facility: LAB | Age: 83
End: 2023-03-18
Payer: MEDICARE

## 2023-03-18 DIAGNOSIS — I48.91 ATRIAL FIBRILLATION WITH RVR (HCC): ICD-10-CM

## 2023-03-20 ENCOUNTER — ANTICOAGULATION VISIT (OUTPATIENT)
Dept: MEDICAL GROUP | Facility: MEDICAL CENTER | Age: 83
End: 2023-03-20
Payer: MEDICARE

## 2023-03-20 ENCOUNTER — DOCUMENTATION (OUTPATIENT)
Dept: VASCULAR LAB | Facility: MEDICAL CENTER | Age: 83
End: 2023-03-20

## 2023-03-20 DIAGNOSIS — I48.11 LONGSTANDING PERSISTENT ATRIAL FIBRILLATION (HCC): ICD-10-CM

## 2023-03-20 PROCEDURE — 99211 OFF/OP EST MAY X REQ PHY/QHP: CPT | Performed by: FAMILY MEDICINE

## 2023-03-20 NOTE — PROGRESS NOTES
NEW DOAC     Anticoagulation Patient Findings      PCP: Waylon Webber D.O.  Cardiologist: Dr Pendleton  Dx: Afib, LLE DVT  CHADSVASC = 4 (age, gender, DM)  HAS-BLED = 1 (age)  Target End Date = Indefinite    Pt Hx: Patient has a history of breast cancer. 2 weeks before hospital patient noticed swelling, previous history of clots. 2 in the past. During pregnancy and second incidental finding during colon surgery. Daughter has a history of PE and is on blood thinners. No aggravating factors prior to current VTE. Due to diagnosis of atrial fibrillation will continue with indefinite therapy.     US imaging from 2/2/23:  Veins are incompressible from the common femoral to the distal calf and    occluded with extensive subacute and acute deep vein thrombosis.    Labs:  Lab Results   Component Value Date/Time    WBC 5.7 03/01/2023 01:50 AM    RBC 4.11 (L) 03/01/2023 01:50 AM    HEMOGLOBIN 12.2 03/01/2023 01:50 AM    HEMATOCRIT 37.1 03/01/2023 01:50 AM    MCV 90.3 03/01/2023 01:50 AM    MCH 29.7 03/01/2023 01:50 AM    MCHC 32.9 (L) 03/01/2023 01:50 AM    MPV 10.1 03/01/2023 01:50 AM    NEUTSPOLYS 69.60 03/01/2023 01:50 AM    LYMPHOCYTES 22.20 03/01/2023 01:50 AM    MONOCYTES 6.90 03/01/2023 01:50 AM    EOSINOPHILS 0.40 03/01/2023 01:50 AM    EOSINOPHILS 3 10/28/2009 07:00 PM    BASOPHILS 0.20 03/01/2023 01:50 AM      Lab Results   Component Value Date/Time    SODIUM 139 03/02/2023 05:40 AM    POTASSIUM 3.9 03/02/2023 05:40 AM    CHLORIDE 109 03/02/2023 05:40 AM    CO2 24 03/02/2023 05:40 AM    GLUCOSE 99 03/02/2023 05:40 AM    BUN 15 03/02/2023 05:40 AM    CREATININE 0.60 03/02/2023 05:40 AM          Pt is new to Eliquis and new to [facility]. Discussed:   Indication for DOAC therapy.  Importance of monitoring and compliance.   Monitoring parameters, signs and symptoms of bleeding or clotting.  DOAC therapy, side effects, potential DDIs, OTC medications  Hormonal therapy -none  Pregnancy -n/a  DDI -none   Lifestyle safety, ie  smoking, ETOH, hobby safety, fall safety/prevention  Procedures for missed doses or suspected missed doses, surgeries/procedures, travel, dental work, any medication changes    Continue Eliquis 5 mg BID - patient has already completed bolus dosing of 10 mg BID.     DOAC affordable = yes    Samples provided: no    Labs to be completed prior to next f/u - CBC, CMP    F/U - 1 month    Katalina Meredith, PharmD      Added [facility] to care team   CC: Dr Bloch

## 2023-03-21 ENCOUNTER — TELEPHONE (OUTPATIENT)
Dept: VASCULAR LAB | Facility: MEDICAL CENTER | Age: 83
End: 2023-03-21
Payer: MEDICARE

## 2023-03-21 NOTE — TELEPHONE ENCOUNTER
----- Message from Kevin Montesinos PharmD sent at 3/21/2023  7:23 AM PDT -----  Regarding: Would like a call.  Needs prescription sent to new pharmacy, but did not say which one, and current pharmacy out

## 2023-03-21 NOTE — PROGRESS NOTES
Initial anticoagulation clinic note and most recent discharge summary reviewed    Pending further recommendations from PCP, we will continue with indefinite anticoagulation with Eliquis for history of VTE and A-fib with CHADS-VASC score = 4    We will defer all further cv care, aside from day to day management of anticoagulation to cardiology.     Michael Bloch, MD  Anticoagulation Clinic    Cc:  MARY Webber

## 2023-03-24 RX ORDER — METOPROLOL TARTRATE 50 MG/1
75 TABLET, FILM COATED ORAL 2 TIMES DAILY
Qty: 135 TABLET | Refills: 2 | Status: SHIPPED
Start: 2023-03-24 | End: 2023-04-26

## 2023-03-30 ENCOUNTER — PATIENT MESSAGE (OUTPATIENT)
Dept: MEDICAL GROUP | Facility: LAB | Age: 83
End: 2023-03-30
Payer: MEDICARE

## 2023-04-11 ENCOUNTER — TELEPHONE (OUTPATIENT)
Dept: CARDIOLOGY | Facility: MEDICAL CENTER | Age: 83
End: 2023-04-11
Payer: MEDICARE

## 2023-04-11 DIAGNOSIS — G89.29 CHRONIC MIDLINE LOW BACK PAIN WITHOUT SCIATICA: ICD-10-CM

## 2023-04-11 DIAGNOSIS — M62.830 SPASM OF BACK MUSCLES: ICD-10-CM

## 2023-04-11 DIAGNOSIS — M54.50 CHRONIC MIDLINE LOW BACK PAIN WITHOUT SCIATICA: ICD-10-CM

## 2023-04-11 DIAGNOSIS — E11.65 UNCONTROLLED TYPE 2 DIABETES MELLITUS WITH HYPERGLYCEMIA (HCC): ICD-10-CM

## 2023-04-11 DIAGNOSIS — E78.2 MIXED HYPERLIPIDEMIA: ICD-10-CM

## 2023-04-11 DIAGNOSIS — Z85.3 HISTORY OF BREAST CANCER: ICD-10-CM

## 2023-04-11 DIAGNOSIS — E03.4 HYPOTHYROIDISM DUE TO ACQUIRED ATROPHY OF THYROID: ICD-10-CM

## 2023-04-11 DIAGNOSIS — Z79.899 ON STATIN THERAPY DUE TO RISK OF FUTURE CARDIOVASCULAR EVENT: ICD-10-CM

## 2023-04-11 DIAGNOSIS — Z12.31 ENCOUNTER FOR SCREENING MAMMOGRAM FOR MALIGNANT NEOPLASM OF BREAST: ICD-10-CM

## 2023-04-11 RX ORDER — ROSUVASTATIN CALCIUM 40 MG/1
TABLET, COATED ORAL
Qty: 100 TABLET | Refills: 3 | Status: SHIPPED | OUTPATIENT
Start: 2023-04-11

## 2023-04-11 NOTE — TELEPHONE ENCOUNTER
Spoke to patient in regards to obtaining records for NP appointment with . Per patient has never been treated by a previous cardiologist. Confirmed all recent notes, labs, and cardiac imaging are in Epic. Confirmed with patient appointment time, location, and date.

## 2023-04-14 ENCOUNTER — APPOINTMENT (RX ONLY)
Dept: URBAN - METROPOLITAN AREA CLINIC 15 | Facility: CLINIC | Age: 83
Setting detail: DERMATOLOGY
End: 2023-04-14

## 2023-04-14 DIAGNOSIS — Z85.828 PERSONAL HISTORY OF OTHER MALIGNANT NEOPLASM OF SKIN: ICD-10-CM

## 2023-04-14 DIAGNOSIS — D22 MELANOCYTIC NEVI: ICD-10-CM

## 2023-04-14 DIAGNOSIS — L57.0 ACTINIC KERATOSIS: ICD-10-CM

## 2023-04-14 DIAGNOSIS — L82.1 OTHER SEBORRHEIC KERATOSIS: ICD-10-CM

## 2023-04-14 DIAGNOSIS — L81.4 OTHER MELANIN HYPERPIGMENTATION: ICD-10-CM

## 2023-04-14 PROBLEM — D22.5 MELANOCYTIC NEVI OF TRUNK: Status: ACTIVE | Noted: 2023-04-14

## 2023-04-14 PROCEDURE — ? LIQUID NITROGEN

## 2023-04-14 PROCEDURE — 17000 DESTRUCT PREMALG LESION: CPT

## 2023-04-14 PROCEDURE — 17003 DESTRUCT PREMALG LES 2-14: CPT

## 2023-04-14 PROCEDURE — 99213 OFFICE O/P EST LOW 20 MIN: CPT | Mod: 25

## 2023-04-14 PROCEDURE — ? SUNSCREEN TREATMENT REGIMEN

## 2023-04-14 PROCEDURE — ? COUNSELING

## 2023-04-14 ASSESSMENT — LOCATION ZONE DERM
LOCATION ZONE: TRUNK
LOCATION ZONE: ARM
LOCATION ZONE: HAND
LOCATION ZONE: NOSE
LOCATION ZONE: FACE

## 2023-04-14 ASSESSMENT — LOCATION SIMPLE DESCRIPTION DERM
LOCATION SIMPLE: NOSE
LOCATION SIMPLE: LEFT FOREARM
LOCATION SIMPLE: UPPER BACK
LOCATION SIMPLE: LEFT HAND
LOCATION SIMPLE: LEFT UPPER BACK
LOCATION SIMPLE: ABDOMEN
LOCATION SIMPLE: LEFT FOREHEAD
LOCATION SIMPLE: RIGHT UPPER BACK
LOCATION SIMPLE: RIGHT POSTERIOR UPPER ARM
LOCATION SIMPLE: CHEST
LOCATION SIMPLE: RIGHT FOREARM

## 2023-04-14 ASSESSMENT — LOCATION DETAILED DESCRIPTION DERM
LOCATION DETAILED: EPIGASTRIC SKIN
LOCATION DETAILED: RIGHT VENTRAL PROXIMAL FOREARM
LOCATION DETAILED: LEFT INFERIOR MEDIAL FOREHEAD
LOCATION DETAILED: RIGHT MEDIAL UPPER BACK
LOCATION DETAILED: NASAL DORSUM
LOCATION DETAILED: RIGHT MEDIAL SUPERIOR CHEST
LOCATION DETAILED: SUBXIPHOID
LOCATION DETAILED: LEFT PROXIMAL DORSAL FOREARM
LOCATION DETAILED: LEFT VENTRAL PROXIMAL FOREARM
LOCATION DETAILED: STERNAL NOTCH
LOCATION DETAILED: RIGHT DISTAL POSTERIOR UPPER ARM
LOCATION DETAILED: LEFT SUPERIOR UPPER BACK
LOCATION DETAILED: SUPERIOR THORACIC SPINE
LOCATION DETAILED: LEFT RADIAL DORSAL HAND
LOCATION DETAILED: RIGHT MID-UPPER BACK

## 2023-04-14 NOTE — PROCEDURE: LIQUID NITROGEN

## 2023-04-16 ENCOUNTER — PATIENT MESSAGE (OUTPATIENT)
Dept: MEDICAL GROUP | Facility: LAB | Age: 83
End: 2023-04-16
Payer: MEDICARE

## 2023-04-19 ENCOUNTER — HOSPITAL ENCOUNTER (OUTPATIENT)
Dept: LAB | Facility: MEDICAL CENTER | Age: 83
End: 2023-04-19
Attending: INTERNAL MEDICINE
Payer: MEDICARE

## 2023-04-19 DIAGNOSIS — E78.2 MIXED HYPERLIPIDEMIA: ICD-10-CM

## 2023-04-19 DIAGNOSIS — I48.11 LONGSTANDING PERSISTENT ATRIAL FIBRILLATION (HCC): ICD-10-CM

## 2023-04-19 LAB
ALBUMIN SERPL BCP-MCNC: 3.9 G/DL (ref 3.2–4.9)
ALBUMIN/GLOB SERPL: 2 G/DL
ALP SERPL-CCNC: 49 U/L (ref 30–99)
ALT SERPL-CCNC: 23 U/L (ref 2–50)
ANION GAP SERPL CALC-SCNC: 9 MMOL/L (ref 7–16)
AST SERPL-CCNC: 14 U/L (ref 12–45)
BILIRUB SERPL-MCNC: 0.8 MG/DL (ref 0.1–1.5)
BUN SERPL-MCNC: 9 MG/DL (ref 8–22)
CALCIUM ALBUM COR SERPL-MCNC: 9.1 MG/DL (ref 8.5–10.5)
CALCIUM SERPL-MCNC: 9 MG/DL (ref 8.5–10.5)
CHLORIDE SERPL-SCNC: 105 MMOL/L (ref 96–112)
CO2 SERPL-SCNC: 26 MMOL/L (ref 20–33)
CREAT SERPL-MCNC: 0.56 MG/DL (ref 0.5–1.4)
ERYTHROCYTE [DISTWIDTH] IN BLOOD BY AUTOMATED COUNT: 48.1 FL (ref 35.9–50)
EST. AVERAGE GLUCOSE BLD GHB EST-MCNC: 252 MG/DL
GFR SERPLBLD CREATININE-BSD FMLA CKD-EPI: 91 ML/MIN/1.73 M 2
GLOBULIN SER CALC-MCNC: 2 G/DL (ref 1.9–3.5)
GLUCOSE SERPL-MCNC: 284 MG/DL (ref 65–99)
HBA1C MFR BLD: 10.4 % (ref 4–5.6)
HCT VFR BLD AUTO: 40.8 % (ref 37–47)
HGB BLD-MCNC: 13 G/DL (ref 12–16)
MCH RBC QN AUTO: 29.5 PG (ref 27–33)
MCHC RBC AUTO-ENTMCNC: 31.9 G/DL (ref 33.6–35)
MCV RBC AUTO: 92.7 FL (ref 81.4–97.8)
PLATELET # BLD AUTO: 185 K/UL (ref 164–446)
PMV BLD AUTO: 10.2 FL (ref 9–12.9)
POTASSIUM SERPL-SCNC: 3.9 MMOL/L (ref 3.6–5.5)
PROT SERPL-MCNC: 5.9 G/DL (ref 6–8.2)
RBC # BLD AUTO: 4.4 M/UL (ref 4.2–5.4)
SODIUM SERPL-SCNC: 140 MMOL/L (ref 135–145)
WBC # BLD AUTO: 4.6 K/UL (ref 4.8–10.8)

## 2023-04-19 PROCEDURE — 80053 COMPREHEN METABOLIC PANEL: CPT

## 2023-04-19 PROCEDURE — 83036 HEMOGLOBIN GLYCOSYLATED A1C: CPT

## 2023-04-19 PROCEDURE — 36415 COLL VENOUS BLD VENIPUNCTURE: CPT

## 2023-04-19 PROCEDURE — 85027 COMPLETE CBC AUTOMATED: CPT

## 2023-04-25 ENCOUNTER — TELEPHONE (OUTPATIENT)
Dept: MEDICAL GROUP | Facility: LAB | Age: 83
End: 2023-04-25
Payer: MEDICARE

## 2023-04-25 NOTE — TELEPHONE ENCOUNTER
ESTABLISHED PATIENT PRE-VISIT PLANNING     Patient was NOT contacted to complete PVP.     Note: Patient will not be contacted if there is no indication to call.     1.  Reviewed notes from the last few office visits within the medical group: Yes    2.  If any orders were placed at last visit or intended to be done for this visit (i.e. 6 mos follow-up), do we have Results/Consult Notes?           Labs - Labs were not ordered at last office visit.  Note: If patient appointment is for lab review and patient did not complete labs, check with provider if OK to reschedule patient until labs completed.         Imaging - Imaging was not ordered at last office visit.         Referrals - No referrals were ordered at last office visit.    3. Is this appointment scheduled as a Hospital Follow-Up? No    4.  Immunizations were updated in Epic using Reconcile Outside Information activity? Yes    5.  Patient is due for the following Health Maintenance Topics:   Health Maintenance Due   Topic Date Due    Annual Wellness Visit  09/26/2019    IMM HEP B VACCINE (2 of 3 - 19+ 3-dose series) 09/23/2020    IMM ZOSTER VACCINES (3 of 3) 10/21/2020    COVID-19 Vaccine (4 - Booster for Pfizer series) 12/10/2021    BONE DENSITY  11/10/2022    FASTING LIPID PROFILE  04/06/2023    URINE ACR / MICROALBUMIN  04/06/2023    DIABETES MONOFILAMENT / LE EXAM  04/08/2023   6.  AHA (Pulse8) form printed for Provider? N/A

## 2023-04-26 ENCOUNTER — OFFICE VISIT (OUTPATIENT)
Dept: CARDIOLOGY | Facility: MEDICAL CENTER | Age: 83
End: 2023-04-26
Attending: HOSPITALIST
Payer: MEDICARE

## 2023-04-26 VITALS
HEART RATE: 82 BPM | WEIGHT: 164 LBS | SYSTOLIC BLOOD PRESSURE: 102 MMHG | RESPIRATION RATE: 102 BRPM | DIASTOLIC BLOOD PRESSURE: 68 MMHG | OXYGEN SATURATION: 97 % | BODY MASS INDEX: 25.74 KG/M2 | HEIGHT: 67 IN

## 2023-04-26 DIAGNOSIS — Z79.01 CHRONIC ANTICOAGULATION: ICD-10-CM

## 2023-04-26 DIAGNOSIS — I50.20 HFREF (HEART FAILURE WITH REDUCED EJECTION FRACTION) (HCC): ICD-10-CM

## 2023-04-26 DIAGNOSIS — E11.9 TYPE 2 DIABETES MELLITUS WITHOUT COMPLICATION, WITH LONG-TERM CURRENT USE OF INSULIN (HCC): ICD-10-CM

## 2023-04-26 DIAGNOSIS — Z79.4 TYPE 2 DIABETES MELLITUS WITHOUT COMPLICATION, WITH LONG-TERM CURRENT USE OF INSULIN (HCC): ICD-10-CM

## 2023-04-26 DIAGNOSIS — I50.9 ACC/AHA STAGE B HEART FAILURE (HCC): ICD-10-CM

## 2023-04-26 DIAGNOSIS — I50.22 CHRONIC SYSTOLIC CONGESTIVE HEART FAILURE, NYHA CLASS 2 (HCC): ICD-10-CM

## 2023-04-26 DIAGNOSIS — I20.9 ANGINA PECTORIS, UNSPECIFIED (HCC): ICD-10-CM

## 2023-04-26 DIAGNOSIS — E78.5 DYSLIPIDEMIA: ICD-10-CM

## 2023-04-26 DIAGNOSIS — I48.19 PERSISTENT ATRIAL FIBRILLATION (HCC): ICD-10-CM

## 2023-04-26 LAB — EKG IMPRESSION: NORMAL

## 2023-04-26 PROCEDURE — 93000 ELECTROCARDIOGRAM COMPLETE: CPT | Performed by: STUDENT IN AN ORGANIZED HEALTH CARE EDUCATION/TRAINING PROGRAM

## 2023-04-26 PROCEDURE — 99215 OFFICE O/P EST HI 40 MIN: CPT | Performed by: STUDENT IN AN ORGANIZED HEALTH CARE EDUCATION/TRAINING PROGRAM

## 2023-04-26 RX ORDER — METOPROLOL SUCCINATE 50 MG/1
75 TABLET, EXTENDED RELEASE ORAL 2 TIMES DAILY
Qty: 270 TABLET | Refills: 3 | Status: SHIPPED
Start: 2023-04-26 | End: 2023-05-04

## 2023-04-26 RX ORDER — METOPROLOL SUCCINATE 50 MG/1
75 TABLET, EXTENDED RELEASE ORAL DAILY
Qty: 135 TABLET | Refills: 3 | Status: SHIPPED
Start: 2023-04-26 | End: 2023-04-26

## 2023-04-26 RX ORDER — INSULIN GLARGINE-YFGN 100 [IU]/ML
INJECTION, SOLUTION SUBCUTANEOUS
COMMUNITY
Start: 2023-03-06 | End: 2023-05-10

## 2023-04-26 ASSESSMENT — ENCOUNTER SYMPTOMS
COUGH: 0
ABDOMINAL PAIN: 0
SHORTNESS OF BREATH: 0
VOMITING: 0
DIZZINESS: 0
IRREGULAR HEARTBEAT: 0
PND: 0
FOCAL WEAKNESS: 0
FEVER: 0
NIGHT SWEATS: 0
PALPITATIONS: 0
WEAKNESS: 0
NAUSEA: 0
DYSPNEA ON EXERTION: 0
DIARRHEA: 0
ORTHOPNEA: 0
WHEEZING: 0
SYNCOPE: 0
NEAR-SYNCOPE: 0

## 2023-04-26 ASSESSMENT — FIBROSIS 4 INDEX: FIB4 SCORE: 1.28

## 2023-04-26 NOTE — PROGRESS NOTES
Cardiology Follow-up Consultation Note    Date of note:    4/26/2023    Primary Care Provider: Waylon Webber D.O.    Patient Name: Mavis Lizarraga   YOB: 1940  MRN:              [id number]    Chief Complaint: A-fib and HFrEF    History of Present Illness: Ms. Mavis Lizarraga is a 82 y.o. female whose current medical problems include A-fib on anticoagulation, HFrEF, diabetes, and PMR who is here for cardiac consultation for A-fib and HFrEF.    The patient was recently hospitalized 2/28-3/2/2023 for tachycardia, found to be in A-fib RVR. During the hospitalization, the patient was also found to have depressed LVEF 40%. She was evaluated by cardiology inpatient by Dr. Pendleton.  She was started on Eliquis and metoprolol.  She was discharged to follow-up in cardiology clinic.      The patient presents today for follow-up.  The patient presents with her .  The patient reports feeling okay today.  She had been stressed prior to being hospitalized.  Her daughter passed away last month from stage IV cancer.  She denies any heart failure symptoms such as orthopnea, PND, or leg swelling.  She denies any shortness of breath.  She does report occasional chest pain with exertion.  She is not sure if she has palpitations.  No syncope or presyncopal episodes.      Cardiovascular Risk Factors:  1. Smoking status: Never smoker  2. Type II Diabetes Mellitus: On medication  Lab Results   Component Value Date/Time    HBA1C 10.4 (H) 04/19/2023 07:19 AM    HBA1C 11.2 (H) 02/28/2023 04:42 PM     3. Hypertension: None  4. Dyslipidemia: On statin  Cholesterol,Tot   Date Value Ref Range Status   04/06/2022 158 100 - 199 mg/dL Final     LDL   Date Value Ref Range Status   04/06/2022 78 <100 mg/dL Final     HDL   Date Value Ref Range Status   04/06/2022 53 >=40 mg/dL Final     Triglycerides   Date Value Ref Range Status   04/06/2022 137 0 - 149 mg/dL Final     5. Family history of early Coronary Artery Disease  in a first degree relative (Male less than 55 years of age; Female less than 65 years of age): Maternal grandmother and mother with MI  6.  Obesity and/or Metabolic Syndrome: Body mass index is 25.69 kg/m².  7. Sedentary lifestyle: Not sedentary    Review of Systems   Constitutional: Negative for fever, malaise/fatigue and night sweats.   Cardiovascular:  Negative for chest pain, dyspnea on exertion, irregular heartbeat, leg swelling, near-syncope, orthopnea, palpitations, paroxysmal nocturnal dyspnea and syncope.   Respiratory:  Negative for cough, shortness of breath and wheezing.    Gastrointestinal:  Negative for abdominal pain, diarrhea, nausea and vomiting.   Neurological:  Negative for dizziness, focal weakness and weakness.       All other systems reviewed and are negative.     Current Outpatient Medications   Medication Sig Dispense Refill    SEMGLEE, YFGN, 100 UNIT/ML Solution Pen-injector       POTASSIUM PO Take  by mouth every day.      metoprolol SR (TOPROL XL) 50 MG TABLET SR 24 HR Take 1.5 Tablets by mouth 2 times a day. 270 Tablet 3    rosuvastatin (CRESTOR) 40 MG tablet TAKE ONE TABLET BY MOUTH DAILY 100 Tablet 3    apixaban (ELIQUIS) 5mg Tab Take 1 Tablet by mouth 2 times a day. 180 Tablet 1    predniSONE (DELTASONE) 5 MG Tab Take 1 Tablet by mouth every day. 30 Tablet 2    insulin glargine 100 UNIT/ML SC SOPN injection Inject 12 Units under the skin every evening. Semglee insulin (Patient taking differently: Inject 12 Units under the skin every morning. Semglee insulin) 3 mL 5    Insulin Pen Needle 32 G x 4 mm Use one pen needle in pen device to inject insulin once daily. 100 Each 0    omeprazole (PRILOSEC) 20 MG delayed-release capsule Take 1 Capsule by mouth every day. (Patient taking differently: Take 20 mg by mouth 1 time a day as needed. Indications: Heartburn) 30 Capsule 3    cyclobenzaprine (FLEXERIL) 10 mg Tab TAKE ONE TABLET BY MOUTH THREE TIMES A DAY AS NEEDED FOR MILD PAIN (FLEXERIL) 30  "Tablet 3    levothyroxine (SYNTHROID) 75 MCG Tab TAKE ONE TABLET BY MOUTH EVERY MORNING ON AN EMPTY STOMACH 90 Tablet 3    Cholecalciferol (VITAMIN D) 2000 UNIT Tab Take 2,000 Units by mouth every day.      Ascorbic Acid (VITAMIN C) 1000 MG Tab Take 1,000 mg by mouth every morning.      Cyanocobalamin (VITAMIN B-12 PO) Take 1 tablet by mouth every morning.      CINNAMON PO Take 1 tablet by mouth every morning.      Multiple Vitamins-Minerals (ICAPS AREDS FORMULA PO) Take 1 tablet by mouth every morning.      Omega-3 Fatty Acids (FISH OIL PO) Take 1 Cap by mouth every day.       No current facility-administered medications for this visit.         Allergies   Allergen Reactions    Metformin Diarrhea and Nausea     NAUSEA VOMIITG AND DIARRHEA    Morphine Unspecified     Hallucinations     Physical Exam:  Ambulatory Vitals  /68 (BP Location: Left arm, Patient Position: Sitting, BP Cuff Size: Adult)   Pulse 82   Resp (!) 102   Ht 1.702 m (5' 7\")   Wt 74.4 kg (164 lb)   SpO2 97%    Oxygen Therapy:  Pulse Oximetry: 97 %  BP Readings from Last 4 Encounters:   04/26/23 102/68   03/17/23 116/80   03/02/23 99/67   02/28/23 125/85       Weight/BMI: Body mass index is 25.69 kg/m².  Wt Readings from Last 4 Encounters:   04/26/23 74.4 kg (164 lb)   03/17/23 76.7 kg (169 lb)   03/02/23 77.7 kg (171 lb 4.8 oz)   02/28/23 73.5 kg (162 lb)       General: Well appearing and in no apparent distress  Eyes: nl conjunctiva, no icteric sclera  ENT: wearing a mask, normal external appearance of ears  Neck: no visible JVP,  no carotid bruits  Lungs: normal respiratory effort, CTAB  Heart: Irregular rhythm, tachycardia rate,no murmurs, no rubs or gallops,  no edema bilateral lower extremities. No LV/RV heave on cardiac palpatation. + bilateral radial pulses.  + bilateral dp pulses.   Abdomen: soft, non tender, non distended, no masses, normal bowel sounds.  No HSM.  Extremities/MSK: no clubbing, no cyanosis  Neurological: No focal " sensory deficits  Psychiatric: Appropriate affect, A/O x 3, intact judgement and insight  Skin: Warm extremities      Lab Data Review:  Lab Results   Component Value Date/Time    CHOLSTRLTOT 158 04/06/2022 06:19 AM    LDL 78 04/06/2022 06:19 AM    HDL 53 04/06/2022 06:19 AM    TRIGLYCERIDE 137 04/06/2022 06:19 AM       Lab Results   Component Value Date/Time    SODIUM 140 04/19/2023 07:19 AM    POTASSIUM 3.9 04/19/2023 07:19 AM    CHLORIDE 105 04/19/2023 07:19 AM    CO2 26 04/19/2023 07:19 AM    GLUCOSE 284 (H) 04/19/2023 07:19 AM    BUN 9 04/19/2023 07:19 AM    CREATININE 0.56 04/19/2023 07:19 AM     Lab Results   Component Value Date/Time    ALKPHOSPHAT 49 04/19/2023 07:19 AM    ASTSGOT 14 04/19/2023 07:19 AM    ALTSGPT 23 04/19/2023 07:19 AM    TBILIRUBIN 0.8 04/19/2023 07:19 AM      Lab Results   Component Value Date/Time    WBC 4.6 (L) 04/19/2023 07:19 AM     Lab Results   Component Value Date/Time    HBA1C 10.4 (H) 04/19/2023 07:19 AM    HBA1C 11.2 (H) 02/28/2023 04:42 PM         Cardiac Imaging and Procedures Review:    EKG dated 4/26/2023: My personal interpretation is atrial fibrillation, rate 106, PVC, low voltage in precordial leads    Echo dated 3/2/2023:   CONCLUSIONS  No prior study is available for comparison.   Mildly reduced left ventricular systolic function.  The left ventricular ejection fraction is visually estimated to be 40%.  Estimated right ventricular systolic pressure is 30 mmHg.      Assessment & Plan     1. Persistent atrial fibrillation (HCC)  EKG    [id number] PET W/CT ATTENUATION    metoprolol SR (TOPROL XL) 50 MG TABLET SR 24 HR      2. HFrEF (heart failure with reduced ejection fraction) ([facility])  EKG    [id number] PET W/CT ATTENUATION    metoprolol SR (TOPROL XL) 50 MG TABLET SR 24 HR    DISCONTINUED: metoprolol SR (TOPROL XL) 50 MG TABLET SR 24 HR      3. Angina pectoris, unspecified ([facility])  EKG    [id number] PET W/CT ATTENUATION      4. Chronic systolic congestive  heart failure, NYHA class 2 (HCC)  EKG    [id number] PET W/CT ATTENUATION    metoprolol SR (TOPROL XL) 50 MG TABLET SR 24 HR      5. ACC/AHA stage B heart failure (HCC)  EKG    [id number] PET W/CT ATTENUATION    metoprolol SR (TOPROL XL) 50 MG TABLET SR 24 HR      6. Dyslipidemia        7. Type 2 diabetes mellitus without complication, with long-term current use of insulin (HCC)        8. Chronic anticoagulation              Shared Medical Decision Making:    HFrEF  NYHA class II stage B heart failure  Chest pain  Euvolemic on exam without diuretics.  Etiology of HFrEF unclear.  LVEF is 40% with ejuq-en-bezx variation, likely secondary to A-fib RVR.  -Obtain cardiac PET to assess for any ischemia or prior MI given new onset HFrEF as well as chest pain symptoms concerning for angina  -Change from Lopressor to Toprol XL for HFrEF  -BP low for initiation of other GDMT  -Consider initiating Farxiga next visit  -LVEF is above 40%, no indication for ICD    Persistent A-fib  On chronic anticoagulation  [id number] at least 5 (age x2, female, CHF, diabetes).  Currently rate controlled  -Continue Eliquis 5 mg twice daily  -Change from Lopressor to Toprol XL as above for rate control  -Discussed options for treatment such as MICHAEL/DCCV and rate control.  Given symptoms, I think it is reasonable to pursue rhythm control.  The patient is interested.  However, she is undergoing a lot of stress and changes in her life (lost her daughter last month to cancer), and would like to think over, which I think is reasonable.  We will follow-up in a month to discuss further options.  Continue rate control for now    Dyslipidemia  Diabetes  -Continue rosuvastatin 40 mg daily    A total of 43 minutes of time was spent on day of encounter reviewing medical record, performing history and examination, counseling, ordering medication/test/consults and documentation.    All of the patient's excellent questions were answered to the  best of my knowledge and to her satisfaction.  It was a pleasure seeing Ms. Mavis Lizarraga in my clinic today. Return in about 4 weeks (around 5/24/2023). Patient is aware to call the cardiology clinic with any questions or concerns.      Deepak Oh MD  [facility], [address]  [address] [address]  Phone: [phone]  Fax: [phone]

## 2023-04-27 ENCOUNTER — ANTICOAGULATION VISIT (OUTPATIENT)
Dept: MEDICAL GROUP | Facility: MEDICAL CENTER | Age: 83
End: 2023-04-27
Payer: MEDICARE

## 2023-04-27 VITALS — DIASTOLIC BLOOD PRESSURE: 57 MMHG | SYSTOLIC BLOOD PRESSURE: 117 MMHG | HEART RATE: 120 BPM

## 2023-04-27 DIAGNOSIS — O22.30 DVT (DEEP VEIN THROMBOSIS) IN PREGNANCY: ICD-10-CM

## 2023-04-27 DIAGNOSIS — I48.91 ATRIAL FIBRILLATION WITH RVR (HCC): ICD-10-CM

## 2023-04-27 PROCEDURE — 99211 OFF/OP EST MAY X REQ PHY/QHP: CPT | Performed by: NURSE PRACTITIONER

## 2023-04-27 NOTE — PROGRESS NOTES
Target end date: Indefinite     Indication: Afib, Hx of DVT     Drug: Eliquis 5 mg BID     CHADsVASC = 6 (Age, sex, hx of VTE, DM)    Health Status Since Last Assessment   Patient denies any new relevant medical problems, ED visits or hospitalizations   Patient denies any embolic events (stroke/tia/systemic embolism)    Adherence with DOAC Therapy   Pt has not missed any doses in the average week    Bleeding Risk Assessment     Denies Epistaxis   Pt denies any excessive or unusual bleeding/hematomas.  Pt denies any GI bleeds or hematemesis.  Pt denies any concerning daily headache or sub dural hematoma symptoms.     Pt denies any hematuria    Latest Hemoglobin 13   ETOH overuse - Denies     Creatinine Clearance/Renal Function     Latest CrCl ~ 93 mL/min    Hepatic function   Latest LFTs WNL   Pt denies any history of liver dysfunction      Drug Interactions   Platelets: 185   ASA/other antiplatelets - None   NSAID - Avoids   Other drug interactions - No clinically significant DDI noted   x Verified no anticonvulsant or azole therapy, education provided for future use.     Examination   Blood Pressure - Recorded in vitals. Pt tachycardic - possibly in Afib, but completely asymptomatic and working w/ cardiology regarding this. Recommended pt go to the ED, but pt opts to CTM and present to the ED should she become symptomatic.   Symptomatic hypotension - Denies   Significant gait impairment/imbalance/high risk for falls? No obvious impairments    Final Assessment and Recommendations:   Patient appears stable from the anticoagulation standpoint.     Benefits of continued DOAC therapy outweigh risks for this patient   Recommend pt continue with current DOAC therapy - Eliquis 5 mg BID   DOAC is affordable     Other Actions: cmp/ cbc hemogram ordered prior to next visit    Follow up:   Will follow up with patient 3 months.     Fausto Harris, PharmD, BCACP

## 2023-04-28 ENCOUNTER — PATIENT MESSAGE (OUTPATIENT)
Dept: MEDICAL GROUP | Facility: LAB | Age: 83
End: 2023-04-28
Payer: MEDICARE

## 2023-05-04 ENCOUNTER — APPOINTMENT (OUTPATIENT)
Dept: MEDICAL GROUP | Facility: LAB | Age: 83
End: 2023-05-04
Payer: MEDICARE

## 2023-05-04 ENCOUNTER — OFFICE VISIT (OUTPATIENT)
Dept: MEDICAL GROUP | Facility: LAB | Age: 83
End: 2023-05-04
Payer: MEDICARE

## 2023-05-04 VITALS
WEIGHT: 165 LBS | HEIGHT: 67 IN | TEMPERATURE: 96.6 F | HEART RATE: 102 BPM | OXYGEN SATURATION: 96 % | BODY MASS INDEX: 25.9 KG/M2 | RESPIRATION RATE: 12 BRPM | DIASTOLIC BLOOD PRESSURE: 58 MMHG | SYSTOLIC BLOOD PRESSURE: 114 MMHG

## 2023-05-04 DIAGNOSIS — Z63.4 GRIEF AT LOSS OF CHILD: ICD-10-CM

## 2023-05-04 DIAGNOSIS — F43.21 GRIEF AT LOSS OF CHILD: ICD-10-CM

## 2023-05-04 DIAGNOSIS — I48.91 ATRIAL FIBRILLATION WITH RVR (HCC): ICD-10-CM

## 2023-05-04 DIAGNOSIS — E11.65 UNCONTROLLED TYPE 2 DIABETES MELLITUS WITH HYPERGLYCEMIA (HCC): ICD-10-CM

## 2023-05-04 DIAGNOSIS — R07.89 CHEST HEAVINESS: ICD-10-CM

## 2023-05-04 PROCEDURE — 99214 OFFICE O/P EST MOD 30 MIN: CPT | Performed by: INTERNAL MEDICINE

## 2023-05-04 RX ORDER — METOPROLOL SUCCINATE 50 MG/1
50 TABLET, EXTENDED RELEASE ORAL DAILY
Qty: 90 TABLET | Refills: 3 | Status: SHIPPED | OUTPATIENT
Start: 2023-05-04 | End: 2023-05-11

## 2023-05-04 RX ORDER — NITROGLYCERIN 0.4 MG/1
0.4 TABLET SUBLINGUAL PRN
Qty: 25 TABLET | Refills: 3 | Status: SHIPPED | OUTPATIENT
Start: 2023-05-04

## 2023-05-04 SDOH — SOCIAL STABILITY - SOCIAL INSECURITY: DISSAPEARANCE AND DEATH OF FAMILY MEMBER: Z63.4

## 2023-05-04 ASSESSMENT — FIBROSIS 4 INDEX: FIB4 SCORE: 1.28

## 2023-05-04 NOTE — PROGRESS NOTES
CC: Mavis Lizarraga is a 82 y.o. female is suffering from   Chief Complaint   Patient presents with    Irregular Heart Beat     6 weeks follow up         SUBJECTIVE:  1. Atrial fibrillation with RVR (HCC)  Mavis is here for follow-up, we have discussed that she needs to continue on Metroprolol (Toprol XL) 50 mg new prescription was sent to the pharmacy.  Patient is to have Persantine thallium study completed tomorrow    2. Uncontrolled type 2 diabetes mellitus with hyperglycemia (HCC)  As discussed #1 above continue with insulin, increased from 12 to 14 units    3. Chest heaviness  Query possible problems with angina start nitroglycerin    4. Grief at loss of child  Grief of the loss of her daughter, expected death secondary to COPD        Past social, family, history: Significant other Rich, supportive  Social History     Tobacco Use    Smoking status: Never    Smokeless tobacco: Never   Vaping Use    Vaping Use: Never used   Substance Use Topics    Alcohol use: Yes     Comment: very rarely on ocassion will have a Sravani    Drug use: No         MEDICATIONS:    Current Outpatient Medications:     metoprolol SR (TOPROL XL) 50 MG TABLET SR 24 HR, Take 1 Tablet by mouth every day., Disp: 90 Tablet, Rfl: 3    insulin glargine 100 UNIT/ML SC SOPN injection, Inject 15 Units under the skin every evening. Semglee insulin, Disp: 3 mL, Rfl: 5    nitroglycerin (NITROSTAT) 0.4 MG SL Tab, Place 1 Tablet under the tongue as needed for Chest Pain. Q 5 min x 3 then 911., Disp: 25 Tablet, Rfl: 3    SEMGLEE, YFGN, 100 UNIT/ML Solution Pen-injector, , Disp: , Rfl:     POTASSIUM PO, Take  by mouth every day., Disp: , Rfl:     rosuvastatin (CRESTOR) 40 MG tablet, TAKE ONE TABLET BY MOUTH DAILY, Disp: 100 Tablet, Rfl: 3    apixaban (ELIQUIS) 5mg Tab, Take 1 Tablet by mouth 2 times a day., Disp: 180 Tablet, Rfl: 1    predniSONE (DELTASONE) 5 MG Tab, Take 1 Tablet by mouth every day., Disp: 30 Tablet, Rfl: 2    omeprazole (PRILOSEC)  20 MG delayed-release capsule, Take 1 Capsule by mouth every day. (Patient taking differently: Take 20 mg by mouth 1 time a day as needed. Indications: Heartburn), Disp: 30 Capsule, Rfl: 3    cyclobenzaprine (FLEXERIL) 10 mg Tab, TAKE ONE TABLET BY MOUTH THREE TIMES A DAY AS NEEDED FOR MILD PAIN (FLEXERIL), Disp: 30 Tablet, Rfl: 3    levothyroxine (SYNTHROID) 75 MCG Tab, TAKE ONE TABLET BY MOUTH EVERY MORNING ON AN EMPTY STOMACH, Disp: 90 Tablet, Rfl: 3    Cholecalciferol (VITAMIN D) 2000 UNIT Tab, Take 2,000 Units by mouth every day., Disp: , Rfl:     Ascorbic Acid (VITAMIN C) 1000 MG Tab, Take 1,000 mg by mouth every morning., Disp: , Rfl:     Cyanocobalamin (VITAMIN B-12 PO), Take 1 tablet by mouth every morning., Disp: , Rfl:     CINNAMON PO, Take 1 tablet by mouth every morning., Disp: , Rfl:     Multiple Vitamins-Minerals (ICAPS AREDS FORMULA PO), Take 1 tablet by mouth every morning., Disp: , Rfl:     Omega-3 Fatty Acids (FISH OIL PO), Take 1 Cap by mouth every day., Disp: , Rfl:     Insulin Pen Needle 32 G x 4 mm, Use one pen needle in pen device to inject insulin once daily., Disp: 100 Each, Rfl: 0    PROBLEMS:  Patient Active Problem List    Diagnosis Date Noted    HFrEF (heart failure with reduced ejection fraction) ([facility]) 04/26/2023    Angina pectoris, unspecified ([facility]) 04/26/2023    DVT (deep vein thrombosis) in pregnancy 03/01/2023    Hypokalemia 03/01/2023    Atrial fibrillation with RVR ([facility]) 02/28/2023    Polymyalgia rheumatica ([facility]) 02/28/2023    Vitamin D deficiency 04/12/2021    Cervical spinal stenosis 04/11/2021    Pubic ramus fracture ([facility]) 04/09/2021    History of breast cancer     Spasm of back muscles 03/27/2019    Uncontrolled type 2 diabetes mellitus with hyperglycemia ([facility]) 01/28/2016    On statin therapy due to risk of future cardiovascular event 01/15/2013    Acquired hypothyroidism 01/15/2013    Gastroesophageal reflux disease with esophagitis 01/15/2013       REVIEW OF SYSTEMS:  Gen.:  " No Nausea, Vomiting, fever, Chills.  Heart: No chest pain.  Lungs:  No shortness of Breath.  Psychological: Rajan unusual Anxiety depression     PHYSICAL EXAM   Constitutional: Alert, cooperative, not in acute distress.  Cardiovascular:  Rate Rhythm is regular without murmurs rubs clicks.     Thorax & Lungs: Clear to auscultation, no wheezing, rhonchi, or rales  HENT: Normocephalic, Atraumatic.  Eyes: PERRLA, EOMI, Conjunctiva normal.   Neck: Trachia is midline no swelling of the thyroid.   Lymphatic: No lymphadenopathy noted.   Neurologic: Alert & oriented x 3, cranial nerves II through XII are intact, Normal motor function, Normal sensory function, No focal deficits noted.   Psychiatric: Affect normal, Judgment normal, Mood normal.     VITAL SIGNS:/58   Pulse (!) 102 Comment: irregular heartbeat  Temp 35.9 °C (96.6 °F) (Temporal)   Resp 12   Ht 1.702 m (5' 7\")   Wt 74.8 kg (165 lb)   LMP  (LMP Unknown)   SpO2 96%   BMI 25.84 kg/m²     Labs: Reviewed    Assessment:                                                     Plan:    1. Atrial fibrillation with RVR (HCC)  Atrial fibrillation with rapid ventricular response clinically unchanged continue Metroprolol extended release 50 mg    2. Uncontrolled type 2 diabetes mellitus with hyperglycemia (HCC)  Increase glargine insulin from 12 to 15 units  - insulin glargine 100 UNIT/ML SC SOPN injection; Inject 15 Units under the skin every evening. Semglee insulin  Dispense: 3 mL; Refill: 5  - nitroglycerin (NITROSTAT) 0.4 MG SL Tab; Place 1 Tablet under the tongue as needed for Chest Pain. Q 5 min x 3 then 911.  Dispense: 25 Tablet; Refill: 3    3. Chest heaviness  Nitroglycerin prescription written  - nitroglycerin (NITROSTAT) 0.4 MG SL Tab; Place 1 Tablet under the tongue as needed for Chest Pain. Q 5 min x 3 then 911.  Dispense: 25 Tablet; Refill: 3    4. Grief at loss of child  Grief associated loss of her daughter from COPD          "

## 2023-05-05 ENCOUNTER — HOSPITAL ENCOUNTER (OUTPATIENT)
Dept: RADIOLOGY | Facility: MEDICAL CENTER | Age: 83
End: 2023-05-05
Attending: STUDENT IN AN ORGANIZED HEALTH CARE EDUCATION/TRAINING PROGRAM
Payer: MEDICARE

## 2023-05-05 DIAGNOSIS — I48.19 PERSISTENT ATRIAL FIBRILLATION (HCC): ICD-10-CM

## 2023-05-05 DIAGNOSIS — I50.9 ACC/AHA STAGE B HEART FAILURE (HCC): ICD-10-CM

## 2023-05-05 DIAGNOSIS — I20.9 ANGINA PECTORIS, UNSPECIFIED (HCC): ICD-10-CM

## 2023-05-05 DIAGNOSIS — I50.20 HFREF (HEART FAILURE WITH REDUCED EJECTION FRACTION) (HCC): ICD-10-CM

## 2023-05-05 DIAGNOSIS — I50.22 CHRONIC SYSTOLIC CONGESTIVE HEART FAILURE, NYHA CLASS 2 (HCC): ICD-10-CM

## 2023-05-05 PROCEDURE — 78431 MYOCRD IMG PET RST&STRS CT: CPT

## 2023-05-08 ENCOUNTER — PATIENT MESSAGE (OUTPATIENT)
Dept: MEDICAL GROUP | Facility: LAB | Age: 83
End: 2023-05-08
Payer: MEDICARE

## 2023-05-08 DIAGNOSIS — E11.65 UNCONTROLLED TYPE 2 DIABETES MELLITUS WITH HYPERGLYCEMIA (HCC): ICD-10-CM

## 2023-05-08 PROCEDURE — 93018 CV STRESS TEST I&R ONLY: CPT | Performed by: STUDENT IN AN ORGANIZED HEALTH CARE EDUCATION/TRAINING PROGRAM

## 2023-05-08 PROCEDURE — 78431 MYOCRD IMG PET RST&STRS CT: CPT | Mod: 26 | Performed by: STUDENT IN AN ORGANIZED HEALTH CARE EDUCATION/TRAINING PROGRAM

## 2023-05-09 ENCOUNTER — TELEPHONE (OUTPATIENT)
Dept: HEALTH INFORMATION MANAGEMENT | Facility: OTHER | Age: 83
End: 2023-05-09
Payer: MEDICARE

## 2023-05-10 RX ORDER — INSULIN GLARGINE-YFGN 100 [IU]/ML
INJECTION, SOLUTION SUBCUTANEOUS
Qty: 15 ML | Refills: 3 | Status: SHIPPED
Start: 2023-05-10 | End: 2023-09-21

## 2023-05-11 ENCOUNTER — TELEPHONE (OUTPATIENT)
Dept: MEDICAL GROUP | Facility: LAB | Age: 83
End: 2023-05-11
Payer: MEDICARE

## 2023-05-11 NOTE — TELEPHONE ENCOUNTER
ESTABLISHED PATIENT PRE-VISIT PLANNING     Patient was NOT contacted to complete PVP.     Note: Patient will not be contacted if there is no indication to call.     1.  Reviewed notes from the last few office visits within the medical group: Yes    2.  If any orders were placed at last visit or intended to be done for this visit (i.e. 6 mos follow-up), do we have Results/Consult Notes?           Labs - Labs ordered, but not to be completed until unclear.  Note: If patient appointment is for lab review and patient did not complete labs, check with provider if OK to reschedule patient until labs completed.         Imaging - Imaging was not ordered at last office visit.         Referrals - No referrals were ordered at last office visit.    3. Is this appointment scheduled as a Hospital Follow-Up? No    4.  Immunizations were updated in Epic using Reconcile Outside Information activity? Yes    5.  Patient is due for the following Health Maintenance Topics:   Health Maintenance Due   Topic Date Due    Annual Wellness Visit  09/26/2019    IMM HEP B VACCINE (2 of 3 - 19+ 3-dose series) 09/23/2020    IMM ZOSTER VACCINES (3 of 3) 10/21/2020    COVID-19 Vaccine (4 - Booster for Pfizer series) 12/10/2021    BONE DENSITY  11/10/2022    FASTING LIPID PROFILE  04/06/2023    URINE ACR / MICROALBUMIN  04/06/2023    DIABETES MONOFILAMENT / LE EXAM  04/08/2023     6.  AHA (Pulse[employer]) form printed for Provider? N/A

## 2023-05-11 NOTE — TELEPHONE ENCOUNTER
There are two different directions for pt's Metoprolol.  1 daily and 1 1/2 daily.  She is currently taking the 1 1/2 per day.  Please let her know the dose she's supposed to be on.

## 2023-05-16 DIAGNOSIS — I50.20 HFREF (HEART FAILURE WITH REDUCED EJECTION FRACTION) (HCC): ICD-10-CM

## 2023-05-16 RX ORDER — METOPROLOL SUCCINATE 50 MG/1
75 TABLET, EXTENDED RELEASE ORAL DAILY
Qty: 135 TABLET | Refills: 3
Start: 2023-05-16 | End: 2023-06-15 | Stop reason: SDUPTHER

## 2023-05-23 ENCOUNTER — OFFICE VISIT (OUTPATIENT)
Dept: MEDICAL GROUP | Facility: LAB | Age: 83
End: 2023-05-23
Payer: MEDICARE

## 2023-05-23 VITALS
BODY MASS INDEX: 27 KG/M2 | WEIGHT: 168 LBS | SYSTOLIC BLOOD PRESSURE: 104 MMHG | OXYGEN SATURATION: 94 % | TEMPERATURE: 98 F | RESPIRATION RATE: 12 BRPM | HEIGHT: 66 IN | HEART RATE: 104 BPM | DIASTOLIC BLOOD PRESSURE: 60 MMHG

## 2023-05-23 DIAGNOSIS — I48.11 LONGSTANDING PERSISTENT ATRIAL FIBRILLATION (HCC): ICD-10-CM

## 2023-05-23 DIAGNOSIS — E78.2 MIXED HYPERLIPIDEMIA: ICD-10-CM

## 2023-05-23 DIAGNOSIS — E11.65 UNCONTROLLED TYPE 2 DIABETES MELLITUS WITH HYPERGLYCEMIA (HCC): ICD-10-CM

## 2023-05-23 PROCEDURE — 3078F DIAST BP <80 MM HG: CPT | Performed by: INTERNAL MEDICINE

## 2023-05-23 PROCEDURE — 99214 OFFICE O/P EST MOD 30 MIN: CPT | Performed by: INTERNAL MEDICINE

## 2023-05-23 PROCEDURE — 3074F SYST BP LT 130 MM HG: CPT | Performed by: INTERNAL MEDICINE

## 2023-05-23 RX ORDER — LANCETS 30 GAUGE
EACH MISCELLANEOUS
Qty: 100 EACH | Refills: 3 | Status: SHIPPED | OUTPATIENT
Start: 2023-05-23

## 2023-05-23 ASSESSMENT — FIBROSIS 4 INDEX: FIB4 SCORE: 1.28

## 2023-05-24 ENCOUNTER — TELEPHONE (OUTPATIENT)
Dept: MEDICAL GROUP | Facility: LAB | Age: 83
End: 2023-05-24
Payer: MEDICARE

## 2023-05-24 NOTE — PROGRESS NOTES
CC: Mavis Lizarraga is a 82 y.o. female is suffering from   Chief Complaint   Patient presents with    Follow-Up    Hair Loss         SUBJECTIVE:  1. Uncontrolled type 2 diabetes mellitus with hyperglycemia (HCC)  Patient with a history of uncontrolled type 2 diabetes, increase insulin glargine to 18 units    2. Mixed hyperlipidemia- not at goal on zocor 40 mg- switch to crestor 40 mg/d  Stable    3. Longstanding persistent atrial fibrillation (HCC)  Patient is currently anticoagulated          Past social, family, history: Significant other is Rich  Social History     Tobacco Use    Smoking status: Never    Smokeless tobacco: Never   Vaping Use    Vaping Use: Never used   Substance Use Topics    Alcohol use: Yes     Comment: very rarely on ocassion will have a Sravani    Drug use: No         MEDICATIONS:    Current Outpatient Medications:     Insulin Pen Needle 32 G x 4 mm, Use one pen needle in pen device to inject insulin once daily., Disp: 100 Each, Rfl: 0    Lancets, Use one Per formulary preference  lancet to test blood sugar once daily early morning before first meal., Disp: 100 Each, Rfl: 3    Blood Glucose Test Strips, Use one Per formulary preference  strip to test blood sugar once daily early morning before first meal., Disp: 100 Strip, Rfl: 0    insulin glargine 100 UNIT/ML SC SOPN injection, Inject 18 Units under the skin every evening., Disp: 3 mL, Rfl: 3    metoprolol SR (TOPROL XL) 50 MG TABLET SR 24 HR, Take 1.5 Tablets by mouth every day for 360 days., Disp: 135 Tablet, Rfl: 3    nitroglycerin (NITROSTAT) 0.4 MG SL Tab, Place 1 Tablet under the tongue as needed for Chest Pain. Q 5 min x 3 then 911., Disp: 25 Tablet, Rfl: 3    POTASSIUM PO, Take  by mouth every day., Disp: , Rfl:     rosuvastatin (CRESTOR) 40 MG tablet, TAKE ONE TABLET BY MOUTH DAILY, Disp: 100 Tablet, Rfl: 3    apixaban (ELIQUIS) 5mg Tab, Take 1 Tablet by mouth 2 times a day., Disp: 180 Tablet, Rfl: 1    predniSONE (DELTASONE)  5 MG Tab, Take 1 Tablet by mouth every day., Disp: 30 Tablet, Rfl: 2    omeprazole (PRILOSEC) 20 MG delayed-release capsule, Take 1 Capsule by mouth every day. (Patient taking differently: Take 20 mg by mouth 1 time a day as needed. Indications: Heartburn), Disp: 30 Capsule, Rfl: 3    cyclobenzaprine (FLEXERIL) 10 mg Tab, TAKE ONE TABLET BY MOUTH THREE TIMES A DAY AS NEEDED FOR MILD PAIN (FLEXERIL), Disp: 30 Tablet, Rfl: 3    levothyroxine (SYNTHROID) 75 MCG Tab, TAKE ONE TABLET BY MOUTH EVERY MORNING ON AN EMPTY STOMACH, Disp: 90 Tablet, Rfl: 3    Cholecalciferol (VITAMIN D) 2000 UNIT Tab, Take 2,000 Units by mouth every day., Disp: , Rfl:     Ascorbic Acid (VITAMIN C) 1000 MG Tab, Take 1,000 mg by mouth every morning., Disp: , Rfl:     Cyanocobalamin (VITAMIN B-12 PO), Take 1 tablet by mouth every morning., Disp: , Rfl:     CINNAMON PO, Take 1 tablet by mouth every morning., Disp: , Rfl:     Multiple Vitamins-Minerals (ICAPS AREDS FORMULA PO), Take 1 tablet by mouth every morning., Disp: , Rfl:     Omega-3 Fatty Acids (FISH OIL PO), Take 1 Cap by mouth every day., Disp: , Rfl:     SEMGLEE, YFGN, 100 UNIT/ML Solution Pen-injector, INJECT 12 UNITS UNDER THE SKIN EVERY EVENING *INSULIN GLARGINE*., Disp: 15 mL, Rfl: 3    PROBLEMS:  Patient Active Problem List    Diagnosis Date Noted    HFrEF (heart failure with reduced ejection fraction) ([facility]) 04/26/2023    Angina pectoris, unspecified ([facility]) 04/26/2023    DVT (deep vein thrombosis) in pregnancy 03/01/2023    Hypokalemia 03/01/2023    Atrial fibrillation with RVR ([facility]) 02/28/2023    Polymyalgia rheumatica ([facility]) 02/28/2023    Vitamin D deficiency 04/12/2021    Cervical spinal stenosis 04/11/2021    Pubic ramus fracture ([facility]) 04/09/2021    History of breast cancer     Spasm of back muscles 03/27/2019    Uncontrolled type 2 diabetes mellitus with hyperglycemia ([facility]) 01/28/2016    On statin therapy due to risk of future cardiovascular event 01/15/2013    Acquired  "hypothyroidism 01/15/2013    Gastroesophageal reflux disease with esophagitis 01/15/2013       REVIEW OF SYSTEMS:  Gen.:  No Nausea, Vomiting, fever, Chills.  Heart: No chest pain.  Lungs:  No shortness of Breath.  Psychological: Rajan unusual Anxiety depression     PHYSICAL EXAM   Constitutional: Alert, cooperative, not in acute distress.  Cardiovascular: Atrial fibrillation with controlled ventricular response    Thorax & Lungs: Clear to auscultation, no wheezing, rhonchi, or rales  HENT: Normocephalic, Atraumatic.  Eyes: PERRLA, EOMI, Conjunctiva normal.   Neck: Trachia is midline no swelling of the thyroid.   Lymphatic: No lymphadenopathy noted.   Neurologic: Alert & oriented x 3, cranial nerves II through XII are intact, Normal motor function, Normal sensory function, No focal deficits noted.   Psychiatric: Affect normal, Judgment normal, Mood normal.     VITAL SIGNS:/60   Pulse (!) 104   Temp 36.7 °C (98 °F) (Temporal)   Resp 12   Ht 1.676 m (5' 6\")   Wt 76.2 kg (168 lb)   LMP  (LMP Unknown)   SpO2 94%   BMI 27.12 kg/m²     Labs: Reviewed    Assessment:                                                     Plan:    1. Uncontrolled type 2 diabetes mellitus with hyperglycemia (HCC)  Increase insulin from 15 to 18 units  - Insulin Pen Needle 32 G x 4 mm; Use one pen needle in pen device to inject insulin once daily.  Dispense: 100 Each; Refill: 0  - Lancets; Use one Per formulary preference  lancet to test blood sugar once daily early morning before first meal.  Dispense: 100 Each; Refill: 3  - Blood Glucose Test Strips; Use one Per formulary preference  strip to test blood sugar once daily early morning before first meal.  Dispense: 100 Strip; Refill: 0  - HEMOGLOBIN A1C; Future  - insulin glargine 100 UNIT/ML SC SOPN injection; Inject 18 Units under the skin every evening.  Dispense: 3 mL; Refill: 3    2. Mixed hyperlipidemia- not at goal on zocor 40 mg- switch to crestor 40 mg/d  No change in " medical therapy  - Lancets; Use one Per formulary preference  lancet to test blood sugar once daily early morning before first meal.  Dispense: 100 Each; Refill: 3  - Blood Glucose Test Strips; Use one Per formulary preference  strip to test blood sugar once daily early morning before first meal.  Dispense: 100 Strip; Refill: 0  - insulin glargine 100 UNIT/ML SC SOPN injection; Inject 18 Units under the skin every evening.  Dispense: 3 mL; Refill: 3    3. Longstanding persistent atrial fibrillation (HCC)  Clinically stable

## 2023-05-24 NOTE — TELEPHONE ENCOUNTER
Prior auth needed for insulin glargine 100 UNIT/ML SC SOPN injection through [employer]Meds.   Key: [id number]

## 2023-06-15 ENCOUNTER — OFFICE VISIT (OUTPATIENT)
Dept: CARDIOLOGY | Facility: MEDICAL CENTER | Age: 83
End: 2023-06-15
Attending: NURSE PRACTITIONER
Payer: MEDICARE

## 2023-06-15 ENCOUNTER — TELEPHONE (OUTPATIENT)
Dept: CARDIOLOGY | Facility: MEDICAL CENTER | Age: 83
End: 2023-06-15

## 2023-06-15 VITALS
DIASTOLIC BLOOD PRESSURE: 60 MMHG | WEIGHT: 162 LBS | HEIGHT: 67 IN | OXYGEN SATURATION: 96 % | HEART RATE: 100 BPM | RESPIRATION RATE: 16 BRPM | BODY MASS INDEX: 25.43 KG/M2 | SYSTOLIC BLOOD PRESSURE: 90 MMHG

## 2023-06-15 DIAGNOSIS — Z79.01 CHRONIC ANTICOAGULATION: ICD-10-CM

## 2023-06-15 DIAGNOSIS — E11.9 TYPE 2 DIABETES MELLITUS WITHOUT COMPLICATION, WITH LONG-TERM CURRENT USE OF INSULIN (HCC): ICD-10-CM

## 2023-06-15 DIAGNOSIS — D68.69 SECONDARY HYPERCOAGULABLE STATE (HCC): ICD-10-CM

## 2023-06-15 DIAGNOSIS — Z79.4 TYPE 2 DIABETES MELLITUS WITHOUT COMPLICATION, WITH LONG-TERM CURRENT USE OF INSULIN (HCC): ICD-10-CM

## 2023-06-15 DIAGNOSIS — I50.20 HFREF (HEART FAILURE WITH REDUCED EJECTION FRACTION) (HCC): ICD-10-CM

## 2023-06-15 DIAGNOSIS — I50.9 ACC/AHA STAGE B HEART FAILURE (HCC): ICD-10-CM

## 2023-06-15 DIAGNOSIS — I50.9 HEART FAILURE, NYHA CLASS 2 (HCC): ICD-10-CM

## 2023-06-15 DIAGNOSIS — I48.19 PERSISTENT ATRIAL FIBRILLATION (HCC): ICD-10-CM

## 2023-06-15 LAB — EKG IMPRESSION: NORMAL

## 2023-06-15 PROCEDURE — 3078F DIAST BP <80 MM HG: CPT | Performed by: NURSE PRACTITIONER

## 2023-06-15 PROCEDURE — 93005 ELECTROCARDIOGRAM TRACING: CPT | Performed by: NURSE PRACTITIONER

## 2023-06-15 PROCEDURE — 99213 OFFICE O/P EST LOW 20 MIN: CPT | Performed by: NURSE PRACTITIONER

## 2023-06-15 PROCEDURE — 3074F SYST BP LT 130 MM HG: CPT | Performed by: NURSE PRACTITIONER

## 2023-06-15 PROCEDURE — 99214 OFFICE O/P EST MOD 30 MIN: CPT | Performed by: NURSE PRACTITIONER

## 2023-06-15 PROCEDURE — 93010 ELECTROCARDIOGRAM REPORT: CPT | Mod: 26 | Performed by: INTERNAL MEDICINE

## 2023-06-15 PROCEDURE — 99212 OFFICE O/P EST SF 10 MIN: CPT | Performed by: NURSE PRACTITIONER

## 2023-06-15 RX ORDER — METOPROLOL SUCCINATE 50 MG/1
75 TABLET, EXTENDED RELEASE ORAL
Qty: 270 TABLET | Refills: 3 | Status: SHIPPED | OUTPATIENT
Start: 2023-06-15

## 2023-06-15 ASSESSMENT — ENCOUNTER SYMPTOMS
NERVOUS/ANXIOUS: 1
FEVER: 0
PALPITATIONS: 1
PND: 0
COUGH: 0
ORTHOPNEA: 0
MYALGIAS: 0
ABDOMINAL PAIN: 0
SHORTNESS OF BREATH: 1
CLAUDICATION: 0
DIZZINESS: 0

## 2023-06-15 ASSESSMENT — FIBROSIS 4 INDEX: FIB4 SCORE: 1.28

## 2023-06-15 NOTE — PROGRESS NOTES
Chief Complaint   Patient presents with    Atrial Fibrillation     F/V DX: Persistent atrial fibrillation (HCC)       Subjective     Mavis Lizarraga is a 82 y.o. female who presents today for follow up on her heart failure, Afib with her , Vinay.    Patient of Dr. Oh. She was last seen in clinic at her initial visit on 4/26/2023.  She had new onset A-fib during her hospitalization in early March.  During her last visit, she was sent for cardiac PET stress test to rule out ischemia, her Lopressor was changed to Toprol-XL.  Patient was also recommended to consider treatment of her A-fib with MICHAEL/DCCV.  Patient wanted to think about it.    Patient comes in the office today reporting she has been doing fairly well.  She does report feeling tired, having chest tightness, shortness of breath, palpitations at times with anxiety and chronic left lower extremity edema.    She denies other chest pain, orthopnea, PND or dizziness/lightheadedness.    She reports she weighs herself every other day, her weights are around 162 pounds.    Additionally, patient has the following medical problems:     -Hypothyroidism    -Left LE DVT    -History of breast cancer    -Diabetes: On insulin    -Polymyalgia rheumatica on prednisone    Past Medical History:   Diagnosis Date    Anesthesia     poor tolerence to morphine    Arthritis     back    Backpain     Breast cancer (HCC) 1980s    right    Diverticulosis     DVT of deep femoral vein (HCC)     leg    Heart burn     High cholesterol     Pneumonia Feb 2006    Thyroid condition     Ulcer      Past Surgical History:   Procedure Laterality Date    ANTERIOR AND POSTERIOR REPAIR  6/23/2014    Performed by Lauri Batista M.D. at SUR[facility]ERY [facility]    BLADDER SLING FEMALE  6/23/2014    Performed by Lauri Batista M.D. at [facility]ERY [facility]    BREAST RECONSTRUCTION  8/14/2012    Performed by SARTHAK LEVIN at [facility]    BREAST IMPLANT  REVISION  8/14/2012    Performed by SARTHAK LEVIN at SURGERY [facility]    CAPSULECTOMY  8/14/2012    Performed by SARTHAK LEVIN at SURGERY [facility]    MASTOPEXY  8/14/2012    Performed by SARTHAK LEVIN at SURGERY [facility]    LIPOSUCTION  8/14/2012    Performed by SARTHAK LEVIN at SURGERY [facility]    RHYTIDECTOMY  8/14/2012    Performed by SARTHAK LEVIN at SURGERY [facility]    PLATYSMAPLASTY  8/14/2012    Performed by SARTHAK LEVIN at SURGERY [facility]    BLEPHAROPLASTY  8/14/2012    Performed by SARTHAK LEVIN at SURGERY [facility]    LOW ANTERIOR RESECTION LAPAROSCOPIC  2/10/2010    Performed by KOBI COHN at SURGERY [facility]    COLECTOMY N/A 2009    partial- post colonoscopy perforation- dr cohn    MASTECTOMY Right 1980s    neg nodes; 6 mos adjuvant chemo    OTHER      mastectomy R breast    OTHER ABDOMINAL SURGERY      I&D of ruptured diverticuli     VT BREAST AUGMENTATION WITH IMPLANT      VT BREAST REDUCTION      VT CHEMOTHERAPY, UNSPECIFIED PROCEDURE       Family History   Problem Relation Age of Onset    Heart Disease Mother 64    Hypertension Mother     Diabetes Mother     Stroke Mother     Cancer Mother         Stomach cancer    Heart Disease Maternal Grandmother 63        heart attack     Social History     Socioeconomic History    Marital status:      Spouse name: Not on file    Number of children: Not on file    Years of education: Not on file    Highest education level: 12th grade   Occupational History    Not on file   Tobacco Use    Smoking status: Never    Smokeless tobacco: Never   Vaping Use    Vaping Use: Never used   Substance and Sexual Activity    Alcohol use: Yes     Comment: very rarely on ocassion will have a Sravani    Drug use: No    Sexual activity: Yes     Partners: Male   Other Topics Concern    Not on file   Social History Narrative    Not on file     Social Determinants of Health      Financial Resource Strain: Low Risk  (4/13/2021)    Overall Financial Resource Strain (CARDIA)     Difficulty of Paying Living Expenses: Not hard at all   Food Insecurity: No Food Insecurity (4/27/2021)    Hunger Vital Sign     Worried About Running Out of Food in the Last Year: Never true     Ran Out of Food in the Last Year: Never true   Transportation Needs: Unknown (4/27/2021)    PRAPARE - Transportation     Lack of Transportation (Medical): No     Lack of Transportation (Non-Medical): Not on file   Physical Activity: Inactive (4/27/2021)    Exercise Vital Sign     Days of Exercise per Week: 0 days     Minutes of Exercise per Session: 10 min   Stress: No Stress Concern Present (4/27/2021)    [employer] [place] [employer] - Occupational Stress Questionnaire     Feeling of Stress : Only a little   Social Connections: Moderately Isolated (4/27/2021)    Social Connection and Isolation Panel [NHANES]     Frequency of Communication with Friends and Family: Twice a week     Frequency of Social Gatherings with Friends and Family: Once a week     Attends Christian Services: Never     Active Member of Clubs or Organizations: No     Attends Club or Organization Meetings: Never     Marital Status:    Intimate Partner Violence: Not on file   Housing Stability: Unknown (4/27/2021)    Housing Stability Vital Sign     Unable to Pay for Housing in the Last Year: No     Number of Places Lived in the Last Year: Not on file     Unstable Housing in the Last Year: No     Allergies   Allergen Reactions    Metformin Diarrhea and Nausea     NAUSEA VOMIITG AND DIARRHEA    Morphine Unspecified     Hallucinations     Outpatient Encounter Medications as of 6/15/2023   Medication Sig Dispense Refill    metoprolol SR (TOPROL XL) 50 MG TABLET SR 24 HR Take 1.5 Tablets by mouth 2 times a day. 270 Tablet 3    Insulin Pen Needle 32 G x 4 mm Use one pen needle in pen device to inject insulin once daily. 100 Each 0    Lancets  Use one Per formulary preference  lancet to test blood sugar once daily early morning before first meal. 100 Each 3    Blood Glucose Test Strips Use one Per formulary preference  strip to test blood sugar once daily early morning before first meal. 100 Strip 0    insulin glargine 100 UNIT/ML SC SOPN injection Inject 18 Units under the skin every evening. 3 mL 3    nitroglycerin (NITROSTAT) 0.4 MG SL Tab Place 1 Tablet under the tongue as needed for Chest Pain. Q 5 min x 3 then 911. 25 Tablet 3    POTASSIUM PO Take  by mouth every day.      rosuvastatin (CRESTOR) 40 MG tablet TAKE ONE TABLET BY MOUTH DAILY 100 Tablet 3    apixaban (ELIQUIS) 5mg Tab Take 1 Tablet by mouth 2 times a day. 180 Tablet 1    predniSONE (DELTASONE) 5 MG Tab Take 1 Tablet by mouth every day. 30 Tablet 2    omeprazole (PRILOSEC) 20 MG delayed-release capsule Take 1 Capsule by mouth every day. (Patient taking differently: Take 20 mg by mouth 1 time a day as needed. Indications: Heartburn) 30 Capsule 3    cyclobenzaprine (FLEXERIL) 10 mg Tab TAKE ONE TABLET BY MOUTH THREE TIMES A DAY AS NEEDED FOR MILD PAIN (FLEXERIL) 30 Tablet 3    levothyroxine (SYNTHROID) 75 MCG Tab TAKE ONE TABLET BY MOUTH EVERY MORNING ON AN EMPTY STOMACH 90 Tablet 3    Cholecalciferol (VITAMIN D) 2000 UNIT Tab Take 2,000 Units by mouth every day.      Ascorbic Acid (VITAMIN C) 1000 MG Tab Take 1,000 mg by mouth every morning.      Cyanocobalamin (VITAMIN B-12 PO) Take 1 tablet by mouth every morning.      CINNAMON PO Take 1 tablet by mouth every morning.      Multiple Vitamins-Minerals (ICAPS AREDS FORMULA PO) Take 1 tablet by mouth every morning.      Omega-3 Fatty Acids (FISH OIL PO) Take 1 Cap by mouth every day.      [DISCONTINUED] metoprolol SR (TOPROL XL) 50 MG TABLET SR 24 HR Take 1.5 Tablets by mouth every day for 360 days. 135 Tablet 3    [DISCONTINUED] SEMGLEE, YFGN, 100 UNIT/ML Solution Pen-injector INJECT 12 UNITS UNDER THE SKIN EVERY EVENING *INSULIN  "GLARGINE*. 15 mL 3     No facility-administered encounter medications on file as of 6/15/2023.     Review of Systems   Constitutional:  Positive for malaise/fatigue. Negative for fever.   Respiratory:  Positive for shortness of breath. Negative for cough.    Cardiovascular:  Positive for chest pain (Tightness), palpitations and leg swelling (slight on left). Negative for orthopnea, claudication and PND.   Gastrointestinal:  Negative for abdominal pain.   Musculoskeletal:  Negative for myalgias.   Neurological:  Negative for dizziness.   Psychiatric/Behavioral:  The patient is nervous/anxious.    All other systems reviewed and are negative.             Objective     BP 90/60 (BP Location: Left arm, Patient Position: Sitting, BP Cuff Size: Adult)   Pulse 100   Resp 16   Ht 1.702 m (5' 7\")   Wt 73.5 kg (162 lb)   LMP  (LMP Unknown)   SpO2 96%   BMI 25.37 kg/m²     Physical Exam  Vitals reviewed.   Constitutional:       Appearance: She is well-developed.   HENT:      Head: Normocephalic and atraumatic.   Eyes:      Pupils: Pupils are equal, round, and reactive to light.   Neck:      Vascular: No JVD.   Cardiovascular:      Rate and Rhythm: Tachycardia present. Rhythm irregular.      Heart sounds: Normal heart sounds.   Pulmonary:      Effort: Pulmonary effort is normal. No respiratory distress.      Breath sounds: Normal breath sounds. No wheezing or rales.   Abdominal:      General: Bowel sounds are normal.      Palpations: Abdomen is soft.   Musculoskeletal:         General: Normal range of motion.      Cervical back: Normal range of motion and neck supple.      Right lower leg: No edema.      Left lower leg: Edema (Trace) present.   Skin:     General: Skin is warm and dry.   Neurological:      General: No focal deficit present.      Mental Status: She is alert and oriented to person, place, and time.   Psychiatric:         Behavior: Behavior normal.       Lab Results   Component Value Date/Time    CHOLSTRLTOT " 158 04/06/2022 06:19 AM    LDL 78 04/06/2022 06:19 AM    HDL 53 04/06/2022 06:19 AM    TRIGLYCERIDE 137 04/06/2022 06:19 AM       Lab Results   Component Value Date/Time    SODIUM 140 04/19/2023 07:19 AM    POTASSIUM 3.9 04/19/2023 07:19 AM    CHLORIDE 105 04/19/2023 07:19 AM    CO2 26 04/19/2023 07:19 AM    GLUCOSE 284 (H) 04/19/2023 07:19 AM    BUN 9 04/19/2023 07:19 AM    CREATININE 0.56 04/19/2023 07:19 AM     Lab Results   Component Value Date/Time    ALKPHOSPHAT 49 04/19/2023 07:19 AM    ASTSGOT 14 04/19/2023 07:19 AM    ALTSGPT 23 04/19/2023 07:19 AM    TBILIRUBIN 0.8 04/19/2023 07:19 AM       Transthoracic Echo Report 3/2/2023  No prior study is available for comparison.   Mildly reduced left ventricular systolic function.  The left ventricular ejection fraction is visually estimated to be 40%.  Estimated right ventricular systolic pressure is 30 mmHg.       Myocardial Perfusion Report 5/5/2023   NUCLEAR IMAGING INTERPRETATION   No evidence of significant jeopardized viable myocardium or prior myocardial infarction.   Normal left ventricular size, ejection fraction, and wall motion.   ECG INTERPRETATION   No ischemic changes on pharmacologic stress test.         Assessment & Plan     1. Persistent atrial fibrillation (HCC)  EKG    CL-CARDIOVERSION    EC-MICHAEL W/O CONT      2. HFrEF (heart failure with reduced ejection fraction) ([facility])  metoprolol SR (TOPROL XL) 50 MG TABLET SR 24 HR    CL-CARDIOVERSION    EC-MICHAEL W/O CONT      3. ACC/AHA stage B heart failure (HCC)        4. Heart failure, NYHA class 2 ([facility])        5. Chronic anticoagulation        6. Secondary hypercoagulable state ([facility])        7. Type 2 diabetes mellitus without complication, with long-term current use of insulin ([facility])            Medical Decision Making: Today's Assessment/Status/Plan:        HFmrEF, Stage C, Class 1-2, LVEF 40%: Based on physical examination findings, patient is euvolemic. No JVD, lungs are clear to auscultation, no pitting  edema in bilateral lower extremities, no ascites. She does report chronic l LE edema that tiff nextins unchanged  -Heart failure due to tachycardia mediated, had negative stress test  -Discussed Heart failure trajectory and prognosis with patient. Will continue to optimize medical therapy as tolerated. Advanced HF treatment, need for remote monitoring consideration at every visit.   -Not able to add further GDMT due to borderline low blood pressure  -ACE-I/ARB/ARNI: Consider if BP allows  -Evidence Based Beta-blocker: Continue metoprolol SR 75 mg twice a day, PCP had ordered it daily, order resent to pharmacy and will have them updated with twice a day dosing  -Aldosterone Antagonist: Consider if BP allows  -Diuretic: Consider as needed  -SGLT2 inhibitor: We will hold off on starting this until after cardioversion  -Other: Consider as needed (Hydralazine/Isosorbide, Vericiguat, Corlanor)  -Labs: No labs ordered today.  Will continue to closely monitor for side effects of patient's high risk medication(s) including renal function, liver function NTproBNP/cardiac markers, and electrolytes as needed  -discussed importance of exercise and regular activity  -Discussed/reviewed maintaining a low Sodium and hydration recommendations  -ICD not indicated at this time  -Reinforced s/sx of worsening heart failure with patient and weight monitoring. Pt verbalizes understanding. Pt to call office or RTC if present.    -PUMP line number [phone] (PUMP) reviewed with patient  -Heart Failure Education:   Discussed the Definition of heart failure (linking disease, symptoms, and treatment) and causes of heart failure  Recognition of escalating symptoms and concrete plan for response to particular symptoms  Risk modification for heart failure progression  Specific diet recommendations: Continue low-sodium diet   Activity as tolerated  Importance of treatment adherence  Behavioral strategies to promote treatment adherence  -Advanced  care planning: Advance directive on file  -Pharmacotherapy Referral: Not referred at this time  -Compliance Barriers: None  -Genetic testing Consideration: None  -Consideration for LVAD and/or Heart transplant as necessary      Persistent A-fib:  -EKG today shows A-fib rate 105  -Reintroduced MICHAEL and cardioversion, patient is agreeable  -Patient to be scheduled, risk and benefits reviewed, no further questions at this time  -Patient reports she has been taking her Eliquis 5 mg twice a day without missing any doses  -Continue metoprolol SR 75 mg twice a day  -[id number] score is  5 (Age, Sex, HF, DM)    Dyslipidemia:  -Continue rosuvastatin 40 mg daily  -Last LDL 78 on 4/6/2022    Diabetes: Uncontrolled  -Last A1c 10.4 on 4/19/2023  -Followed by PCP  -Continue insulin    FU in clinic in 4 weeks after MICHAEL/DCCV. Sooner if needed.    Patient verbalizes understanding and agrees with the plan of care.     PLEASE NOTE: This Note was created using voice recognition Software. I have made every reasonable attempt to correct obvious errors, but I expect that there are errors of grammar and possibly content that I did not discover before finalizing the note

## 2023-06-15 NOTE — TELEPHONE ENCOUNTER
Jeffrey Yancey,      Patient was informed you will be reaching out to schedule: EC-MICHAEL W/O CONT and CL-CARDIOVERSION. Ordered per CHERI Mattson, Thank you.

## 2023-06-16 ENCOUNTER — TELEPHONE (OUTPATIENT)
Dept: CARDIOLOGY | Facility: MEDICAL CENTER | Age: 83
End: 2023-06-16
Payer: MEDICARE

## 2023-06-16 ENCOUNTER — APPOINTMENT (OUTPATIENT)
Dept: ADMISSIONS | Facility: MEDICAL CENTER | Age: 83
End: 2023-06-16
Attending: INTERNAL MEDICINE
Payer: MEDICARE

## 2023-06-16 NOTE — TELEPHONE ENCOUNTER
Patient is scheduled on 7-13-23 for a MICHAEL/CV with anesthesia with Dr. Tate. Patient was told to hold insulin AM day of and to check in at 8:00 for a 10:00 procedure. H&P was done on 6-15-23 by Kacey Carbone admlazara to call patient.

## 2023-06-19 DIAGNOSIS — G89.29 CHRONIC MIDLINE LOW BACK PAIN WITHOUT SCIATICA: ICD-10-CM

## 2023-06-19 DIAGNOSIS — M62.830 SPASM OF BACK MUSCLES: ICD-10-CM

## 2023-06-19 DIAGNOSIS — E11.65 UNCONTROLLED TYPE 2 DIABETES MELLITUS WITH HYPERGLYCEMIA (HCC): ICD-10-CM

## 2023-06-19 DIAGNOSIS — E03.4 HYPOTHYROIDISM DUE TO ACQUIRED ATROPHY OF THYROID: ICD-10-CM

## 2023-06-19 DIAGNOSIS — M54.50 CHRONIC MIDLINE LOW BACK PAIN WITHOUT SCIATICA: ICD-10-CM

## 2023-06-19 DIAGNOSIS — Z12.31 ENCOUNTER FOR SCREENING MAMMOGRAM FOR MALIGNANT NEOPLASM OF BREAST: ICD-10-CM

## 2023-06-19 DIAGNOSIS — E78.2 MIXED HYPERLIPIDEMIA: ICD-10-CM

## 2023-06-19 DIAGNOSIS — Z85.3 HISTORY OF BREAST CANCER: ICD-10-CM

## 2023-06-19 RX ORDER — LEVOTHYROXINE SODIUM 0.07 MG/1
TABLET ORAL
Qty: 90 TABLET | Refills: 3 | Status: SHIPPED | OUTPATIENT
Start: 2023-06-19

## 2023-06-19 NOTE — TELEPHONE ENCOUNTER
Received request via: Pharmacy    Was the patient seen in the last year in this department? Yes  LOV : 5/23/2023   Does the patient have an active prescription (recently filled or refills available) for medication(s) requested? No    Does the patient have [facility] Plus and need 100 day supply (blood pressure, diabetes and cholesterol meds only)?

## 2023-06-23 ENCOUNTER — PRE-ADMISSION TESTING (OUTPATIENT)
Dept: ADMISSIONS | Facility: MEDICAL CENTER | Age: 83
End: 2023-06-23
Attending: INTERNAL MEDICINE
Payer: MEDICARE

## 2023-07-10 ENCOUNTER — PRE-ADMISSION TESTING (OUTPATIENT)
Dept: ADMISSIONS | Facility: MEDICAL CENTER | Age: 83
End: 2023-07-10
Attending: INTERNAL MEDICINE
Payer: MEDICARE

## 2023-07-10 DIAGNOSIS — Z01.812 PRE-OPERATIVE LABORATORY EXAMINATION: ICD-10-CM

## 2023-07-10 LAB
ANION GAP SERPL CALC-SCNC: 10 MMOL/L (ref 7–16)
BUN SERPL-MCNC: 10 MG/DL (ref 8–22)
CALCIUM SERPL-MCNC: 9.4 MG/DL (ref 8.5–10.5)
CHLORIDE SERPL-SCNC: 102 MMOL/L (ref 96–112)
CO2 SERPL-SCNC: 28 MMOL/L (ref 20–33)
CREAT SERPL-MCNC: 0.69 MG/DL (ref 0.5–1.4)
ERYTHROCYTE [DISTWIDTH] IN BLOOD BY AUTOMATED COUNT: 43.7 FL (ref 35.9–50)
GFR SERPLBLD CREATININE-BSD FMLA CKD-EPI: 87 ML/MIN/1.73 M 2
GLUCOSE SERPL-MCNC: 195 MG/DL (ref 65–99)
HCT VFR BLD AUTO: 41.7 % (ref 37–47)
HGB BLD-MCNC: 13.6 G/DL (ref 12–16)
INR PPP: 1.28 (ref 0.87–1.13)
MCH RBC QN AUTO: 28.7 PG (ref 27–33)
MCHC RBC AUTO-ENTMCNC: 32.6 G/DL (ref 32.2–35.5)
MCV RBC AUTO: 88 FL (ref 81.4–97.8)
PLATELET # BLD AUTO: 167 K/UL (ref 164–446)
PMV BLD AUTO: 9.9 FL (ref 9–12.9)
POTASSIUM SERPL-SCNC: 4.6 MMOL/L (ref 3.6–5.5)
PROTHROMBIN TIME: 15.7 SEC (ref 12–14.6)
RBC # BLD AUTO: 4.74 M/UL (ref 4.2–5.4)
SODIUM SERPL-SCNC: 140 MMOL/L (ref 135–145)
WBC # BLD AUTO: 5.4 K/UL (ref 4.8–10.8)

## 2023-07-10 PROCEDURE — 85610 PROTHROMBIN TIME: CPT

## 2023-07-10 PROCEDURE — 80048 BASIC METABOLIC PNL TOTAL CA: CPT

## 2023-07-10 PROCEDURE — 85027 COMPLETE CBC AUTOMATED: CPT

## 2023-07-10 PROCEDURE — 36415 COLL VENOUS BLD VENIPUNCTURE: CPT

## 2023-07-13 ENCOUNTER — TELEPHONE (OUTPATIENT)
Dept: CARDIOLOGY | Facility: MEDICAL CENTER | Age: 83
End: 2023-07-13

## 2023-07-13 ENCOUNTER — ANESTHESIA EVENT (OUTPATIENT)
Dept: CARDIOLOGY | Facility: MEDICAL CENTER | Age: 83
End: 2023-07-13
Payer: MEDICARE

## 2023-07-13 ENCOUNTER — ANESTHESIA (OUTPATIENT)
Dept: CARDIOLOGY | Facility: MEDICAL CENTER | Age: 83
End: 2023-07-13
Payer: MEDICARE

## 2023-07-13 ENCOUNTER — APPOINTMENT (OUTPATIENT)
Dept: CARDIOLOGY | Facility: MEDICAL CENTER | Age: 83
End: 2023-07-13
Attending: NURSE PRACTITIONER
Payer: MEDICARE

## 2023-07-13 ENCOUNTER — HOSPITAL ENCOUNTER (OUTPATIENT)
Facility: MEDICAL CENTER | Age: 83
End: 2023-07-13
Attending: INTERNAL MEDICINE | Admitting: INTERNAL MEDICINE
Payer: MEDICARE

## 2023-07-13 VITALS
TEMPERATURE: 97.4 F | HEIGHT: 67 IN | SYSTOLIC BLOOD PRESSURE: 123 MMHG | RESPIRATION RATE: 14 BRPM | BODY MASS INDEX: 25.92 KG/M2 | DIASTOLIC BLOOD PRESSURE: 66 MMHG | HEART RATE: 60 BPM | OXYGEN SATURATION: 95 % | WEIGHT: 165.12 LBS

## 2023-07-13 DIAGNOSIS — I48.91 ATRIAL FIBRILLATION WITH RVR (HCC): ICD-10-CM

## 2023-07-13 DIAGNOSIS — I48.19 PERSISTENT ATRIAL FIBRILLATION (HCC): ICD-10-CM

## 2023-07-13 DIAGNOSIS — I50.20 HFREF (HEART FAILURE WITH REDUCED EJECTION FRACTION) (HCC): ICD-10-CM

## 2023-07-13 LAB
EKG IMPRESSION: NORMAL
EKG IMPRESSION: NORMAL

## 2023-07-13 PROCEDURE — 700111 HCHG RX REV CODE 636 W/ 250 OVERRIDE (IP): Performed by: STUDENT IN AN ORGANIZED HEALTH CARE EDUCATION/TRAINING PROGRAM

## 2023-07-13 PROCEDURE — 700105 HCHG RX REV CODE 258: Mod: JZ | Performed by: INTERNAL MEDICINE

## 2023-07-13 PROCEDURE — 700101 HCHG RX REV CODE 250: Performed by: STUDENT IN AN ORGANIZED HEALTH CARE EDUCATION/TRAINING PROGRAM

## 2023-07-13 PROCEDURE — 99100 ANES PT EXTEME AGE<1 YR&>70: CPT | Performed by: STUDENT IN AN ORGANIZED HEALTH CARE EDUCATION/TRAINING PROGRAM

## 2023-07-13 PROCEDURE — 93005 ELECTROCARDIOGRAM TRACING: CPT | Performed by: INTERNAL MEDICINE

## 2023-07-13 PROCEDURE — 4410588 CL-CARDIOVERSION

## 2023-07-13 PROCEDURE — 01922 ANES N-INVAS IMG/RADJ THER: CPT | Performed by: STUDENT IN AN ORGANIZED HEALTH CARE EDUCATION/TRAINING PROGRAM

## 2023-07-13 PROCEDURE — 93010 ELECTROCARDIOGRAM REPORT: CPT | Mod: 59,76 | Performed by: INTERNAL MEDICINE

## 2023-07-13 PROCEDURE — 160036 HCHG PACU - EA ADDL 30 MINS PHASE I

## 2023-07-13 PROCEDURE — 160046 HCHG PACU - 1ST 60 MINS PHASE II

## 2023-07-13 PROCEDURE — 160002 HCHG RECOVERY MINUTES (STAT)

## 2023-07-13 PROCEDURE — 160035 HCHG PACU - 1ST 60 MINS PHASE I

## 2023-07-13 PROCEDURE — 92960 CARDIOVERSION ELECTRIC EXT: CPT | Performed by: INTERNAL MEDICINE

## 2023-07-13 PROCEDURE — 93312 ECHO TRANSESOPHAGEAL: CPT | Mod: 26 | Performed by: INTERNAL MEDICINE

## 2023-07-13 PROCEDURE — 93325 DOPPLER ECHO COLOR FLOW MAPG: CPT

## 2023-07-13 PROCEDURE — 93010 ELECTROCARDIOGRAM REPORT: CPT | Mod: 59 | Performed by: INTERNAL MEDICINE

## 2023-07-13 RX ORDER — EPHEDRINE SULFATE 50 MG/ML
5 INJECTION, SOLUTION INTRAVENOUS
Status: DISCONTINUED | OUTPATIENT
Start: 2023-07-13 | End: 2023-07-13 | Stop reason: HOSPADM

## 2023-07-13 RX ORDER — MEPERIDINE HYDROCHLORIDE 25 MG/ML
12.5 INJECTION INTRAMUSCULAR; INTRAVENOUS; SUBCUTANEOUS
Status: DISCONTINUED | OUTPATIENT
Start: 2023-07-13 | End: 2023-07-13 | Stop reason: HOSPADM

## 2023-07-13 RX ORDER — HYDROMORPHONE HYDROCHLORIDE 1 MG/ML
0.1 INJECTION, SOLUTION INTRAMUSCULAR; INTRAVENOUS; SUBCUTANEOUS
Status: DISCONTINUED | OUTPATIENT
Start: 2023-07-13 | End: 2023-07-13 | Stop reason: HOSPADM

## 2023-07-13 RX ORDER — HYDROMORPHONE HYDROCHLORIDE 1 MG/ML
0.2 INJECTION, SOLUTION INTRAMUSCULAR; INTRAVENOUS; SUBCUTANEOUS
Status: DISCONTINUED | OUTPATIENT
Start: 2023-07-13 | End: 2023-07-13 | Stop reason: HOSPADM

## 2023-07-13 RX ORDER — OXYCODONE HCL 5 MG/5 ML
10 SOLUTION, ORAL ORAL
Status: DISCONTINUED | OUTPATIENT
Start: 2023-07-13 | End: 2023-07-13 | Stop reason: HOSPADM

## 2023-07-13 RX ORDER — HALOPERIDOL 5 MG/ML
1 INJECTION INTRAMUSCULAR
Status: DISCONTINUED | OUTPATIENT
Start: 2023-07-13 | End: 2023-07-13 | Stop reason: HOSPADM

## 2023-07-13 RX ORDER — ONDANSETRON 2 MG/ML
4 INJECTION INTRAMUSCULAR; INTRAVENOUS
Status: DISCONTINUED | OUTPATIENT
Start: 2023-07-13 | End: 2023-07-13 | Stop reason: HOSPADM

## 2023-07-13 RX ORDER — OXYCODONE HCL 5 MG/5 ML
5 SOLUTION, ORAL ORAL
Status: DISCONTINUED | OUTPATIENT
Start: 2023-07-13 | End: 2023-07-13 | Stop reason: HOSPADM

## 2023-07-13 RX ORDER — LIDOCAINE HYDROCHLORIDE 20 MG/ML
INJECTION, SOLUTION EPIDURAL; INFILTRATION; INTRACAUDAL; PERINEURAL PRN
Status: DISCONTINUED | OUTPATIENT
Start: 2023-07-13 | End: 2023-07-13 | Stop reason: SURG

## 2023-07-13 RX ORDER — PHENYLEPHRINE HYDROCHLORIDE 10 MG/ML
INJECTION, SOLUTION INTRAMUSCULAR; INTRAVENOUS; SUBCUTANEOUS PRN
Status: DISCONTINUED | OUTPATIENT
Start: 2023-07-13 | End: 2023-07-13 | Stop reason: SURG

## 2023-07-13 RX ORDER — HYDRALAZINE HYDROCHLORIDE 20 MG/ML
5 INJECTION INTRAMUSCULAR; INTRAVENOUS
Status: DISCONTINUED | OUTPATIENT
Start: 2023-07-13 | End: 2023-07-13 | Stop reason: HOSPADM

## 2023-07-13 RX ORDER — DIPHENHYDRAMINE HYDROCHLORIDE 50 MG/ML
12.5 INJECTION INTRAMUSCULAR; INTRAVENOUS
Status: DISCONTINUED | OUTPATIENT
Start: 2023-07-13 | End: 2023-07-13 | Stop reason: HOSPADM

## 2023-07-13 RX ORDER — SODIUM CHLORIDE, SODIUM LACTATE, POTASSIUM CHLORIDE, CALCIUM CHLORIDE 600; 310; 30; 20 MG/100ML; MG/100ML; MG/100ML; MG/100ML
INJECTION, SOLUTION INTRAVENOUS CONTINUOUS
Status: DISCONTINUED | OUTPATIENT
Start: 2023-07-13 | End: 2023-07-13 | Stop reason: HOSPADM

## 2023-07-13 RX ORDER — HYDROMORPHONE HYDROCHLORIDE 1 MG/ML
0.4 INJECTION, SOLUTION INTRAMUSCULAR; INTRAVENOUS; SUBCUTANEOUS
Status: DISCONTINUED | OUTPATIENT
Start: 2023-07-13 | End: 2023-07-13 | Stop reason: HOSPADM

## 2023-07-13 RX ORDER — LABETALOL HYDROCHLORIDE 5 MG/ML
5 INJECTION, SOLUTION INTRAVENOUS
Status: DISCONTINUED | OUTPATIENT
Start: 2023-07-13 | End: 2023-07-13 | Stop reason: HOSPADM

## 2023-07-13 RX ADMIN — SODIUM CHLORIDE, POTASSIUM CHLORIDE, SODIUM LACTATE AND CALCIUM CHLORIDE: 600; 310; 30; 20 INJECTION, SOLUTION INTRAVENOUS at 08:56

## 2023-07-13 RX ADMIN — PHENYLEPHRINE HYDROCHLORIDE 100 MCG: 10 INJECTION INTRAVENOUS at 10:04

## 2023-07-13 RX ADMIN — PROPOFOL 50 MG: 10 INJECTION, EMULSION INTRAVENOUS at 10:04

## 2023-07-13 RX ADMIN — LIDOCAINE HYDROCHLORIDE 100 MG: 20 INJECTION, SOLUTION EPIDURAL; INFILTRATION; INTRACAUDAL at 10:04

## 2023-07-13 RX ADMIN — PROPOFOL 30 MG: 10 INJECTION, EMULSION INTRAVENOUS at 10:10

## 2023-07-13 ASSESSMENT — FIBROSIS 4 INDEX: FIB4 SCORE: 1.42

## 2023-07-13 NOTE — ANESTHESIA PREPROCEDURE EVALUATION
Date/Time: 07/13/23 1000    Scheduled providers: Roger Tate M.D.; Hi Crespo D.O.    Procedures:       CL-CARDIOVERSION      EC-MICHAEL W/O CONT    Diagnosis:       Persistent atrial fibrillation (HCC) [I48.19]      HFrEF (heart failure with reduced ejection fraction) (HCC) [I50.20]      Other persistent atrial fibrillation [I48.19]    Indications: See Associated Dx    Location: [facility] - Echocardiology [facility]          Relevant Problems   CARDIAC   (positive) Angina pectoris, unspecified (HCC)   (positive) Atrial fibrillation with RVR (HCC)   (positive) DVT (deep vein thrombosis) in pregnancy      GI   (positive) Gastroesophageal reflux disease with esophagitis      ENDO   (positive) Acquired hypothyroidism       Physical Exam    Airway   Mallampati: II  TM distance: >3 FB  Neck ROM: full       Cardiovascular - normal exam  Rhythm: regular  Rate: normal  (-) murmur     Dental - normal exam           Pulmonary - normal exam  Breath sounds clear to auscultation     Abdominal    Neurological - normal exam                 Anesthesia Plan    ASA 2       Plan - MAC               Induction: intravenous    Postoperative Plan: Postoperative administration of opioids is intended.    Pertinent diagnostic labs and testing reviewed    Informed Consent:    Anesthetic plan and risks discussed with patient.    Use of blood products discussed with: patient whom consented to blood products.

## 2023-07-13 NOTE — OR NURSING
Assume care for pt in pre-op. Patient allergies and NPO status verified. Belongings secured. Patient verbalizes understanding of pain scale, expected course of stay and plan of care. Surgical procedure verified with patient. IV access established. Ekg results in chart. Call light within reach. No further needs at this time. Hourly rounding in place.

## 2023-07-13 NOTE — PROGRESS NOTES
Pharmacy Medication Reconciliation      ~Medication reconciliation updated and complete per patient at bedside  ~Ok per Patient to discuss medications with visitor/s present  ~Allergies have been verified and updated   ~No oral ABX within the last 30 days  ~Patient home pharmacy :  [facility]

## 2023-07-13 NOTE — TELEPHONE ENCOUNTER
SURYA     Caller: Mavis Lizarraga    Topic/issue: Patient is returning missed call. Advised patient of [employer] message sent by nurse. Patient request a call to home number on file. Please advise.     Callback Number: [phone] (home)     Thank you,   Kassandra WILSON

## 2023-07-13 NOTE — PROCEDURES
Patient was brought to cath lab holding.  Consent was obtained. Risks and benefits of procedures were explained.    Anesthesia was used for sedation process.    Indication: persistent atrial fibrillation. To restore sinus rhythm.    Complication: none    Diagnosis: no evidence of left atrial appendage thrombus. Moderate mitral regurgitation.    Cardioversion was done successfully.        Roger Tate MD.

## 2023-07-13 NOTE — OR NURSING
Pt to Phase II via homero.   Pt A&OX4. VSS. Pt in sinus rhythm on PACU monitor and EKG. Pt on room air. Afebrile. No complaints of pain or nausea. Pt declined sips of water. Update given to pt's , Lauri. Report give to MARY ELLEN Molina.

## 2023-07-13 NOTE — OR NURSING
1123: report from MARY ELLEN Watkins. Pt arrived to PACU 2 bay #30. Stand and transfer to recliner chair. Connected to monitor. PIV saline locked.     1130: Spouse to bedside    1135: pt dressed with no assistance    1200: Discharge instructions reviewed with pt and spouse Lauri, both stating understanding. Questions answered. PIV removed with tip intact. Pt discharged home in stable condition. Down to pick-up area via wheelchair accompanied by CNA. All belongings taken.

## 2023-07-13 NOTE — DISCHARGE INSTRUCTIONS
HOME CARE INSTRUCTIONS    ACTIVITY: Rest and take it easy for the first 24 hours.  A responsible adult is recommended to remain with you during that time.  It is normal to feel sleepy.  We encourage you to not do anything that requires balance, judgment or coordination.    FOR 24 HOURS DO NOT:  Drive, operate machinery or run household appliances.  Drink beer or alcoholic beverages.  Make important decisions or sign legal documents.    SPECIAL INSTRUCTIONS: Electrical Cardioversion, Care After  This sheet gives you information about how to care for yourself after your procedure. Your health care provider may also give you more specific instructions. If you have problems or questions, contact your health care provider.  What can I expect after the procedure?  After the procedure, it is common to have:  Some redness on the skin where the shocks were given.  Follow these instructions at home:  Do not drive for 24 hours if you were given a medicine to help you relax (sedative).  Take over-the-counter and prescription medicines only as told by your health care provider.  Ask your health care provider how to check your pulse. Check it often.  Rest for 48 hours after the procedure or as told by your health care provider.  Avoid or limit your caffeine use as told by your health care provider.  Contact a health care provider if:  You feel like your heart is beating too quickly or your pulse is not regular.  You have a serious muscle cramp that does not go away.  Get help right away if:  You have discomfort in your chest.  You are dizzy or you feel faint.  You have trouble breathing or you are short of breath.  Your speech is slurred.  You have trouble moving an arm or leg on one side of your body.  Your fingers or toes turn cold or blue.    ENDOSCOPY HOME CARE INSTRUCTIONS    MICHAEL - TRANSESOPHAGEAL ECHOCARDIOGRAM  1. Don't drive or drink alcohol for 24 hours. The medication you received will make you too drowsy.  2. If you  begin to vomit bloody material, or develop black or bloody stools, call your doctor as soon as possible.  3. If you have any neck, chest, abdominal pain or temp of 100 degrees, call your doctor.      DIET: To avoid nausea, slowly advance diet as tolerated, avoiding spicy or greasy foods for the first day.  Add more substantial food to your diet according to your physician's instructions.  INCREASE FLUIDS AND FIBER TO AVOID CONSTIPATION.    MEDICATIONS: Resume taking daily medication.  Take prescribed pain medication with food.  If no medication is prescribed, you may take non-aspirin pain medication if needed.  PAIN MEDICATION CAN BE VERY CONSTIPATING.  Take a stool softener or laxative such as senokot, pericolace, or milk of magnesia if needed.    A follow-up appointment should be arranged with your doctor; call to schedule.    You should CALL YOUR PHYSICIAN if you develop:  Fever greater than 101 degrees F.  Pain not relieved by medication, or persistent nausea or vomiting.  Excessive bleeding (blood soaking through dressing) or unexpected drainage from the wound.  Extreme redness or swelling around the incision site, drainage of pus or foul smelling drainage.  Inability to urinate or empty your bladder within 8 hours.  Problems with breathing or chest pain.    You should call 911 if you develop problems with breathing or chest pain.  If you are unable to contact your doctor or surgical center, you should go to the nearest emergency room or urgent care center.  Physician's telephone #: [phone]    MILD FLU-LIKE SYMPTOMS ARE NORMAL.  YOU MAY EXPERIENCE GENERALIZED MUSCLE ACHES, THROAT IRRITATION, HEADACHE AND/OR SOME NAUSEA.    If any questions arise, call your doctor.  If your doctor is not available, please feel free to call the Surgical Center at [phone].  The Center is open Monday through Friday from 7AM to 7PM.      A registered nurse may call you a few days after your surgery to see how you are  doing after your procedure.    You may also receive a survey in the mail within the next two weeks and we ask that you take a few moments to complete the survey and return it to us.  Our goal is to provide you with very good care and we value your comments.     Depression / Suicide Risk    As you are discharged from this [facility] facility, it is important to learn how to keep safe from harming yourself.    Recognize the warning signs:  Abrupt changes in personality, positive or negative- including increase in energy   Giving away possessions  Change in eating patterns- significant weight changes-  positive or negative  Change in sleeping patterns- unable to sleep or sleeping all the time   Unwillingness or inability to communicate  Depression  Unusual sadness, discouragement and loneliness  Talk of wanting to die  Neglect of personal appearance   Rebelliousness- reckless behavior  Withdrawal from people/activities they love  Confusion- inability to concentrate     If you or a loved one observes any of these behaviors or has concerns about self-harm, here's what you can do:  Talk about it- your feelings and reasons for harming yourself  Remove any means that you might use to hurt yourself (examples: pills, rope, extension cords, firearm)  Get professional help from the community (Mental Health, Substance Abuse, psychological counseling)  Do not be alone:Call your Safe Contact- someone whom you trust who will be there for you.  Call your local CRISIS HOTLINE 914-5639 or 065-352-2809  Call your local Children's Mobile Crisis Response Team [place] (702) 432-9060 or www.[facility]  Call the toll free National Suicide Prevention Hotlines   National Suicide Prevention Lifeline 870-596-JQRB (4888)  [facility] Line Network 800-SUICIDE (139-1730)    I acknowledge receipt and understanding of these Home Care instructions.

## 2023-07-13 NOTE — ANESTHESIA POSTPROCEDURE EVALUATION
Patient: Mavis Lizarraga    Procedure Summary     Date: 07/13/23 Room / Location: [facility] - Echocardiology [facility]    Anesthesia Start: 0955 Anesthesia Stop: 1025    Procedures:       CL-CARDIOVERSION      EC-MICHAEL W/O CONT Diagnosis:       Persistent atrial fibrillation (HCC)      HFrEF (heart failure with reduced ejection fraction) (HCC)      Other persistent atrial fibrillation      (See Associated Dx)    Scheduled Providers: Roger Tate M.D.; Hi Crespo D.O. Responsible Provider: Hi Crespo D.O.    Anesthesia Type: MAC ASA Status: 2          Final Anesthesia Type: MAC  Last vitals  BP   Blood Pressure : 108/57    Temp   36.3 °C (97.4 °F)    Pulse   (!) 59   Resp   17    SpO2   97 %      Anesthesia Post Evaluation    Patient location during evaluation: PACU  Patient participation: complete - patient participated  Level of consciousness: awake and alert    Airway patency: patent  Anesthetic complications: no  Cardiovascular status: hemodynamically stable  Respiratory status: acceptable  Hydration status: euvolemic    PONV: none          No notable events documented.     Nurse Pain Score: 0 (NPRS)

## 2023-07-13 NOTE — ANESTHESIA TIME REPORT
Anesthesia Start and Stop Event Times     Date Time Event    7/13/2023 0909 Ready for Procedure     0955 Anesthesia Start     1025 Anesthesia Stop        Responsible Staff  07/13/23    Name Role Begin End    Hi Crespo D.O. Anesth 0955 1025        Overtime Reason:  no overtime (within assigned shift)    Comments:

## 2023-07-13 NOTE — TELEPHONE ENCOUNTER
SURYA    Caller: Mavis Lizarraga    Topic/issue: Patient just got MICHAEL & Cardioversion done today and is wondering if she should start taking her Eliquis and Metoprolol again or when should she start back up?    Callback Number: [phone]    Thank you,  -Rula HERNANDEZ

## 2023-07-17 ENCOUNTER — HOSPITAL ENCOUNTER (OUTPATIENT)
Dept: LAB | Facility: MEDICAL CENTER | Age: 83
End: 2023-07-17
Attending: INTERNAL MEDICINE
Payer: MEDICARE

## 2023-07-17 ENCOUNTER — ANTICOAGULATION VISIT (OUTPATIENT)
Dept: MEDICAL GROUP | Facility: MEDICAL CENTER | Age: 83
End: 2023-07-17
Payer: MEDICARE

## 2023-07-17 VITALS — DIASTOLIC BLOOD PRESSURE: 56 MMHG | SYSTOLIC BLOOD PRESSURE: 105 MMHG | HEART RATE: 73 BPM

## 2023-07-17 DIAGNOSIS — O22.30 DVT (DEEP VEIN THROMBOSIS) IN PREGNANCY: ICD-10-CM

## 2023-07-17 DIAGNOSIS — I48.91 ATRIAL FIBRILLATION WITH RVR (HCC): ICD-10-CM

## 2023-07-17 DIAGNOSIS — E11.65 UNCONTROLLED TYPE 2 DIABETES MELLITUS WITH HYPERGLYCEMIA (HCC): ICD-10-CM

## 2023-07-17 LAB
ALBUMIN SERPL BCP-MCNC: 3.4 G/DL (ref 3.2–4.9)
ALBUMIN/GLOB SERPL: 1.1 G/DL
ALP SERPL-CCNC: 46 U/L (ref 30–99)
ALT SERPL-CCNC: 9 U/L (ref 2–50)
ANION GAP SERPL CALC-SCNC: 9 MMOL/L (ref 7–16)
AST SERPL-CCNC: 10 U/L (ref 12–45)
BASOPHILS # BLD AUTO: 0.6 % (ref 0–1.8)
BASOPHILS # BLD: 0.02 K/UL (ref 0–0.12)
BILIRUB SERPL-MCNC: 0.7 MG/DL (ref 0.1–1.5)
BUN SERPL-MCNC: 7 MG/DL (ref 8–22)
CALCIUM ALBUM COR SERPL-MCNC: 9.8 MG/DL (ref 8.5–10.5)
CALCIUM SERPL-MCNC: 9.3 MG/DL (ref 8.5–10.5)
CHLORIDE SERPL-SCNC: 107 MMOL/L (ref 96–112)
CO2 SERPL-SCNC: 20 MMOL/L (ref 20–33)
CREAT SERPL-MCNC: 0.56 MG/DL (ref 0.5–1.4)
EOSINOPHIL # BLD AUTO: 0.06 K/UL (ref 0–0.51)
EOSINOPHIL NFR BLD: 1.7 % (ref 0–6.9)
ERYTHROCYTE [DISTWIDTH] IN BLOOD BY AUTOMATED COUNT: 45.9 FL (ref 35.9–50)
EST. AVERAGE GLUCOSE BLD GHB EST-MCNC: 266 MG/DL
GFR SERPLBLD CREATININE-BSD FMLA CKD-EPI: 91 ML/MIN/1.73 M 2
GLOBULIN SER CALC-MCNC: 3 G/DL (ref 1.9–3.5)
GLUCOSE SERPL-MCNC: 187 MG/DL (ref 65–99)
HBA1C MFR BLD: 10.9 % (ref 4–5.6)
HCT VFR BLD AUTO: 42.2 % (ref 37–47)
HGB BLD-MCNC: 13.2 G/DL (ref 12–16)
IMM GRANULOCYTES # BLD AUTO: 0.01 K/UL (ref 0–0.11)
IMM GRANULOCYTES NFR BLD AUTO: 0.3 % (ref 0–0.9)
LYMPHOCYTES # BLD AUTO: 1.65 K/UL (ref 1–4.8)
LYMPHOCYTES NFR BLD: 46 % (ref 22–41)
MCH RBC QN AUTO: 28.7 PG (ref 27–33)
MCHC RBC AUTO-ENTMCNC: 31.3 G/DL (ref 32.2–35.5)
MCV RBC AUTO: 91.7 FL (ref 81.4–97.8)
MONOCYTES # BLD AUTO: 0.24 K/UL (ref 0–0.85)
MONOCYTES NFR BLD AUTO: 6.7 % (ref 0–13.4)
NEUTROPHILS # BLD AUTO: 1.61 K/UL (ref 1.82–7.42)
NEUTROPHILS NFR BLD: 44.7 % (ref 44–72)
NRBC # BLD AUTO: 0 K/UL
NRBC BLD-RTO: 0 /100 WBC (ref 0–0.2)
PLATELET # BLD AUTO: 141 K/UL (ref 164–446)
PMV BLD AUTO: 10.2 FL (ref 9–12.9)
POTASSIUM SERPL-SCNC: 4.7 MMOL/L (ref 3.6–5.5)
PROT SERPL-MCNC: 6.4 G/DL (ref 6–8.2)
RBC # BLD AUTO: 4.6 M/UL (ref 4.2–5.4)
SODIUM SERPL-SCNC: 136 MMOL/L (ref 135–145)
WBC # BLD AUTO: 3.6 K/UL (ref 4.8–10.8)

## 2023-07-17 PROCEDURE — 36415 COLL VENOUS BLD VENIPUNCTURE: CPT

## 2023-07-17 PROCEDURE — 80053 COMPREHEN METABOLIC PANEL: CPT

## 2023-07-17 PROCEDURE — 3078F DIAST BP <80 MM HG: CPT | Performed by: INTERNAL MEDICINE

## 2023-07-17 PROCEDURE — 99211 OFF/OP EST MAY X REQ PHY/QHP: CPT | Performed by: INTERNAL MEDICINE

## 2023-07-17 PROCEDURE — 85025 COMPLETE CBC W/AUTO DIFF WBC: CPT

## 2023-07-17 PROCEDURE — 3074F SYST BP LT 130 MM HG: CPT | Performed by: INTERNAL MEDICINE

## 2023-07-17 PROCEDURE — 83036 HEMOGLOBIN GLYCOSYLATED A1C: CPT

## 2023-07-17 NOTE — PROGRESS NOTES
Target end date: Indefinite     Indication: Afib, Hx of DVT     Drug: Eliquis 5 mg BID     CHADsVASC = 6 (Age, sex, hx of VTE, DM)    Health Status Since Last Assessment   Patient underwent cardioversion several days ago.    Patient denies any embolic events (stroke/tia/systemic embolism)    Adherence with DOAC Therapy   Pt has not missed any doses in the average week    Bleeding Risk Assessment     Denies Epistaxis   Pt denies any excessive or unusual bleeding/hematomas.  Pt denies any GI bleeds or hematemesis.  Pt denies any concerning daily headache or sub dural hematoma symptoms.     Pt denies any hematuria    Latest Hemoglobin 13   ETOH overuse - Denies     Creatinine Clearance/Renal Function     Latest CrCl ~ 90 mL/min    Hepatic function   Latest LFTs WNL   Pt denies any history of liver dysfunction      Drug Interactions   Platelets: 141   ASA/other antiplatelets - None   NSAID - Avoids   Other drug interactions - No clinically significant DDI noted   x Verified no anticonvulsant or azole therapy, education provided for future use.     Examination   Blood Pressure - WNL   Symptomatic hypotension - Denies   Significant gait impairment/imbalance/high risk for falls? No obvious impairments    Final Assessment and Recommendations:   Patient appears stable from the anticoagulation standpoint.     Benefits of continued DOAC therapy outweigh risks for this patient   Recommend pt continue with current DOAC therapy - Eliquis 5 mg BID   DOAC is affordable     Other Actions: cmp/ cbc hemogram ordered prior to next visit    Follow up:   Will follow up with patient 4 months.     Katalina Meredith, AndreaD

## 2023-07-20 ENCOUNTER — TELEPHONE (OUTPATIENT)
Dept: MEDICAL GROUP | Facility: LAB | Age: 83
End: 2023-07-20
Payer: MEDICARE

## 2023-07-20 NOTE — TELEPHONE ENCOUNTER
ESTABLISHED PATIENT PRE-VISIT PLANNING     Patient was NOT contacted to complete PVP.     Note: Patient will not be contacted if there is no indication to call.     1.  Reviewed notes from the last few office visits within the medical group: Yes    2.  If any orders were placed at last visit or intended to be done for this visit (i.e. 6 mos follow-up), do we have Results/Consult Notes?           Labs - Labs ordered, completed on 7/17/23 and results are in chart.  Note: If patient appointment is for lab review and patient did not complete labs, check with provider if OK to reschedule patient until labs completed.         Imaging - Imaging was not ordered at last office visit.         Referrals - No referrals were ordered at last office visit.    3. Is this appointment scheduled as a Hospital Follow-Up? Yes, visit was at [facility].     4.  Immunizations were updated in Epic using Reconcile Outside Information activity? Yes    5.  Patient is due for the following Health Maintenance Topics:   Health Maintenance Due   Topic Date Due    Annual Wellness Visit  09/26/2019    IMM HEP B VACCINE (2 of 3 - 19+ 3-dose series) 09/23/2020    IMM ZOSTER VACCINES (3 of 3) 10/21/2020    COVID-19 Vaccine (4 - Pfizer series) 12/10/2021    BONE DENSITY  11/10/2022    FASTING LIPID PROFILE  04/06/2023    URINE ACR / MICROALBUMIN  04/06/2023    DIABETES MONOFILAMENT / LE EXAM  04/08/2023     6.  AHA (Pulse8) form printed for Provider? N/A

## 2023-07-27 ENCOUNTER — OFFICE VISIT (OUTPATIENT)
Dept: CARDIOLOGY | Facility: MEDICAL CENTER | Age: 83
End: 2023-07-27
Attending: NURSE PRACTITIONER
Payer: MEDICARE

## 2023-07-27 VITALS
HEIGHT: 67 IN | HEART RATE: 63 BPM | BODY MASS INDEX: 25.55 KG/M2 | WEIGHT: 162.8 LBS | DIASTOLIC BLOOD PRESSURE: 52 MMHG | OXYGEN SATURATION: 95 % | RESPIRATION RATE: 16 BRPM | SYSTOLIC BLOOD PRESSURE: 104 MMHG

## 2023-07-27 DIAGNOSIS — D68.69 SECONDARY HYPERCOAGULABLE STATE (HCC): ICD-10-CM

## 2023-07-27 DIAGNOSIS — I50.20 ACC/AHA STAGE C SYSTOLIC HEART FAILURE (HCC): ICD-10-CM

## 2023-07-27 DIAGNOSIS — R06.02 SOB (SHORTNESS OF BREATH): ICD-10-CM

## 2023-07-27 DIAGNOSIS — I50.20 HFREF (HEART FAILURE WITH REDUCED EJECTION FRACTION) (HCC): ICD-10-CM

## 2023-07-27 DIAGNOSIS — E78.5 DYSLIPIDEMIA: ICD-10-CM

## 2023-07-27 DIAGNOSIS — I48.91 ATRIAL FIBRILLATION WITH RVR (HCC): ICD-10-CM

## 2023-07-27 DIAGNOSIS — I50.9 HEART FAILURE, NYHA CLASS 2 (HCC): ICD-10-CM

## 2023-07-27 DIAGNOSIS — Z79.899 HIGH RISK MEDICATION USE: ICD-10-CM

## 2023-07-27 DIAGNOSIS — Z79.4 TYPE 2 DIABETES MELLITUS WITHOUT COMPLICATION, WITH LONG-TERM CURRENT USE OF INSULIN (HCC): ICD-10-CM

## 2023-07-27 DIAGNOSIS — E11.9 TYPE 2 DIABETES MELLITUS WITHOUT COMPLICATION, WITH LONG-TERM CURRENT USE OF INSULIN (HCC): ICD-10-CM

## 2023-07-27 PROCEDURE — 99214 OFFICE O/P EST MOD 30 MIN: CPT | Performed by: NURSE PRACTITIONER

## 2023-07-27 PROCEDURE — 3074F SYST BP LT 130 MM HG: CPT | Performed by: NURSE PRACTITIONER

## 2023-07-27 PROCEDURE — 99213 OFFICE O/P EST LOW 20 MIN: CPT | Performed by: NURSE PRACTITIONER

## 2023-07-27 PROCEDURE — 93005 ELECTROCARDIOGRAM TRACING: CPT | Performed by: NURSE PRACTITIONER

## 2023-07-27 PROCEDURE — 99212 OFFICE O/P EST SF 10 MIN: CPT | Performed by: NURSE PRACTITIONER

## 2023-07-27 PROCEDURE — 3078F DIAST BP <80 MM HG: CPT | Performed by: NURSE PRACTITIONER

## 2023-07-27 RX ORDER — EMPAGLIFLOZIN 10 MG/1
10 TABLET, FILM COATED ORAL DAILY
Qty: 30 TABLET | Refills: 11 | Status: SHIPPED
Start: 2023-07-27 | End: 2023-07-31

## 2023-07-27 ASSESSMENT — ENCOUNTER SYMPTOMS
COUGH: 0
PALPITATIONS: 0
ABDOMINAL PAIN: 0
DIZZINESS: 0
CLAUDICATION: 0
PND: 0
MYALGIAS: 0
FEVER: 0
SHORTNESS OF BREATH: 0
ORTHOPNEA: 0

## 2023-07-27 ASSESSMENT — FIBROSIS 4 INDEX: FIB4 SCORE: 1.91

## 2023-07-27 NOTE — PROGRESS NOTES
Chief Complaint   Patient presents with    Atrial Fibrillation     F/V Dx: Atrial fibrillation with RVR (Formerly [facility])      Follow-Up     Dx: HFrEF (heart failure with reduced ejection fraction) (Formerly [facility])         Subjective     Mavis Lizarraga is a 82 y.o. female who presents today for follow up on her heart failure, Afib with her , Vinay.    Patient of Dr. Oh. She was last seen in clinic on 6/15/2023.  During that visit, she was sent for MICHAEL and DCCV for her reoccurrence of A fib.      Patient had her MICHAEL and DCCV on 7/13/2023.  She was successfully cardioverted to sinus rhythm.    She reports since her cardioversion, she is feeling better.  She denies chest pain, palpitations, orthopnea, PND, edema, shortness of breath or dizziness/lightheadedness.    She believes her symptoms were completely related to her A-fib.    Additionally, patient has the following medical problems:     -Hypothyroidism    -Left LE DVT    -History of breast cancer    -Diabetes: On insulin    -Polymyalgia rheumatica on prednisone    Past Medical History:   Diagnosis Date    Anesthesia     poor tolerence to morphine    Arthritis     back    Backpain     Breast cancer (HCC) 1980s    right    Diverticulosis     DVT of deep femoral vein (HCC)     leg    Heart burn     High cholesterol     Pneumonia 02/01/2006    Thyroid condition     Ulcer     Urinary incontinence      Past Surgical History:   Procedure Laterality Date    ANTERIOR AND POSTERIOR REPAIR  6/23/2014    Performed by Lauri Batista M.D. at SURGERY SAME DAY [facility]    BLADDER SLING FEMALE  6/23/2014    Performed by Lauri Batista M.D. at SURGERY SAME DAY [facility]    BREAST RECONSTRUCTION  8/14/2012    Performed by SARTHAK LEVIN at [facility]    BREAST IMPLANT REVISION  8/14/2012    Performed by SARTHAK LEVIN at [facility]    CAPSULECTOMY  8/14/2012    Performed by SARTHAK LEVIN at [facility]    MASTOPEXY  8/14/2012     Performed by SARTHAK LEVIN at SURGERY [facility] ORS    LIPOSUCTION  8/14/2012    Performed by SARTHAK LEVIN at SURGERY [facility] ORS    RHYTIDECTOMY  8/14/2012    Performed by SARTHAK LEVIN at SURGERY [facility] ORS    PLATYSMAPLASTY  8/14/2012    Performed by SARTHAK LEVIN at SURGERY [facility] ORS    BLEPHAROPLASTY  8/14/2012    Performed by SARTHAK LEVIN at SURGERY [facility] ORS    LOW ANTERIOR RESECTION LAPAROSCOPIC  2/10/2010    Performed by KOBI COHN at SURGERY [facility]    COLECTOMY N/A 2009    partial- post colonoscopy perforation- dr cohn    MASTECTOMY Right 1980s    neg nodes; 6 mos adjuvant chemo    OTHER      mastectomy R breast    OTHER ABDOMINAL SURGERY      I&D of ruptured diverticuli     ID BREAST AUGMENTATION WITH IMPLANT      ID BREAST REDUCTION      ID CHEMOTHERAPY, UNSPECIFIED PROCEDURE       Family History   Problem Relation Age of Onset    Heart Disease Mother 64    Hypertension Mother     Diabetes Mother     Stroke Mother     Cancer Mother         Stomach cancer    Heart Disease Maternal Grandmother 63        heart attack     Social History     Socioeconomic History    Marital status:      Spouse name: Not on file    Number of children: Not on file    Years of education: Not on file    Highest education level: 12th grade   Occupational History    Not on file   Tobacco Use    Smoking status: Never    Smokeless tobacco: Never   Vaping Use    Vaping Use: Never used   Substance and Sexual Activity    Alcohol use: Not Currently    Drug use: No    Sexual activity: Yes     Partners: Male   Other Topics Concern    Not on file   Social History Narrative    Not on file     Social Determinants of Health     Financial Resource Strain: Low Risk  (4/13/2021)    Overall Financial Resource Strain (CARDIA)     Difficulty of Paying Living Expenses: Not hard at all   Food Insecurity: No Food Insecurity (4/27/2021)    Hunger Vital Sign     Worried About Running  Out of Food in the Last Year: Never true     Ran Out of Food in the Last Year: Never true   Transportation Needs: Unknown (4/27/2021)    PRAPARE - Transportation     Lack of Transportation (Medical): No     Lack of Transportation (Non-Medical): Not on file   Physical Activity: Inactive (4/27/2021)    Exercise Vital Sign     Days of Exercise per Week: 0 days     Minutes of Exercise per Session: 10 min   Stress: No Stress Concern Present (4/27/2021)    [employer] - Occupational Stress Questionnaire     Feeling of Stress : Only a little   Social Connections: Moderately Isolated (4/27/2021)    Social Connection and Isolation Panel [NHANES]     Frequency of Communication with Friends and Family: Twice a week     Frequency of Social Gatherings with Friends and Family: Once a week     Attends Rastafarian Services: Never     Active Member of Clubs or Organizations: No     Attends Club or Organization Meetings: Never     Marital Status:    Intimate Partner Violence: Not on file   Housing Stability: Unknown (4/27/2021)    Housing Stability Vital Sign     Unable to Pay for Housing in the Last Year: No     Number of Places Lived in the Last Year: Not on file     Unstable Housing in the Last Year: No     Allergies   Allergen Reactions    Metformin Diarrhea and Nausea     NAUSEA VOMITING AND DIARRHEA    Morphine Unspecified     Hallucinations     Outpatient Encounter Medications as of 7/27/2023   Medication Sig Dispense Refill    Empagliflozin (JARDIANCE) 10 MG Tab tablet Take 1 Tablet by mouth every day. 30 Tablet 11    levothyroxine (SYNTHROID) 75 MCG Tab TAKE ONE TABLET BY MOUTH EVERY MORNING ON AN EMPTY STOMACH 90 Tablet 3    metoprolol SR (TOPROL XL) 50 MG TABLET SR 24 HR Take 1.5 Tablets by mouth 2 times a day. 270 Tablet 3    Insulin Pen Needle 32 G x 4 mm Use one pen needle in pen device to inject insulin once daily. 100 Each 0    Lancets Use one Per formulary preference  lancet to test  blood sugar once daily early morning before first meal. 100 Each 3    Blood Glucose Test Strips Use one Per formulary preference  strip to test blood sugar once daily early morning before first meal. 100 Strip 0    insulin glargine 100 UNIT/ML SC SOPN injection Inject 18 Units under the skin every evening. (Patient taking differently: Inject 18 Units under the skin every morning.) 3 mL 3    nitroglycerin (NITROSTAT) 0.4 MG SL Tab Place 1 Tablet under the tongue as needed for Chest Pain. Q 5 min x 3 then 911. 25 Tablet 3    POTASSIUM PO Take 1 Tablet by mouth every morning.      rosuvastatin (CRESTOR) 40 MG tablet TAKE ONE TABLET BY MOUTH DAILY 100 Tablet 3    apixaban (ELIQUIS) 5mg Tab Take 1 Tablet by mouth 2 times a day. 180 Tablet 1    predniSONE (DELTASONE) 5 MG Tab Take 1 Tablet by mouth every day. 30 Tablet 2    omeprazole (PRILOSEC) 20 MG delayed-release capsule Take 1 Capsule by mouth every day. (Patient taking differently: Take 20 mg by mouth 1 time a day as needed. Indications: Heartburn) 30 Capsule 3    cyclobenzaprine (FLEXERIL) 10 mg Tab TAKE ONE TABLET BY MOUTH THREE TIMES A DAY AS NEEDED FOR MILD PAIN (FLEXERIL) 30 Tablet 3    Cholecalciferol (VITAMIN D) 2000 UNIT Tab Take 2,000 Units by mouth every day.      Ascorbic Acid (VITAMIN C) 1000 MG Tab Take 1,000 mg by mouth every morning.      Cyanocobalamin (VITAMIN B-12 PO) Take 1 tablet by mouth every morning.      CINNAMON PO Take 1 tablet by mouth every morning.      Multiple Vitamins-Minerals (ICAPS AREDS FORMULA PO) Take 1 tablet by mouth every morning.      Omega-3 Fatty Acids (FISH OIL PO) Take 1 Cap by mouth every day.       No facility-administered encounter medications on file as of 7/27/2023.     Review of Systems   Constitutional:  Negative for fever and malaise/fatigue.   Respiratory:  Negative for cough and shortness of breath.    Cardiovascular:  Negative for chest pain, palpitations, orthopnea, claudication, leg swelling and PND.  "  Gastrointestinal:  Negative for abdominal pain.   Musculoskeletal:  Negative for myalgias.   Neurological:  Negative for dizziness.   All other systems reviewed and are negative.             Objective     /52 (BP Location: Left arm, Patient Position: Sitting, BP Cuff Size: Adult)   Pulse 63   Resp 16   Ht 1.702 m (5' 7\")   Wt 73.8 kg (162 lb 12.8 oz)   LMP  (LMP Unknown)   SpO2 95%   BMI 25.50 kg/m²     Physical Exam  Vitals reviewed.   Constitutional:       Appearance: She is well-developed.   HENT:      Head: Normocephalic and atraumatic.   Eyes:      Pupils: Pupils are equal, round, and reactive to light.   Neck:      Vascular: No JVD.   Cardiovascular:      Rate and Rhythm: Normal rate and regular rhythm.      Heart sounds: Normal heart sounds.   Pulmonary:      Effort: Pulmonary effort is normal. No respiratory distress.      Breath sounds: Normal breath sounds. No wheezing or rales.   Abdominal:      General: Bowel sounds are normal.      Palpations: Abdomen is soft.   Musculoskeletal:         General: Normal range of motion.      Cervical back: Normal range of motion and neck supple.      Right lower leg: No edema.      Left lower leg: No edema.   Skin:     General: Skin is warm and dry.   Neurological:      General: No focal deficit present.      Mental Status: She is alert and oriented to person, place, and time.   Psychiatric:         Behavior: Behavior normal.       Lab Results   Component Value Date/Time    CHOLSTRLTOT 158 04/06/2022 06:19 AM    LDL 78 04/06/2022 06:19 AM    HDL 53 04/06/2022 06:19 AM    TRIGLYCERIDE 137 04/06/2022 06:19 AM       Lab Results   Component Value Date/Time    SODIUM 136 07/17/2023 06:41 AM    POTASSIUM 4.7 07/17/2023 06:41 AM    CHLORIDE 107 07/17/2023 06:41 AM    CO2 20 07/17/2023 06:41 AM    GLUCOSE 187 (H) 07/17/2023 06:41 AM    BUN 7 (L) 07/17/2023 06:41 AM    CREATININE 0.56 07/17/2023 06:41 AM     Lab Results   Component Value Date/Time    ALKPHOSPHAT 46 " 07/17/2023 06:41 AM    ASTSGOT 10 (L) 07/17/2023 06:41 AM    ALTSGPT 9 07/17/2023 06:41 AM    TBILIRUBIN 0.7 07/17/2023 06:41 AM       Transthoracic Echo Report 3/2/2023  No prior study is available for comparison.   Mildly reduced left ventricular systolic function.  The left ventricular ejection fraction is visually estimated to be 40%.  Estimated right ventricular systolic pressure is 30 mmHg.       Myocardial Perfusion Report 5/5/2023   NUCLEAR IMAGING INTERPRETATION   No evidence of significant jeopardized viable myocardium or prior myocardial infarction.   Normal left ventricular size, ejection fraction, and wall motion.   ECG INTERPRETATION   No ischemic changes on pharmacologic stress test.         Assessment & Plan     1. Atrial fibrillation with RVR ([facility])  EKG    Empagliflozin (JARDIANCE) 10 MG Tab tablet    Basic Metabolic Panel    proBrain Natriuretic Peptide, NT    EC-ECHOCARDIOGRAM COMPLETE W/O CONT      2. SOB (shortness of breath)  proBrain Natriuretic Peptide, NT      3. HFrEF (heart failure with reduced ejection fraction) ([facility])  Empagliflozin (JARDIANCE) 10 MG Tab tablet    Basic Metabolic Panel    proBrain Natriuretic Peptide, NT    EC-ECHOCARDIOGRAM COMPLETE W/O CONT      4. High risk medication use  Basic Metabolic Panel    EC-ECHOCARDIOGRAM COMPLETE W/O CONT      5. Heart failure, NYHA class 2 ([facility])        6. ACC/AHA stage C systolic heart failure (HCC)        7. Dyslipidemia        8. Type 2 diabetes mellitus without complication, with long-term current use of insulin ([facility])        9. Secondary hypercoagulable state ([facility])            Medical Decision Making: Today's Assessment/Status/Plan:        HFmrEF, Stage C, Class 1, LVEF 40%: Based on physical examination findings, patient is euvolemic. No JVD, lungs are clear to auscultation, no pitting edema in bilateral lower extremities, no ascites.   -Heart failure due to tachycardia mediated, had negative stress test  -Discussed Heart failure  trajectory and prognosis with patient. Will continue to optimize medical therapy as tolerated. Advanced HF treatment, need for remote monitoring consideration at every visit.   -ACE-I/ARB/ARNI: Consider if BP allows  -Evidence Based Beta-blocker: Continue metoprolol SR 75 mg twice a day  -Aldosterone Antagonist: Consider if BP allows  -Diuretic: Consider as needed  -SGLT2 inhibitor: Discussed purpose of this medication, she is agreeable.  Start Jardiance 10 mg daily, may need to increase to 25 for her history of diabetes  -Other: Consider as needed (Hydralazine/Isosorbide, Vericiguat, Corlanor)  -Labs: BMP, NT proBNP in 1 month.  Will continue to closely monitor for side effects of patient's high risk medication(s) including renal function, liver function NTproBNP/cardiac markers, and electrolytes as needed  -discussed importance of exercise and regular activity  -Discussed/reviewed maintaining a low Sodium and hydration recommendations  -ICD not indicated at this time  -Patient repeat echo in 3 months  -Reinforced s/sx of worsening heart failure with patient and weight monitoring. Pt verbalizes understanding. Pt to call office or RTC if present.    -PUMP line number [phone] (PUMP) reviewed with patient  -Heart Failure Education:   Discussed the Definition of heart failure (linking disease, symptoms, and treatment) and causes of heart failure  Recognition of escalating symptoms and concrete plan for response to particular symptoms  Risk modification for heart failure progression  Specific diet recommendations: Continue low-sodium diet   Activity as tolerated  Importance of treatment adherence  Behavioral strategies to promote treatment adherence  -Advanced care planning: Advance directive on file  -Pharmacotherapy Referral: Not referred at this time  -Compliance Barriers: None  -Genetic testing Consideration: None  -Consideration for LVAD and/or Heart transplant as necessary      Persistent A-fib:  -s/p DCCV on  7/13/2023  -EKG SR 60  -Continue Eliquis 5 mg twice a day  -Continue metoprolol SR 75 mg twice a day  -[id number] score is  5 (Age, Sex, HF, DM)    Dyslipidemia:  -Continue rosuvastatin 40 mg daily  -Last LDL 78 on 4/6/2022    Diabetes: Uncontrolled  -Last A1c 10.9 on 7/17/2023  -Followed by PCP  -Continue insulin  -Jardiance added to her regimen today    FU in clinic in 3 months after echo and labs. Sooner if needed.    Patient verbalizes understanding and agrees with the plan of care.     PLEASE NOTE: This Note was created using voice recognition Software. I have made every reasonable attempt to correct obvious errors, but I expect that there are errors of grammar and possibly content that I did not discover before finalizing the note

## 2023-07-28 ENCOUNTER — OFFICE VISIT (OUTPATIENT)
Dept: MEDICAL GROUP | Facility: LAB | Age: 83
End: 2023-07-28
Payer: MEDICARE

## 2023-07-28 ENCOUNTER — HOSPITAL ENCOUNTER (OUTPATIENT)
Facility: MEDICAL CENTER | Age: 83
End: 2023-07-28
Attending: INTERNAL MEDICINE
Payer: MEDICARE

## 2023-07-28 VITALS
TEMPERATURE: 97.3 F | BODY MASS INDEX: 26.2 KG/M2 | HEIGHT: 66 IN | WEIGHT: 163 LBS | OXYGEN SATURATION: 94 % | SYSTOLIC BLOOD PRESSURE: 110 MMHG | RESPIRATION RATE: 12 BRPM | DIASTOLIC BLOOD PRESSURE: 58 MMHG | HEART RATE: 76 BPM

## 2023-07-28 DIAGNOSIS — E78.2 MIXED HYPERLIPIDEMIA: ICD-10-CM

## 2023-07-28 DIAGNOSIS — E11.65 UNCONTROLLED TYPE 2 DIABETES MELLITUS WITH HYPERGLYCEMIA (HCC): ICD-10-CM

## 2023-07-28 DIAGNOSIS — M35.3 PMR (POLYMYALGIA RHEUMATICA) (HCC): ICD-10-CM

## 2023-07-28 LAB — EKG IMPRESSION: NORMAL

## 2023-07-28 PROCEDURE — 93010 ELECTROCARDIOGRAM REPORT: CPT | Performed by: INTERNAL MEDICINE

## 2023-07-28 PROCEDURE — 82043 UR ALBUMIN QUANTITATIVE: CPT

## 2023-07-28 PROCEDURE — 3074F SYST BP LT 130 MM HG: CPT | Performed by: INTERNAL MEDICINE

## 2023-07-28 PROCEDURE — 99214 OFFICE O/P EST MOD 30 MIN: CPT | Performed by: INTERNAL MEDICINE

## 2023-07-28 PROCEDURE — 3078F DIAST BP <80 MM HG: CPT | Performed by: INTERNAL MEDICINE

## 2023-07-28 PROCEDURE — 82570 ASSAY OF URINE CREATININE: CPT

## 2023-07-28 RX ORDER — PREDNISONE 5 MG/1
5 TABLET ORAL DAILY
Qty: 30 TABLET | Refills: 2 | Status: SHIPPED | OUTPATIENT
Start: 2023-07-28 | End: 2023-08-21 | Stop reason: SDUPTHER

## 2023-07-28 ASSESSMENT — FIBROSIS 4 INDEX: FIB4 SCORE: 1.91

## 2023-07-29 DIAGNOSIS — E11.65 UNCONTROLLED TYPE 2 DIABETES MELLITUS WITH HYPERGLYCEMIA (HCC): ICD-10-CM

## 2023-07-29 LAB
CREAT UR-MCNC: 29.39 MG/DL
MICROALBUMIN UR-MCNC: <1.2 MG/DL
MICROALBUMIN/CREAT UR: NORMAL MG/G (ref 0–30)

## 2023-07-29 NOTE — PROGRESS NOTES
CC: Mavis Lizarraga is a 82 y.o. female is suffering from   Chief Complaint   Patient presents with    Atrial Fibrillation     2 months follow up    Diabetes     Discuss getting off of insulin since start of jardiance    Arthritis     Increased arthritis pain         SUBJECTIVE:  1. Uncontrolled type 2 diabetes mellitus with hyperglycemia (HCC)  Mavis is here for follow-up has a history of type 2 diabetes, is to start on Jardiance, we will cut her insulin in half down to 8 units at night    2. Mixed hyperlipidemia- not at goal on zocor 40 mg- switch to crestor 40 mg/d  Continue current medical therapy    3. PMR (polymyalgia rheumatica) (HCC)  Possible recurrent polymyalgia rheumatica continue at current dose of prednisone        Past social, family, history: Boyfriend  Social History     Tobacco Use    Smoking status: Never    Smokeless tobacco: Never   Vaping Use    Vaping Use: Never used   Substance Use Topics    Alcohol use: Not Currently    Drug use: No         MEDICATIONS:    Current Outpatient Medications:     insulin glargine 100 UNIT/ML SC SOPN injection, Inject 8 Units under the skin every evening., Disp: 3 mL, Rfl: 3    predniSONE (DELTASONE) 5 MG Tab, Take 1 Tablet by mouth every day., Disp: 30 Tablet, Rfl: 2    Empagliflozin (JARDIANCE) 10 MG Tab tablet, Take 1 Tablet by mouth every day., Disp: 30 Tablet, Rfl: 11    levothyroxine (SYNTHROID) 75 MCG Tab, TAKE ONE TABLET BY MOUTH EVERY MORNING ON AN EMPTY STOMACH, Disp: 90 Tablet, Rfl: 3    metoprolol SR (TOPROL XL) 50 MG TABLET SR 24 HR, Take 1.5 Tablets by mouth 2 times a day., Disp: 270 Tablet, Rfl: 3    nitroglycerin (NITROSTAT) 0.4 MG SL Tab, Place 1 Tablet under the tongue as needed for Chest Pain. Q 5 min x 3 then 911., Disp: 25 Tablet, Rfl: 3    POTASSIUM PO, Take 1 Tablet by mouth every morning., Disp: , Rfl:     rosuvastatin (CRESTOR) 40 MG tablet, TAKE ONE TABLET BY MOUTH DAILY, Disp: 100 Tablet, Rfl: 3    apixaban (ELIQUIS) 5mg Tab, Take 1  Tablet by mouth 2 times a day., Disp: 180 Tablet, Rfl: 1    omeprazole (PRILOSEC) 20 MG delayed-release capsule, Take 1 Capsule by mouth every day. (Patient taking differently: Take 20 mg by mouth 1 time a day as needed. Indications: Heartburn), Disp: 30 Capsule, Rfl: 3    cyclobenzaprine (FLEXERIL) 10 mg Tab, TAKE ONE TABLET BY MOUTH THREE TIMES A DAY AS NEEDED FOR MILD PAIN (FLEXERIL), Disp: 30 Tablet, Rfl: 3    Cholecalciferol (VITAMIN D) 2000 UNIT Tab, Take 2,000 Units by mouth every day., Disp: , Rfl:     Ascorbic Acid (VITAMIN C) 1000 MG Tab, Take 1,000 mg by mouth every morning., Disp: , Rfl:     Cyanocobalamin (VITAMIN B-12 PO), Take 1 tablet by mouth every morning., Disp: , Rfl:     CINNAMON PO, Take 1 tablet by mouth every morning., Disp: , Rfl:     Multiple Vitamins-Minerals (ICAPS AREDS FORMULA PO), Take 1 tablet by mouth every morning., Disp: , Rfl:     Omega-3 Fatty Acids (FISH OIL PO), Take 1 Cap by mouth every day., Disp: , Rfl:     Insulin Pen Needle 32 G x 4 mm, Use one pen needle in pen device to inject insulin once daily., Disp: 100 Each, Rfl: 0    Lancets, Use one Per formulary preference  lancet to test blood sugar once daily early morning before first meal., Disp: 100 Each, Rfl: 3    Blood Glucose Test Strips, Use one Per formulary preference  strip to test blood sugar once daily early morning before first meal., Disp: 100 Strip, Rfl: 0    PROBLEMS:  Patient Active Problem List    Diagnosis Date Noted    Secondary hypercoagulable state ([facility]) 06/15/2023    HFrEF (heart failure with reduced ejection fraction) ([facility]) 04/26/2023    Angina pectoris, unspecified ([facility]) 04/26/2023    DVT (deep vein thrombosis) in pregnancy 03/01/2023    Hypokalemia 03/01/2023    Atrial fibrillation with RVR ([facility]) 02/28/2023    Polymyalgia rheumatica ([facility]) 02/28/2023    Vitamin D deficiency 04/12/2021    Cervical spinal stenosis 04/11/2021    Pubic ramus fracture ([facility]) 04/09/2021    History of breast cancer     Spasm of  "back muscles 03/27/2019    Uncontrolled type 2 diabetes mellitus with hyperglycemia (HCC) 01/28/2016    On statin therapy due to risk of future cardiovascular event 01/15/2013    Acquired hypothyroidism 01/15/2013    Gastroesophageal reflux disease with esophagitis 01/15/2013       REVIEW OF SYSTEMS:  Gen.:  No Nausea, Vomiting, fever, Chills.  Heart: No chest pain.  Lungs:  No shortness of Breath.  Psychological: Rajan unusual Anxiety depression     PHYSICAL EXAM   Constitutional: Alert, cooperative, not in acute distress.  Cardiovascular:  Rate Rhythm is regular without murmurs rubs clicks.     Thorax & Lungs: Clear to auscultation, no wheezing, rhonchi, or rales  HENT: Normocephalic, Atraumatic.  Eyes: PERRLA, EOMI, Conjunctiva normal.   Neck: Trachia is midline no swelling of the thyroid.   Lymphatic: No lymphadenopathy noted.   Neurologic: Alert & oriented x 3, cranial nerves II through XII are intact, Normal motor function, Normal sensory function, No focal deficits noted.   Psychiatric: Affect normal, Judgment normal, Mood normal.     VITAL SIGNS:/58   Pulse 76   Temp 36.3 °C (97.3 °F) (Temporal)   Resp 12   Ht 1.676 m (5' 6\")   Wt 73.9 kg (163 lb)   LMP  (LMP Unknown)   SpO2 94%   BMI 26.31 kg/m²     Labs: Reviewed    Assessment:                                                     Plan:    1. Uncontrolled type 2 diabetes mellitus with hyperglycemia (HCC)  Continue insulin at 8 units start Jardiance  - POCT Microalbumin Creat Ratio Urine; Future  - insulin glargine 100 UNIT/ML SC SOPN injection; Inject 8 Units under the skin every evening.  Dispense: 3 mL; Refill: 3    2. Mixed hyperlipidemia- not at goal on zocor 40 mg- switch to crestor 40 mg/d  No change in medical therapy  - insulin glargine 100 UNIT/ML SC SOPN injection; Inject 8 Units under the skin every evening.  Dispense: 3 mL; Refill: 3    3. PMR (polymyalgia rheumatica) (HCC)  Continue prednisone 5 mg each day  - predniSONE " (DELTASONE) 5 MG Tab; Take 1 Tablet by mouth every day.  Dispense: 30 Tablet; Refill: 2

## 2023-07-31 ENCOUNTER — HOSPITAL ENCOUNTER (OUTPATIENT)
Dept: LAB | Facility: MEDICAL CENTER | Age: 83
End: 2023-07-31
Attending: INTERNAL MEDICINE
Payer: MEDICARE

## 2023-07-31 ENCOUNTER — PATIENT MESSAGE (OUTPATIENT)
Dept: MEDICAL GROUP | Facility: LAB | Age: 83
End: 2023-07-31

## 2023-07-31 ENCOUNTER — OFFICE VISIT (OUTPATIENT)
Dept: MEDICAL GROUP | Facility: LAB | Age: 83
End: 2023-07-31
Payer: MEDICARE

## 2023-07-31 VITALS
WEIGHT: 158 LBS | TEMPERATURE: 97.1 F | DIASTOLIC BLOOD PRESSURE: 60 MMHG | BODY MASS INDEX: 25.39 KG/M2 | HEIGHT: 66 IN | RESPIRATION RATE: 12 BRPM | HEART RATE: 67 BPM | SYSTOLIC BLOOD PRESSURE: 110 MMHG | OXYGEN SATURATION: 95 %

## 2023-07-31 DIAGNOSIS — R21 MACULOPAPULAR RASH: ICD-10-CM

## 2023-07-31 DIAGNOSIS — R79.89 ABNORMAL CBC: ICD-10-CM

## 2023-07-31 LAB
ALBUMIN SERPL BCP-MCNC: 4.2 G/DL (ref 3.2–4.9)
ALBUMIN/GLOB SERPL: 1.4 G/DL
ALP SERPL-CCNC: 59 U/L (ref 30–99)
ALT SERPL-CCNC: 17 U/L (ref 2–50)
ANION GAP SERPL CALC-SCNC: 13 MMOL/L (ref 7–16)
AST SERPL-CCNC: 13 U/L (ref 12–45)
BASOPHILS # BLD AUTO: 0.3 % (ref 0–1.8)
BASOPHILS # BLD: 0.03 K/UL (ref 0–0.12)
BILIRUB SERPL-MCNC: 1 MG/DL (ref 0.1–1.5)
BUN SERPL-MCNC: 19 MG/DL (ref 8–22)
CALCIUM ALBUM COR SERPL-MCNC: 10 MG/DL (ref 8.5–10.5)
CALCIUM SERPL-MCNC: 10.2 MG/DL (ref 8.5–10.5)
CHLORIDE SERPL-SCNC: 100 MMOL/L (ref 96–112)
CO2 SERPL-SCNC: 26 MMOL/L (ref 20–33)
CREAT SERPL-MCNC: 0.62 MG/DL (ref 0.5–1.4)
EOSINOPHIL # BLD AUTO: 0.14 K/UL (ref 0–0.51)
EOSINOPHIL NFR BLD: 1.6 % (ref 0–6.9)
ERYTHROCYTE [DISTWIDTH] IN BLOOD BY AUTOMATED COUNT: 45.3 FL (ref 35.9–50)
GFR SERPLBLD CREATININE-BSD FMLA CKD-EPI: 89 ML/MIN/1.73 M 2
GLOBULIN SER CALC-MCNC: 2.9 G/DL (ref 1.9–3.5)
GLUCOSE SERPL-MCNC: 320 MG/DL (ref 65–99)
HCT VFR BLD AUTO: 47.5 % (ref 37–47)
HGB BLD-MCNC: 15.2 G/DL (ref 12–16)
IMM GRANULOCYTES # BLD AUTO: 0.03 K/UL (ref 0–0.11)
IMM GRANULOCYTES NFR BLD AUTO: 0.3 % (ref 0–0.9)
LYMPHOCYTES # BLD AUTO: 1.09 K/UL (ref 1–4.8)
LYMPHOCYTES NFR BLD: 12.5 % (ref 22–41)
MCH RBC QN AUTO: 28.1 PG (ref 27–33)
MCHC RBC AUTO-ENTMCNC: 32 G/DL (ref 32.2–35.5)
MCV RBC AUTO: 87.8 FL (ref 81.4–97.8)
MONOCYTES # BLD AUTO: 0.45 K/UL (ref 0–0.85)
MONOCYTES NFR BLD AUTO: 5.2 % (ref 0–13.4)
NEUTROPHILS # BLD AUTO: 6.98 K/UL (ref 1.82–7.42)
NEUTROPHILS NFR BLD: 80.1 % (ref 44–72)
NRBC # BLD AUTO: 0 K/UL
NRBC BLD-RTO: 0 /100 WBC (ref 0–0.2)
PLATELET # BLD AUTO: 216 K/UL (ref 164–446)
PMV BLD AUTO: 10.2 FL (ref 9–12.9)
POTASSIUM SERPL-SCNC: 4.7 MMOL/L (ref 3.6–5.5)
PROT SERPL-MCNC: 7.1 G/DL (ref 6–8.2)
RBC # BLD AUTO: 5.41 M/UL (ref 4.2–5.4)
SODIUM SERPL-SCNC: 139 MMOL/L (ref 135–145)
WBC # BLD AUTO: 8.7 K/UL (ref 4.8–10.8)

## 2023-07-31 PROCEDURE — 93312 ECHO TRANSESOPHAGEAL: CPT | Mod: 26 | Performed by: INTERNAL MEDICINE

## 2023-07-31 PROCEDURE — 99214 OFFICE O/P EST MOD 30 MIN: CPT | Performed by: INTERNAL MEDICINE

## 2023-07-31 PROCEDURE — 36415 COLL VENOUS BLD VENIPUNCTURE: CPT

## 2023-07-31 PROCEDURE — 3074F SYST BP LT 130 MM HG: CPT | Performed by: INTERNAL MEDICINE

## 2023-07-31 PROCEDURE — 80053 COMPREHEN METABOLIC PANEL: CPT

## 2023-07-31 PROCEDURE — 3078F DIAST BP <80 MM HG: CPT | Performed by: INTERNAL MEDICINE

## 2023-07-31 PROCEDURE — 85025 COMPLETE CBC W/AUTO DIFF WBC: CPT

## 2023-07-31 RX ORDER — DIPHENHYDRAMINE HCL 25 MG
25 CAPSULE ORAL EVERY 6 HOURS PRN
Qty: 30 CAPSULE | Refills: 3
Start: 2023-07-31

## 2023-07-31 RX ORDER — BETAMETHASONE DIPROPIONATE 0.5 MG/ML
3 LOTION, AUGMENTED TOPICAL 2 TIMES DAILY PRN
Qty: 60 ML | Refills: 0 | Status: SHIPPED | OUTPATIENT
Start: 2023-07-31 | End: 2023-11-20

## 2023-07-31 ASSESSMENT — FIBROSIS 4 INDEX: FIB4 SCORE: 1.91

## 2023-07-31 NOTE — PROGRESS NOTES
CC: Mavis Lizarraga is a 82 y.o. female is suffering from   Chief Complaint   Patient presents with    Rash     Rash all over starting last night, possible reaction to Jardiance         SUBJECTIVE:  1. Maculopapular rash  Mavis is here for follow-up has a maculopapular rash likely secondary to the use of Jardiance.  I have instructed the patient this needs to be stopped immediately.  Is to really increase her insulin up to 18 units    2. Abnormal CBC  Recheck CBC        Past social, family, history: Boyfriend  Social History     Tobacco Use    Smoking status: Never    Smokeless tobacco: Never   Vaping Use    Vaping Use: Never used   Substance Use Topics    Alcohol use: Not Currently    Drug use: No         MEDICATIONS:    Current Outpatient Medications:     diphenhydrAMINE (BENADRYL) 25 MG capsule, Take 1 Capsule by mouth every 6 hours as needed for Rash., Disp: 30 Capsule, Rfl: 3    betamethasone, augmented, (DIPROLENE) 0.05 % lotion, Apply 3 mL topically 2 times a day as needed (itch)., Disp: 60 mL, Rfl: 0    insulin glargine 100 UNIT/ML SC SOPN injection, Inject 8 Units under the skin every evening., Disp: 3 mL, Rfl: 3    predniSONE (DELTASONE) 5 MG Tab, Take 1 Tablet by mouth every day., Disp: 30 Tablet, Rfl: 2    levothyroxine (SYNTHROID) 75 MCG Tab, TAKE ONE TABLET BY MOUTH EVERY MORNING ON AN EMPTY STOMACH, Disp: 90 Tablet, Rfl: 3    metoprolol SR (TOPROL XL) 50 MG TABLET SR 24 HR, Take 1.5 Tablets by mouth 2 times a day., Disp: 270 Tablet, Rfl: 3    nitroglycerin (NITROSTAT) 0.4 MG SL Tab, Place 1 Tablet under the tongue as needed for Chest Pain. Q 5 min x 3 then 911., Disp: 25 Tablet, Rfl: 3    POTASSIUM PO, Take 1 Tablet by mouth every morning., Disp: , Rfl:     rosuvastatin (CRESTOR) 40 MG tablet, TAKE ONE TABLET BY MOUTH DAILY, Disp: 100 Tablet, Rfl: 3    apixaban (ELIQUIS) 5mg Tab, Take 1 Tablet by mouth 2 times a day., Disp: 180 Tablet, Rfl: 1    omeprazole (PRILOSEC) 20 MG delayed-release capsule,  Take 1 Capsule by mouth every day. (Patient taking differently: Take 20 mg by mouth 1 time a day as needed. Indications: Heartburn), Disp: 30 Capsule, Rfl: 3    cyclobenzaprine (FLEXERIL) 10 mg Tab, TAKE ONE TABLET BY MOUTH THREE TIMES A DAY AS NEEDED FOR MILD PAIN (FLEXERIL), Disp: 30 Tablet, Rfl: 3    Cholecalciferol (VITAMIN D) 2000 UNIT Tab, Take 2,000 Units by mouth every day., Disp: , Rfl:     Ascorbic Acid (VITAMIN C) 1000 MG Tab, Take 1,000 mg by mouth every morning., Disp: , Rfl:     Cyanocobalamin (VITAMIN B-12 PO), Take 1 tablet by mouth every morning., Disp: , Rfl:     CINNAMON PO, Take 1 tablet by mouth every morning., Disp: , Rfl:     Multiple Vitamins-Minerals (ICAPS AREDS FORMULA PO), Take 1 tablet by mouth every morning., Disp: , Rfl:     Omega-3 Fatty Acids (FISH OIL PO), Take 1 Cap by mouth every day., Disp: , Rfl:     Insulin Pen Needle 32 G x 4 mm, Use one pen needle in pen device to inject insulin once daily., Disp: 100 Each, Rfl: 0    Lancets, Use one Per formulary preference  lancet to test blood sugar once daily early morning before first meal., Disp: 100 Each, Rfl: 3    Blood Glucose Test Strips, Use one Per formulary preference  strip to test blood sugar once daily early morning before first meal., Disp: 100 Strip, Rfl: 0    PROBLEMS:  Patient Active Problem List    Diagnosis Date Noted    Secondary hypercoagulable state ([facility]) 06/15/2023    HFrEF (heart failure with reduced ejection fraction) ([facility]) 04/26/2023    Angina pectoris, unspecified ([facility]) 04/26/2023    DVT (deep vein thrombosis) in pregnancy 03/01/2023    Hypokalemia 03/01/2023    Atrial fibrillation with RVR ([facility]) 02/28/2023    Polymyalgia rheumatica ([facility]) 02/28/2023    Vitamin D deficiency 04/12/2021    Cervical spinal stenosis 04/11/2021    Pubic ramus fracture ([facility]) 04/09/2021    History of breast cancer     Spasm of back muscles 03/27/2019    Uncontrolled type 2 diabetes mellitus with hyperglycemia ([facility]) 01/28/2016    On  "statin therapy due to risk of future cardiovascular event 01/15/2013    Acquired hypothyroidism 01/15/2013    Gastroesophageal reflux disease with esophagitis 01/15/2013       REVIEW OF SYSTEMS:  Gen.:  No Nausea, Vomiting, fever, Chills.  Heart: No chest pain.  Lungs:  No shortness of Breath.  Psychological: Rajan unusual Anxiety depression     PHYSICAL EXAM   Constitutional: Alert, cooperative, not in acute distress.  Cardiovascular:  Rate Rhythm is regular without murmurs rubs clicks.     Thorax & Lungs: Clear to auscultation, no wheezing, rhonchi, or rales  HENT: Normocephalic, Atraumatic.  Eyes: PERRLA, EOMI, Conjunctiva normal.   Neck: Trachia is midline no swelling of the thyroid.   Lymphatic: No lymphadenopathy noted.   Neurologic: Alert & oriented x 3, cranial nerves II through XII are intact, Normal motor function, Normal sensory function, No focal deficits noted.   Psychiatric: Affect normal, Judgment normal, Mood normal.     VITAL SIGNS:/60   Pulse 67   Temp 36.2 °C (97.1 °F) (Temporal)   Resp 12   Ht 1.676 m (5' 6\")   Wt 71.7 kg (158 lb)   LMP  (LMP Unknown)   SpO2 95%   BMI 25.50 kg/m²     Labs: Reviewed    Assessment:                                                     Plan:    1. Maculopapular rash  Macular papular rash likely secondary to the use of Jardiance, medication was stopped and it is an allergy  - Comp Metabolic Panel; Future  - CBC WITH DIFFERENTIAL; Future  - diphenhydrAMINE (BENADRYL) 25 MG capsule; Take 1 Capsule by mouth every 6 hours as needed for Rash.  Dispense: 30 Capsule; Refill: 3  - betamethasone, augmented, (DIPROLENE) 0.05 % lotion; Apply 3 mL topically 2 times a day as needed (itch).  Dispense: 60 mL; Refill: 0    2. Abnormal CBC  Recheck CBC  - Comp Metabolic Panel; Future  - CBC WITH DIFFERENTIAL; Future        "

## 2023-08-01 ENCOUNTER — HOSPITAL ENCOUNTER (OUTPATIENT)
Dept: CARDIOLOGY | Facility: MEDICAL CENTER | Age: 83
End: 2023-08-01
Attending: NURSE PRACTITIONER
Payer: MEDICARE

## 2023-08-01 DIAGNOSIS — I48.91 ATRIAL FIBRILLATION WITH RVR (HCC): ICD-10-CM

## 2023-08-01 DIAGNOSIS — Z79.899 HIGH RISK MEDICATION USE: ICD-10-CM

## 2023-08-01 DIAGNOSIS — I50.20 HFREF (HEART FAILURE WITH REDUCED EJECTION FRACTION) (HCC): ICD-10-CM

## 2023-08-01 LAB — LV EJECT FRACT  99904: 70

## 2023-08-01 PROCEDURE — 93306 TTE W/DOPPLER COMPLETE: CPT | Mod: 26 | Performed by: INTERNAL MEDICINE

## 2023-08-01 PROCEDURE — 93306 TTE W/DOPPLER COMPLETE: CPT

## 2023-08-20 ENCOUNTER — PATIENT MESSAGE (OUTPATIENT)
Dept: MEDICAL GROUP | Facility: LAB | Age: 83
End: 2023-08-20
Payer: MEDICARE

## 2023-08-20 DIAGNOSIS — M35.3 PMR (POLYMYALGIA RHEUMATICA) (HCC): ICD-10-CM

## 2023-08-20 RX ORDER — PREDNISONE 5 MG/1
5 TABLET ORAL DAILY
Qty: 30 TABLET | Refills: 2 | Status: CANCELLED | OUTPATIENT
Start: 2023-08-20

## 2023-08-21 RX ORDER — PREDNISONE 5 MG/1
5 TABLET ORAL DAILY
Qty: 30 TABLET | Refills: 2 | Status: SHIPPED | OUTPATIENT
Start: 2023-08-21 | End: 2023-10-26

## 2023-09-06 ENCOUNTER — HOSPITAL ENCOUNTER (OUTPATIENT)
Dept: LAB | Facility: MEDICAL CENTER | Age: 83
End: 2023-09-06
Attending: INTERNAL MEDICINE
Payer: MEDICARE

## 2023-09-06 ENCOUNTER — HOSPITAL ENCOUNTER (OUTPATIENT)
Dept: LAB | Facility: MEDICAL CENTER | Age: 83
End: 2023-09-06
Attending: NURSE PRACTITIONER
Payer: MEDICARE

## 2023-09-06 DIAGNOSIS — R06.02 SOB (SHORTNESS OF BREATH): ICD-10-CM

## 2023-09-06 DIAGNOSIS — I50.20 HFREF (HEART FAILURE WITH REDUCED EJECTION FRACTION) (HCC): ICD-10-CM

## 2023-09-06 DIAGNOSIS — O22.30 DVT (DEEP VEIN THROMBOSIS) IN PREGNANCY: ICD-10-CM

## 2023-09-06 DIAGNOSIS — I48.91 ATRIAL FIBRILLATION WITH RVR (HCC): ICD-10-CM

## 2023-09-06 LAB
ALBUMIN SERPL BCP-MCNC: 3.9 G/DL (ref 3.2–4.9)
ALBUMIN/GLOB SERPL: 1.6 G/DL
ALP SERPL-CCNC: 48 U/L (ref 30–99)
ALT SERPL-CCNC: 17 U/L (ref 2–50)
ANION GAP SERPL CALC-SCNC: 9 MMOL/L (ref 7–16)
AST SERPL-CCNC: 15 U/L (ref 12–45)
BILIRUB SERPL-MCNC: 0.7 MG/DL (ref 0.1–1.5)
BUN SERPL-MCNC: 12 MG/DL (ref 8–22)
CALCIUM ALBUM COR SERPL-MCNC: 9.1 MG/DL (ref 8.5–10.5)
CALCIUM SERPL-MCNC: 9 MG/DL (ref 8.5–10.5)
CHLORIDE SERPL-SCNC: 103 MMOL/L (ref 96–112)
CO2 SERPL-SCNC: 25 MMOL/L (ref 20–33)
CREAT SERPL-MCNC: 0.55 MG/DL (ref 0.5–1.4)
ERYTHROCYTE [DISTWIDTH] IN BLOOD BY AUTOMATED COUNT: 45 FL (ref 35.9–50)
GFR SERPLBLD CREATININE-BSD FMLA CKD-EPI: 91 ML/MIN/1.73 M 2
GLOBULIN SER CALC-MCNC: 2.5 G/DL (ref 1.9–3.5)
GLUCOSE SERPL-MCNC: 196 MG/DL (ref 65–99)
HCT VFR BLD AUTO: 40.6 % (ref 37–47)
HGB BLD-MCNC: 13.6 G/DL (ref 12–16)
MCH RBC QN AUTO: 29.2 PG (ref 27–33)
MCHC RBC AUTO-ENTMCNC: 33.5 G/DL (ref 32.2–35.5)
MCV RBC AUTO: 87.1 FL (ref 81.4–97.8)
NT-PROBNP SERPL IA-MCNC: 289 PG/ML (ref 0–125)
PLATELET # BLD AUTO: 174 K/UL (ref 164–446)
PMV BLD AUTO: 10.3 FL (ref 9–12.9)
POTASSIUM SERPL-SCNC: 3.7 MMOL/L (ref 3.6–5.5)
PROT SERPL-MCNC: 6.4 G/DL (ref 6–8.2)
RBC # BLD AUTO: 4.66 M/UL (ref 4.2–5.4)
SODIUM SERPL-SCNC: 137 MMOL/L (ref 135–145)
WBC # BLD AUTO: 4.7 K/UL (ref 4.8–10.8)

## 2023-09-06 PROCEDURE — 36415 COLL VENOUS BLD VENIPUNCTURE: CPT

## 2023-09-06 PROCEDURE — 80053 COMPREHEN METABOLIC PANEL: CPT

## 2023-09-06 PROCEDURE — 83880 ASSAY OF NATRIURETIC PEPTIDE: CPT

## 2023-09-06 PROCEDURE — 85027 COMPLETE CBC AUTOMATED: CPT

## 2023-09-07 DIAGNOSIS — E11.65 UNCONTROLLED TYPE 2 DIABETES MELLITUS WITH HYPERGLYCEMIA (HCC): ICD-10-CM

## 2023-09-07 RX ORDER — PEN NEEDLE, DIABETIC 32GX 5/32"
NEEDLE, DISPOSABLE MISCELLANEOUS
Qty: 100 EACH | Refills: 3 | Status: SHIPPED | OUTPATIENT
Start: 2023-09-07 | End: 2024-02-06 | Stop reason: SDUPTHER

## 2023-09-07 NOTE — TELEPHONE ENCOUNTER
Received request via: Pharmacy    Was the patient seen in the last year in this department? Yes  LOV 07/31/2023  Does the patient have an active prescription (recently filled or refills available) for medication(s) requested? No    Does the patient have [facility] Plus and need 100 day supply (blood pressure, diabetes and cholesterol meds only)? Yes, quantity updated to 100 days

## 2023-09-11 ENCOUNTER — PATIENT MESSAGE (OUTPATIENT)
Dept: MEDICAL GROUP | Facility: LAB | Age: 83
End: 2023-09-11
Payer: MEDICARE

## 2023-09-11 DIAGNOSIS — E11.65 UNCONTROLLED TYPE 2 DIABETES MELLITUS WITH HYPERGLYCEMIA (HCC): ICD-10-CM

## 2023-09-11 DIAGNOSIS — E78.2 MIXED HYPERLIPIDEMIA: ICD-10-CM

## 2023-09-12 DIAGNOSIS — E78.2 MIXED HYPERLIPIDEMIA: ICD-10-CM

## 2023-09-12 DIAGNOSIS — E11.65 UNCONTROLLED TYPE 2 DIABETES MELLITUS WITH HYPERGLYCEMIA (HCC): ICD-10-CM

## 2023-09-13 ENCOUNTER — TELEPHONE (OUTPATIENT)
Dept: MEDICAL GROUP | Facility: LAB | Age: 83
End: 2023-09-13
Payer: MEDICARE

## 2023-09-13 NOTE — TELEPHONE ENCOUNTER
ESTABLISHED PATIENT PRE-VISIT PLANNING     Patient was NOT contacted to complete PVP.     Note: Patient will not be contacted if there is no indication to call.     1.  Reviewed notes from the last few office visits within the medical group: Yes    2.  If any orders were placed at last visit or intended to be done for this visit (i.e. 6 mos follow-up), do we have Results/Consult Notes?           Labs - Labs ordered, completed on 9/6/23 and results are in chart.  Note: If patient appointment is for lab review and patient did not complete labs, check with provider if OK to reschedule patient until labs completed.         Imaging - Imaging was not ordered at last office visit.         Referrals - No referrals were ordered at last office visit.    3. Is this appointment scheduled as a Hospital Follow-Up? No    4.  Immunizations were updated in Epic using Reconcile Outside Information activity? Yes    5.  Patient is due for the following Health Maintenance Topics:   Health Maintenance Due   Topic Date Due    Annual Wellness Visit  09/26/2019    Hepatitis B Vaccine (Hep B) (2 of 3 - 19+ 3-dose series) 09/23/2020    Zoster (Shingles) Vaccines (3 of 3) 10/21/2020    COVID-19 Vaccine (4 - Pfizer series) 12/10/2021    Bone Density Scan  11/10/2022    Fasting Lipid Profile  04/06/2023    Diabetes: Monofilament / LE Exam  04/08/2023    Influenza Vaccine (1) 09/01/2023     6.  AHA (Pulse8) form printed for Provider? N/A

## 2023-09-21 ENCOUNTER — OFFICE VISIT (OUTPATIENT)
Dept: MEDICAL GROUP | Facility: LAB | Age: 83
End: 2023-09-21
Payer: MEDICARE

## 2023-09-21 VITALS
HEART RATE: 58 BPM | OXYGEN SATURATION: 92 % | RESPIRATION RATE: 12 BRPM | DIASTOLIC BLOOD PRESSURE: 70 MMHG | WEIGHT: 160.4 LBS | TEMPERATURE: 97 F | HEIGHT: 66 IN | SYSTOLIC BLOOD PRESSURE: 120 MMHG | BODY MASS INDEX: 25.78 KG/M2

## 2023-09-21 DIAGNOSIS — Z23 NEED FOR INFLUENZA VACCINATION: ICD-10-CM

## 2023-09-21 DIAGNOSIS — M35.3 PMR (POLYMYALGIA RHEUMATICA) (HCC): ICD-10-CM

## 2023-09-21 DIAGNOSIS — E11.65 UNCONTROLLED TYPE 2 DIABETES MELLITUS WITH HYPERGLYCEMIA (HCC): ICD-10-CM

## 2023-09-21 PROCEDURE — G0008 ADMIN INFLUENZA VIRUS VAC: HCPCS | Performed by: INTERNAL MEDICINE

## 2023-09-21 PROCEDURE — 99214 OFFICE O/P EST MOD 30 MIN: CPT | Mod: 25 | Performed by: INTERNAL MEDICINE

## 2023-09-21 PROCEDURE — 3078F DIAST BP <80 MM HG: CPT | Performed by: INTERNAL MEDICINE

## 2023-09-21 PROCEDURE — 90662 IIV NO PRSV INCREASED AG IM: CPT | Performed by: INTERNAL MEDICINE

## 2023-09-21 PROCEDURE — 3074F SYST BP LT 130 MM HG: CPT | Performed by: INTERNAL MEDICINE

## 2023-09-21 RX ORDER — PREDNISONE 1 MG/1
1 TABLET ORAL SEE ADMIN INSTRUCTIONS
Qty: 120 TABLET | Refills: 2 | Status: SHIPPED | OUTPATIENT
Start: 2023-09-21 | End: 2024-02-22

## 2023-09-21 ASSESSMENT — FIBROSIS 4 INDEX: FIB4 SCORE: 1.71

## 2023-09-22 NOTE — PROGRESS NOTES
CC: Mavis Lizarraga is a 83 y.o. female is suffering from No chief complaint on file.        SUBJECTIVE:  1. Need for influenza vaccination  Mavis is here is in need of influenza vaccination    2. PMR (polymyalgia rheumatica) ([facility])  Patient with polymyalgia rheumatica is currently at 5 mg each day we will anticipate tapering by 1 mg every 2 weeks to see the lowest effective dose to control her symptoms    3. Uncontrolled type 2 diabetes mellitus with hyperglycemia ([facility])  History of poorly controlled type 2 diabetes secondary to the use of prednisone use for polymyalgia rheumatica        Past social, family, history: Boyfriend Lauri  Social History     Tobacco Use    Smoking status: Never    Smokeless tobacco: Never   Vaping Use    Vaping Use: Never used   Substance Use Topics    Alcohol use: Not Currently    Drug use: No         MEDICATIONS:    Current Outpatient Medications:     predniSONE (DELTASONE) 1 MG Tab, Take 1 Tablet by mouth see administration instructions. Please take 4 mg x 2 weeks if symptoms are stable okay to decrease to 3 mg x 2 weeks., Disp: 120 Tablet, Rfl: 2    insulin glargine 100 UNIT/ML SC SOPN injection, Inject 18 Units under the skin every evening., Disp: 3 mL, Rfl: 3    Insulin Pen Needle 32 G x 4 mm ([employer] PEN NEEDLES), USE ONE PEN NEEDLE EACH TIME IN PEN DEVICE TO INJECT INSULIN ONCE DAILY., Disp: 100 Each, Rfl: 3    predniSONE (DELTASONE) 5 MG Tab, Take 1 Tablet by mouth every day., Disp: 30 Tablet, Rfl: 2    diphenhydrAMINE (BENADRYL) 25 MG capsule, Take 1 Capsule by mouth every 6 hours as needed for Rash., Disp: 30 Capsule, Rfl: 3    betamethasone, augmented, (DIPROLENE) 0.05 % lotion, Apply 3 mL topically 2 times a day as needed (itch)., Disp: 60 mL, Rfl: 0    levothyroxine (SYNTHROID) 75 MCG Tab, TAKE ONE TABLET BY MOUTH EVERY MORNING ON AN EMPTY STOMACH, Disp: 90 Tablet, Rfl: 3    metoprolol SR (TOPROL XL) 50 MG TABLET SR 24 HR, Take 1.5 Tablets by mouth 2 times a day., Disp:  270 Tablet, Rfl: 3    Lancets, Use one Per formulary preference  lancet to test blood sugar once daily early morning before first meal., Disp: 100 Each, Rfl: 3    Blood Glucose Test Strips, Use one Per formulary preference  strip to test blood sugar once daily early morning before first meal., Disp: 100 Strip, Rfl: 0    nitroglycerin (NITROSTAT) 0.4 MG SL Tab, Place 1 Tablet under the tongue as needed for Chest Pain. Q 5 min x 3 then 911., Disp: 25 Tablet, Rfl: 3    POTASSIUM PO, Take 1 Tablet by mouth every morning., Disp: , Rfl:     rosuvastatin (CRESTOR) 40 MG tablet, TAKE ONE TABLET BY MOUTH DAILY, Disp: 100 Tablet, Rfl: 3    apixaban (ELIQUIS) 5mg Tab, Take 1 Tablet by mouth 2 times a day., Disp: 180 Tablet, Rfl: 1    omeprazole (PRILOSEC) 20 MG delayed-release capsule, Take 1 Capsule by mouth every day. (Patient taking differently: Take 20 mg by mouth 1 time a day as needed. Indications: Heartburn), Disp: 30 Capsule, Rfl: 3    cyclobenzaprine (FLEXERIL) 10 mg Tab, TAKE ONE TABLET BY MOUTH THREE TIMES A DAY AS NEEDED FOR MILD PAIN (FLEXERIL), Disp: 30 Tablet, Rfl: 3    Cholecalciferol (VITAMIN D) 2000 UNIT Tab, Take 2,000 Units by mouth every day., Disp: , Rfl:     Ascorbic Acid (VITAMIN C) 1000 MG Tab, Take 1,000 mg by mouth every morning., Disp: , Rfl:     Cyanocobalamin (VITAMIN B-12 PO), Take 1 tablet by mouth every morning., Disp: , Rfl:     CINNAMON PO, Take 1 tablet by mouth every morning., Disp: , Rfl:     Multiple Vitamins-Minerals (ICAPS AREDS FORMULA PO), Take 1 tablet by mouth every morning., Disp: , Rfl:     Omega-3 Fatty Acids (FISH OIL PO), Take 1 Cap by mouth every day., Disp: , Rfl:     PROBLEMS:  Patient Active Problem List    Diagnosis Date Noted    Secondary hypercoagulable state ([facility]) 06/15/2023    HFrEF (heart failure with reduced ejection fraction) ([facility]) 04/26/2023    Angina pectoris, unspecified ([facility]) 04/26/2023    DVT (deep vein thrombosis) in pregnancy 03/01/2023    Hypokalemia  "03/01/2023    Atrial fibrillation with RVR ([facility]) 02/28/2023    Polymyalgia rheumatica ([facility]) 02/28/2023    Vitamin D deficiency 04/12/2021    Cervical spinal stenosis 04/11/2021    Pubic ramus fracture ([facility]) 04/09/2021    History of breast cancer     Spasm of back muscles 03/27/2019    Uncontrolled type 2 diabetes mellitus with hyperglycemia ([facility]) 01/28/2016    On statin therapy due to risk of future cardiovascular event 01/15/2013    Acquired hypothyroidism 01/15/2013    Gastroesophageal reflux disease with esophagitis 01/15/2013       REVIEW OF SYSTEMS:  Gen.:  No Nausea, Vomiting, fever, Chills.  Heart: No chest pain.  Lungs:  No shortness of Breath.  Psychological: Rajan unusual Anxiety depression     PHYSICAL EXAM   Constitutional: Alert, cooperative, not in acute distress.  Cardiovascular:  Rate Rhythm is regular without murmurs rubs clicks.     Thorax & Lungs: Clear to auscultation, no wheezing, rhonchi, or rales  HENT: Normocephalic, Atraumatic.  Eyes: PERRLA, EOMI, Conjunctiva normal.   Neck: Trachia is midline no swelling of the thyroid.   Lymphatic: No lymphadenopathy noted.   Neurologic: Alert & oriented x 3, cranial nerves II through XII are intact, Normal motor function, Normal sensory function, No focal deficits noted.   Psychiatric: Affect normal, Judgment normal, Mood normal.     VITAL SIGNS:/70   Pulse (!) 58   Temp 36.1 °C (97 °F)   Resp 12   Ht 1.676 m (5' 6\")   Wt 72.8 kg (160 lb 6.4 oz)   LMP  (LMP Unknown)   SpO2 92%   BMI 25.89 kg/m²     Labs: Reviewed    Assessment:                                                     Plan:    1. Need for influenza vaccination  Influenza vaccination given  - INFLUENZA VACCINE, HIGH DOSE (65+ ONLY)    2. PMR (polymyalgia rheumatica) ([facility])  Gradually taper down to 4 mg for 2 weeks then 3 mg for 2 weeks if tolerated  - predniSONE (DELTASONE) 1 MG Tab; Take 1 Tablet by mouth see administration instructions. Please take 4 mg x 2 weeks if symptoms are " stable okay to decrease to 3 mg x 2 weeks.  Dispense: 120 Tablet; Refill: 2    3. Uncontrolled type 2 diabetes mellitus with hyperglycemia ([facility])  Recheck hemoglobin A1c  - HEMOGLOBIN A1C; Future

## 2023-10-26 ENCOUNTER — OFFICE VISIT (OUTPATIENT)
Dept: CARDIOLOGY | Facility: MEDICAL CENTER | Age: 83
End: 2023-10-26
Attending: NURSE PRACTITIONER
Payer: MEDICARE

## 2023-10-26 VITALS
BODY MASS INDEX: 26.68 KG/M2 | DIASTOLIC BLOOD PRESSURE: 60 MMHG | RESPIRATION RATE: 17 BRPM | HEART RATE: 61 BPM | OXYGEN SATURATION: 98 % | SYSTOLIC BLOOD PRESSURE: 98 MMHG | WEIGHT: 166 LBS | HEIGHT: 66 IN

## 2023-10-26 DIAGNOSIS — I50.20 HFREF (HEART FAILURE WITH REDUCED EJECTION FRACTION) (HCC): ICD-10-CM

## 2023-10-26 DIAGNOSIS — Z79.899 HIGH RISK MEDICATION USE: ICD-10-CM

## 2023-10-26 DIAGNOSIS — D68.69 SECONDARY HYPERCOAGULABLE STATE (HCC): ICD-10-CM

## 2023-10-26 DIAGNOSIS — E78.5 DYSLIPIDEMIA: ICD-10-CM

## 2023-10-26 DIAGNOSIS — E11.9 TYPE 2 DIABETES MELLITUS WITHOUT COMPLICATION, WITH LONG-TERM CURRENT USE OF INSULIN (HCC): ICD-10-CM

## 2023-10-26 DIAGNOSIS — Z79.4 TYPE 2 DIABETES MELLITUS WITHOUT COMPLICATION, WITH LONG-TERM CURRENT USE OF INSULIN (HCC): ICD-10-CM

## 2023-10-26 DIAGNOSIS — I50.9 HEART FAILURE, NYHA CLASS 1 (HCC): ICD-10-CM

## 2023-10-26 DIAGNOSIS — I48.91 ATRIAL FIBRILLATION WITH RVR (HCC): ICD-10-CM

## 2023-10-26 LAB — EKG IMPRESSION: NORMAL

## 2023-10-26 PROCEDURE — 93005 ELECTROCARDIOGRAM TRACING: CPT | Performed by: NURSE PRACTITIONER

## 2023-10-26 PROCEDURE — 99213 OFFICE O/P EST LOW 20 MIN: CPT | Performed by: NURSE PRACTITIONER

## 2023-10-26 PROCEDURE — 3078F DIAST BP <80 MM HG: CPT | Performed by: NURSE PRACTITIONER

## 2023-10-26 PROCEDURE — 99214 OFFICE O/P EST MOD 30 MIN: CPT | Performed by: NURSE PRACTITIONER

## 2023-10-26 PROCEDURE — 3074F SYST BP LT 130 MM HG: CPT | Performed by: NURSE PRACTITIONER

## 2023-10-26 PROCEDURE — 93010 ELECTROCARDIOGRAM REPORT: CPT | Performed by: INTERNAL MEDICINE

## 2023-10-26 RX ORDER — INSULIN GLARGINE-YFGN 100 [IU]/ML
INJECTION, SOLUTION SUBCUTANEOUS
COMMUNITY
Start: 2023-10-18 | End: 2023-11-20

## 2023-10-26 ASSESSMENT — ENCOUNTER SYMPTOMS
ORTHOPNEA: 0
PND: 0
PALPITATIONS: 0
SHORTNESS OF BREATH: 0
CLAUDICATION: 0
FEVER: 0
MYALGIAS: 0
DIZZINESS: 0
ABDOMINAL PAIN: 0
COUGH: 0

## 2023-10-26 ASSESSMENT — FIBROSIS 4 INDEX: FIB4 SCORE: 1.71

## 2023-10-26 NOTE — PROGRESS NOTES
Chief Complaint   Patient presents with    Atrial Fibrillation     F/V Dx: Atrial fibrillation with RVR ([facility])    Follow-Up     Dx: HFrEF (heart failure with reduced ejection fraction) ([facility])      Other     Dx: Angina pectoris, unspecified ([facility])       Subjective     Mavis Lizarraga is a 82 y.o. female who presents today for follow up on her heart failure, Afib with her , Vinay.    Patient of Dr. Oh. She was last seen in clinic on 7/27/2023.  During that visit, she was started on Jardiance. Within 3 days pt did develop a rash on her trunk area.     She did reach out to her PCP and was started on steroids. She reports her rash resolved after about 1 month.    Patient also had a repeat echo since her last visit.    She otherwise, feels well, denies chest pain, shortness of breath, palpitations, dizziness/lightheadedness, orthopnea, PND or Edema.      Her home weights have been stable around 157 lbs.    She reports no recurrence of A-fib.    Additionally, patient has the following medical problems:     -Hypothyroidism    -Left LE DVT    -History of breast cancer    -Diabetes: On insulin    -Polymyalgia rheumatica on prednisone    Past Medical History:   Diagnosis Date    Anesthesia     poor tolerence to morphine    Arthritis     back    Backpain     Breast cancer (HCC) 1980s    right    Diverticulosis     DVT of deep femoral vein (HCC)     leg    Heart burn     High cholesterol     Pneumonia 02/01/2006    Thyroid condition     Ulcer     Urinary incontinence      Past Surgical History:   Procedure Laterality Date    ANTERIOR AND POSTERIOR REPAIR  6/23/2014    Performed by Lauri Batista M.D. at SURGERY SAME DAY [facility]    BLADDER SLING FEMALE  6/23/2014    Performed by Lauri Batista M.D. at SURGERY SAME DAY [facility]    BREAST RECONSTRUCTION  8/14/2012    Performed by SARTHAK LEVIN at SURGERY [facility]    BREAST IMPLANT REVISION  8/14/2012    Performed by SARTHAK LEVIN at SURGERY  [facility]    CAPSULECTOMY  8/14/2012    Performed by SARTHAK LEVIN at SURGERY [facility]    MASTOPEXY  8/14/2012    Performed by SARTHAK LEVIN at SURGERY [facility]    LIPOSUCTION  8/14/2012    Performed by SARTHAK LEVIN at SURGERY [facility]    RHYTIDECTOMY  8/14/2012    Performed by SARTHAK LEVIN at SURGERY [facility]    PLATYSMAPLASTY  8/14/2012    Performed by SARTHAK LEVIN at SURGERY [facility]    BLEPHAROPLASTY  8/14/2012    Performed by SARTHAK LEVIN at SURGERY [facility]    LOW ANTERIOR RESECTION LAPAROSCOPIC  2/10/2010    Performed by KOBI COHN at SURGERY [facility]    COLECTOMY N/A 2009    partial- post colonoscopy perforation- dr cohn    MASTECTOMY Right 1980s    neg nodes; 6 mos adjuvant chemo    OTHER      mastectomy R breast    OTHER ABDOMINAL SURGERY      I&D of ruptured diverticuli     CT BREAST AUGMENTATION WITH IMPLANT      CT BREAST REDUCTION      CT CHEMOTHERAPY, UNSPECIFIED PROCEDURE       Family History   Problem Relation Age of Onset    Heart Disease Mother 64    Hypertension Mother     Diabetes Mother     Stroke Mother     Cancer Mother         Stomach cancer    Heart Disease Maternal Grandmother 63        heart attack     Social History     Socioeconomic History    Marital status:      Spouse name: Not on file    Number of children: Not on file    Years of education: Not on file    Highest education level: 12th grade   Occupational History    Not on file   Tobacco Use    Smoking status: Never    Smokeless tobacco: Never   Vaping Use    Vaping Use: Never used   Substance and Sexual Activity    Alcohol use: Yes     Comment: rarely    Drug use: No    Sexual activity: Yes     Partners: Male   Other Topics Concern    Not on file   Social History Narrative    Not on file     Social Determinants of Health     Financial Resource Strain: Low Risk  (4/13/2021)    Overall Financial Resource Strain (CARDIA)      Difficulty of Paying Living Expenses: Not hard at all   Food Insecurity: No Food Insecurity (4/27/2021)    Hunger Vital Sign     Worried About Running Out of Food in the Last Year: Never true     Ran Out of Food in the Last Year: Never true   Transportation Needs: Unknown (4/27/2021)    PRAPARE - Transportation     Lack of Transportation (Medical): No     Lack of Transportation (Non-Medical): Not on file   Physical Activity: Inactive (4/27/2021)    Exercise Vital Sign     Days of Exercise per Week: 0 days     Minutes of Exercise per Session: 10 min   Stress: No Stress Concern Present (4/27/2021)    [employer] [place] [employer] - Occupational Stress Questionnaire     Feeling of Stress : Only a little   Social Connections: Moderately Isolated (4/27/2021)    Social Connection and Isolation Panel [NHANES]     Frequency of Communication with Friends and Family: Twice a week     Frequency of Social Gatherings with Friends and Family: Once a week     Attends Gnosticist Services: Never     Active Member of Clubs or Organizations: No     Attends Club or Organization Meetings: Never     Marital Status:    Intimate Partner Violence: Not on file   Housing Stability: Unknown (4/27/2021)    Housing Stability Vital Sign     Unable to Pay for Housing in the Last Year: No     Number of Places Lived in the Last Year: Not on file     Unstable Housing in the Last Year: No     Allergies   Allergen Reactions    Jardiance [Empagliflozin] Rash     Diffuse Maculopapular skin rash    Metformin Diarrhea and Nausea     NAUSEA VOMITING AND DIARRHEA    Morphine Unspecified     Hallucinations     Outpatient Encounter Medications as of 10/26/2023   Medication Sig Dispense Refill    CHERYLGLHEATHER, YFGN, 100 UNIT/ML Solution Pen-injector Patient taking 18 units a day      predniSONE (DELTASONE) 1 MG Tab Take 1 Tablet by mouth see administration instructions. Please take 4 mg x 2 weeks if symptoms are stable okay to decrease to 3 mg x 2  weeks. 120 Tablet 2    insulin glargine 100 UNIT/ML SC SOPN injection Inject 18 Units under the skin every evening. 3 mL 3    Insulin Pen Needle 32 G x 4 mm ([employer] PEN NEEDLES) USE ONE PEN NEEDLE EACH TIME IN PEN DEVICE TO INJECT INSULIN ONCE DAILY. 100 Each 3    diphenhydrAMINE (BENADRYL) 25 MG capsule Take 1 Capsule by mouth every 6 hours as needed for Rash. 30 Capsule 3    betamethasone, augmented, (DIPROLENE) 0.05 % lotion Apply 3 mL topically 2 times a day as needed (itch). 60 mL 0    levothyroxine (SYNTHROID) 75 MCG Tab TAKE ONE TABLET BY MOUTH EVERY MORNING ON AN EMPTY STOMACH 90 Tablet 3    metoprolol SR (TOPROL XL) 50 MG TABLET SR 24 HR Take 1.5 Tablets by mouth 2 times a day. 270 Tablet 3    Lancets Use one Per formulary preference  lancet to test blood sugar once daily early morning before first meal. 100 Each 3    Blood Glucose Test Strips Use one Per formulary preference  strip to test blood sugar once daily early morning before first meal. 100 Strip 0    nitroglycerin (NITROSTAT) 0.4 MG SL Tab Place 1 Tablet under the tongue as needed for Chest Pain. Q 5 min x 3 then 911. 25 Tablet 3    POTASSIUM PO Take 1 Tablet by mouth every morning.      rosuvastatin (CRESTOR) 40 MG tablet TAKE ONE TABLET BY MOUTH DAILY 100 Tablet 3    apixaban (ELIQUIS) 5mg Tab Take 1 Tablet by mouth 2 times a day. 180 Tablet 1    omeprazole (PRILOSEC) 20 MG delayed-release capsule Take 1 Capsule by mouth every day. (Patient taking differently: Take 20 mg by mouth 1 time a day as needed. Indications: Heartburn) 30 Capsule 3    cyclobenzaprine (FLEXERIL) 10 mg Tab TAKE ONE TABLET BY MOUTH THREE TIMES A DAY AS NEEDED FOR MILD PAIN (FLEXERIL) 30 Tablet 3    Cholecalciferol (VITAMIN D) 2000 UNIT Tab Take 2,000 Units by mouth every day.      Ascorbic Acid (VITAMIN C) 1000 MG Tab Take 1,000 mg by mouth every morning.      Cyanocobalamin (VITAMIN B-12 PO) Take 1 tablet by mouth every morning.      CINNAMON PO Take 1 tablet by mouth  "every morning.      Multiple Vitamins-Minerals (ICAPS AREDS FORMULA PO) Take 1 tablet by mouth every morning.      Omega-3 Fatty Acids (FISH OIL PO) Take 1 Cap by mouth every day.      [DISCONTINUED] predniSONE (DELTASONE) 5 MG Tab Take 1 Tablet by mouth every day. (Patient taking differently: Take 1 mg by mouth every day. Patient reported taking 1mg once a day) 30 Tablet 2     No facility-administered encounter medications on file as of 10/26/2023.     Review of Systems   Constitutional:  Negative for fever and malaise/fatigue.   Respiratory:  Negative for cough and shortness of breath.    Cardiovascular:  Negative for chest pain, palpitations, orthopnea, claudication, leg swelling and PND.   Gastrointestinal:  Negative for abdominal pain.   Musculoskeletal:  Negative for myalgias.   Neurological:  Negative for dizziness.   All other systems reviewed and are negative.             Objective     BP 98/60 (BP Location: Left arm, Patient Position: Sitting, BP Cuff Size: Adult)   Pulse 61   Resp 17   Ht 1.676 m (5' 6\")   Wt 75.3 kg (166 lb)   LMP  (LMP Unknown)   SpO2 98%   BMI 26.79 kg/m²     Physical Exam  Vitals reviewed.   Constitutional:       Appearance: She is well-developed.   HENT:      Head: Normocephalic and atraumatic.   Eyes:      Pupils: Pupils are equal, round, and reactive to light.   Neck:      Vascular: No JVD.   Cardiovascular:      Rate and Rhythm: Normal rate and regular rhythm.      Heart sounds: Normal heart sounds.   Pulmonary:      Effort: Pulmonary effort is normal. No respiratory distress.      Breath sounds: Normal breath sounds. No wheezing or rales.   Abdominal:      General: Bowel sounds are normal.      Palpations: Abdomen is soft.   Musculoskeletal:         General: Normal range of motion.      Cervical back: Normal range of motion and neck supple.      Right lower leg: No edema.      Left lower leg: No edema.   Skin:     General: Skin is warm and dry.   Neurological:      " General: No focal deficit present.      Mental Status: She is alert and oriented to person, place, and time.   Psychiatric:         Behavior: Behavior normal.       Lab Results   Component Value Date/Time    CHOLSTRLTOT 158 04/06/2022 06:19 AM    LDL 78 04/06/2022 06:19 AM    HDL 53 04/06/2022 06:19 AM    TRIGLYCERIDE 137 04/06/2022 06:19 AM       Lab Results   Component Value Date/Time    SODIUM 137 09/06/2023 06:49 AM    POTASSIUM 3.7 09/06/2023 06:49 AM    CHLORIDE 103 09/06/2023 06:49 AM    CO2 25 09/06/2023 06:49 AM    GLUCOSE 196 (H) 09/06/2023 06:49 AM    BUN 12 09/06/2023 06:49 AM    CREATININE 0.55 09/06/2023 06:49 AM     Lab Results   Component Value Date/Time    ALKPHOSPHAT 48 09/06/2023 06:49 AM    ASTSGOT 15 09/06/2023 06:49 AM    ALTSGPT 17 09/06/2023 06:49 AM    TBILIRUBIN 0.7 09/06/2023 06:49 AM       Transthoracic Echo Report 3/2/2023  No prior study is available for comparison.   Mildly reduced left ventricular systolic function.  The left ventricular ejection fraction is visually estimated to be 40%.  Estimated right ventricular systolic pressure is 30 mmHg.       Myocardial Perfusion Report 5/5/2023   NUCLEAR IMAGING INTERPRETATION   No evidence of significant jeopardized viable myocardium or prior myocardial infarction.   Normal left ventricular size, ejection fraction, and wall motion.   ECG INTERPRETATION   No ischemic changes on pharmacologic stress test.     Transthoracic Echo Report 8/1/2023  Normal left ventricular systolic function.  The left ventricular ejection fraction is visually estimated to be 65-  70%.  Aortic valve sclerosis without stenosis.  Normal right ventricular size and systolic function.  Right heart pressures are normal.  No significant valvular abnormalities.   Compared to the images of the prior study, 3/2/2023 the left   ventricular function is now normal.    Assessment & Plan     1. HFrEF (heart failure with reduced ejection fraction) ([facility])  EKG      2. High risk  medication use  EKG      3. Atrial fibrillation with RVR ([facility])  EKG      4. Heart failure, NYHA class 1 ([facility])        5. Dyslipidemia        6. Type 2 diabetes mellitus without complication, with long-term current use of insulin ([facility])        7. Secondary hypercoagulable state ([facility])            Medical Decision Making: Today's Assessment/Status/Plan:        HFmrEF, Stage C, Class 1, LVEF 65 to 70% improved from 40%: Based on physical examination findings, patient is euvolemic. No JVD, lungs are clear to auscultation, no pitting edema in bilateral lower extremities, no ascites.   -Heart failure due to tachycardia mediated, had negative stress test  -Discussed Heart failure trajectory and prognosis with patient. Will continue to optimize medical therapy as tolerated. Advanced HF treatment, need for remote monitoring consideration at every visit.   -ACE-I/ARB/ARNI: Consider if BP allows  -Evidence Based Beta-blocker: Continue metoprolol SR 75 mg twice a day  -Aldosterone Antagonist: Consider if BP allows  -Diuretic: Consider as needed  -SGLT2 inhibitor: Not able to tolerate Jardiance due to rash  -Other: Consider as needed (Hydralazine/Isosorbide, Vericiguat, Corlanor)  -Labs: No labs due at this time.  Will continue to closely monitor for side effects of patient's high risk medication(s) including renal function, liver function NTproBNP/cardiac markers, and electrolytes as needed  -discussed importance of exercise and regular activity  -Discussed/reviewed maintaining a low Sodium and hydration recommendations  -ICD not indicated at this time  -Reinforced s/sx of worsening heart failure with patient and weight monitoring. Pt verbalizes understanding. Pt to call office or RTC if present.    -PUMP line number [phone] (PUMP) reviewed with patient  -Heart Failure Education:   Discussed the Definition of heart failure (linking disease, symptoms, and treatment) and causes of heart failure  Recognition of escalating symptoms  and concrete plan for response to particular symptoms  Risk modification for heart failure progression  Specific diet recommendations: Continue low-sodium diet   Activity as tolerated  Importance of treatment adherence  Behavioral strategies to promote treatment adherence  -Advanced care planning: Advance directive on file  -Pharmacotherapy Referral: Not referred at this time  -Compliance Barriers: None  -Genetic testing Consideration: None  -Consideration for LVAD and/or Heart transplant as necessary      Persistent A-fib:  -s/p DCCV on 7/13/2023  -EKG SB 54  -Continue Eliquis 5 mg twice a day  -Continue metoprolol SR 75 mg twice a day  -[id number] score is  5 (Age, Sex, HF, DM)    Dyslipidemia:  -Continue rosuvastatin 40 mg daily  -Last LDL 78 on 4/6/2022    Diabetes: Uncontrolled  -Last A1c 10.9 on 7/17/2023  -Followed by PCP  -Continue insulin    FU in clinic in 6 months with Dr. Oh. Sooner if needed.    Patient verbalizes understanding and agrees with the plan of care.     PLEASE NOTE: This Note was created using voice recognition Software. I have made every reasonable attempt to correct obvious errors, but I expect that there are errors of grammar and possibly content that I did not discover before finalizing the note

## 2023-11-20 ENCOUNTER — ANTICOAGULATION VISIT (OUTPATIENT)
Dept: MEDICAL GROUP | Facility: MEDICAL CENTER | Age: 83
End: 2023-11-20
Payer: MEDICARE

## 2023-11-20 VITALS — DIASTOLIC BLOOD PRESSURE: 48 MMHG | HEART RATE: 61 BPM | SYSTOLIC BLOOD PRESSURE: 118 MMHG

## 2023-11-20 DIAGNOSIS — I48.91 ATRIAL FIBRILLATION WITH RVR (HCC): ICD-10-CM

## 2023-11-20 DIAGNOSIS — O22.30 DVT (DEEP VEIN THROMBOSIS) IN PREGNANCY: ICD-10-CM

## 2023-11-20 PROCEDURE — 99211 OFF/OP EST MAY X REQ PHY/QHP: CPT | Performed by: STUDENT IN AN ORGANIZED HEALTH CARE EDUCATION/TRAINING PROGRAM

## 2023-11-20 NOTE — PROGRESS NOTES
Target end date: Indefinite  Indication: Afib, Hx of DVT  Drug: Eliquis 5 mg BID  CHADsVASC = 6 (Age, sex, hx of VTE, DM)    Health Status Since Last Assessment  Pt denies any new relevant medical problems, ED visits or hospitalizations  Pt denies any embolic events (stroke/tia/systemic embolism)    Adherence with DOAC Therapy  Pt has not missed doses in the average week.    Bleeding Risk Assessment  Pt denies epistaxis  Pt denies any hematuria  Pt denies any excessive or unusual bleeding/hematomas.  Pt denies any GI bleeds or hematemesis.  Pt denies any concerning daily headache or subdural hematoma symptoms.    Latest Hemoglobin: WNL  Hemoglobin   Date Value Ref Range Status   09/06/2023 13.6 12.0 - 16.0 g/dL Final     Latest Platelets: WNL  Platelet Count   Date Value Ref Range Status   09/06/2023 174 164 - 446 K/uL Final     Pt denies  ETOH overuse     Creatinine Clearance/Renal Function  Latest CrCl: 93 ml/min  Age 82 yo, Scr 0.55 mg/dl, Wt 75.3 kg    Hepatic function  Latest LFTs: WNL  Pt denies any history of liver dysfunction      Drug Interactions  ASA/other antiplatelets: None  NSAID: None  Other drug interactions: None  Verified no anticonvulsant or azole therapy, education provided for future use.     Examination  Blood Pressure WNL  Vitals:    11/20/23 1601   BP: 118/48   Pulse: 61     Symptomatic hypotension: Denies   Significant gait impairment/imbalance/high risk for falls? No issues    Final Assessment and Recommendations:  Patient appears stable from the anticoagulation standpoint.    Benefits of continued DOAC therapy outweigh risks for this patient  Recommend pt continue with current DOAC therapy: Eliquis 5 mg BID  DOAC is affordable     Other Actions: CMP/CBC hemogram ordered prior to next visit    Follow up:  Will follow up with patient 6 month(s).     Atilio Ray, PharmD, BCACP

## 2023-11-21 DIAGNOSIS — E78.2 MIXED HYPERLIPIDEMIA: ICD-10-CM

## 2023-11-21 DIAGNOSIS — E11.65 UNCONTROLLED TYPE 2 DIABETES MELLITUS WITH HYPERGLYCEMIA (HCC): ICD-10-CM

## 2023-11-21 RX ORDER — INSULIN GLARGINE-YFGN 100 [IU]/ML
INJECTION, SOLUTION SUBCUTANEOUS
Qty: 6 ML | Refills: 5 | Status: SHIPPED | OUTPATIENT
Start: 2023-11-21 | End: 2024-01-26

## 2023-11-27 DIAGNOSIS — I48.11 LONGSTANDING PERSISTENT ATRIAL FIBRILLATION (HCC): ICD-10-CM

## 2023-11-27 RX ORDER — APIXABAN 5 MG/1
5 TABLET, FILM COATED ORAL 2 TIMES DAILY
Qty: 180 TABLET | Refills: 1 | Status: SHIPPED | OUTPATIENT
Start: 2023-11-27 | End: 2023-12-04 | Stop reason: SDUPTHER

## 2023-12-04 DIAGNOSIS — I48.11 LONGSTANDING PERSISTENT ATRIAL FIBRILLATION (HCC): ICD-10-CM

## 2024-01-05 ENCOUNTER — HOSPITAL ENCOUNTER (OUTPATIENT)
Dept: LAB | Facility: MEDICAL CENTER | Age: 84
End: 2024-01-05
Attending: NURSE PRACTITIONER
Payer: MEDICARE

## 2024-01-05 ENCOUNTER — HOSPITAL ENCOUNTER (OUTPATIENT)
Dept: LAB | Facility: MEDICAL CENTER | Age: 84
End: 2024-01-05
Attending: INTERNAL MEDICINE
Payer: MEDICARE

## 2024-01-05 DIAGNOSIS — I48.91 ATRIAL FIBRILLATION WITH RVR (HCC): ICD-10-CM

## 2024-01-05 DIAGNOSIS — I50.20 HFREF (HEART FAILURE WITH REDUCED EJECTION FRACTION) (HCC): ICD-10-CM

## 2024-01-05 DIAGNOSIS — Z79.899 HIGH RISK MEDICATION USE: ICD-10-CM

## 2024-01-05 DIAGNOSIS — E11.65 UNCONTROLLED TYPE 2 DIABETES MELLITUS WITH HYPERGLYCEMIA (HCC): ICD-10-CM

## 2024-01-05 LAB
ALBUMIN SERPL BCP-MCNC: 4 G/DL (ref 3.2–4.9)
ALBUMIN/GLOB SERPL: 1.5 G/DL
ALP SERPL-CCNC: 57 U/L (ref 30–99)
ALT SERPL-CCNC: 14 U/L (ref 2–50)
ANION GAP SERPL CALC-SCNC: 10 MMOL/L (ref 7–16)
ANION GAP SERPL CALC-SCNC: 9 MMOL/L (ref 7–16)
AST SERPL-CCNC: 19 U/L (ref 12–45)
BILIRUB SERPL-MCNC: 0.6 MG/DL (ref 0.1–1.5)
BUN SERPL-MCNC: 11 MG/DL (ref 8–22)
BUN SERPL-MCNC: 11 MG/DL (ref 8–22)
CALCIUM ALBUM COR SERPL-MCNC: 9.5 MG/DL (ref 8.5–10.5)
CALCIUM SERPL-MCNC: 9.3 MG/DL (ref 8.5–10.5)
CALCIUM SERPL-MCNC: 9.5 MG/DL (ref 8.5–10.5)
CHLORIDE SERPL-SCNC: 103 MMOL/L (ref 96–112)
CHLORIDE SERPL-SCNC: 105 MMOL/L (ref 96–112)
CO2 SERPL-SCNC: 28 MMOL/L (ref 20–33)
CO2 SERPL-SCNC: 28 MMOL/L (ref 20–33)
CREAT SERPL-MCNC: 0.49 MG/DL (ref 0.5–1.4)
CREAT SERPL-MCNC: 0.51 MG/DL (ref 0.5–1.4)
ERYTHROCYTE [DISTWIDTH] IN BLOOD BY AUTOMATED COUNT: 40.5 FL (ref 35.9–50)
EST. AVERAGE GLUCOSE BLD GHB EST-MCNC: 240 MG/DL
GFR SERPLBLD CREATININE-BSD FMLA CKD-EPI: 93 ML/MIN/1.73 M 2
GFR SERPLBLD CREATININE-BSD FMLA CKD-EPI: 94 ML/MIN/1.73 M 2
GLOBULIN SER CALC-MCNC: 2.7 G/DL (ref 1.9–3.5)
GLUCOSE SERPL-MCNC: 161 MG/DL (ref 65–99)
GLUCOSE SERPL-MCNC: 163 MG/DL (ref 65–99)
HBA1C MFR BLD: 10 % (ref 4–5.6)
HCT VFR BLD AUTO: 43.4 % (ref 37–47)
HGB BLD-MCNC: 14.4 G/DL (ref 12–16)
MCH RBC QN AUTO: 29.9 PG (ref 27–33)
MCHC RBC AUTO-ENTMCNC: 33.2 G/DL (ref 32.2–35.5)
MCV RBC AUTO: 90.2 FL (ref 81.4–97.8)
PLATELET # BLD AUTO: 170 K/UL (ref 164–446)
PMV BLD AUTO: 10 FL (ref 9–12.9)
POTASSIUM SERPL-SCNC: 4.2 MMOL/L (ref 3.6–5.5)
POTASSIUM SERPL-SCNC: 4.2 MMOL/L (ref 3.6–5.5)
PROT SERPL-MCNC: 6.7 G/DL (ref 6–8.2)
RBC # BLD AUTO: 4.81 M/UL (ref 4.2–5.4)
SODIUM SERPL-SCNC: 141 MMOL/L (ref 135–145)
SODIUM SERPL-SCNC: 142 MMOL/L (ref 135–145)
WBC # BLD AUTO: 5 K/UL (ref 4.8–10.8)

## 2024-01-05 PROCEDURE — 80048 BASIC METABOLIC PNL TOTAL CA: CPT

## 2024-01-05 PROCEDURE — 83036 HEMOGLOBIN GLYCOSYLATED A1C: CPT

## 2024-01-05 PROCEDURE — 80053 COMPREHEN METABOLIC PANEL: CPT

## 2024-01-05 PROCEDURE — 85027 COMPLETE CBC AUTOMATED: CPT

## 2024-01-05 PROCEDURE — 36415 COLL VENOUS BLD VENIPUNCTURE: CPT

## 2024-01-26 ENCOUNTER — OFFICE VISIT (OUTPATIENT)
Dept: MEDICAL GROUP | Facility: LAB | Age: 84
End: 2024-01-26
Payer: MEDICARE

## 2024-01-26 VITALS
WEIGHT: 169 LBS | DIASTOLIC BLOOD PRESSURE: 76 MMHG | BODY MASS INDEX: 27.16 KG/M2 | SYSTOLIC BLOOD PRESSURE: 124 MMHG | OXYGEN SATURATION: 97 % | TEMPERATURE: 98.5 F | RESPIRATION RATE: 14 BRPM | HEIGHT: 66 IN | HEART RATE: 59 BPM

## 2024-01-26 DIAGNOSIS — M35.3 POLYMYALGIA RHEUMATICA (HCC): ICD-10-CM

## 2024-01-26 DIAGNOSIS — E11.65 UNCONTROLLED TYPE 2 DIABETES MELLITUS WITH HYPERGLYCEMIA (HCC): ICD-10-CM

## 2024-01-26 PROCEDURE — 99213 OFFICE O/P EST LOW 20 MIN: CPT | Performed by: INTERNAL MEDICINE

## 2024-01-26 PROCEDURE — 3078F DIAST BP <80 MM HG: CPT | Performed by: INTERNAL MEDICINE

## 2024-01-26 PROCEDURE — 3074F SYST BP LT 130 MM HG: CPT | Performed by: INTERNAL MEDICINE

## 2024-01-26 RX ORDER — METFORMIN HYDROCHLORIDE 500 MG/1
500 TABLET, EXTENDED RELEASE ORAL DAILY
Qty: 30 TABLET | Refills: 5 | Status: SHIPPED | OUTPATIENT
Start: 2024-01-26 | End: 2024-07-24

## 2024-01-26 RX ORDER — INSULIN GLARGINE 100 [IU]/ML
20 INJECTION, SOLUTION SUBCUTANEOUS EVERY EVENING
Qty: 15 ML | Refills: 3 | Status: SHIPPED | OUTPATIENT
Start: 2024-01-26 | End: 2024-02-06 | Stop reason: SDUPTHER

## 2024-01-26 ASSESSMENT — FIBROSIS 4 INDEX: FIB4 SCORE: 2.45

## 2024-01-27 DIAGNOSIS — E03.4 HYPOTHYROIDISM DUE TO ACQUIRED ATROPHY OF THYROID: ICD-10-CM

## 2024-01-27 DIAGNOSIS — E78.2 MIXED HYPERLIPIDEMIA: ICD-10-CM

## 2024-01-27 NOTE — PROGRESS NOTES
CC: Mavis Lizarraga is a 83 y.o. female is suffering from   Chief Complaint   Patient presents with    Follow-Up         SUBJECTIVE:  1. Uncontrolled type 2 diabetes mellitus with hyperglycemia (HCC)  Mavis is here for follow-up has a history of poorly controlled type 2 diabetes in part worsened by the use of prednisone because of problem #2 below.  I have asked the patient to restart extended release metformin, patient is to continue on insulin, referral is been written to endocrinology    2. Polymyalgia rheumatica (HCC)  History of polymyalgia rheumatica continue prednisone        Past social, family, history: Boyfriend Rich  Social History     Tobacco Use    Smoking status: Never    Smokeless tobacco: Never   Vaping Use    Vaping Use: Never used   Substance Use Topics    Alcohol use: Yes     Comment: rarely    Drug use: No         MEDICATIONS:    Current Outpatient Medications:     Semaglutide,0.25 or 0.5MG/DOS, 2 MG/3ML Solution Pen-injector, Inject 0.5 mg under the skin every 7 days., Disp: 3 mL, Rfl: 11    insulin glargine (LANTUS SOLOSTAR) 100 UNIT/ML Solution Pen-injector injection, Inject 20 Units under the skin every evening., Disp: 15 mL, Rfl: 3    metFORMIN ER (GLUCOPHAGE XR) 500 MG TABLET SR 24 HR, Take 1 Tablet by mouth every day for 180 days., Disp: 30 Tablet, Rfl: 5    apixaban (ELIQUIS) 5mg Tab, Take 1 Tablet by mouth 2 times a day., Disp: 180 Tablet, Rfl: 1    apixaban (ELIQUIS) 5mg Tab, Take 1 Tablet by mouth 2 times a day., Disp: 180 Tablet, Rfl: 1    predniSONE (DELTASONE) 1 MG Tab, Take 1 Tablet by mouth see administration instructions. Please take 4 mg x 2 weeks if symptoms are stable okay to decrease to 3 mg x 2 weeks., Disp: 120 Tablet, Rfl: 2    Insulin Pen Needle 32 G x 4 mm ([employer] PEN NEEDLES), USE ONE PEN NEEDLE EACH TIME IN PEN DEVICE TO INJECT INSULIN ONCE DAILY., Disp: 100 Each, Rfl: 3    diphenhydrAMINE (BENADRYL) 25 MG capsule, Take 1 Capsule by mouth every 6 hours as needed for  Rash., Disp: 30 Capsule, Rfl: 3    levothyroxine (SYNTHROID) 75 MCG Tab, TAKE ONE TABLET BY MOUTH EVERY MORNING ON AN EMPTY STOMACH, Disp: 90 Tablet, Rfl: 3    metoprolol SR (TOPROL XL) 50 MG TABLET SR 24 HR, Take 1.5 Tablets by mouth 2 times a day., Disp: 270 Tablet, Rfl: 3    Lancets, Use one Per formulary preference  lancet to test blood sugar once daily early morning before first meal., Disp: 100 Each, Rfl: 3    Blood Glucose Test Strips, Use one Per formulary preference  strip to test blood sugar once daily early morning before first meal., Disp: 100 Strip, Rfl: 0    nitroglycerin (NITROSTAT) 0.4 MG SL Tab, Place 1 Tablet under the tongue as needed for Chest Pain. Q 5 min x 3 then 911., Disp: 25 Tablet, Rfl: 3    POTASSIUM PO, Take 1 Tablet by mouth every morning., Disp: , Rfl:     rosuvastatin (CRESTOR) 40 MG tablet, TAKE ONE TABLET BY MOUTH DAILY, Disp: 100 Tablet, Rfl: 3    omeprazole (PRILOSEC) 20 MG delayed-release capsule, Take 1 Capsule by mouth every day. (Patient taking differently: Take 20 mg by mouth 1 time a day as needed. Indications: Heartburn), Disp: 30 Capsule, Rfl: 3    cyclobenzaprine (FLEXERIL) 10 mg Tab, TAKE ONE TABLET BY MOUTH THREE TIMES A DAY AS NEEDED FOR MILD PAIN (FLEXERIL), Disp: 30 Tablet, Rfl: 3    Cholecalciferol (VITAMIN D) 2000 UNIT Tab, Take 2,000 Units by mouth every day., Disp: , Rfl:     Ascorbic Acid (VITAMIN C) 1000 MG Tab, Take 1,000 mg by mouth every morning., Disp: , Rfl:     Cyanocobalamin (VITAMIN B-12 PO), Take 1 tablet by mouth every morning., Disp: , Rfl:     CINNAMON PO, Take 1 tablet by mouth every morning., Disp: , Rfl:     Multiple Vitamins-Minerals (ICAPS AREDS FORMULA PO), Take 1 tablet by mouth every morning., Disp: , Rfl:     Omega-3 Fatty Acids (FISH OIL PO), Take 1 Cap by mouth every day., Disp: , Rfl:     PROBLEMS:  Patient Active Problem List    Diagnosis Date Noted    Type 2 diabetes mellitus without complication, with long-term current use of insulin  "([facility]) 10/26/2023    Secondary hypercoagulable state ([facility]) 06/15/2023    HFrEF (heart failure with reduced ejection fraction) ([facility]) 04/26/2023    Angina pectoris, unspecified ([facility]) 04/26/2023    DVT (deep vein thrombosis) in pregnancy 03/01/2023    Hypokalemia 03/01/2023    Atrial fibrillation with RVR ([facility]) 02/28/2023    Polymyalgia rheumatica ([facility]) 02/28/2023    Vitamin D deficiency 04/12/2021    Cervical spinal stenosis 04/11/2021    Pubic ramus fracture ([facility]) 04/09/2021    History of breast cancer     Spasm of back muscles 03/27/2019    Uncontrolled type 2 diabetes mellitus with hyperglycemia ([facility]) 01/28/2016    On statin therapy due to risk of future cardiovascular event 01/15/2013    Acquired hypothyroidism 01/15/2013    Gastroesophageal reflux disease with esophagitis 01/15/2013       REVIEW OF SYSTEMS:  Gen.:  No Nausea, Vomiting, fever, Chills.  Heart: No chest pain.  Lungs:  No shortness of Breath.  Psychological: Rajan unusual Anxiety depression     PHYSICAL EXAM   Constitutional: Alert, cooperative, not in acute distress.  Cardiovascular:  Rate Rhythm is regular without murmurs rubs clicks.     Thorax & Lungs: Clear to auscultation, no wheezing, rhonchi, or rales  HENT: Normocephalic, Atraumatic.  Eyes: PERRLA, EOMI, Conjunctiva normal.   Neck: Trachia is midline no swelling of the thyroid.   Lymphatic: No lymphadenopathy noted.   Neurologic: Alert & oriented x 3, cranial nerves II through XII are intact, Normal motor function, Normal sensory function, No focal deficits noted.   Psychiatric: Affect normal, Judgment normal, Mood normal.     VITAL SIGNS:/76 (BP Location: Left arm, Patient Position: Sitting, BP Cuff Size: Large adult)   Pulse (!) 59   Temp 36.9 °C (98.5 °F)   Resp 14   Ht 1.676 m (5' 6\")   Wt 76.7 kg (169 lb)   LMP  (LMP Unknown)   SpO2 97%   BMI 27.28 kg/m²     Labs: Reviewed    Assessment:                                                     Plan:    1. Uncontrolled type 2 " diabetes mellitus with hyperglycemia (HCC)  Start semaglutide restart metformin continue with Lantus, patient with an allergic response to the use of Lantus  - Semaglutide,0.25 or 0.5MG/DOS, 2 MG/3ML Solution Pen-injector; Inject 0.5 mg under the skin every 7 days.  Dispense: 3 mL; Refill: 11  - Referral to Endocrinology  - insulin glargine (LANTUS SOLOSTAR) 100 UNIT/ML Solution Pen-injector injection; Inject 20 Units under the skin every evening.  Dispense: 15 mL; Refill: 3  - metFORMIN ER (GLUCOPHAGE XR) 500 MG TABLET SR 24 HR; Take 1 Tablet by mouth every day for 180 days.  Dispense: 30 Tablet; Refill: 5  - Lipid Profile; Future    2. Polymyalgia rheumatica (HCC)  Continue with prednisone try to taper off as necessary

## 2024-01-29 RX ORDER — CYCLOBENZAPRINE HCL 10 MG
TABLET ORAL
Qty: 30 TABLET | Refills: 3 | Status: SHIPPED | OUTPATIENT
Start: 2024-01-29

## 2024-01-29 NOTE — TELEPHONE ENCOUNTER
Received request via: Patient    Was the patient seen in the last year in this department? Yes    Does the patient have an active prescription (recently filled or refills available) for medication(s) requested? No    Pharmacy Name: [facility]    Does the patient have nursing home Plus and need 100 day supply (blood pressure, diabetes and cholesterol meds only)? Patient does have SCP

## 2024-02-06 ENCOUNTER — OFFICE VISIT (OUTPATIENT)
Dept: ENDOCRINOLOGY | Facility: MEDICAL CENTER | Age: 84
End: 2024-02-06
Attending: INTERNAL MEDICINE
Payer: MEDICARE

## 2024-02-06 VITALS
WEIGHT: 169 LBS | HEART RATE: 69 BPM | DIASTOLIC BLOOD PRESSURE: 72 MMHG | OXYGEN SATURATION: 95 % | HEIGHT: 66 IN | BODY MASS INDEX: 27.16 KG/M2 | SYSTOLIC BLOOD PRESSURE: 138 MMHG

## 2024-02-06 DIAGNOSIS — E11.65 UNCONTROLLED TYPE 2 DIABETES MELLITUS WITH HYPERGLYCEMIA (HCC): ICD-10-CM

## 2024-02-06 DIAGNOSIS — E78.5 DYSLIPIDEMIA: ICD-10-CM

## 2024-02-06 DIAGNOSIS — Z79.4 LONG-TERM INSULIN USE (HCC): ICD-10-CM

## 2024-02-06 DIAGNOSIS — Z79.52 CURRENT CHRONIC USE OF SYSTEMIC STEROIDS: ICD-10-CM

## 2024-02-06 DIAGNOSIS — E55.9 VITAMIN D DEFICIENCY: ICD-10-CM

## 2024-02-06 DIAGNOSIS — E89.0 POSTOPERATIVE PRIMARY HYPOTHYROIDISM: ICD-10-CM

## 2024-02-06 DIAGNOSIS — Z13.820 SCREENING FOR OSTEOPOROSIS: ICD-10-CM

## 2024-02-06 PROCEDURE — 99204 OFFICE O/P NEW MOD 45 MIN: CPT | Performed by: INTERNAL MEDICINE

## 2024-02-06 PROCEDURE — 99211 OFF/OP EST MAY X REQ PHY/QHP: CPT | Performed by: INTERNAL MEDICINE

## 2024-02-06 PROCEDURE — 3078F DIAST BP <80 MM HG: CPT | Performed by: INTERNAL MEDICINE

## 2024-02-06 PROCEDURE — 3075F SYST BP GE 130 - 139MM HG: CPT | Performed by: INTERNAL MEDICINE

## 2024-02-06 RX ORDER — INSULIN GLARGINE 100 [IU]/ML
30 INJECTION, SOLUTION SUBCUTANEOUS EVERY EVENING
Qty: 15 ML | Refills: 6 | Status: SHIPPED | OUTPATIENT
Start: 2024-02-06

## 2024-02-06 RX ORDER — ACYCLOVIR 400 MG/1
1 TABLET ORAL
Qty: 9 EACH | Refills: 3 | Status: SHIPPED | OUTPATIENT
Start: 2024-02-06

## 2024-02-06 RX ORDER — PEN NEEDLE, DIABETIC 32GX 5/32"
1 NEEDLE, DISPOSABLE MISCELLANEOUS 4 TIMES DAILY
Qty: 300 EACH | Refills: 3 | Status: SHIPPED | OUTPATIENT
Start: 2024-02-06

## 2024-02-06 RX ORDER — INSULIN LISPRO 100 [IU]/ML
5-10 INJECTION, SOLUTION INTRAVENOUS; SUBCUTANEOUS
Qty: 15 ML | Refills: 6 | Status: SHIPPED | OUTPATIENT
Start: 2024-02-06

## 2024-02-06 RX ORDER — ACYCLOVIR 400 MG/1
1 TABLET ORAL CONTINUOUS
Qty: 1 EACH | Refills: 0 | Status: SHIPPED | OUTPATIENT
Start: 2024-02-06

## 2024-02-06 ASSESSMENT — FIBROSIS 4 INDEX: FIB4 SCORE: 2.45

## 2024-02-06 NOTE — PROGRESS NOTES
"Chief Complaint:  Consult requested by Waylon Webber D.O. for initial evaluation of Type 2 Diabetes Mellitus    HPI:   Mavis Lizarraga is a 83 y.o. female with Type 2 Diabetes Mellitus diagnosed in 2016.  She denies hospitalizations for DKA in the past.    Her A1c is 10% on 2024  She is on Metformin  500mg daily  She can't tolerate high doses of metformin  She tried and failed Jardiance (rash)      She is on Metformin 500mg daily  Lantus 20u daily    She is on steroids for PMR  She remains on prednidsone 2mg daily  She has a hx of CHF with reduced EF    She admits to high sugars of 300 post meals    She has hypothyroidism and is on Levothyroxine 75mcg daily  Her TSH is 0.600 on 3/2023        She monitors blood glucose  1 times per day. Blood glucose values range:Fastin-160            She denies hypoglycemic episodes   She  denies hypoglycemic unawareness. She denies episodes of severe hypoglycemia requiring third party assistance.  She  is not wearing a medical alert bracelet or necklace.  She does not a glucagon emergency kit.    She denies attending diabetes education classes.  Diet: \"unhealthy\" diet in general.    Diabetes Complications   She  denies history of retinopathy.  She denies laser eye surgery. Last eye exam: 2023 We donct have records, She saw Dr. Sintia Pink  She denies history of peripheral sensory neuropathy.  She denies numbness, tingling in both feet.  She denies history of foot sores.   She denies history of kidney damage.  She is not on ACE inhibitor or ARB.   She denies history of coronary artery disease.  She  denies history of stroke and denies TIA.  She denies history of PAD.  She reports history of hyperlipidemia.      ROS:     CONS:     No fever, no chills, no weight loss, no fatigue   EYES:      No diplopia, no blurry vision, no redness of eyes, no swelling of eyelids   ENT:    No hearing loss, No ear pain, No sore throat, no dysphagia, no neck swelling   CV:   "   No chest pain, no palpitations, no claudication, no orthopnea, no PND   PULM:    No SOB, no cough, no hemoptysis, no wheezing    GI:   No nausea, no vomiting, no diarrhea, no constipation, no bloody stools   :  Passing urine well, no dysuria, no hematuria   ENDO:   reports polyuria, reports polydipsia, no heat intolerance, no cold intolerance   NEURO: No headaches, no dizziness, no convulsions, no tremors   MUSC:  No joint swellings, no arthralgias, no myalgias, no weakness   SKIN:   No rash, no ulcers, no dry skin   PSYCH:   No depression, no anxiety, no difficulty sleeping       Past Medical History:  Patient Active Problem List    Diagnosis Date Noted    Type 2 diabetes mellitus without complication, with long-term current use of insulin ([facility]) 10/26/2023    Secondary hypercoagulable state ([facility]) 06/15/2023    HFrEF (heart failure with reduced ejection fraction) ([facility]) 04/26/2023    Angina pectoris, unspecified ([facility]) 04/26/2023    DVT (deep vein thrombosis) in pregnancy 03/01/2023    Hypokalemia 03/01/2023    Atrial fibrillation with RVR ([facility]) 02/28/2023    Polymyalgia rheumatica ([facility]) 02/28/2023    Vitamin D deficiency 04/12/2021    Cervical spinal stenosis 04/11/2021    Pubic ramus fracture ([facility]) 04/09/2021    History of breast cancer     Spasm of back muscles 03/27/2019    Uncontrolled type 2 diabetes mellitus with hyperglycemia ([facility]) 01/28/2016    On statin therapy due to risk of future cardiovascular event 01/15/2013    Acquired hypothyroidism 01/15/2013    Gastroesophageal reflux disease with esophagitis 01/15/2013       Past Surgical History:  Past Surgical History:   Procedure Laterality Date    ANTERIOR AND POSTERIOR REPAIR  6/23/2014    Performed by Lauri Batista M.D. at SURGERY SAME DAY [facility]    BLADDER SLING FEMALE  6/23/2014    Performed by Lauri Batista M.D. at SURGERY SAME DAY [facility]    BREAST RECONSTRUCTION  8/14/2012    Performed by SARTHAK LEVIN at [facility]  ORS    BREAST IMPLANT REVISION  8/14/2012    Performed by SARTHAK LEVIN at SURGERY [facility] ORS    CAPSULECTOMY  8/14/2012    Performed by SARTHAK LEVIN at SURGERY [facility] ORS    MASTOPEXY  8/14/2012    Performed by SARTHAK LEVIN at SURGERY [facility] ORS    LIPOSUCTION  8/14/2012    Performed by SARTHAK LEVIN at SURGERY [facility] ORS    RHYTIDECTOMY  8/14/2012    Performed by SARTHAK LEVIN at SURGERY [facility] ORS    PLATYSMAPLASTY  8/14/2012    Performed by SARTHAK LEVIN at SURGERY [facility] ORS    BLEPHAROPLASTY  8/14/2012    Performed by SARTHAK LEVIN at SURGERY [facility] ORS    LOW ANTERIOR RESECTION LAPAROSCOPIC  2/10/2010    Performed by KOBI COHN at SURGERY [facility] ORS    COLECTOMY N/A 2009    partial- post colonoscopy perforation- dr cohn    MASTECTOMY Right 1980s    neg nodes; 6 mos adjuvant chemo    OTHER      mastectomy R breast    OTHER ABDOMINAL SURGERY      I&D of ruptured diverticuli     UT BREAST AUGMENTATION WITH IMPLANT      UT BREAST REDUCTION      UT CHEMOTHERAPY, UNSPECIFIED PROCEDURE          Allergies:  Jardiance [empagliflozin], Metformin, and Morphine     Current Medications:    Current Outpatient Medications:     cyclobenzaprine (FLEXERIL) 10 mg Tab, TAKE ONE TABLET BY MOUTH THREE TIMES A DAY AS NEEDED FOR MILD PAIN (FLEXERIL), Disp: 30 Tablet, Rfl: 3    Semaglutide,0.25 or 0.5MG/DOS, 2 MG/3ML Solution Pen-injector, Inject 0.5 mg under the skin every 7 days., Disp: 3 mL, Rfl: 11    insulin glargine (LANTUS SOLOSTAR) 100 UNIT/ML Solution Pen-injector injection, Inject 20 Units under the skin every evening., Disp: 15 mL, Rfl: 3    metFORMIN ER (GLUCOPHAGE XR) 500 MG TABLET SR 24 HR, Take 1 Tablet by mouth every day for 180 days., Disp: 30 Tablet, Rfl: 5    apixaban (ELIQUIS) 5mg Tab, Take 1 Tablet by mouth 2 times a day., Disp: 180 Tablet, Rfl: 1    apixaban (ELIQUIS) 5mg Tab, Take 1 Tablet by mouth 2 times a day., Disp: 180 Tablet,  Rfl: 1    predniSONE (DELTASONE) 1 MG Tab, Take 1 Tablet by mouth see administration instructions. Please take 4 mg x 2 weeks if symptoms are stable okay to decrease to 3 mg x 2 weeks., Disp: 120 Tablet, Rfl: 2    Insulin Pen Needle 32 G x 4 mm ([employer] PEN NEEDLES), USE ONE PEN NEEDLE EACH TIME IN PEN DEVICE TO INJECT INSULIN ONCE DAILY., Disp: 100 Each, Rfl: 3    diphenhydrAMINE (BENADRYL) 25 MG capsule, Take 1 Capsule by mouth every 6 hours as needed for Rash., Disp: 30 Capsule, Rfl: 3    levothyroxine (SYNTHROID) 75 MCG Tab, TAKE ONE TABLET BY MOUTH EVERY MORNING ON AN EMPTY STOMACH, Disp: 90 Tablet, Rfl: 3    metoprolol SR (TOPROL XL) 50 MG TABLET SR 24 HR, Take 1.5 Tablets by mouth 2 times a day., Disp: 270 Tablet, Rfl: 3    Lancets, Use one Per formulary preference  lancet to test blood sugar once daily early morning before first meal., Disp: 100 Each, Rfl: 3    Blood Glucose Test Strips, Use one Per formulary preference  strip to test blood sugar once daily early morning before first meal., Disp: 100 Strip, Rfl: 0    nitroglycerin (NITROSTAT) 0.4 MG SL Tab, Place 1 Tablet under the tongue as needed for Chest Pain. Q 5 min x 3 then 911., Disp: 25 Tablet, Rfl: 3    POTASSIUM PO, Take 1 Tablet by mouth every morning., Disp: , Rfl:     rosuvastatin (CRESTOR) 40 MG tablet, TAKE ONE TABLET BY MOUTH DAILY, Disp: 100 Tablet, Rfl: 3    omeprazole (PRILOSEC) 20 MG delayed-release capsule, Take 1 Capsule by mouth every day. (Patient taking differently: Take 20 mg by mouth 1 time a day as needed. Indications: Heartburn), Disp: 30 Capsule, Rfl: 3    Cholecalciferol (VITAMIN D) 2000 UNIT Tab, Take 2,000 Units by mouth every day., Disp: , Rfl:     Ascorbic Acid (VITAMIN C) 1000 MG Tab, Take 1,000 mg by mouth every morning., Disp: , Rfl:     Cyanocobalamin (VITAMIN B-12 PO), Take 1 tablet by mouth every morning., Disp: , Rfl:     CINNAMON PO, Take 1 tablet by mouth every morning., Disp: , Rfl:     Multiple Vitamins-Minerals  (ICAPS AREDS FORMULA PO), Take 1 tablet by mouth every morning., Disp: , Rfl:     Omega-3 Fatty Acids (FISH OIL PO), Take 1 Cap by mouth every day., Disp: , Rfl:     Social History:  Social History     Socioeconomic History    Marital status:      Spouse name: Not on file    Number of children: Not on file    Years of education: Not on file    Highest education level: 12th grade   Occupational History    Not on file   Tobacco Use    Smoking status: Never    Smokeless tobacco: Never   Vaping Use    Vaping Use: Never used   Substance and Sexual Activity    Alcohol use: Yes     Comment: rarely    Drug use: No    Sexual activity: Yes     Partners: Male   Other Topics Concern    Not on file   Social History Narrative    Not on file     Social Determinants of Health     Financial Resource Strain: Low Risk  (4/13/2021)    Overall Financial Resource Strain (CARDIA)     Difficulty of Paying Living Expenses: Not hard at all   Food Insecurity: No Food Insecurity (4/27/2021)    Hunger Vital Sign     Worried About Running Out of Food in the Last Year: Never true     Ran Out of Food in the Last Year: Never true   Transportation Needs: Unknown (4/27/2021)    PRAPARE - Transportation     Lack of Transportation (Medical): No     Lack of Transportation (Non-Medical): Not on file   Physical Activity: Inactive (4/27/2021)    Exercise Vital Sign     Days of Exercise per Week: 0 days     Minutes of Exercise per Session: 10 min   Stress: No Stress Concern Present (4/27/2021)    [place] [employer] - Occupational Stress Questionnaire     Feeling of Stress : Only a little   Social Connections: Moderately Isolated (4/27/2021)    Social Connection and Isolation Panel [NHANES]     Frequency of Communication with Friends and Family: Twice a week     Frequency of Social Gatherings with Friends and Family: Once a week     Attends Sabianism Services: Never     Active Member of Clubs or Organizations: No     Attends Club  "or Organization Meetings: Never     Marital Status:    Intimate Partner Violence: Not on file   Housing Stability: Unknown (4/27/2021)    Housing Stability Vital Sign     Unable to Pay for Housing in the Last Year: No     Number of Places Lived in the Last Year: Not on file     Unstable Housing in the Last Year: No        Family History:   Family History   Problem Relation Age of Onset    Heart Disease Mother 64    Hypertension Mother     Diabetes Mother     Stroke Mother     Cancer Mother         Stomach cancer    Heart Disease Maternal Grandmother 63        heart attack       PHYSICAL EXAM:   Vital signs: /72 (BP Location: Left arm, Patient Position: Sitting, BP Cuff Size: Adult)   Pulse 69   Ht 1.676 m (5' 6\")   Wt 76.7 kg (169 lb)   LMP  (LMP Unknown)   SpO2 95%   BMI 27.28 kg/m²   GENERAL: Well-developed, well-nourished  in no apparent distress.   EYE: No ocular and eyelid asymmetry, Anicteric sclerae,  PERRL, No exophthalmos or lidlag  HENT: Hearing grossly intact, Normocephalic, atraumatic. Pink, moist mucous membranes, No exudate  NECK: Supple. Trachea midline. thyroid is normal in size without nodules or tenderness  CARDIOVASCULAR: Regular rate and rhythm. No murmurs, rubs, or gallops.   LUNGS: Clear to auscultation bilaterally   ABDOMEN: Soft, nontender with positive bowel sounds.   EXTREMITIES: No clubbing, cyanosis, or edema.   NEUROLOGICAL: Cranial nerves II-XII are grossly intact   Symmetric reflexes at the patella no proximal muscle weakness, No visible tremor with both outstretched hands  LYMPH: No cervical, supraclavicular,  adenopathy palpated.   SKIN: No rashes, lesions. Turgor is normal.  FOOT: Normal sensation to monofilament testing, normal pulses, no ulcers.  Normal Vibration quantitative sensation test.    Labs:  Lab Results   Component Value Date/Time    HBA1C 10.0 (H) 01/05/2024 0640    AVGLUC 240 01/05/2024 0640       Lab Results   Component Value Date/Time    WBC 5.0 " "01/05/2024 06:33 AM    RBC 4.81 01/05/2024 06:33 AM    HEMOGLOBIN 14.4 01/05/2024 06:33 AM    MCV 90.2 01/05/2024 06:33 AM    MCH 29.9 01/05/2024 06:33 AM    MCHC 33.2 01/05/2024 06:33 AM    RDW 40.5 01/05/2024 06:33 AM    MPV 10.0 01/05/2024 06:33 AM       Lab Results   Component Value Date/Time    SODIUM 141 01/05/2024 06:33 AM    POTASSIUM 4.2 01/05/2024 06:33 AM    CHLORIDE 103 01/05/2024 06:33 AM    CO2 28 01/05/2024 06:33 AM    ANION 10.0 01/05/2024 06:33 AM    GLUCOSE 161 (H) 01/05/2024 06:33 AM    BUN 11 01/05/2024 06:33 AM    CREATININE 0.49 (L) 01/05/2024 06:33 AM    CALCIUM 9.5 01/05/2024 06:33 AM    ASTSGOT 19 01/05/2024 06:33 AM    ALTSGPT 14 01/05/2024 06:33 AM    TBILIRUBIN 0.6 01/05/2024 06:33 AM    ALBUMIN 4.0 01/05/2024 06:33 AM    ALBUMIN 3.90 04/09/2021 01:33 PM    TOTPROTEIN 6.7 01/05/2024 06:33 AM    TOTPROTEIN 6.5 04/09/2021 01:33 PM    GLOBULIN 2.7 01/05/2024 06:33 AM    AGRATIO 1.5 01/05/2024 06:33 AM       Lab Results   Component Value Date/Time    CHOLSTRLTOT 158 04/06/2022 0619    TRIGLYCERIDE 137 04/06/2022 0619    HDL 53 04/06/2022 0619    LDL 78 04/06/2022 0619       Lab Results   Component Value Date/Time    MALBCRT see below 07/28/2023 04:07 PM    MICROALBUR <1.2 07/28/2023 04:07 PM        Lab Results   Component Value Date/Time    TSHULTRASEN 0.600 03/02/2023 0540     No results found for: \"FREEDIR\"  No results found for: \"FREET3\"  No results found for: \"THYSTIMIG\"      ASSESSMENT/PLAN:     1. Uncontrolled type 2 diabetes mellitus with hyperglycemia (HCC)  Uncontrolled due to hyperglycemia  Patient has hyperglycemia due to steroids.  She did not start Ozempic because of personal reasons  I think she may be a candidate for the ACHIEVE 4  study however currently enrollment is on hold because of waiting for a new research coordinator  At this time I advised her not to take Ozempic for now because of her concerns about the medication and because she might be a candidate for the study " with the oral GLP-1 analog  I am placing her on Humalog 5 units 3 times a day with each meal with a correction scale of 1 unit for every 40 above 120  I want her to increase Lantus to 30 units daily and titrate and adjust until fasting sugars at goal  She should continue taking metformin  I am also ordering a Dexcom G7 CGM to help with blood sugar monitoring  The CGM is medically necessary for daily adjustment of her insulin doses  She has received proper education on the CGM  She is involved in a comprehensive diabetes education program  I want her to get updated labs for CMP, lipids and urine albumin  I am also checking a C-peptide and pam 65 antibody level  She is going to follow-up with the pharmacy team in our office in 1 month for closer follow-up because she needs titration of her insulin doses    2. Postoperative primary hypothyroidism  Controlled  Continue levothyroxine 75 mcg daily  I want her to get a TSH with her upcoming labs    3. Dyslipidemia  Controlled  Continue Crestor  Repeat fasting lipids this week    4. Vitamin D deficiency  Stable we will check calcium vitamin D this week    5. Long-term insulin use (HCC)  Patient is on basal bolus therapy for uncontrolled diabetes from steroid related hyperglycemia    6. Current chronic use of systemic steroids  Unstable patient is on chronic steroids for PMR  Reviewed side effects of steroids including bone loss and uncontrolled sugars and uncontrolled blood pressure    7. Screening for osteoporosis  Unstable  Because of multiple risk factors for bone loss and because her last bone density was 5 years ago I am ordering a new bone density for the patient and I will update her      Return in about 4 weeks (around 3/5/2024).      Total time spent on day of service was over 60 minutes which included obtaining a detailed history and physical exam, ordering labs, coordinating care and scheduling future follow-up      This patient during there office visit was  started on new medication.  Side effects of new medications were discussed with the patient today in the office. The patient was supplied paperwork on this new medication.    Thank you kindly for allowing me to participate in the diabetes care plan for this patient.    Jonathan Mccormick MD, [facility], [facility]  02/06/24    CC:   Waylon Webber D.O.

## 2024-02-07 NOTE — TELEPHONE ENCOUNTER
Received request via: Pharmacy    Was the patient seen in the last year in this department? Yes    Does the patient have an active prescription (recently filled or refills available) for medication(s) requested? No    Pharmacy Name: [facility]    Does the patient have jail Plus and need 100 day supply (blood pressure, diabetes and cholesterol meds only)? Yes, quantity updated to 100 days

## 2024-02-20 ENCOUNTER — TELEPHONE (OUTPATIENT)
Dept: ENDOCRINOLOGY | Facility: MEDICAL CENTER | Age: 84
End: 2024-02-20
Payer: MEDICARE

## 2024-02-20 ENCOUNTER — PATIENT MESSAGE (OUTPATIENT)
Dept: MEDICAL GROUP | Facility: LAB | Age: 84
End: 2024-02-20
Payer: MEDICARE

## 2024-02-20 NOTE — TELEPHONE ENCOUNTER
Patient hasn't bee feeling well from the metFORMIN 500 MG. She received a prescription order from Dr. Webber for Ozempic and is wondering if she should stop taking the metFORMIN and switch to the Ozempic. Patient is requesting a call regarding this situation. Thank you!

## 2024-02-22 DIAGNOSIS — M35.3 PMR (POLYMYALGIA RHEUMATICA) (HCC): ICD-10-CM

## 2024-02-22 RX ORDER — PREDNISONE 1 MG/1
TABLET ORAL
Qty: 120 TABLET | Refills: 2 | Status: SHIPPED | OUTPATIENT
Start: 2024-02-22

## 2024-02-22 NOTE — TELEPHONE ENCOUNTER
Received request via: Pharmacy    Was the patient seen in the last year in this department? Yes    Does the patient have an active prescription (recently filled or refills available) for medication(s) requested? No    Pharmacy Name: [facility]    Does the patient have nursing home Plus and need 100 day supply (blood pressure, diabetes and cholesterol meds only)? Yes, quantity updated to 100 days

## 2024-02-23 ENCOUNTER — OFFICE VISIT (OUTPATIENT)
Dept: ENDOCRINOLOGY | Facility: MEDICAL CENTER | Age: 84
End: 2024-02-23
Attending: INTERNAL MEDICINE
Payer: MEDICARE

## 2024-02-23 VITALS — WEIGHT: 173.6 LBS | BODY MASS INDEX: 28.02 KG/M2

## 2024-02-23 DIAGNOSIS — E11.65 UNCONTROLLED TYPE 2 DIABETES MELLITUS WITH HYPERGLYCEMIA (HCC): ICD-10-CM

## 2024-02-23 PROCEDURE — 99213 OFFICE O/P EST LOW 20 MIN: CPT

## 2024-02-23 PROCEDURE — 99999 PR NO CHARGE: CPT

## 2024-02-23 ASSESSMENT — FIBROSIS 4 INDEX: FIB4 SCORE: 2.45

## 2024-02-23 NOTE — PROGRESS NOTES
Patient Consult Note    Primary care physician: Waylon Webber D.O.    Reason for consult: Management of Uncontrolled Type 2 Diabetes    HPI:  Mavis Lizarraga is a 83 y.o. old patient who comes in today for evaluation of above stated problem.    Patient was last seen by Dr. Mccormick on 02/06/24 at which time basal insulin was increased and bolus insulin was initiated.    Allergies:  Jardiance [empagliflozin], Metformin, and Morphine    Current Diabetes Medication Regimen:  Metformin ER: 500 mg daily - discontinued by the patient; see below  Basal Insulin: Lantus 30 units daily  Prandial Insulin: Humalog 5 units + sliding scale before meals (1 units for every 40 above 120) - usually injecting at least 6 units    Previous Diabetes Medications and Reason for Discontinuation:  Jardiance - rash  Glimepiride  Glipizide  Metformin IR - N/V, diarrhea  Pioglitazone  Semglee    Potential Barriers to Care:  Adherence: denies missed doses for insulin therapy. Discontinued metformin.  Side effects:  Unable to tolerate metformin therefore self-discontinued the medication  Bruising from insulin shots  Affordability: no issues  Others:  On steroids for PMR, currently on prednisone 2 mg daily  Per Dr. Mccormick' OV note 02/06/24: She may be a candidate for the ACHIEVE 4  study however currently enrollment is on hold because of waiting for a new research coordinator. At this time I advised her not to take Ozempic for now because of her concerns about the medication and because she might be a candidate for the study with the oral GLP-1 analog.  Pt expresses today that she would like to proceed with initiating Ozempic as she would prefer to be on a once weekly injection vs multiple daily injections. She has the supply at home (prescribed by PCP but was advised not to initiate until seen by endo).    SMBG:  Discussed BG Goals: FBG 80 - 130, 2hPP < 180, A1c < 7%      Hypoglycemia:  Hypoglycemia awareness: Yes  Nocturnal  hypoglycemia: None  Hypoglycemia:  None  Pt's treatment of Hypoglycemia  Discussed 15:15 Rule    Lifestyle:  Not discussed today d/t time constraints    Preventative Management:  BP regimen (ACEi/ARB): none  Statin: rosuvastatin (Crestor) 40 mg daily  LDL <100: Yes, but overdue  Lab Results   Component Value Date/Time    CHOLSTRLTOT 158 04/06/2022 06:19 AM    LDL 78 04/06/2022 06:19 AM    HDL 53 04/06/2022 06:19 AM    TRIGLYCERIDE 137 04/06/2022 06:19 AM       Last Microalbumin/Cr:  Lab Results   Component Value Date/Time    MALBCRT see below 07/28/2023 04:07 PM    MICROALBUR <1.2 07/28/2023 04:07 PM     Last A1c:  Lab Results   Component Value Date/Time    HBA1C 10.0 (H) 01/05/2024 06:40 AM      Last Retinal Scan: 11/2021, overdue    Updated caregaps    Labs:  Lab Results   Component Value Date/Time    SODIUM 141 01/05/2024 06:33 AM    POTASSIUM 4.2 01/05/2024 06:33 AM    CHLORIDE 103 01/05/2024 06:33 AM    CO2 28 01/05/2024 06:33 AM    GLUCOSE 161 (H) 01/05/2024 06:33 AM    BUN 11 01/05/2024 06:33 AM    CREATININE 0.49 (L) 01/05/2024 06:33 AM     Lab Results   Component Value Date/Time    ALKPHOSPHAT 57 01/05/2024 06:33 AM    ASTSGOT 19 01/05/2024 06:33 AM    ALTSGPT 14 01/05/2024 06:33 AM    TBILIRUBIN 0.6 01/05/2024 06:33 AM    INR 1.28 (H) 07/10/2023 08:55 AM    ALBUMIN 4.0 01/05/2024 06:33 AM    ALBUMIN 3.90 04/09/2021 01:33 PM        Physical Examination:  Vital signs: Wt 78.7 kg (173 lb 9.6 oz)   LMP  (LMP Unknown)   BMI 28.02 kg/m²  Body mass index is 28.02 kg/m².    Foot Exam:  Monofilament exam: 01/2024    Assessment and Plan:    1. DMII  Basic physiology of DMII was explained to patient as well as microvascular/macrovascular complications. The importance of increasing physical activity to improve diabetes control was discussed with the patient. Patient was also educated on changing diet and making better choices to help control blood sugar.   Discussed Goals: FBG 80 - 130, 2hPP < 180, a1c < 7.0%  Last  A1c elevated at 10%  AGP shows average glucose of 182 which is an improvement as this would be equivalent to an A1c of ~8% if she maintains this average glucose  TIR not at goal of >70%  Pt did not tolerate metformin despite being on low dose ER formulation. She does not want to rechallenge this.  She is interested in initiating Ozempic at this time.  Agree that pt would benefit from GLP1a but given Dr. Mccormick' last OV note regarding ACHIEVE 4 study, will discuss the plan with him prior to making any changes  Discussed MOA of Ozempic as well as both basal and bolus insulin  Pt advised that if Ozempic 0.25 mg is initiated, will continue both basal and bolus insulin in the interim as the 0.25 mg dose does not provide adequate glucose control, however could evaluate in the long run if bolus insulin can be discontinued as Ozempic is titrated.  Encouraged patient to utilize CGM trends to identify best day and to mimic activities (diet/exercise) as this would help lower insulin requirements    Medication changes:  None   Continue:  Basal Insulin: Lantus 30 units daily  Prandial Insulin: Humalog 5 units + sliding scale before meals (1 units for every 40 above 120)      Lifestyle changes:  Will discuss at next visit, but advised to utilize CGM to identify foods and activities that keep glucose in range and to mimic these on a daily basis    Follow Up:  TIFFANY Ray, AndreaD    CC:   RASHEEDA RenteriaO.  Jonathan Mccormick MD              [facility] Consent      Name    Mavis Lizarraga    MRN number: [id number]    the following are guidelines for participation in the [facility].     I, ____Mavis Lizarraga_____, understand and voluntarily agree to participate in the [facility] and to have services provided to me by pharmacists working in collaboration with my provider.    I understand the pharmacist within the [facility] Program may initiate, modify or discontinue my medications, order appropriate testing and appointments, perform exams, monitor treatment, and make clinical evaluations and decisions pursuant to a collaborative practice agreement with my provider.  I understand the pharmacist within the [facility] Program is not a physician, osteopathic physician, advanced practice registered nurse or physician assistant and may not diagnose.  I will take all my medications as instructed and not change the way I take it without first talking to my provider or a pharmacist within the [facility] Program.  I understand that if I am late to my appointment I may not be able to be seen by a pharmacist at that time and will have to reschedule my appointment.  During appointment with pharmacist I understand that pharmacist has the right not to answer questions or perform services outside the pharmacist’s scope of practice.  By signing below, I provide informed consent for the pharmacist to provide these services and for my participation in the [facility].      Mavis Lizarraga           [id number]          02/23/24  Patient Name                   MRN number  Date     ____Obtained verbal consent from Mavis Lizarraga____  Patient Signature

## 2024-02-23 NOTE — Clinical Note
Jeffrey CONTRERAS!  Pt would be interested in starting Ozempic at this time as she would like to decrease insulin requirements/injections if possible. Did see your note regarding ACHIEVE 4 study and that she may be a candidate. Would it be reasonable to initiate Ozempic at this time rather than wait for enrollment to open again?  Thanks, Atilio

## 2024-03-11 DIAGNOSIS — I48.91 ATRIAL FIBRILLATION WITH RVR (HCC): ICD-10-CM

## 2024-03-15 ENCOUNTER — TELEPHONE (OUTPATIENT)
Dept: ENDOCRINOLOGY | Facility: MEDICAL CENTER | Age: 84
End: 2024-03-15
Payer: MEDICARE

## 2024-03-15 NOTE — TELEPHONE ENCOUNTER
Per discussion with Dr. Mccormick, patient after all would not qualify for ACHIEVE 4 study since she is on insulin therapy.    Attempted to contact patient to relay this message and to discuss initiation of Ozempic. Unable to reach. LVM with call back number.    Atilio Ray, PharmD, BCACP

## 2024-03-18 NOTE — TELEPHONE ENCOUNTER
Called and spoke to patient to relay above message. At this time, will initiate Ozempic 0.25 mg weekly as discussed at last visit. Advised to continue all other DM medications. Scheduled f/u appt in 4 weeks.    Atilio Ray, AndreaD, BCACP

## 2024-03-27 DIAGNOSIS — I50.20 HFREF (HEART FAILURE WITH REDUCED EJECTION FRACTION) (HCC): ICD-10-CM

## 2024-03-28 NOTE — TELEPHONE ENCOUNTER
Is the patient due for a refill? Yes    Was the patient seen the past year? Yes    Date of last office visit: 10/26/23    Does the patient have an upcoming appointment?  No   If yes, When?     Provider to refill:SURYA    Does the patients insurance require a 100 day supply?  Yes

## 2024-04-08 ENCOUNTER — APPOINTMENT (OUTPATIENT)
Dept: MEDICAL GROUP | Facility: MEDICAL CENTER | Age: 84
End: 2024-04-08
Payer: MEDICARE

## 2024-04-08 VITALS
BODY MASS INDEX: 27.44 KG/M2 | HEART RATE: 67 BPM | DIASTOLIC BLOOD PRESSURE: 56 MMHG | WEIGHT: 170 LBS | SYSTOLIC BLOOD PRESSURE: 126 MMHG | OXYGEN SATURATION: 96 %

## 2024-04-08 DIAGNOSIS — I48.91 ATRIAL FIBRILLATION WITH RVR (HCC): ICD-10-CM

## 2024-04-08 DIAGNOSIS — O22.30 DVT (DEEP VEIN THROMBOSIS) IN PREGNANCY: ICD-10-CM

## 2024-04-08 PROCEDURE — 3078F DIAST BP <80 MM HG: CPT | Performed by: STUDENT IN AN ORGANIZED HEALTH CARE EDUCATION/TRAINING PROGRAM

## 2024-04-08 PROCEDURE — 3074F SYST BP LT 130 MM HG: CPT | Performed by: STUDENT IN AN ORGANIZED HEALTH CARE EDUCATION/TRAINING PROGRAM

## 2024-04-08 PROCEDURE — 99211 OFF/OP EST MAY X REQ PHY/QHP: CPT | Performed by: STUDENT IN AN ORGANIZED HEALTH CARE EDUCATION/TRAINING PROGRAM

## 2024-04-08 RX ORDER — METOPROLOL SUCCINATE 50 MG/1
75 TABLET, EXTENDED RELEASE ORAL
Qty: 270 TABLET | Refills: 2 | Status: SHIPPED | OUTPATIENT
Start: 2024-04-08

## 2024-04-08 RX ORDER — SEMAGLUTIDE 1.34 MG/ML
0.25 INJECTION, SOLUTION SUBCUTANEOUS
COMMUNITY
End: 2024-04-12

## 2024-04-08 ASSESSMENT — FIBROSIS 4 INDEX: FIB4 SCORE: 2.45

## 2024-04-08 NOTE — PROGRESS NOTES
Target end date: Indefinite  Indication: Afib, Hx of DVT  Drug: Eliquis 5 mg BID  CHADsVASC = 6 (Age, sex, hx of VTE, DM)    Health Status Since Last Assessment  Pt denies any new relevant medical problems, ED visits or hospitalizations  Pt denies any embolic events (stroke/tia/systemic embolism)    Adherence with DOAC Therapy  Pt has not missed doses in the average week.    Bleeding Risk Assessment  Pt denies epistaxis  Pt denies any hematuria  Pt denies any excessive or unusual bleeding/hematomas.  Pt denies any GI bleeds or hematemesis.  Pt denies any concerning daily headache or subdural hematoma symptoms.    Latest Hemoglobin: WNL  Hemoglobin   Date Value Ref Range Status   01/05/2024 14.4 12.0 - 16.0 g/dL Final     Latest Platelets: WNL  Platelet Count   Date Value Ref Range Status   01/05/2024 170 164 - 446 K/uL Final     Pt denies  ETOH overuse     Creatinine Clearance/Renal Function  Latest CrCl: 108 ml/min  Age 84 yo, Scr 0.49 mg/dl, Wt 75.3 kg    Hepatic function  Latest LFTs: WNL  Pt denies any history of liver dysfunction      Drug Interactions  ASA/other antiplatelets: None  NSAID: None  Other drug interactions: None  Verified no anticonvulsant or azole therapy, education provided for future use.     Examination  Blood Pressure WNL  Vitals:    04/08/24 0822   BP: 126/56   Pulse: 67   SpO2: 96%       Symptomatic hypotension: Denies   Significant gait impairment/imbalance/high risk for falls? No issues    Final Assessment and Recommendations:  Patient appears stable from the anticoagulation standpoint.    Benefits of continued DOAC therapy outweigh risks for this patient  Recommend pt continue with current DOAC therapy: Eliquis 5 mg BID  DOAC is affordable     Other Actions: CMP/CBC hemogram ordered prior to next visit    Follow up:  Will follow up with patient 6 month(s).     Katalina Meredith, AndreaD

## 2024-04-09 ENCOUNTER — HOSPITAL ENCOUNTER (OUTPATIENT)
Dept: RADIOLOGY | Facility: MEDICAL CENTER | Age: 84
End: 2024-04-09
Attending: INTERNAL MEDICINE
Payer: MEDICARE

## 2024-04-09 DIAGNOSIS — E11.65 UNCONTROLLED TYPE 2 DIABETES MELLITUS WITH HYPERGLYCEMIA (HCC): ICD-10-CM

## 2024-04-09 DIAGNOSIS — E55.9 VITAMIN D DEFICIENCY: ICD-10-CM

## 2024-04-09 DIAGNOSIS — E78.5 DYSLIPIDEMIA: ICD-10-CM

## 2024-04-09 DIAGNOSIS — Z79.4 LONG-TERM INSULIN USE (HCC): ICD-10-CM

## 2024-04-09 DIAGNOSIS — Z79.52 CURRENT CHRONIC USE OF SYSTEMIC STEROIDS: ICD-10-CM

## 2024-04-09 DIAGNOSIS — Z13.820 SCREENING FOR OSTEOPOROSIS: ICD-10-CM

## 2024-04-09 PROCEDURE — 77080 DXA BONE DENSITY AXIAL: CPT

## 2024-04-12 ENCOUNTER — OFFICE VISIT (OUTPATIENT)
Dept: ENDOCRINOLOGY | Facility: MEDICAL CENTER | Age: 84
End: 2024-04-12
Attending: INTERNAL MEDICINE
Payer: MEDICARE

## 2024-04-12 VITALS — WEIGHT: 175.4 LBS | BODY MASS INDEX: 28.31 KG/M2

## 2024-04-12 DIAGNOSIS — E11.65 UNCONTROLLED TYPE 2 DIABETES MELLITUS WITH HYPERGLYCEMIA (HCC): Primary | ICD-10-CM

## 2024-04-12 LAB
HBA1C MFR BLD: 7.2 % (ref ?–5.8)
POCT INT CON NEG: NEGATIVE
POCT INT CON POS: POSITIVE

## 2024-04-12 PROCEDURE — 83036 HEMOGLOBIN GLYCOSYLATED A1C: CPT

## 2024-04-12 PROCEDURE — 99999 PR NO CHARGE: CPT

## 2024-04-12 PROCEDURE — 99212 OFFICE O/P EST SF 10 MIN: CPT

## 2024-04-12 RX ORDER — SEMAGLUTIDE 0.68 MG/ML
0.5 INJECTION, SOLUTION SUBCUTANEOUS
Qty: 3 ML | Refills: 1 | Status: SHIPPED | OUTPATIENT
Start: 2024-04-12

## 2024-04-12 ASSESSMENT — FIBROSIS 4 INDEX: FIB4 SCORE: 2.45

## 2024-04-12 NOTE — PROGRESS NOTES
Patient Consult Note    Primary care physician: Waylon Webber D.O.    Reason for consult: Management of Uncontrolled Type 2 Diabetes    HPI:  Mavis Lizarraga is a 83 y.o. old patient who comes in today for evaluation of above stated problem.    Allergies:  Jardiance [empagliflozin], Metformin, and Morphine    Current Diabetes Medication Regimen:  GLP1a: Ozempic 0.25 mg weekly  Basal Insulin: Lantus 30 units daily - pt has been skipping Lantus on Mon PM when she injects Ozempic d/t fear of hypoglycemia  Prandial Insulin: Humalog 5 units + sliding scale before meals (1 units for every 40 above 120) - pt has been skipping most meals/days    Previous Diabetes Medications and Reason for Discontinuation:  Jardiance - rash  Glimepiride  Glipizide  Metformin IR/ER - N/V, diarrhea  Pioglitazone  Semglee    Potential Barriers to Care:  Adherence: reports missed doses for insulin therapy as noted above  Side effects: bruising from insulin shots  Affordability: no issues  Others: on steroids for PMR, currently on prednisone 2 mg daily    SMBG:      Hypoglycemia:  Hypoglycemia awareness: Yes  Nocturnal hypoglycemia: None  Hypoglycemia:  None  Pt's treatment of Hypoglycemia  Discussed 15:15 Rule    Lifestyle:  Not discussed today d/t time constraints     Preventative Management:  BP regimen (ACEi/ARB): none  Statin: rosuvastatin (Crestor) 40 mg daily  LDL <100: Yes, but overdue  Lab Results   Component Value Date/Time    CHOLSTRLTOT 158 04/06/2022 06:19 AM    LDL 78 04/06/2022 06:19 AM    HDL 53 04/06/2022 06:19 AM    TRIGLYCERIDE 137 04/06/2022 06:19 AM       Last Microalbumin/Cr:  Lab Results   Component Value Date/Time    MALBCRT see below 07/28/2023 04:07 PM    MICROALBUR <1.2 07/28/2023 04:07 PM     Last A1c:  Lab Results   Component Value Date/Time    HBA1C 7.2 (A) 04/12/2024 09:50 AM      Last Retinal Scan: 11/2021, overdue    Updated caregaps    Labs:  Lab Results   Component Value Date/Time    SODIUM 141  01/05/2024 06:33 AM    POTASSIUM 4.2 01/05/2024 06:33 AM    CHLORIDE 103 01/05/2024 06:33 AM    CO2 28 01/05/2024 06:33 AM    GLUCOSE 161 (H) 01/05/2024 06:33 AM    BUN 11 01/05/2024 06:33 AM    CREATININE 0.49 (L) 01/05/2024 06:33 AM     Lab Results   Component Value Date/Time    ALKPHOSPHAT 57 01/05/2024 06:33 AM    ASTSGOT 19 01/05/2024 06:33 AM    ALTSGPT 14 01/05/2024 06:33 AM    TBILIRUBIN 0.6 01/05/2024 06:33 AM    INR 1.28 (H) 07/10/2023 08:55 AM    ALBUMIN 4.0 01/05/2024 06:33 AM    ALBUMIN 3.90 04/09/2021 01:33 PM        Physical Examination:  Vital signs: Wt 79.6 kg (175 lb 6.4 oz)   LMP  (LMP Unknown)   BMI 28.31 kg/m²  Body mass index is 28.31 kg/m².    Foot Exam:  Monofilament exam: 01/2024    Assessment and Plan:    1. DMII  Discussed Goals: FBG 80 - 130, 2hPP < 180, a1c < 7.0%  A1c decreased from 10% to 7.2%, A1c is near goal  TIR at goal of >70%  Patient is tolerating Ozempic well and she has injected 4 doses, will increase Ozempic dose as current dose of 0.25 mg does not provide adequate glycemic control.  Since patient is rarely injecting bolus insulin, will d/c completely given improvement in A1c. Will focus on Ozempic titration.  Explained MOA of Ozempic and advised to inject basal insulin daily    Medication changes:  Increase Ozempic to 0.5 mg weekly  Stop Humalog   Continue:  Basal Insulin: Lantus 30 units daily    Lifestyle changes:  Will discuss at next visit, but advised to utilize CGM to identify foods and activities that keep glucose in range and to mimic these on a daily basis    Preventative management:  REC DM Score: 0  Care gaps addressed: Lipid panel ordered previously  Care gaps updated in Health Maintenance    Follow Up:  4 weeks    Atilio Ray, AndreaD    CC:   Waylon Webber D.O.  Jonathan Mccormick MD

## 2024-04-29 ENCOUNTER — TELEPHONE (OUTPATIENT)
Dept: HEALTH INFORMATION MANAGEMENT | Facility: OTHER | Age: 84
End: 2024-04-29

## 2024-04-30 DIAGNOSIS — I48.11 LONGSTANDING PERSISTENT ATRIAL FIBRILLATION (HCC): ICD-10-CM

## 2024-04-30 NOTE — TELEPHONE ENCOUNTER
Received request via: Pharmacy    Was the patient seen in the last year in this department? Yes    Does the patient have an active prescription (recently filled or refills available) for medication(s) requested? No    Pharmacy Name: [facility]    Does the patient have intermediate Plus and need 100 day supply (blood pressure, diabetes and cholesterol meds only)? Yes, quantity updated to 100 days

## 2024-05-08 ENCOUNTER — OFFICE VISIT (OUTPATIENT)
Dept: ENDOCRINOLOGY | Facility: MEDICAL CENTER | Age: 84
End: 2024-05-08
Attending: INTERNAL MEDICINE
Payer: MEDICARE

## 2024-05-08 VITALS — WEIGHT: 172.8 LBS | BODY MASS INDEX: 27.89 KG/M2

## 2024-05-08 DIAGNOSIS — E11.65 UNCONTROLLED TYPE 2 DIABETES MELLITUS WITH HYPERGLYCEMIA (HCC): Primary | ICD-10-CM

## 2024-05-08 PROCEDURE — 99999 PR NO CHARGE: CPT

## 2024-05-08 RX ORDER — SEMAGLUTIDE 1.34 MG/ML
1 INJECTION, SOLUTION SUBCUTANEOUS
Qty: 3 ML | Refills: 2 | Status: SHIPPED | OUTPATIENT
Start: 2024-05-08

## 2024-05-08 ASSESSMENT — FIBROSIS 4 INDEX: FIB4 SCORE: 2.45

## 2024-05-08 NOTE — PROGRESS NOTES
Patient Consult Note    Primary care physician: Waylon Webber D.O.    Reason for consult: Management of Uncontrolled Type 2 Diabetes    HPI:  Mavis Lizarraga is a 83 y.o. old patient who comes in today for evaluation of above stated problem.    Allergies:  Jardiance [empagliflozin], Metformin, and Morphine    Current Diabetes Medication Regimen:  GLP1a: Ozempic 0.5 mg weekly  Basal Insulin: Lantus 30 units daily    Previous Diabetes Medications and Reason for Discontinuation:  Jardiance - rash  Glimepiride  Glipizide  Metformin IR/ER - N/V, diarrhea  Pioglitazone  Semglee  Humalog - d/c d/t improving BG control    Potential Barriers to Care:  Adherence: denies missed doses   Side effects:none  Affordability: no issues  Others: on steroids for PMR, currently on prednisone 2 mg daily. Has a f/u appt next week and states she will discuss trying to lower dose or weaning off the medication if possible.    SMBG:      Hypoglycemia:  Hypoglycemia awareness: Yes  Nocturnal hypoglycemia: None  Hypoglycemia:  None  Pt's treatment of Hypoglycemia  Discussed 15:15 Rule    Lifestyle:  Diet:  Breakfast: 3 cups of coffee, cereal  Lunch: apple with PB, PB sandwich  Dinner: chicken, pasta, veggies  Drinks: sugarfree lemonade, diet pepsi, water    Exercise: minimal    Preventative Management:  BP regimen (ACEi/ARB): none  Statin: rosuvastatin (Crestor) 40 mg daily  LDL <100: Yes, but overdue  Lab Results   Component Value Date/Time    CHOLSTRLTOT 158 04/06/2022 06:19 AM    LDL 78 04/06/2022 06:19 AM    HDL 53 04/06/2022 06:19 AM    TRIGLYCERIDE 137 04/06/2022 06:19 AM       Last Microalbumin/Cr:  Lab Results   Component Value Date/Time    MALBCRT see below 07/28/2023 04:07 PM    MICROALBUR <1.2 07/28/2023 04:07 PM     Last A1c:  Lab Results   Component Value Date/Time    HBA1C 7.2 (A) 04/12/2024 09:50 AM      Last Retinal Scan: 11/2021, overdue    Updated caregaps    Labs:  Lab Results   Component Value Date/Time    SODIUM  141 01/05/2024 06:33 AM    POTASSIUM 4.2 01/05/2024 06:33 AM    CHLORIDE 103 01/05/2024 06:33 AM    CO2 28 01/05/2024 06:33 AM    GLUCOSE 161 (H) 01/05/2024 06:33 AM    BUN 11 01/05/2024 06:33 AM    CREATININE 0.49 (L) 01/05/2024 06:33 AM     Lab Results   Component Value Date/Time    ALKPHOSPHAT 57 01/05/2024 06:33 AM    ASTSGOT 19 01/05/2024 06:33 AM    ALTSGPT 14 01/05/2024 06:33 AM    TBILIRUBIN 0.6 01/05/2024 06:33 AM    INR 1.28 (H) 07/10/2023 08:55 AM    ALBUMIN 4.0 01/05/2024 06:33 AM    ALBUMIN 3.90 04/09/2021 01:33 PM        Physical Examination:  Vital signs: Wt 78.4 kg (172 lb 12.8 oz)   LMP  (LMP Unknown)   BMI 27.89 kg/m²  Body mass index is 27.89 kg/m².    Foot Exam:  Monofilament exam: 01/2024    Assessment and Plan:    1. DMII  Discussed Goals: FBG 80 - 130, 2hPP < 180, a1c < 7.0%  Last A1c decreased from 10% to 7.2%, A1c is near goal  TIR is not at goal of > 70% but near goal  FBG at goal, therefore will not increase basal insulin to prevent hypoglycemia  Patient appears to require additional PPG coverage, likely d/t steroid administration  Steroid-induced hyperglycemia is typically best treated with bolus insulin, but to prevent injection fatigue, will attempt to control hyperglycemic episodes with Ozempic titration  Patient is tolerating Ozempic well, will increase Ozempic dose further    Medication changes:  Increase Ozempic to 1 mg weekly  Continue:  Basal Insulin: Lantus 30 units daily    Lifestyle changes:  Continue current diet  Increase exercise as tolerated    Preventative management:  REC DM Score: 0  Care gaps addressed: Lipid panel ordered previously  Care gaps updated in Health Maintenance    Follow Up:  4 weeks with Dr. Mccormick  2 months with PharmD for A1c check    Atilio Ray, AndreaD    CC:   Waylon Webber D.O.  Jonathan Mccormick MD

## 2024-05-13 DIAGNOSIS — G89.29 CHRONIC MIDLINE LOW BACK PAIN WITHOUT SCIATICA: ICD-10-CM

## 2024-05-13 DIAGNOSIS — Z12.31 ENCOUNTER FOR SCREENING MAMMOGRAM FOR MALIGNANT NEOPLASM OF BREAST: ICD-10-CM

## 2024-05-13 DIAGNOSIS — E11.65 UNCONTROLLED TYPE 2 DIABETES MELLITUS WITH HYPERGLYCEMIA (HCC): ICD-10-CM

## 2024-05-13 DIAGNOSIS — E78.2 MIXED HYPERLIPIDEMIA: ICD-10-CM

## 2024-05-13 DIAGNOSIS — Z85.3 HISTORY OF BREAST CANCER: ICD-10-CM

## 2024-05-13 DIAGNOSIS — Z79.899 ON STATIN THERAPY DUE TO RISK OF FUTURE CARDIOVASCULAR EVENT: ICD-10-CM

## 2024-05-13 DIAGNOSIS — M54.50 CHRONIC MIDLINE LOW BACK PAIN WITHOUT SCIATICA: ICD-10-CM

## 2024-05-13 DIAGNOSIS — E03.4 HYPOTHYROIDISM DUE TO ACQUIRED ATROPHY OF THYROID: ICD-10-CM

## 2024-05-13 DIAGNOSIS — M62.830 SPASM OF BACK MUSCLES: ICD-10-CM

## 2024-05-13 RX ORDER — ROSUVASTATIN CALCIUM 40 MG/1
TABLET, COATED ORAL
Qty: 100 TABLET | Refills: 3 | Status: SHIPPED | OUTPATIENT
Start: 2024-05-13

## 2024-05-13 NOTE — TELEPHONE ENCOUNTER
Received request via: Pharmacy    Was the patient seen in the last year in this department? Yes    Does the patient have an active prescription (recently filled or refills available) for medication(s) requested? No    Pharmacy Name: [facility]    Does the patient have longterm Plus and need 100 day supply (blood pressure, diabetes and cholesterol meds only)? Yes, quantity updated to 100 days

## 2024-05-17 ENCOUNTER — HOSPITAL ENCOUNTER (OUTPATIENT)
Dept: LAB | Facility: MEDICAL CENTER | Age: 84
End: 2024-05-17
Attending: INTERNAL MEDICINE
Payer: MEDICARE

## 2024-05-17 DIAGNOSIS — Z13.820 SCREENING FOR OSTEOPOROSIS: ICD-10-CM

## 2024-05-17 DIAGNOSIS — E11.65 UNCONTROLLED TYPE 2 DIABETES MELLITUS WITH HYPERGLYCEMIA (HCC): ICD-10-CM

## 2024-05-17 DIAGNOSIS — E55.9 VITAMIN D DEFICIENCY: ICD-10-CM

## 2024-05-17 DIAGNOSIS — Z79.52 CURRENT CHRONIC USE OF SYSTEMIC STEROIDS: ICD-10-CM

## 2024-05-17 DIAGNOSIS — E78.5 DYSLIPIDEMIA: ICD-10-CM

## 2024-05-17 DIAGNOSIS — Z79.4 LONG-TERM INSULIN USE (HCC): ICD-10-CM

## 2024-05-17 LAB
25(OH)D3 SERPL-MCNC: 61 NG/ML (ref 30–100)
ALBUMIN SERPL BCP-MCNC: 4.3 G/DL (ref 3.2–4.9)
ALBUMIN/GLOB SERPL: 2 G/DL
ALP SERPL-CCNC: 52 U/L (ref 30–99)
ALT SERPL-CCNC: 10 U/L (ref 2–50)
ANION GAP SERPL CALC-SCNC: 10 MMOL/L (ref 7–16)
AST SERPL-CCNC: 16 U/L (ref 12–45)
BILIRUB SERPL-MCNC: 0.6 MG/DL (ref 0.1–1.5)
BUN SERPL-MCNC: 10 MG/DL (ref 8–22)
CALCIUM ALBUM COR SERPL-MCNC: 8.6 MG/DL (ref 8.5–10.5)
CALCIUM SERPL-MCNC: 8.8 MG/DL (ref 8.5–10.5)
CHLORIDE SERPL-SCNC: 107 MMOL/L (ref 96–112)
CHOLEST SERPL-MCNC: 131 MG/DL (ref 100–199)
CO2 SERPL-SCNC: 25 MMOL/L (ref 20–33)
CREAT SERPL-MCNC: 0.41 MG/DL (ref 0.5–1.4)
CREAT UR-MCNC: 120.76 MG/DL
GFR SERPLBLD CREATININE-BSD FMLA CKD-EPI: 98 ML/MIN/1.73 M 2
GLOBULIN SER CALC-MCNC: 2.1 G/DL (ref 1.9–3.5)
GLUCOSE SERPL-MCNC: 113 MG/DL (ref 65–99)
HDLC SERPL-MCNC: 50 MG/DL
LDLC SERPL CALC-MCNC: 63 MG/DL
MICROALBUMIN UR-MCNC: 6.6 MG/DL
MICROALBUMIN/CREAT UR: 55 MG/G (ref 0–30)
POTASSIUM SERPL-SCNC: 3.8 MMOL/L (ref 3.6–5.5)
PROT SERPL-MCNC: 6.4 G/DL (ref 6–8.2)
SODIUM SERPL-SCNC: 142 MMOL/L (ref 135–145)
T4 FREE SERPL-MCNC: 1.41 NG/DL (ref 0.93–1.7)
TRIGL SERPL-MCNC: 92 MG/DL (ref 0–149)
TSH SERPL DL<=0.005 MIU/L-ACNC: 1.21 UIU/ML (ref 0.38–5.33)

## 2024-05-19 LAB
C PEPTIDE SERPL-MCNC: 1.4 NG/ML (ref 0.5–3.3)
GAD65 AB SER IA-ACNC: <5 IU/ML (ref 0–5)

## 2024-06-03 ENCOUNTER — OFFICE VISIT (OUTPATIENT)
Dept: ENDOCRINOLOGY | Facility: MEDICAL CENTER | Age: 84
End: 2024-06-03
Attending: INTERNAL MEDICINE
Payer: MEDICARE

## 2024-06-03 VITALS
HEART RATE: 69 BPM | DIASTOLIC BLOOD PRESSURE: 60 MMHG | BODY MASS INDEX: 27.32 KG/M2 | SYSTOLIC BLOOD PRESSURE: 122 MMHG | OXYGEN SATURATION: 95 % | HEIGHT: 66 IN | WEIGHT: 170 LBS

## 2024-06-03 DIAGNOSIS — E78.5 DYSLIPIDEMIA: ICD-10-CM

## 2024-06-03 DIAGNOSIS — Z79.4 LONG-TERM INSULIN USE (HCC): ICD-10-CM

## 2024-06-03 DIAGNOSIS — E11.9 CONTROLLED TYPE 2 DIABETES MELLITUS WITHOUT COMPLICATION, WITH LONG-TERM CURRENT USE OF INSULIN (HCC): ICD-10-CM

## 2024-06-03 DIAGNOSIS — E89.0 POSTOPERATIVE PRIMARY HYPOTHYROIDISM: ICD-10-CM

## 2024-06-03 DIAGNOSIS — Z79.52 CURRENT CHRONIC USE OF SYSTEMIC STEROIDS: ICD-10-CM

## 2024-06-03 DIAGNOSIS — Z79.4 CONTROLLED TYPE 2 DIABETES MELLITUS WITHOUT COMPLICATION, WITH LONG-TERM CURRENT USE OF INSULIN (HCC): ICD-10-CM

## 2024-06-03 DIAGNOSIS — E55.9 VITAMIN D DEFICIENCY: ICD-10-CM

## 2024-06-03 PROCEDURE — 95251 CONT GLUC MNTR ANALYSIS I&R: CPT | Performed by: INTERNAL MEDICINE

## 2024-06-03 PROCEDURE — 95249 CONT GLUC MNTR PT PROV EQP: CPT | Performed by: INTERNAL MEDICINE

## 2024-06-03 PROCEDURE — 3078F DIAST BP <80 MM HG: CPT | Performed by: INTERNAL MEDICINE

## 2024-06-03 PROCEDURE — 99212 OFFICE O/P EST SF 10 MIN: CPT | Performed by: INTERNAL MEDICINE

## 2024-06-03 PROCEDURE — 3074F SYST BP LT 130 MM HG: CPT | Performed by: INTERNAL MEDICINE

## 2024-06-03 PROCEDURE — 99214 OFFICE O/P EST MOD 30 MIN: CPT | Performed by: INTERNAL MEDICINE

## 2024-06-03 ASSESSMENT — FIBROSIS 4 INDEX: FIB4 SCORE: 2.44

## 2024-06-03 NOTE — PROGRESS NOTES
CHIEF COMPLAINT: Patient is here for follow up of Type 2 Diabetes Mellitus    HPI:     Mavis Lizarraga is a 83 y.o. female with Type 2 Diabetes Mellitus here for follow up.    Labs from 6/3/2024 HbA1c is 7.2%    She has PMR and is on chronic low dose prednisone  She has been wearing a Dexcom G7 CGM for over 6 mos  She previously tried and failed Jardiance due to rash  She previously tried and failed metformin due to GI upset          Lantus 30u daily ( sometimes skips)  Ozempic 0.5mg  Humalog prn        She denies side effects with her meds.  She admits that she has been skipping her Lantus at night when her bedtime sugars in the 150 because she is afraid of crashing we discussed that she should not skip her basal insulin        Download of her CGM shows an average BG ofd 165 with TIR of 64%      She has hyperlipidemia  She denies a history of coronary artery disease and cerebrovascular disease.  She does have a history of atrial fibrillation and congestive heart failure with reduced EF  She is on Crestor 40mg daily   Latest Reference Range & Units 05/17/24 06:11   Cholesterol,Tot 100 - 199 mg/dL 131   Triglycerides 0 - 149 mg/dL 92   HDL >=40 mg/dL 50   LDL <100 mg/dL 63       She has new onset albuminuria  She is currently not on ACE inhibitor or ARB   Latest Reference Range & Units 05/17/24 06:10   Micro Alb Creat Ratio 0 - 30 mg/g 55 (H)   Creatinine, Urine mg/dL 120.76   Microalbumin, Urine Random mg/dL 6.6     She had an eye exam on 12/2023       She also has postsurgical hypothyroidism and has been on levothyroxine 75 mcg daily for many years  Her TSH is normal at 1.2 on May 2024          BG Diary:06/03/24  CGM was downloaded and reviewed    Weight has been stable    Diabetes Complications   Retinopathy: No known retinopathy.  Last eye exam: 12/2023  Neuropathy: Denies paresthesias or numbness in hands or feet. Denies any foot wounds.  Exercise: Minimal.  Diet: Fair.  Patient's medications, allergies, and  social histories were reviewed and updated as appropriate.    ROS:     CONS:     No fever, no chills   EYES:     No diplopia, no blurry vision   CV:           No chest pain, no palpitations   PULM:     No SOB, no cough, no hemoptysis.   GI:            No nausea, no vomiting, no diarrhea, no constipation   ENDO:     No polyuria, no polydipsia, no heat intolerance, no cold intolerance       Past Medical History:  Problem List:  2023-10: Type 2 diabetes mellitus without complication, with long-  term current use of insulin ([facility])  2023-06: Secondary hypercoagulable state ([facility])  2023-04: HFrEF (heart failure with reduced ejection fraction) ([facility])  2023-04: Angina pectoris, unspecified ([facility])  2023-03: DVT (deep vein thrombosis) in pregnancy  2023-03: Hypokalemia  2023-02: Atrial fibrillation with RVR ([facility])  2023-02: Polymyalgia rheumatica ([facility])  2021-04: Vitamin D deficiency  2021-04: Bone lesion  2021-04: Cervical spinal stenosis  2021-04: Pubic ramus fracture ([facility])  2019-03: Spasm of back muscles  2017-04: Chronic midline low back pain without sciatica  2016-12: Diarrhea- ? due to metformin  2016-10: S/P colonoscopy- neg at [facility] about 2014 complicated by   perforation  2016-10: Neoplasm of uncertain behavior of skin- chest- prob bcca  2016-01: IFG (impaired fasting glucose)  2016-01: Uncontrolled type 2 diabetes mellitus with hyperglycemia   ([facility])  2016-01: BMI 30.0-30.9,adult  2014-06: Female stress incontinence  2014-06: Cystocele, midline  2014-06: Rectocele  2013-01: Osteoarthritis, shoulder  2013-01: On statin therapy due to risk of future cardiovascular event  2013-01: Acquired hypothyroidism  2013-01: Gastroesophageal reflux disease with esophagitis  2012-08: Status post right breast reconstruction- right breast cancer   1980s- neg nodes; 6 mo chemo rx  2012-08: S/P cosmetic plastic surgery  History of breast cancer  Lower extremity weakness      Past Surgical History:  Past Surgical History:   Procedure Laterality  Date    ANTERIOR AND POSTERIOR REPAIR  6/23/2014    Performed by Lauri Batista M.D. at SURGERY SAME DAY [facility]    BLADDER SLING FEMALE  6/23/2014    Performed by Lauri Batista M.D. at SURGERY SAME DAY [facility]    BREAST RECONSTRUCTION  8/14/2012    Performed by SARTHAK LEVIN at SURGERY [facility]    BREAST IMPLANT REVISION  8/14/2012    Performed by SARTHAK LEVIN at SURGERY [facility]    CAPSULECTOMY  8/14/2012    Performed by SARTHAK LEVIN at SURGERY [facility]    MASTOPEXY  8/14/2012    Performed by SARTHAK LEVIN at SURGERY [facility]    LIPOSUCTION  8/14/2012    Performed by SARTHAK LEVIN at SURGERY [facility]    RHYTIDECTOMY  8/14/2012    Performed by SARTHAK LEVIN at SURGERY [facility]    PLATYSMAPLASTY  8/14/2012    Performed by SARTHAK LEVIN at SURGERY [facility]    BLEPHAROPLASTY  8/14/2012    Performed by SARTHAK LEVIN at SURGERY [facility]    LOW ANTERIOR RESECTION LAPAROSCOPIC  2/10/2010    Performed by KOBI COHN at SURGERY [facility]    COLECTOMY N/A 2009    partial- post colonoscopy perforation- dr cohn    MASTECTOMY Right 1980s    neg nodes; 6 mos adjuvant chemo    OTHER      mastectomy R breast    OTHER ABDOMINAL SURGERY      I&D of ruptured diverticuli     MS BREAST AUGMENTATION WITH IMPLANT      MS BREAST REDUCTION      MS CHEMOTHERAPY, UNSPECIFIED PROCEDURE          Allergies:  Jardiance [empagliflozin], Metformin, and Morphine     Social History:  Social History     Tobacco Use    Smoking status: Never    Smokeless tobacco: Never   Vaping Use    Vaping status: Never Used   Substance Use Topics    Alcohol use: Yes     Comment: rarely    Drug use: No        Family History:   family history includes Cancer in her mother; Diabetes in her mother; Heart Disease (age of onset: 63) in her maternal grandmother; Heart Disease (age of onset: 64) in her mother; Hypertension in her mother; Stroke in her  "mother.      PHYSICAL EXAM:   OBJECTIVE:  Vital signs: /60   Pulse 69   Ht 1.676 m (5' 6\")   Wt 77.1 kg (170 lb)   LMP  (LMP Unknown)   SpO2 95%   BMI 27.44 kg/m²   GENERAL: Well-developed, well-nourished in no apparent distress.   EYE:  No ocular asymmetry, PERRLA  HENT: Pink, moist mucous membranes.    NECK: No thyromegaly.   CARDIOVASCULAR:  No murmurs  LUNGS: Clear breath sounds  ABDOMEN: Soft, nontender   EXTREMITIES: No clubbing, cyanosis, or edema.   NEUROLOGICAL: No gross focal motor abnormalities   LYMPH: No cervical adenopathy palpated.   SKIN: No rashes, lesions.     Labs:  Lab Results   Component Value Date/Time    HBA1C 7.2 (A) 04/12/2024 09:50 AM        Lab Results   Component Value Date/Time    WBC 5.0 01/05/2024 06:33 AM    RBC 4.81 01/05/2024 06:33 AM    HEMOGLOBIN 14.4 01/05/2024 06:33 AM    MCV 90.2 01/05/2024 06:33 AM    MCH 29.9 01/05/2024 06:33 AM    MCHC 33.2 01/05/2024 06:33 AM    RDW 40.5 01/05/2024 06:33 AM    MPV 10.0 01/05/2024 06:33 AM       Lab Results   Component Value Date/Time    SODIUM 142 05/17/2024 06:11 AM    POTASSIUM 3.8 05/17/2024 06:11 AM    CHLORIDE 107 05/17/2024 06:11 AM    CO2 25 05/17/2024 06:11 AM    ANION 10.0 05/17/2024 06:11 AM    GLUCOSE 113 (H) 05/17/2024 06:11 AM    BUN 10 05/17/2024 06:11 AM    CREATININE 0.41 (L) 05/17/2024 06:11 AM    CALCIUM 8.8 05/17/2024 06:11 AM    ASTSGOT 16 05/17/2024 06:11 AM    ALTSGPT 10 05/17/2024 06:11 AM    TBILIRUBIN 0.6 05/17/2024 06:11 AM    ALBUMIN 4.3 05/17/2024 06:11 AM    ALBUMIN 3.90 04/09/2021 01:33 PM    TOTPROTEIN 6.4 05/17/2024 06:11 AM    TOTPROTEIN 6.5 04/09/2021 01:33 PM    GLOBULIN 2.1 05/17/2024 06:11 AM    AGRATIO 2.0 05/17/2024 06:11 AM       Lab Results   Component Value Date/Time    CHOLSTRLTOT 131 05/17/2024 0611    TRIGLYCERIDE 92 05/17/2024 0611    HDL 50 05/17/2024 0611    LDL 63 05/17/2024 0611       Lab Results   Component Value Date/Time    MALBCRT 55 (H) 05/17/2024 06:10 AM    MICROALBUR 6.6 " "05/17/2024 06:10 AM        Lab Results   Component Value Date/Time    TSHRAMAN 1.210 05/17/2024 0611     No results found for: \"FREEDIR\"  No results found for: \"FREET3\"  No results found for: \"THYSTIMIG\"        ASSESSMENT/PLAN:     1. Controlled type 2 diabetes mellitus without complication, with long-term current use of insulin (HCC)  Significantly improved control  CGM was downloaded and reviewed  Adjust Lantus to 26 units daily to reduce risk of early morning hypoglycemia  Start Humalog 4 units with meals and to also cover the steroid induced rise in sugars from prednisone with breakfast  Continue Ozempic she is going to go up to the 1 mg dose  We will repeat her urine microalbumin test in 1 month    2. Postoperative primary hypothyroidism  Controlled   continue levothyroxine 75 mcg daily  Repeat thyroid labs after 6 to 12 months    3. Dyslipidemia  Controlled continue Crestor    4. Vitamin D deficiency  Stable   Vitamin D labs were reviewed with patient  Continue current supplements  Continue monitoring levels       5. Long-term insulin use (HCC)  Patient is on long-term insulin therapy    6. Current chronic use of systemic steroids  Patient is on chronic steroids for PMR      Return in about 4 weeks (around 7/1/2024).      Thank you kindly for allowing me to participate in the diabetes care plan for this patient.    Jonathan Mccormick MD, WILLIAM, NATEU      CC:   Waylon Webber D.O.  "

## 2024-06-07 ENCOUNTER — HOSPITAL ENCOUNTER (OUTPATIENT)
Dept: LAB | Facility: MEDICAL CENTER | Age: 84
End: 2024-06-07
Attending: INTERNAL MEDICINE
Payer: MEDICARE

## 2024-06-07 ENCOUNTER — OFFICE VISIT (OUTPATIENT)
Dept: MEDICAL GROUP | Facility: LAB | Age: 84
End: 2024-06-07
Payer: MEDICARE

## 2024-06-07 VITALS
TEMPERATURE: 98.7 F | HEART RATE: 78 BPM | OXYGEN SATURATION: 97 % | DIASTOLIC BLOOD PRESSURE: 70 MMHG | WEIGHT: 169 LBS | BODY MASS INDEX: 27.16 KG/M2 | SYSTOLIC BLOOD PRESSURE: 122 MMHG | HEIGHT: 66 IN | RESPIRATION RATE: 18 BRPM

## 2024-06-07 DIAGNOSIS — M35.3 POLYMYALGIA RHEUMATICA (HCC): ICD-10-CM

## 2024-06-07 DIAGNOSIS — I48.91 ATRIAL FIBRILLATION WITH RVR (HCC): ICD-10-CM

## 2024-06-07 DIAGNOSIS — Z79.4 CONTROLLED TYPE 2 DIABETES MELLITUS WITHOUT COMPLICATION, WITH LONG-TERM CURRENT USE OF INSULIN (HCC): ICD-10-CM

## 2024-06-07 DIAGNOSIS — E11.9 CONTROLLED TYPE 2 DIABETES MELLITUS WITHOUT COMPLICATION, WITH LONG-TERM CURRENT USE OF INSULIN (HCC): ICD-10-CM

## 2024-06-07 LAB
CRP SERPL HS-MCNC: <0.3 MG/DL (ref 0–0.75)
ERYTHROCYTE [SEDIMENTATION RATE] IN BLOOD BY WESTERGREN METHOD: 8 MM/HOUR (ref 0–25)

## 2024-06-07 PROCEDURE — 99213 OFFICE O/P EST LOW 20 MIN: CPT | Performed by: INTERNAL MEDICINE

## 2024-06-07 PROCEDURE — 82570 ASSAY OF URINE CREATININE: CPT

## 2024-06-07 PROCEDURE — 82043 UR ALBUMIN QUANTITATIVE: CPT

## 2024-06-07 PROCEDURE — 3074F SYST BP LT 130 MM HG: CPT | Performed by: INTERNAL MEDICINE

## 2024-06-07 PROCEDURE — 86140 C-REACTIVE PROTEIN: CPT

## 2024-06-07 PROCEDURE — 36415 COLL VENOUS BLD VENIPUNCTURE: CPT

## 2024-06-07 PROCEDURE — 85652 RBC SED RATE AUTOMATED: CPT

## 2024-06-07 PROCEDURE — 3078F DIAST BP <80 MM HG: CPT | Performed by: INTERNAL MEDICINE

## 2024-06-07 ASSESSMENT — FIBROSIS 4 INDEX: FIB4 SCORE: 2.44

## 2024-06-08 LAB
CREAT UR-MCNC: 132.86 MG/DL
MICROALBUMIN UR-MCNC: 2.4 MG/DL
MICROALBUMIN/CREAT UR: 18 MG/G (ref 0–30)

## 2024-06-08 NOTE — PROGRESS NOTES
CC: Mavis Lizarraga is a 83 y.o. female is suffering from   Chief Complaint   Patient presents with    Follow-Up         SUBJECTIVE:  1. Polymyalgia rheumatica (HCC)  Patient with a history of polymyalgia rheumatica currently on 2 mg of prednisone I encouraged patient to go to 1 mg of prednisone then every other day on 1 mg after 2 weeks to see if she can completely stop prednisone    History of Present Illness      Past social, family, history: Boyfriend Lauri  Social History     Tobacco Use    Smoking status: Never    Smokeless tobacco: Never   Vaping Use    Vaping status: Never Used   Substance Use Topics    Alcohol use: Yes     Comment: rarely    Drug use: No         MEDICATIONS:    Current Outpatient Medications:     rosuvastatin (CRESTOR) 40 MG tablet, TAKE ONE TABLET BY MOUTH DAILY, Disp: 100 Tablet, Rfl: 3    Semaglutide, 1 MG/DOSE, (OZEMPIC, 1 MG/DOSE,) 4 MG/3ML Solution Pen-injector, Inject 1 mg under the skin every 7 days., Disp: 3 mL, Rfl: 2    apixaban (ELIQUIS) 5mg Tab, Take 1 Tablet by mouth 2 times a day., Disp: 200 Tablet, Rfl: 1    metoprolol SR (TOPROL XL) 50 MG TABLET SR 24 HR, Take 1.5 Tablets by mouth 2 times a day., Disp: 270 Tablet, Rfl: 2    predniSONE (DELTASONE) 1 MG Tab, TAKE FOUR TABLETS (4 MG) BY MOUTH DAILY FOR 2 WEEKS, IF SYMPTOMS ARE STABLE OKAY TO DECREASE TO TAKE THREE TABLETS (3 MG) BY MOUTH DAILY FOR 2 WEEKS, Disp: 120 Tablet, Rfl: 2    CONTOUR NEXT TEST strip, USE ONE STRIP TO TEST BLOOD SUGAR ONCE DAILY EARLY MORNING BEFORE FIRST MEAL, Disp: 100 Strip, Rfl: 3    insulin lispro (HUMALOG KWIKPEN) 100 UNIT/ML SC SOPN injection PEN, Inject 5-10 Units under the skin 3 times a day before meals., Disp: 15 mL, Rfl: 6    Insulin Pen Needle 32 G x 4 mm ([employer] PEN NEEDLES), Inject 1 Each under the skin 4 times a day., Disp: 300 Each, Rfl: 3    Continuous Blood Gluc  (DEXCOM G7 ) Device, 1 Each continuous., Disp: 1 Each, Rfl: 0    Continuous Blood Gluc Sensor (DEXCOM  G7 SENSOR) Misc, 1 Each every 10 days., Disp: 9 Each, Rfl: 3    insulin glargine (LANTUS SOLOSTAR) 100 UNIT/ML Solution Pen-injector injection, Inject 30 Units under the skin every evening., Disp: 15 mL, Rfl: 6    cyclobenzaprine (FLEXERIL) 10 mg Tab, TAKE ONE TABLET BY MOUTH THREE TIMES A DAY AS NEEDED FOR MILD PAIN (FLEXERIL), Disp: 30 Tablet, Rfl: 3    apixaban (ELIQUIS) 5mg Tab, Take 1 Tablet by mouth 2 times a day., Disp: 180 Tablet, Rfl: 1    levothyroxine (SYNTHROID) 75 MCG Tab, TAKE ONE TABLET BY MOUTH EVERY MORNING ON AN EMPTY STOMACH, Disp: 90 Tablet, Rfl: 3    Lancets, Use one Per formulary preference  lancet to test blood sugar once daily early morning before first meal., Disp: 100 Each, Rfl: 3    Blood Glucose Test Strips, Use one Per formulary preference  strip to test blood sugar once daily early morning before first meal., Disp: 100 Strip, Rfl: 0    nitroglycerin (NITROSTAT) 0.4 MG SL Tab, Place 1 Tablet under the tongue as needed for Chest Pain. Q 5 min x 3 then 911., Disp: 25 Tablet, Rfl: 3    POTASSIUM PO, Take 1 Tablet by mouth every morning., Disp: , Rfl:     omeprazole (PRILOSEC) 20 MG delayed-release capsule, Take 1 Capsule by mouth every day. (Patient taking differently: Take 20 mg by mouth 1 time a day as needed. Indications: Heartburn), Disp: 30 Capsule, Rfl: 3    Cholecalciferol (VITAMIN D) 2000 UNIT Tab, Take 2,000 Units by mouth every day., Disp: , Rfl:     Ascorbic Acid (VITAMIN C) 1000 MG Tab, Take 1,000 mg by mouth every morning., Disp: , Rfl:     Cyanocobalamin (VITAMIN B-12 PO), Take 1 tablet by mouth every morning., Disp: , Rfl:     CINNAMON PO, Take 1 tablet by mouth every morning., Disp: , Rfl:     Multiple Vitamins-Minerals (ICAPS AREDS FORMULA PO), Take 1 tablet by mouth every morning., Disp: , Rfl:     Omega-3 Fatty Acids (FISH OIL PO), Take 1 Cap by mouth every day., Disp: , Rfl:     PROBLEMS:  Patient Active Problem List    Diagnosis Date Noted    Type 2 diabetes mellitus  "without complication, with long-term current use of insulin ([facility]) 10/26/2023    Secondary hypercoagulable state ([facility]) 06/15/2023    HFrEF (heart failure with reduced ejection fraction) ([facility]) 04/26/2023    Angina pectoris, unspecified ([facility]) 04/26/2023    DVT (deep vein thrombosis) in pregnancy 03/01/2023    Hypokalemia 03/01/2023    Atrial fibrillation with RVR ([facility]) 02/28/2023    Polymyalgia rheumatica ([facility]) 02/28/2023    Vitamin D deficiency 04/12/2021    Cervical spinal stenosis 04/11/2021    Pubic ramus fracture ([facility]) 04/09/2021    History of breast cancer     Spasm of back muscles 03/27/2019    Uncontrolled type 2 diabetes mellitus with hyperglycemia ([facility]) 01/28/2016    On statin therapy due to risk of future cardiovascular event 01/15/2013    Acquired hypothyroidism 01/15/2013    Gastroesophageal reflux disease with esophagitis 01/15/2013       REVIEW OF SYSTEMS:  Gen.:  No Nausea, Vomiting, fever, Chills.  Heart: No chest pain.  Lungs:  No shortness of Breath.  Psychological: Rajan unusual Anxiety depression     PHYSICAL EXAM   Physical Exam       Constitutional: Alert, cooperative, not in acute distress.  Cardiovascular:  Rate Rhythm is regular without murmurs rubs clicks.     Thorax & Lungs: Clear to auscultation, no wheezing, rhonchi, or rales  HENT: Normocephalic, Atraumatic.  Eyes: PERRLA, EOMI, Conjunctiva normal.   Neck: Trachia is midline no swelling of the thyroid.   Lymphatic: No lymphadenopathy noted.   Neurologic: Alert & oriented x 3, cranial nerves II through XII are intact, Normal motor function, Normal sensory function, No focal deficits noted.   Psychiatric: Affect normal, Judgment normal, Mood normal.     VITAL SIGNS:/70   Pulse 78   Temp 37.1 °C (98.7 °F)   Resp 18   Ht 1.676 m (5' 6\")   Wt 76.7 kg (169 lb)   LMP  (LMP Unknown)   SpO2 97%   BMI 27.28 kg/m²     Labs: Reviewed    Assessment:                                                     Plan:  Assessment & Plan       1. " Polymyalgia rheumatica (HCC)  Taper completely off prednisone over the next 1 month, restart prednisone if low blood pressure or nausea vomiting  - Sed Rate; Future  - CRP QUANTITIVE (NON-CARDIAC); Future    2. Atrial fibrillation with RVR (HCC)  Continue Eliquis

## 2024-06-13 DIAGNOSIS — E78.2 MIXED HYPERLIPIDEMIA: ICD-10-CM

## 2024-06-13 DIAGNOSIS — M54.50 CHRONIC MIDLINE LOW BACK PAIN WITHOUT SCIATICA: ICD-10-CM

## 2024-06-13 DIAGNOSIS — G89.29 CHRONIC MIDLINE LOW BACK PAIN WITHOUT SCIATICA: ICD-10-CM

## 2024-06-13 DIAGNOSIS — Z85.3 HISTORY OF BREAST CANCER: ICD-10-CM

## 2024-06-13 DIAGNOSIS — E11.65 UNCONTROLLED TYPE 2 DIABETES MELLITUS WITH HYPERGLYCEMIA (HCC): ICD-10-CM

## 2024-06-13 DIAGNOSIS — E03.4 HYPOTHYROIDISM DUE TO ACQUIRED ATROPHY OF THYROID: ICD-10-CM

## 2024-06-13 DIAGNOSIS — Z12.31 ENCOUNTER FOR SCREENING MAMMOGRAM FOR MALIGNANT NEOPLASM OF BREAST: ICD-10-CM

## 2024-06-13 DIAGNOSIS — M62.830 SPASM OF BACK MUSCLES: ICD-10-CM

## 2024-06-13 RX ORDER — LEVOTHYROXINE SODIUM 0.07 MG/1
TABLET ORAL
Qty: 90 TABLET | Refills: 3 | Status: SHIPPED | OUTPATIENT
Start: 2024-06-13

## 2024-06-13 NOTE — TELEPHONE ENCOUNTER
Received request via: Pharmacy    Was the patient seen in the last year in this department? Yes  6/7/24  Does the patient have an active prescription (recently filled or refills available) for medication(s) requested? No    Pharmacy Name: [facility]    Does the patient have alf Plus and need 100 day supply (blood pressure, diabetes and cholesterol meds only)? Medication is not for cholesterol, blood pressure or diabetes

## 2024-07-17 ENCOUNTER — OFFICE VISIT (OUTPATIENT)
Dept: ENDOCRINOLOGY | Facility: MEDICAL CENTER | Age: 84
End: 2024-07-17
Attending: INTERNAL MEDICINE
Payer: MEDICARE

## 2024-07-17 VITALS — BODY MASS INDEX: 27.16 KG/M2 | WEIGHT: 168.3 LBS

## 2024-07-17 DIAGNOSIS — E11.9 CONTROLLED TYPE 2 DIABETES MELLITUS WITHOUT COMPLICATION, WITH LONG-TERM CURRENT USE OF INSULIN (HCC): Primary | ICD-10-CM

## 2024-07-17 DIAGNOSIS — Z79.4 CONTROLLED TYPE 2 DIABETES MELLITUS WITHOUT COMPLICATION, WITH LONG-TERM CURRENT USE OF INSULIN (HCC): Primary | ICD-10-CM

## 2024-07-17 LAB
HBA1C MFR BLD: 6.2 % (ref ?–5.8)
POCT INT CON NEG: NEGATIVE
POCT INT CON POS: POSITIVE

## 2024-07-17 PROCEDURE — 83036 HEMOGLOBIN GLYCOSYLATED A1C: CPT

## 2024-07-17 PROCEDURE — 99999 PR NO CHARGE: CPT

## 2024-07-17 PROCEDURE — 99212 OFFICE O/P EST SF 10 MIN: CPT

## 2024-07-17 RX ORDER — SEMAGLUTIDE 2.68 MG/ML
2 INJECTION, SOLUTION SUBCUTANEOUS
Qty: 3 ML | Refills: 5 | Status: SHIPPED | OUTPATIENT
Start: 2024-07-17

## 2024-07-17 ASSESSMENT — FIBROSIS 4 INDEX: FIB4 SCORE: 2.44

## 2024-07-29 ENCOUNTER — OFFICE VISIT (OUTPATIENT)
Dept: MEDICAL GROUP | Facility: LAB | Age: 84
End: 2024-07-29
Payer: MEDICARE

## 2024-07-29 VITALS
SYSTOLIC BLOOD PRESSURE: 102 MMHG | HEART RATE: 63 BPM | OXYGEN SATURATION: 95 % | DIASTOLIC BLOOD PRESSURE: 70 MMHG | HEIGHT: 66 IN | BODY MASS INDEX: 26.68 KG/M2 | TEMPERATURE: 98.4 F | RESPIRATION RATE: 18 BRPM | WEIGHT: 166 LBS

## 2024-07-29 DIAGNOSIS — D68.69 SECONDARY HYPERCOAGULABLE STATE (HCC): ICD-10-CM

## 2024-07-29 DIAGNOSIS — E11.9 TYPE 2 DIABETES MELLITUS WITHOUT COMPLICATION, WITH LONG-TERM CURRENT USE OF INSULIN (HCC): ICD-10-CM

## 2024-07-29 DIAGNOSIS — M35.3 PMR (POLYMYALGIA RHEUMATICA) (HCC): ICD-10-CM

## 2024-07-29 DIAGNOSIS — Z79.4 TYPE 2 DIABETES MELLITUS WITHOUT COMPLICATION, WITH LONG-TERM CURRENT USE OF INSULIN (HCC): ICD-10-CM

## 2024-07-29 ASSESSMENT — FIBROSIS 4 INDEX: FIB4 SCORE: 2.44

## 2024-08-04 DIAGNOSIS — I48.11 LONGSTANDING PERSISTENT ATRIAL FIBRILLATION (HCC): ICD-10-CM

## 2024-08-20 DIAGNOSIS — E11.65 UNCONTROLLED TYPE 2 DIABETES MELLITUS WITH HYPERGLYCEMIA (HCC): ICD-10-CM

## 2024-08-20 RX ORDER — INSULIN GLARGINE 100 [IU]/ML
30 INJECTION, SOLUTION SUBCUTANEOUS EVERY EVENING
Qty: 15 ML | Refills: 6 | Status: SHIPPED | OUTPATIENT
Start: 2024-08-20

## 2024-09-04 ENCOUNTER — HOSPITAL ENCOUNTER (OUTPATIENT)
Dept: LAB | Facility: MEDICAL CENTER | Age: 84
End: 2024-09-04
Attending: INTERNAL MEDICINE
Payer: MEDICARE

## 2024-09-04 DIAGNOSIS — E11.9 TYPE 2 DIABETES MELLITUS WITHOUT COMPLICATION, WITH LONG-TERM CURRENT USE OF INSULIN (HCC): ICD-10-CM

## 2024-09-04 DIAGNOSIS — M35.3 PMR (POLYMYALGIA RHEUMATICA) (HCC): ICD-10-CM

## 2024-09-04 DIAGNOSIS — E11.65 UNCONTROLLED TYPE 2 DIABETES MELLITUS WITH HYPERGLYCEMIA (HCC): ICD-10-CM

## 2024-09-04 DIAGNOSIS — Z79.4 TYPE 2 DIABETES MELLITUS WITHOUT COMPLICATION, WITH LONG-TERM CURRENT USE OF INSULIN (HCC): ICD-10-CM

## 2024-09-04 LAB
ERYTHROCYTE [SEDIMENTATION RATE] IN BLOOD BY WESTERGREN METHOD: 17 MM/HOUR (ref 0–25)
EST. AVERAGE GLUCOSE BLD GHB EST-MCNC: 131 MG/DL
HBA1C MFR BLD: 6.2 % (ref 4–5.6)

## 2024-09-04 PROCEDURE — 85652 RBC SED RATE AUTOMATED: CPT

## 2024-09-04 PROCEDURE — 83036 HEMOGLOBIN GLYCOSYLATED A1C: CPT

## 2024-09-04 PROCEDURE — 80061 LIPID PANEL: CPT

## 2024-09-04 PROCEDURE — 86140 C-REACTIVE PROTEIN: CPT

## 2024-09-04 PROCEDURE — 36415 COLL VENOUS BLD VENIPUNCTURE: CPT

## 2024-09-05 LAB
CHOLEST SERPL-MCNC: 133 MG/DL (ref 100–199)
CRP SERPL HS-MCNC: <0.3 MG/DL (ref 0–0.75)
HDLC SERPL-MCNC: 42 MG/DL
LDLC SERPL CALC-MCNC: 63 MG/DL
TRIGL SERPL-MCNC: 138 MG/DL (ref 0–149)

## 2024-09-09 ENCOUNTER — APPOINTMENT (OUTPATIENT)
Dept: LAB | Facility: MEDICAL CENTER | Age: 84
End: 2024-09-09
Attending: INTERNAL MEDICINE
Payer: MEDICARE

## 2024-10-31 ENCOUNTER — ANTICOAGULATION VISIT (OUTPATIENT)
Dept: MEDICAL GROUP | Facility: MEDICAL CENTER | Age: 84
End: 2024-10-31
Payer: MEDICARE

## 2024-10-31 VITALS — WEIGHT: 156 LBS | HEIGHT: 66 IN | RESPIRATION RATE: 15 BRPM | BODY MASS INDEX: 25.07 KG/M2

## 2024-10-31 DIAGNOSIS — O22.30 DVT (DEEP VEIN THROMBOSIS) IN PREGNANCY: ICD-10-CM

## 2024-10-31 DIAGNOSIS — I48.91 ATRIAL FIBRILLATION WITH RVR (HCC): ICD-10-CM

## 2024-10-31 ASSESSMENT — FIBROSIS 4 INDEX: FIB4 SCORE: 2.47

## 2024-11-04 ENCOUNTER — OFFICE VISIT (OUTPATIENT)
Dept: MEDICAL GROUP | Facility: LAB | Age: 84
End: 2024-11-04
Payer: MEDICARE

## 2024-11-04 ENCOUNTER — HOSPITAL ENCOUNTER (OUTPATIENT)
Dept: LAB | Facility: MEDICAL CENTER | Age: 84
End: 2024-11-04
Attending: INTERNAL MEDICINE
Payer: MEDICARE

## 2024-11-04 VITALS
BODY MASS INDEX: 24.59 KG/M2 | WEIGHT: 153 LBS | HEART RATE: 74 BPM | OXYGEN SATURATION: 97 % | HEIGHT: 66 IN | SYSTOLIC BLOOD PRESSURE: 118 MMHG | RESPIRATION RATE: 16 BRPM | DIASTOLIC BLOOD PRESSURE: 70 MMHG | TEMPERATURE: 97.2 F

## 2024-11-04 DIAGNOSIS — R07.2 PRECORDIAL PAIN: ICD-10-CM

## 2024-11-04 DIAGNOSIS — E11.65 UNCONTROLLED TYPE 2 DIABETES MELLITUS WITH HYPERGLYCEMIA (HCC): ICD-10-CM

## 2024-11-04 DIAGNOSIS — Z79.4 TYPE 2 DIABETES MELLITUS WITHOUT COMPLICATION, WITH LONG-TERM CURRENT USE OF INSULIN (HCC): ICD-10-CM

## 2024-11-04 DIAGNOSIS — I20.9 ANGINA PECTORIS, UNSPECIFIED (HCC): ICD-10-CM

## 2024-11-04 DIAGNOSIS — O22.30 DVT (DEEP VEIN THROMBOSIS) IN PREGNANCY: ICD-10-CM

## 2024-11-04 DIAGNOSIS — I48.91 ATRIAL FIBRILLATION WITH RVR (HCC): ICD-10-CM

## 2024-11-04 DIAGNOSIS — E11.9 TYPE 2 DIABETES MELLITUS WITHOUT COMPLICATION, WITH LONG-TERM CURRENT USE OF INSULIN (HCC): ICD-10-CM

## 2024-11-04 LAB
ERYTHROCYTE [DISTWIDTH] IN BLOOD BY AUTOMATED COUNT: 40.3 FL (ref 35.9–50)
HCT VFR BLD AUTO: 41 % (ref 37–47)
HGB BLD-MCNC: 13.6 G/DL (ref 12–16)
MCH RBC QN AUTO: 29.8 PG (ref 27–33)
MCHC RBC AUTO-ENTMCNC: 33.2 G/DL (ref 32.2–35.5)
MCV RBC AUTO: 89.7 FL (ref 81.4–97.8)
PLATELET # BLD AUTO: 156 K/UL (ref 164–446)
PMV BLD AUTO: 10.2 FL (ref 9–12.9)
RBC # BLD AUTO: 4.57 M/UL (ref 4.2–5.4)
WBC # BLD AUTO: 5.8 K/UL (ref 4.8–10.8)

## 2024-11-04 PROCEDURE — 82550 ASSAY OF CK (CPK): CPT

## 2024-11-04 PROCEDURE — 82552 ASSAY OF CPK IN BLOOD: CPT

## 2024-11-04 PROCEDURE — 85027 COMPLETE CBC AUTOMATED: CPT

## 2024-11-04 PROCEDURE — 93000 ELECTROCARDIOGRAM COMPLETE: CPT | Performed by: INTERNAL MEDICINE

## 2024-11-04 PROCEDURE — 80053 COMPREHEN METABOLIC PANEL: CPT

## 2024-11-04 PROCEDURE — 3078F DIAST BP <80 MM HG: CPT | Performed by: INTERNAL MEDICINE

## 2024-11-04 PROCEDURE — 36415 COLL VENOUS BLD VENIPUNCTURE: CPT

## 2024-11-04 PROCEDURE — 99214 OFFICE O/P EST MOD 30 MIN: CPT | Performed by: INTERNAL MEDICINE

## 2024-11-04 PROCEDURE — 84484 ASSAY OF TROPONIN QUANT: CPT

## 2024-11-04 PROCEDURE — 3074F SYST BP LT 130 MM HG: CPT | Performed by: INTERNAL MEDICINE

## 2024-11-04 ASSESSMENT — PATIENT HEALTH QUESTIONNAIRE - PHQ9: CLINICAL INTERPRETATION OF PHQ2 SCORE: 0

## 2024-11-04 ASSESSMENT — FIBROSIS 4 INDEX: FIB4 SCORE: 2.47

## 2024-11-04 NOTE — PROGRESS NOTES
CC: Mavis Lizarraga is a 84 y.o. female is suffering from   Chief Complaint   Patient presents with    Follow-Up         SUBJECTIVE:  1. Precordial pain  Mavis is here for follow-up is having problems with intermittent chest pain states that this was exacerbated on this past Friday night when she was having dinner with friends apparently they were discussing their medical problems which caused her some anxiety which increased her chest pain    2. Angina pectoris, unspecified ([facility])  History of angina, referral to cardiology rewritten    3. Type 2 diabetes mellitus without complication, with long-term current use of insulin ([facility])  History of type 2 diabetes clinically well-controlled        Past social, family, history: Boyfriend  Social History     Tobacco Use    Smoking status: Never    Smokeless tobacco: Never   Vaping Use    Vaping status: Never Used   Substance Use Topics    Alcohol use: Yes     Comment: rarely    Drug use: No         MEDICATIONS:    Current Outpatient Medications:     insulin glargine (LANTUS SOLOSTAR) 100 UNIT/ML Solution Pen-injector injection, Inject 30 Units under the skin every evening., Disp: 15 mL, Rfl: 6    apixaban (ELIQUIS) 5mg Tab, Take 1 Tablet by mouth 2 times a day., Disp: 200 Tablet, Rfl: 3    Semaglutide, 2 MG/DOSE, (OZEMPIC, 2 MG/DOSE,) 8 MG/3ML Solution Pen-injector, Inject 2 mg under the skin every 7 days., Disp: 3 mL, Rfl: 5    levothyroxine (SYNTHROID) 75 MCG Tab, TAKE ONE TABLET BY MOUTH EVERY MORNING ON AN EMPTY STOMACH, Disp: 90 Tablet, Rfl: 3    rosuvastatin (CRESTOR) 40 MG tablet, TAKE ONE TABLET BY MOUTH DAILY, Disp: 100 Tablet, Rfl: 3    Semaglutide, 1 MG/DOSE, (OZEMPIC, 1 MG/DOSE,) 4 MG/3ML Solution Pen-injector, Inject 1 mg under the skin every 7 days., Disp: 3 mL, Rfl: 2    metoprolol SR (TOPROL XL) 50 MG TABLET SR 24 HR, Take 1.5 Tablets by mouth 2 times a day., Disp: 270 Tablet, Rfl: 2    predniSONE (DELTASONE) 1 MG Tab, TAKE FOUR TABLETS (4 MG) BY MOUTH DAILY  FOR 2 WEEKS, IF SYMPTOMS ARE STABLE OKAY TO DECREASE TO TAKE THREE TABLETS (3 MG) BY MOUTH DAILY FOR 2 WEEKS, Disp: 120 Tablet, Rfl: 2    CONTOUR NEXT TEST strip, USE ONE STRIP TO TEST BLOOD SUGAR ONCE DAILY EARLY MORNING BEFORE FIRST MEAL, Disp: 100 Strip, Rfl: 3    insulin lispro (HUMALOG KWIKPEN) 100 UNIT/ML SC SOPN injection PEN, Inject 5-10 Units under the skin 3 times a day before meals., Disp: 15 mL, Rfl: 6    Insulin Pen Needle 32 G x 4 mm ([employer] PEN NEEDLES), Inject 1 Each under the skin 4 times a day., Disp: 300 Each, Rfl: 3    Continuous Blood Gluc  (DEXCOM G7 ) Device, 1 Each continuous., Disp: 1 Each, Rfl: 0    Continuous Blood Gluc Sensor (DEXCOM G7 SENSOR) Misc, 1 Each every 10 days., Disp: 9 Each, Rfl: 3    cyclobenzaprine (FLEXERIL) 10 mg Tab, TAKE ONE TABLET BY MOUTH THREE TIMES A DAY AS NEEDED FOR MILD PAIN (FLEXERIL), Disp: 30 Tablet, Rfl: 3    apixaban (ELIQUIS) 5mg Tab, Take 1 Tablet by mouth 2 times a day., Disp: 180 Tablet, Rfl: 1    Lancets, Use one Per formulary preference  lancet to test blood sugar once daily early morning before first meal., Disp: 100 Each, Rfl: 3    Blood Glucose Test Strips, Use one Per formulary preference  strip to test blood sugar once daily early morning before first meal., Disp: 100 Strip, Rfl: 0    nitroglycerin (NITROSTAT) 0.4 MG SL Tab, Place 1 Tablet under the tongue as needed for Chest Pain. Q 5 min x 3 then 911., Disp: 25 Tablet, Rfl: 3    POTASSIUM PO, Take 1 Tablet by mouth every morning., Disp: , Rfl:     omeprazole (PRILOSEC) 20 MG delayed-release capsule, Take 1 Capsule by mouth every day. (Patient taking differently: Take 20 mg by mouth 1 time a day as needed. Indications: Heartburn), Disp: 30 Capsule, Rfl: 3    Cholecalciferol (VITAMIN D) 2000 UNIT Tab, Take 2,000 Units by mouth every day., Disp: , Rfl:     Ascorbic Acid (VITAMIN C) 1000 MG Tab, Take 1,000 mg by mouth every morning., Disp: , Rfl:     Cyanocobalamin (VITAMIN B-12 PO),  Take 1 tablet by mouth every morning., Disp: , Rfl:     CINNAMON PO, Take 1 tablet by mouth every morning., Disp: , Rfl:     Multiple Vitamins-Minerals (ICAPS AREDS FORMULA PO), Take 1 tablet by mouth every morning., Disp: , Rfl:     Omega-3 Fatty Acids (FISH OIL PO), Take 1 Cap by mouth every day., Disp: , Rfl:     PROBLEMS:  Patient Active Problem List    Diagnosis Date Noted    Type 2 diabetes mellitus without complication, with long-term current use of insulin ([facility]) 10/26/2023    Secondary hypercoagulable state ([facility]) 06/15/2023    HFrEF (heart failure with reduced ejection fraction) ([facility]) 04/26/2023    Angina pectoris, unspecified ([facility]) 04/26/2023    DVT (deep vein thrombosis) in pregnancy 03/01/2023    Hypokalemia 03/01/2023    Atrial fibrillation with RVR ([facility]) 02/28/2023    Polymyalgia rheumatica ([facility]) 02/28/2023    Vitamin D deficiency 04/12/2021    Cervical spinal stenosis 04/11/2021    Pubic ramus fracture ([facility]) 04/09/2021    History of breast cancer     Spasm of back muscles 03/27/2019    Uncontrolled type 2 diabetes mellitus with hyperglycemia ([facility]) 01/28/2016    On statin therapy due to risk of future cardiovascular event 01/15/2013    Acquired hypothyroidism 01/15/2013    Gastroesophageal reflux disease with esophagitis 01/15/2013       REVIEW OF SYSTEMS:  Gen.:  No Nausea, Vomiting, fever, Chills.  Heart: No chest pain.  Lungs:  No shortness of Breath.  Psychological: Rajan unusual Anxiety depression     PHYSICAL EXAM      Constitutional: Alert, cooperative, not in acute distress.  Cardiovascular:  Rate Rhythm is regular without murmurs rubs clicks.     Thorax & Lungs: Clear to auscultation, no wheezing, rhonchi, or rales  HENT: Normocephalic, Atraumatic.  Eyes: PERRLA, EOMI, Conjunctiva normal.   Neck: Trachia is midline no swelling of the thyroid.   Lymphatic: No lymphadenopathy noted.   Neurologic: Alert & oriented x 3, cranial nerves II through XII are intact, Normal motor function, Normal sensory  "function, No focal deficits noted.   Psychiatric: Affect normal, Judgment normal, Mood normal.     VITAL SIGNS:/70 (BP Location: Left arm, Patient Position: Sitting, BP Cuff Size: Adult)   Pulse 74   Temp 36.2 °C (97.2 °F)   Resp 16   Ht 1.676 m (5' 6\")   Wt 69.4 kg (153 lb)   LMP  (LMP Unknown)   SpO2 97%   BMI 24.69 kg/m²     Labs: Reviewed    Assessment:                                                     Plan:     1. Precordial pain  EKG: Questionable atrial fibrillation versus significant [id number] irregularity, normal axis, no evidence of ST segment elevation or depression abnormal EKG  - REFERRAL TO CARDIOLOGY  - EKG  - NM-CARDIAC STRESS TEST; Future  - CK ISOENZYMES SERUM; Future  - TROPONIN; Future    2. Angina pectoris, unspecified (HCC)  Discussed with the patient proper use of nitroglycerin which she has at home    3. Type 2 diabetes mellitus without complication, with long-term current use of insulin (HCC)  History of type 2 diabetes clinically well-controlled        "

## 2024-11-05 LAB
ALBUMIN SERPL BCP-MCNC: 4.2 G/DL (ref 3.2–4.9)
ALBUMIN/GLOB SERPL: 1.6 G/DL
ALP SERPL-CCNC: 51 U/L (ref 30–99)
ALT SERPL-CCNC: 13 U/L (ref 2–50)
ANION GAP SERPL CALC-SCNC: 8 MMOL/L (ref 7–16)
AST SERPL-CCNC: 23 U/L (ref 12–45)
BILIRUB SERPL-MCNC: 0.8 MG/DL (ref 0.1–1.5)
BUN SERPL-MCNC: 7 MG/DL (ref 8–22)
CALCIUM ALBUM COR SERPL-MCNC: 9.8 MG/DL (ref 8.5–10.5)
CALCIUM SERPL-MCNC: 10 MG/DL (ref 8.5–10.5)
CHLORIDE SERPL-SCNC: 101 MMOL/L (ref 96–112)
CO2 SERPL-SCNC: 27 MMOL/L (ref 20–33)
CREAT SERPL-MCNC: 0.59 MG/DL (ref 0.5–1.4)
GFR SERPLBLD CREATININE-BSD FMLA CKD-EPI: 89 ML/MIN/1.73 M 2
GLOBULIN SER CALC-MCNC: 2.7 G/DL (ref 1.9–3.5)
GLUCOSE SERPL-MCNC: 114 MG/DL (ref 65–99)
POTASSIUM SERPL-SCNC: 4.2 MMOL/L (ref 3.6–5.5)
PROT SERPL-MCNC: 6.9 G/DL (ref 6–8.2)
SODIUM SERPL-SCNC: 136 MMOL/L (ref 135–145)
TROPONIN T SERPL-MCNC: 10 NG/L (ref 6–19)

## 2024-11-07 ENCOUNTER — TELEPHONE (OUTPATIENT)
Dept: HEALTH INFORMATION MANAGEMENT | Facility: OTHER | Age: 84
End: 2024-11-07

## 2024-11-08 LAB
CK BB CFR SERPL ELPH: 0 % (ref 0–0)
CK MACRO1 CFR SERPL: 0 % (ref 0–0)
CK MACRO2 CFR SERPL: 0 % (ref 0–0)
CK MB CFR SERPL ELPH: 0 % (ref 0–4)
CK MM CFR SERPL ELPH: 100 % (ref 96–100)
CK SERPL-CCNC: 34 U/L (ref 26–192)

## 2024-12-02 DIAGNOSIS — E11.65 UNCONTROLLED TYPE 2 DIABETES MELLITUS WITH HYPERGLYCEMIA (HCC): ICD-10-CM

## 2024-12-03 RX ORDER — INSULIN GLARGINE 100 [IU]/ML
30 INJECTION, SOLUTION SUBCUTANEOUS EVERY EVENING
Qty: 15 ML | Refills: 6 | Status: SHIPPED | OUTPATIENT
Start: 2024-12-03

## 2024-12-09 ENCOUNTER — OFFICE VISIT (OUTPATIENT)
Dept: CARDIOLOGY | Facility: MEDICAL CENTER | Age: 84
End: 2024-12-09
Attending: INTERNAL MEDICINE
Payer: MEDICARE

## 2024-12-09 VITALS
HEIGHT: 66 IN | SYSTOLIC BLOOD PRESSURE: 100 MMHG | HEART RATE: 69 BPM | OXYGEN SATURATION: 98 % | RESPIRATION RATE: 16 BRPM | WEIGHT: 153 LBS | DIASTOLIC BLOOD PRESSURE: 60 MMHG | BODY MASS INDEX: 24.59 KG/M2

## 2024-12-09 DIAGNOSIS — I50.20 HFREF (HEART FAILURE WITH REDUCED EJECTION FRACTION) (HCC): ICD-10-CM

## 2024-12-09 DIAGNOSIS — I48.19 PERSISTENT ATRIAL FIBRILLATION (HCC): ICD-10-CM

## 2024-12-09 DIAGNOSIS — I50.9 HEART FAILURE, NYHA CLASS 1 (HCC): ICD-10-CM

## 2024-12-09 DIAGNOSIS — R07.89 OTHER CHEST PAIN: ICD-10-CM

## 2024-12-09 DIAGNOSIS — D68.69 SECONDARY HYPERCOAGULABLE STATE (HCC): ICD-10-CM

## 2024-12-09 DIAGNOSIS — E78.5 DYSLIPIDEMIA: ICD-10-CM

## 2024-12-09 DIAGNOSIS — Z79.899 HIGH RISK MEDICATION USE: ICD-10-CM

## 2024-12-09 DIAGNOSIS — E11.9 TYPE 2 DIABETES MELLITUS WITHOUT COMPLICATION, WITH LONG-TERM CURRENT USE OF INSULIN (HCC): ICD-10-CM

## 2024-12-09 DIAGNOSIS — Z79.4 TYPE 2 DIABETES MELLITUS WITHOUT COMPLICATION, WITH LONG-TERM CURRENT USE OF INSULIN (HCC): ICD-10-CM

## 2024-12-09 LAB — EKG IMPRESSION: NORMAL

## 2024-12-09 PROCEDURE — 3074F SYST BP LT 130 MM HG: CPT | Performed by: NURSE PRACTITIONER

## 2024-12-09 PROCEDURE — 93010 ELECTROCARDIOGRAM REPORT: CPT | Performed by: INTERNAL MEDICINE

## 2024-12-09 PROCEDURE — 93005 ELECTROCARDIOGRAM TRACING: CPT | Mod: TC | Performed by: NURSE PRACTITIONER

## 2024-12-09 PROCEDURE — 99213 OFFICE O/P EST LOW 20 MIN: CPT | Performed by: NURSE PRACTITIONER

## 2024-12-09 PROCEDURE — 3078F DIAST BP <80 MM HG: CPT | Performed by: NURSE PRACTITIONER

## 2024-12-09 PROCEDURE — 99214 OFFICE O/P EST MOD 30 MIN: CPT | Performed by: NURSE PRACTITIONER

## 2024-12-09 ASSESSMENT — ENCOUNTER SYMPTOMS
SHORTNESS OF BREATH: 0
FEVER: 0
COUGH: 0
MYALGIAS: 0
DIZZINESS: 0
PALPITATIONS: 0
ABDOMINAL PAIN: 0
CLAUDICATION: 0
ORTHOPNEA: 0
PND: 0

## 2024-12-09 ASSESSMENT — FIBROSIS 4 INDEX: FIB4 SCORE: 3.39

## 2024-12-09 NOTE — PROGRESS NOTES
Chief Complaint   Patient presents with    Congestive Heart Failure     F/V DX: HFrEF (heart failure with reduced ejection fraction) ([facility])           Subjective     Mavis Lizarraga is a 84 y.o. female who presents today for follow up on her heart failure, Afib with her , Vinay.    Patient of Dr. Oh. She was last seen in clinic on 10/26/2023 with myself.  No changes were made during this visit.  She was recommended follow-up in 6 months.    Patient did not follow-up sooner due to unclear reasons.    She mentions she has been having chest heaviness for the past 6 months.  She mentions her heaviness happens when she is at home concentrating on her craft.  She wants her crafts to be perfect.  She mentions that when she has the symptoms she will sit down and rest and her symptoms resolved.    She denies any other times with heaviness.  She works and walks around without difficulty.    She denies chest pain, palpitations, orthopnea, PND, edema or dizziness/lightheadedness.    She denies any known episodes of A-fib.    She did speak to her PCP regarding her symptoms and was sent for stress test, lab testing. She was also recommended to follow back up with Cardiology.     Her home weights have been stable around 150 lbs.    Additionally, patient has the following medical problems:     -Hypothyroidism    -Left LE DVT    -History of breast cancer    -Diabetes: On insulin    -Polymyalgia rheumatica on prednisone    Past Medical History:   Diagnosis Date    Anesthesia     poor tolerence to morphine    Arthritis     back    Backpain     Breast cancer (HCC) 1980s    right    Diverticulosis     DVT of deep femoral vein (HCC)     leg    Heart burn     High cholesterol     Pneumonia 02/01/2006    Thyroid condition     Ulcer     Urinary incontinence      Past Surgical History:   Procedure Laterality Date    ANTERIOR AND POSTERIOR REPAIR  6/23/2014    Performed by Lauri Batista M.D. at SURGERY SAME DAY [facility]     BLADDER SLING FEMALE  6/23/2014    Performed by Lauri Batista M.D. at SURGERY SAME DAY [facility] ORS    BREAST RECONSTRUCTION  8/14/2012    Performed by SARTHAK LEVIN at SURGERY [facility] ORS    BREAST IMPLANT REVISION  8/14/2012    Performed by SARTHAK LEVIN at SURGERY [facility] ORS    CAPSULECTOMY  8/14/2012    Performed by SARTHAK LEVIN at SURGERY [facility] ORS    MASTOPEXY  8/14/2012    Performed by SARTHAK LEVIN at SURGERY [facility] ORS    LIPOSUCTION  8/14/2012    Performed by SARTHAK LEVIN at SURGERY [facility] ORS    RHYTIDECTOMY  8/14/2012    Performed by SARTHAK LEVIN at SURGERY [facility] ORS    PLATYSMAPLASTY  8/14/2012    Performed by SARTHAK LEVIN at SURGERY [facility] ORS    BLEPHAROPLASTY  8/14/2012    Performed by SARTHAK LEVIN at SURGERY [facility]    LOW ANTERIOR RESECTION LAPAROSCOPIC  2/10/2010    Performed by KOBI COHN at SURGERY [facility] ORS    COLECTOMY N/A 2009    partial- post colonoscopy perforation- dr cohn    MASTECTOMY Right 1980s    neg nodes; 6 mos adjuvant chemo    OTHER      mastectomy R breast    OTHER ABDOMINAL SURGERY      I&D of ruptured diverticuli     MD BREAST AUGMENTATION WITH IMPLANT      MD BREAST REDUCTION      MD CHEMOTHERAPY, UNSPECIFIED PROCEDURE       Family History   Problem Relation Age of Onset    Heart Disease Mother 64    Hypertension Mother     Diabetes Mother     Stroke Mother     Cancer Mother         Stomach cancer    Heart Disease Maternal Grandmother 63        heart attack     Social History     Socioeconomic History    Marital status:      Spouse name: Not on file    Number of children: Not on file    Years of education: Not on file    Highest education level: 12th grade   Occupational History    Not on file   Tobacco Use    Smoking status: Never    Smokeless tobacco: Never   Vaping Use    Vaping status: Never Used   Substance and Sexual Activity    Alcohol use: Yes     Comment:  rarely    Drug use: No    Sexual activity: Yes     Partners: Male   Other Topics Concern    Not on file   Social History Narrative    Not on file     Social Drivers of Health     Financial Resource Strain: Low Risk  (4/13/2021)    Overall Financial Resource Strain (CARDIA)     Difficulty of Paying Living Expenses: Not hard at all   Food Insecurity: No Food Insecurity (4/27/2021)    Hunger Vital Sign     Worried About Running Out of Food in the Last Year: Never true     Ran Out of Food in the Last Year: Never true   Transportation Needs: Unknown (4/27/2021)    PRAPARE - Transportation     Lack of Transportation (Medical): No     Lack of Transportation (Non-Medical): Not on file   Physical Activity: Inactive (4/27/2021)    Exercise Vital Sign     Days of Exercise per Week: 0 days     Minutes of Exercise per Session: 10 min   Stress: No Stress Concern Present (4/27/2021)    [place] [employer] - Occupational Stress Questionnaire     Feeling of Stress : Only a little   Social Connections: Moderately Isolated (4/27/2021)    Social Connection and Isolation Panel [NHANES]     Frequency of Communication with Friends and Family: Twice a week     Frequency of Social Gatherings with Friends and Family: Once a week     Attends Cheondoism Services: Never     Active Member of Clubs or Organizations: No     Attends Club or Organization Meetings: Never     Marital Status:    Intimate Partner Violence: Not on file   Housing Stability: Unknown (4/27/2021)    Housing Stability Vital Sign     Unable to Pay for Housing in the Last Year: No     Number of Places Lived in the Last Year: Not on file     Unstable Housing in the Last Year: No     Allergies   Allergen Reactions    Jardiance [Empagliflozin] Rash     Diffuse Maculopapular skin rash    Metformin Diarrhea and Nausea     NAUSEA VOMITING AND DIARRHEA    Morphine Unspecified     Hallucinations     Outpatient Encounter Medications as of 12/9/2024    Medication Sig Dispense Refill    insulin glargine (LANTUS SOLOSTAR) 100 UNIT/ML Solution Pen-injector injection Inject 30 Units under the skin every evening. 15 mL 6    apixaban (ELIQUIS) 5mg Tab Take 1 Tablet by mouth 2 times a day. 200 Tablet 3    Semaglutide, 2 MG/DOSE, (OZEMPIC, 2 MG/DOSE,) 8 MG/3ML Solution Pen-injector Inject 2 mg under the skin every 7 days. 3 mL 5    levothyroxine (SYNTHROID) 75 MCG Tab TAKE ONE TABLET BY MOUTH EVERY MORNING ON AN EMPTY STOMACH 90 Tablet 3    rosuvastatin (CRESTOR) 40 MG tablet TAKE ONE TABLET BY MOUTH DAILY 100 Tablet 3    metoprolol SR (TOPROL XL) 50 MG TABLET SR 24 HR Take 1.5 Tablets by mouth 2 times a day. 270 Tablet 2    CONTOUR NEXT TEST strip USE ONE STRIP TO TEST BLOOD SUGAR ONCE DAILY EARLY MORNING BEFORE FIRST MEAL 100 Strip 3    insulin lispro (HUMALOG KWIKPEN) 100 UNIT/ML SC SOPN injection PEN Inject 5-10 Units under the skin 3 times a day before meals. 15 mL 6    Insulin Pen Needle 32 G x 4 mm ([employer] PEN NEEDLES) Inject 1 Each under the skin 4 times a day. 300 Each 3    Continuous Blood Gluc  (DEXCOM G7 ) Device 1 Each continuous. 1 Each 0    Continuous Blood Gluc Sensor (DEXCOM G7 SENSOR) Misc 1 Each every 10 days. 9 Each 3    cyclobenzaprine (FLEXERIL) 10 mg Tab TAKE ONE TABLET BY MOUTH THREE TIMES A DAY AS NEEDED FOR MILD PAIN (FLEXERIL) 30 Tablet 3    Lancets Use one Per formulary preference  lancet to test blood sugar once daily early morning before first meal. 100 Each 3    Blood Glucose Test Strips Use one Per formulary preference  strip to test blood sugar once daily early morning before first meal. 100 Strip 0    nitroglycerin (NITROSTAT) 0.4 MG SL Tab Place 1 Tablet under the tongue as needed for Chest Pain. Q 5 min x 3 then 911. 25 Tablet 3    POTASSIUM PO Take 1 Tablet by mouth every morning.      omeprazole (PRILOSEC) 20 MG delayed-release capsule Take 1 Capsule by mouth every day. (Patient taking differently: Take 20 mg by  "mouth 1 time a day as needed. Indications: Heartburn) 30 Capsule 3    Cholecalciferol (VITAMIN D) 2000 UNIT Tab Take 2,000 Units by mouth every day.      Ascorbic Acid (VITAMIN C) 1000 MG Tab Take 1,000 mg by mouth every morning.      Cyanocobalamin (VITAMIN B-12 PO) Take 1 tablet by mouth every morning.      CINNAMON PO Take 1 tablet by mouth every morning.      Multiple Vitamins-Minerals (ICAPS AREDS FORMULA PO) Take 1 tablet by mouth every morning.      Omega-3 Fatty Acids (FISH OIL PO) Take 1 Cap by mouth every day.      [DISCONTINUED] Semaglutide, 1 MG/DOSE, (OZEMPIC, 1 MG/DOSE,) 4 MG/3ML Solution Pen-injector Inject 1 mg under the skin every 7 days. (Patient not taking: Reported on 12/9/2024) 3 mL 2    [DISCONTINUED] predniSONE (DELTASONE) 1 MG Tab TAKE FOUR TABLETS (4 MG) BY MOUTH DAILY FOR 2 WEEKS, IF SYMPTOMS ARE STABLE OKAY TO DECREASE TO TAKE THREE TABLETS (3 MG) BY MOUTH DAILY FOR 2 WEEKS (Patient not taking: Reported on 12/9/2024) 120 Tablet 2    [DISCONTINUED] apixaban (ELIQUIS) 5mg Tab Take 1 Tablet by mouth 2 times a day. 180 Tablet 1     No facility-administered encounter medications on file as of 12/9/2024.     Review of Systems   Constitutional:  Negative for fever and malaise/fatigue.   Respiratory:  Negative for cough and shortness of breath.    Cardiovascular:  Positive for chest pain (chest heaviness). Negative for palpitations, orthopnea, claudication, leg swelling and PND.   Gastrointestinal:  Negative for abdominal pain.   Musculoskeletal:  Negative for myalgias.   Neurological:  Negative for dizziness.   All other systems reviewed and are negative.             Objective     /60 (BP Location: Left arm, Patient Position: Sitting, BP Cuff Size: Adult)   Pulse 69   Resp 16   Ht 1.676 m (5' 6\")   Wt 69.4 kg (153 lb)   LMP  (LMP Unknown)   SpO2 98%   BMI 24.69 kg/m²     Physical Exam  Vitals reviewed.   Constitutional:       Appearance: She is well-developed.   HENT:      Head: " Normocephalic and atraumatic.   Eyes:      Pupils: Pupils are equal, round, and reactive to light.   Neck:      Vascular: No JVD.   Cardiovascular:      Rate and Rhythm: Normal rate and regular rhythm.      Heart sounds: Normal heart sounds.   Pulmonary:      Effort: Pulmonary effort is normal. No respiratory distress.      Breath sounds: Normal breath sounds. No wheezing or rales.   Abdominal:      General: Bowel sounds are normal.      Palpations: Abdomen is soft.   Musculoskeletal:         General: Normal range of motion.      Cervical back: Normal range of motion and neck supple.      Right lower leg: No edema.      Left lower leg: No edema.   Skin:     General: Skin is warm and dry.   Neurological:      General: No focal deficit present.      Mental Status: She is alert and oriented to person, place, and time.   Psychiatric:         Behavior: Behavior normal.       Lab Results   Component Value Date/Time    CHOLSTRLTOT 133 09/04/2024 06:50 AM    LDL 63 09/04/2024 06:50 AM    HDL 42 09/04/2024 06:50 AM    TRIGLYCERIDE 138 09/04/2024 06:50 AM       Lab Results   Component Value Date/Time    SODIUM 136 11/04/2024 11:27 AM    POTASSIUM 4.2 11/04/2024 11:27 AM    CHLORIDE 101 11/04/2024 11:27 AM    CO2 27 11/04/2024 11:27 AM    GLUCOSE 114 (H) 11/04/2024 11:27 AM    BUN 7 (L) 11/04/2024 11:27 AM    CREATININE 0.59 11/04/2024 11:27 AM     Lab Results   Component Value Date/Time    ALKPHOSPHAT 51 11/04/2024 11:27 AM    ASTSGOT 23 11/04/2024 11:27 AM    ALTSGPT 13 11/04/2024 11:27 AM    TBILIRUBIN 0.8 11/04/2024 11:27 AM       Transthoracic Echo Report 3/2/2023  No prior study is available for comparison.   Mildly reduced left ventricular systolic function.  The left ventricular ejection fraction is visually estimated to be 40%.  Estimated right ventricular systolic pressure is 30 mmHg.       Myocardial Perfusion Report 5/5/2023   NUCLEAR IMAGING INTERPRETATION   No evidence of significant jeopardized viable myocardium  or prior myocardial infarction.   Normal left ventricular size, ejection fraction, and wall motion.   ECG INTERPRETATION   No ischemic changes on pharmacologic stress test.     Transthoracic Echo Report 8/1/2023  Normal left ventricular systolic function.  The left ventricular ejection fraction is visually estimated to be 65-70%.  Aortic valve sclerosis without stenosis.  Normal right ventricular size and systolic function.  Right heart pressures are normal.  No significant valvular abnormalities.   Compared to the images of the prior study, 3/2/2023 the left ventricular function is now normal.    Assessment & Plan     1. Other chest pain  EKG      2. HFrEF (heart failure with reduced ejection fraction) ([facility])        3. Heart failure, NYHA class 1 ([facility])        4. Secondary hypercoagulable state ([facility])        5. Persistent atrial fibrillation ([facility])        6. Dyslipidemia        7. High risk medication use        8. Type 2 diabetes mellitus without complication, with long-term current use of insulin ([facility])              Medical Decision Making: Today's Assessment/Status/Plan:        Chest heaviness:  -EKG shows SR 61 with PAC, PVC  -Discussed with patient to schedule her stress test.  -Not started on aspirin as she is taking Eliquis  -ER precautions for worsening symptoms    HFmrEF, Stage C, Class 1, LVEF 65 to 70% improved from 40%: Based on physical examination findings, patient is euvolemic. No JVD, lungs are clear to auscultation, no pitting edema in bilateral lower extremities, no ascites.   -Heart failure due to tachycardia mediated, had negative stress test  -Discussed Heart failure trajectory and prognosis with patient. Will continue to optimize medical therapy as tolerated. Advanced HF treatment, need for remote monitoring consideration at every visit.   -ACE-I/ARB/ARNI: Consider if BP allows  -Evidence Based Beta-blocker: Continue metoprolol SR 75 mg twice a day  -Aldosterone Antagonist: Consider if BP  allows  -Diuretic: Consider as needed  -SGLT2 inhibitor: Not able to tolerate Jardiance due to rash  -Other: Consider as needed (Hydralazine/Isosorbide, Vericiguat, Corlanor)  -Labs: Recent lab results reviewed.  Will continue to closely monitor for side effects of patient's high risk medication(s) including renal function, liver function NTproBNP/cardiac markers, and electrolytes as needed  -discussed importance of exercise and regular activity  -Discussed/reviewed maintaining a low Sodium and hydration recommendations  -ICD not indicated at this time  -May consider repeating echo soon  -Reinforced s/sx of worsening heart failure with patient and weight monitoring. Pt verbalizes understanding. Pt to call office or RTC if present.    -PUMP line number [phone] (PUMP) reviewed with patient  -Heart Failure Education:   Recognition of escalating symptoms and concrete plan for response to particular symptoms  Risk modification for heart failure progression  Specific diet recommendations: Continue low-sodium diet   Activity as tolerated  Importance of treatment adherence  Behavioral strategies to promote treatment adherence  -Advanced care planning: Advance directive on file  -Pharmacotherapy Referral: Not referred at this time  -Compliance Barriers: None  -Genetic testing Consideration: None  -Consideration for LVAD and/or Heart transplant as necessary      Persistent A-fib:  -s/p DCCV on 7/13/2023  -EKG SR 61 with PVC, PAC  -Continue Eliquis 5 mg twice a day  -Continue metoprolol SR 75 mg twice a day  -[id number] score is  5 (Age, Sex, HF, DM)    Dyslipidemia:  -Continue rosuvastatin 40 mg daily  -Last LDL 63 on 9/4/2024    Diabetes: Controlled now  -Last A1c 6.2 on 9/4/2024  -Followed by PCP  -Continue insulin    FU in clinic in 6 months or sooner if stress test abnormal. Sooner if needed.    Patient verbalizes understanding and agrees with the plan of care.     PLEASE NOTE: This Note was created using voice  recognition Software. I have made every reasonable attempt to correct obvious errors, but I expect that there are errors of grammar and possibly content that I did not discover before finalizing the note

## 2025-01-05 DIAGNOSIS — Z79.4 CONTROLLED TYPE 2 DIABETES MELLITUS WITHOUT COMPLICATION, WITH LONG-TERM CURRENT USE OF INSULIN (HCC): ICD-10-CM

## 2025-01-05 DIAGNOSIS — E11.9 CONTROLLED TYPE 2 DIABETES MELLITUS WITHOUT COMPLICATION, WITH LONG-TERM CURRENT USE OF INSULIN (HCC): ICD-10-CM

## 2025-01-05 RX ORDER — SEMAGLUTIDE 2.68 MG/ML
INJECTION, SOLUTION SUBCUTANEOUS
Qty: 3 ML | Refills: 5 | Status: SHIPPED | OUTPATIENT
Start: 2025-01-05

## 2025-01-06 ENCOUNTER — OFFICE VISIT (OUTPATIENT)
Dept: ENDOCRINOLOGY | Facility: MEDICAL CENTER | Age: 85
End: 2025-01-06
Attending: INTERNAL MEDICINE
Payer: MEDICARE

## 2025-01-06 ENCOUNTER — OFFICE VISIT (OUTPATIENT)
Dept: MEDICAL GROUP | Facility: LAB | Age: 85
End: 2025-01-06
Payer: MEDICARE

## 2025-01-06 VITALS
DIASTOLIC BLOOD PRESSURE: 64 MMHG | HEIGHT: 66 IN | SYSTOLIC BLOOD PRESSURE: 112 MMHG | BODY MASS INDEX: 24.59 KG/M2 | WEIGHT: 153 LBS | OXYGEN SATURATION: 95 % | HEART RATE: 64 BPM

## 2025-01-06 VITALS
DIASTOLIC BLOOD PRESSURE: 62 MMHG | OXYGEN SATURATION: 96 % | TEMPERATURE: 96.9 F | HEIGHT: 66 IN | RESPIRATION RATE: 16 BRPM | BODY MASS INDEX: 24.59 KG/M2 | HEART RATE: 78 BPM | SYSTOLIC BLOOD PRESSURE: 120 MMHG | WEIGHT: 153 LBS

## 2025-01-06 DIAGNOSIS — Z79.4 LONG-TERM INSULIN USE (HCC): ICD-10-CM

## 2025-01-06 DIAGNOSIS — E11.9 TYPE 2 DIABETES MELLITUS WITHOUT COMPLICATION, WITH LONG-TERM CURRENT USE OF INSULIN (HCC): ICD-10-CM

## 2025-01-06 DIAGNOSIS — R07.9 CHEST PAIN, UNSPECIFIED TYPE: ICD-10-CM

## 2025-01-06 DIAGNOSIS — Z79.4 TYPE 2 DIABETES MELLITUS WITHOUT COMPLICATION, WITH LONG-TERM CURRENT USE OF INSULIN (HCC): ICD-10-CM

## 2025-01-06 DIAGNOSIS — Z79.4 CONTROLLED TYPE 2 DIABETES MELLITUS WITHOUT COMPLICATION, WITH LONG-TERM CURRENT USE OF INSULIN (HCC): ICD-10-CM

## 2025-01-06 DIAGNOSIS — E03.9 PRIMARY HYPOTHYROIDISM: ICD-10-CM

## 2025-01-06 DIAGNOSIS — E55.9 VITAMIN D DEFICIENCY: ICD-10-CM

## 2025-01-06 DIAGNOSIS — E11.9 CONTROLLED TYPE 2 DIABETES MELLITUS WITHOUT COMPLICATION, WITH LONG-TERM CURRENT USE OF INSULIN (HCC): ICD-10-CM

## 2025-01-06 DIAGNOSIS — E78.5 DYSLIPIDEMIA: ICD-10-CM

## 2025-01-06 LAB
HBA1C MFR BLD: 6.3 % (ref ?–5.8)
POCT INT CON NEG: NEGATIVE
POCT INT CON POS: POSITIVE

## 2025-01-06 PROCEDURE — 99213 OFFICE O/P EST LOW 20 MIN: CPT | Mod: 27 | Performed by: INTERNAL MEDICINE

## 2025-01-06 PROCEDURE — 83036 HEMOGLOBIN GLYCOSYLATED A1C: CPT | Performed by: INTERNAL MEDICINE

## 2025-01-06 PROCEDURE — 95250 CONT GLUC MNTR PHYS/QHP EQP: CPT | Performed by: INTERNAL MEDICINE

## 2025-01-06 PROCEDURE — 3074F SYST BP LT 130 MM HG: CPT | Performed by: INTERNAL MEDICINE

## 2025-01-06 PROCEDURE — 99213 OFFICE O/P EST LOW 20 MIN: CPT | Performed by: INTERNAL MEDICINE

## 2025-01-06 PROCEDURE — 99214 OFFICE O/P EST MOD 30 MIN: CPT | Performed by: INTERNAL MEDICINE

## 2025-01-06 PROCEDURE — 3078F DIAST BP <80 MM HG: CPT | Performed by: INTERNAL MEDICINE

## 2025-01-06 PROCEDURE — 95251 CONT GLUC MNTR ANALYSIS I&R: CPT | Performed by: INTERNAL MEDICINE

## 2025-01-06 ASSESSMENT — PATIENT HEALTH QUESTIONNAIRE - PHQ9: CLINICAL INTERPRETATION OF PHQ2 SCORE: 0

## 2025-01-06 ASSESSMENT — FIBROSIS 4 INDEX
FIB4 SCORE: 3.39
FIB4 SCORE: 3.39

## 2025-01-06 NOTE — PROGRESS NOTES
CHIEF COMPLAINT: Patient is here for follow up of Type 2 Diabetes Mellitus    HPI:     Mavis Lizarraga is a 84 y.o. female with Type 2 Diabetes Mellitus here for follow up.    Labs from 1/6/2025 HbA1c is 6.3%    She has a history of PMR  She has congestive heart failure with reduced EF  She reports previous allergic reaction to Jardiance  She also has primary hypothyroidism and is on levothyroxine  She has been wearing a CGM for over 6 mos      She is currently on  Lantus 25u AM daily  Ozempic 32mg weekly      She denies side effects with her meds  She has stopped her mealtime insulin  She is no longer on steroids for her PMR  She reports excellent glucose control      Download of her CCGM shows:      She does not have uncontrolled hyperlipidemia  She is currently on rosuvastatin 40 mg daily   Latest Reference Range & Units 09/04/24 06:50   Cholesterol,Tot 100 - 199 mg/dL 133   Triglycerides 0 - 149 mg/dL 138   HDL >=40 mg/dL 42   LDL <100 mg/dL 63     She no longer has albuminuria   Latest Reference Range & Units 06/07/24 15:17   Micro Alb Creat Ratio 0 - 30 mg/g 18   Creatinine, Urine mg/dL 132.86   Microalbumin, Urine Random mg/dL 2.4         Eye exam done just recently - no records      For her primary hypothyroidism she is on levothyroxine 75 mcg daily  She reports good compliance  She denies constipation fatigue her last TSH was normal 1.2 on May 2024            BG Diary:  Please see CGM download    Weight has been stable    Diabetes Complications   Retinopathy: No known retinopathy.  Last eye exam: January 2025  Neuropathy: Denies paresthesias or numbness in hands or feet. Denies any foot wounds.  Exercise: Minimal.  Diet: Fair.  Patient's medications, allergies, and social histories were reviewed and updated as appropriate.    ROS:     CONS:     No fever, no chills   EYES:     No diplopia, no blurry vision   CV:           No chest pain, no palpitations   PULM:     No SOB, no cough, no hemoptysis.   GI:             No nausea, no vomiting, no diarrhea, no constipation   ENDO:     No polyuria, no polydipsia, no heat intolerance, no cold intolerance       Past Medical History:  Problem List:  2023-10: Type 2 diabetes mellitus without complication, with long-  term current use of insulin ([facility])  2023-06: Secondary hypercoagulable state ([facility])  2023-04: HFrEF (heart failure with reduced ejection fraction) ([facility])  2023-04: Angina pectoris, unspecified ([facility])  2023-03: DVT (deep vein thrombosis) in pregnancy  2023-03: Hypokalemia  2023-02: Atrial fibrillation with RVR ([facility])  2023-02: Polymyalgia rheumatica ([facility])  2021-04: Vitamin D deficiency  2021-04: Bone lesion  2021-04: Cervical spinal stenosis  2021-04: Pubic ramus fracture ([facility])  2019-03: Spasm of back muscles  2017-04: Chronic midline low back pain without sciatica  2016-12: Diarrhea- ? due to metformin  2016-10: S/P colonoscopy- neg at [facility] about 2014 complicated by   perforation  2016-10: Neoplasm of uncertain behavior of skin- chest- prob bcca  2016-01: IFG (impaired fasting glucose)  2016-01: Uncontrolled type 2 diabetes mellitus with hyperglycemia   ([facility])  2016-01: BMI 30.0-30.9,adult  2014-06: Female stress incontinence  2014-06: Cystocele, midline  2014-06: Rectocele  2013-01: Osteoarthritis, shoulder  2013-01: On statin therapy due to risk of future cardiovascular event  2013-01: Acquired hypothyroidism  2013-01: Gastroesophageal reflux disease with esophagitis  2012-08: Status post right breast reconstruction- right breast cancer   1980s- neg nodes; 6 mo chemo rx  2012-08: S/P cosmetic plastic surgery  History of breast cancer  Lower extremity weakness      Past Surgical History:  Past Surgical History:   Procedure Laterality Date    ANTERIOR AND POSTERIOR REPAIR  6/23/2014    Performed by Lauri Batista M.D. [facility] [place] [facility]    BLADDER SLING FEMALE  6/23/2014    Performed by Lauri Batista M.D. at SURGERY SAME DAY [facility]    BREAST  "RECONSTRUCTION  8/14/2012    Performed by SARTHAK LEVIN at SURGERY [facility]    BREAST IMPLANT REVISION  8/14/2012    Performed by SARTHAK LEVIN at SURGERY [facility]    CAPSULECTOMY  8/14/2012    Performed by SARTHAK LEVIN at SURGERY [facility]    MASTOPEXY  8/14/2012    Performed by SARTHAK LEVIN at SURGERY [facility]    LIPOSUCTION  8/14/2012    Performed by SARTHAK LEVIN at SURGERY [facility]    RHYTIDECTOMY  8/14/2012    Performed by SARTHAK LEVIN at SURGERY [facility]    PLATYSMAPLASTY  8/14/2012    Performed by SARTHAK LEVIN at SURGERY [facility]    BLEPHAROPLASTY  8/14/2012    Performed by SARTHAK LEVIN at SURGERY [facility]    LOW ANTERIOR RESECTION LAPAROSCOPIC  2/10/2010    Performed by KOBI COHN at SURGERY [facility]S    COLECTOMY N/A 2009    partial- post colonoscopy perforation- dr [facility]    MASTECTOMY Right 1980s    neg nodes; 6 mos adjuvant chemo    OTHER      mastectomy R breast    OTHER ABDOMINAL SURGERY      I&D of ruptured diverticuli     CA BREAST AUGMENTATION WITH IMPLANT      CA BREAST REDUCTION      CA CHEMOTHERAPY, UNSPECIFIED PROCEDURE          Allergies:  Jardiance [empagliflozin], Metformin, and Morphine     Social History:  Social History     Tobacco Use    Smoking status: Never    Smokeless tobacco: Never   Vaping Use    Vaping status: Never Used   Substance Use Topics    Alcohol use: Yes     Comment: rarely    Drug use: No        Family History:   family history includes Cancer in her mother; Diabetes in her mother; Heart Disease (age of onset: 63) in her maternal grandmother; Heart Disease (age of onset: 64) in her mother; Hypertension in her mother; Stroke in her mother.      PHYSICAL EXAM:   OBJECTIVE:  Vital signs: /64 (BP Location: Left arm, Patient Position: Sitting, BP Cuff Size: Adult long)   Pulse 64   Ht 1.676 m (5' 6\")   Wt 69.4 kg (153 lb)   LMP  (LMP Unknown)   SpO2 95%   " "BMI 24.69 kg/m²   GENERAL: Well-developed, well-nourished in no apparent distress.   EYE:  No ocular asymmetry, PERRLA  HENT: Pink, moist mucous membranes.    NECK: No thyromegaly.   CARDIOVASCULAR:  No murmurs  LUNGS: Clear breath sounds  ABDOMEN: Soft, nontender   EXTREMITIES: No clubbing, cyanosis, or edema.   NEUROLOGICAL: No gross focal motor abnormalities   LYMPH: No cervical adenopathy palpated.   SKIN: No rashes, lesions.     Labs:  Lab Results   Component Value Date/Time    HBA1C 6.3 (A) 01/06/2025 09:49 AM        Lab Results   Component Value Date/Time    WBC 5.8 11/04/2024 11:27 AM    RBC 4.57 11/04/2024 11:27 AM    HEMOGLOBIN 13.6 11/04/2024 11:27 AM    MCV 89.7 11/04/2024 11:27 AM    MCH 29.8 11/04/2024 11:27 AM    MCHC 33.2 11/04/2024 11:27 AM    RDW 40.3 11/04/2024 11:27 AM    MPV 10.2 11/04/2024 11:27 AM       Lab Results   Component Value Date/Time    SODIUM 136 11/04/2024 11:27 AM    POTASSIUM 4.2 11/04/2024 11:27 AM    CHLORIDE 101 11/04/2024 11:27 AM    CO2 27 11/04/2024 11:27 AM    ANION 8.0 11/04/2024 11:27 AM    GLUCOSE 114 (H) 11/04/2024 11:27 AM    BUN 7 (L) 11/04/2024 11:27 AM    CREATININE 0.59 11/04/2024 11:27 AM    CALCIUM 10.0 11/04/2024 11:27 AM    ASTSGOT 23 11/04/2024 11:27 AM    ALTSGPT 13 11/04/2024 11:27 AM    TBILIRUBIN 0.8 11/04/2024 11:27 AM    ALBUMIN 4.2 11/04/2024 11:27 AM    ALBUMIN 3.90 04/09/2021 01:33 PM    TOTPROTEIN 6.9 11/04/2024 11:27 AM    TOTPROTEIN 6.5 04/09/2021 01:33 PM    GLOBULIN 2.7 11/04/2024 11:27 AM    AGRATIO 1.6 11/04/2024 11:27 AM       Lab Results   Component Value Date/Time    CHOLSTRLTOT 133 09/04/2024 0650    TRIGLYCERIDE 138 09/04/2024 0650    HDL 42 09/04/2024 0650    LDL 63 09/04/2024 0650       Lab Results   Component Value Date/Time    MALBCRT 18 06/07/2024 03:17 PM    MICROALBUR 2.4 06/07/2024 03:17 PM        Lab Results   Component Value Date/Time    TSHULTRASEN 1.210 05/17/2024 0611     No results found for: \"FREEDIR\"  No results found for: " "\"FREET3\"  No results found for: \"THYSTIMIG\"        ASSESSMENT/PLAN:     1. Controlled type 2 diabetes mellitus without complication, with long-term current use of insulin (HCC)  Controlled  Continue Lantus 25 units daily  Continue Ozempic 2 mg weekly  Follow-up in 6 months with complete labs    2. Primary hypothyroidism  Controlled  Continue levothyroxine 75 mcg daily  Follow-up in 6 months with complete labs    3. Dyslipidemia  Controlled  Continue Crestor  Repeat fasting lipids in 6 months    4. Vitamin D deficiency  Stable   Vitamin D labs were reviewed with patient  Continue current supplements  Continue monitoring levels       5. Long-term insulin use (HCC)  Patient is on long-term basal insulin for type 2 diabetes management        Return in about 6 months (around 7/6/2025).        Thank you kindly for allowing me to participate in the diabetes care plan for this patient.    Jonathan Mccormick MD, FACE, [facility]    CC:   Waylon Webber D.O.  "

## 2025-01-07 NOTE — PROGRESS NOTES
CC: Mavis Lizraraga is a 84 y.o. female is suffering from   Chief Complaint   Patient presents with    Follow-Up     Discuss appointment with Cardiology          SUBJECTIVE:  1. Type 2 diabetes mellitus without complication, with long-term current use of insulin (HCC)  Mavis is here for follow-up, has a history of type 2 diabetes clinically is stable    2. Chest pain, unspecified type  Patient with intermittent chest pain states this occurs whenever she is dealing with work that stressful.        Past social, family, history: Boyfriend  Social History     Tobacco Use    Smoking status: Never    Smokeless tobacco: Never   Vaping Use    Vaping status: Never Used   Substance Use Topics    Alcohol use: Yes     Comment: rarely    Drug use: No         MEDICATIONS:    Current Outpatient Medications:     OZEMPIC, 2 MG/DOSE, 8 MG/3ML Solution Pen-injector, DIAL AND INJECT UNDER THE SKIN 2 MG WEEKLY, Disp: 3 mL, Rfl: 5    insulin glargine (LANTUS SOLOSTAR) 100 UNIT/ML Solution Pen-injector injection, Inject 30 Units under the skin every evening., Disp: 15 mL, Rfl: 6    apixaban (ELIQUIS) 5mg Tab, Take 1 Tablet by mouth 2 times a day., Disp: 200 Tablet, Rfl: 3    levothyroxine (SYNTHROID) 75 MCG Tab, TAKE ONE TABLET BY MOUTH EVERY MORNING ON AN EMPTY STOMACH, Disp: 90 Tablet, Rfl: 3    rosuvastatin (CRESTOR) 40 MG tablet, TAKE ONE TABLET BY MOUTH DAILY, Disp: 100 Tablet, Rfl: 3    metoprolol SR (TOPROL XL) 50 MG TABLET SR 24 HR, Take 1.5 Tablets by mouth 2 times a day., Disp: 270 Tablet, Rfl: 2    CONTOUR NEXT TEST strip, USE ONE STRIP TO TEST BLOOD SUGAR ONCE DAILY EARLY MORNING BEFORE FIRST MEAL, Disp: 100 Strip, Rfl: 3    insulin lispro (HUMALOG KWIKPEN) 100 UNIT/ML SC SOPN injection PEN, Inject 5-10 Units under the skin 3 times a day before meals. (Patient not taking: Reported on 1/6/2025), Disp: 15 mL, Rfl: 6    Insulin Pen Needle 32 G x 4 mm ([employer] PEN NEEDLES), Inject 1 Each under the skin 4 times a day. (Patient  not taking: Reported on 1/6/2025), Disp: 300 Each, Rfl: 3    Continuous Blood Gluc  (DEXCOM G7 ) Device, 1 Each continuous., Disp: 1 Each, Rfl: 0    Continuous Blood Gluc Sensor (DEXCOM G7 SENSOR) Misc, 1 Each every 10 days., Disp: 9 Each, Rfl: 3    cyclobenzaprine (FLEXERIL) 10 mg Tab, TAKE ONE TABLET BY MOUTH THREE TIMES A DAY AS NEEDED FOR MILD PAIN (FLEXERIL), Disp: 30 Tablet, Rfl: 3    Lancets, Use one Per formulary preference  lancet to test blood sugar once daily early morning before first meal., Disp: 100 Each, Rfl: 3    Blood Glucose Test Strips, Use one Per formulary preference  strip to test blood sugar once daily early morning before first meal., Disp: 100 Strip, Rfl: 0    nitroglycerin (NITROSTAT) 0.4 MG SL Tab, Place 1 Tablet under the tongue as needed for Chest Pain. Q 5 min x 3 then 911. (Patient not taking: Reported on 1/6/2025), Disp: 25 Tablet, Rfl: 3    POTASSIUM PO, Take 1 Tablet by mouth every morning., Disp: , Rfl:     omeprazole (PRILOSEC) 20 MG delayed-release capsule, Take 1 Capsule by mouth every day. (Patient not taking: Reported on 1/6/2025), Disp: 30 Capsule, Rfl: 3    Cholecalciferol (VITAMIN D) 2000 UNIT Tab, Take 2,000 Units by mouth every day., Disp: , Rfl:     Ascorbic Acid (VITAMIN C) 1000 MG Tab, Take 1,000 mg by mouth every morning., Disp: , Rfl:     Cyanocobalamin (VITAMIN B-12 PO), Take 1 tablet by mouth every morning., Disp: , Rfl:     CINNAMON PO, Take 1 tablet by mouth every morning., Disp: , Rfl:     Multiple Vitamins-Minerals (ICAPS AREDS FORMULA PO), Take 1 tablet by mouth every morning., Disp: , Rfl:     Omega-3 Fatty Acids (FISH OIL PO), Take 1 Cap by mouth every day., Disp: , Rfl:     PROBLEMS:  Patient Active Problem List    Diagnosis Date Noted    Type 2 diabetes mellitus without complication, with long-term current use of insulin ([facility]) 10/26/2023    Secondary hypercoagulable state ([facility]) 06/15/2023    HFrEF (heart failure with reduced ejection  "fraction) ([facility]) 04/26/2023    Angina pectoris, unspecified ([facility]) 04/26/2023    DVT (deep vein thrombosis) in pregnancy 03/01/2023    Hypokalemia 03/01/2023    Atrial fibrillation with RVR ([facility]) 02/28/2023    Polymyalgia rheumatica ([facility]) 02/28/2023    Vitamin D deficiency 04/12/2021    Cervical spinal stenosis 04/11/2021    Pubic ramus fracture ([facility]) 04/09/2021    History of breast cancer     Spasm of back muscles 03/27/2019    On statin therapy due to risk of future cardiovascular event 01/15/2013    Acquired hypothyroidism 01/15/2013    Gastroesophageal reflux disease with esophagitis 01/15/2013       REVIEW OF SYSTEMS:  Gen.:  No Nausea, Vomiting, fever, Chills.  Heart: No chest pain.  Lungs:  No shortness of Breath.  Psychological: Rajan unusual Anxiety depression     PHYSICAL EXAM      Constitutional: Alert, cooperative, not in acute distress.  Cardiovascular:  Rate Rhythm is regular without murmurs rubs clicks.     Thorax & Lungs: Clear to auscultation, no wheezing, rhonchi, or rales  HENT: Normocephalic, Atraumatic.  Eyes: PERRLA, EOMI, Conjunctiva normal.   Neck: Trachia is midline no swelling of the thyroid.   Lymphatic: No lymphadenopathy noted.   Neurologic: Alert & oriented x 3, cranial nerves II through XII are intact, Normal motor function, Normal sensory function, No focal deficits noted.   Psychiatric: Affect normal, Judgment normal, Mood normal.     VITAL SIGNS:/62 (BP Location: Left arm, Patient Position: Sitting, BP Cuff Size: Adult)   Pulse 78   Temp 36.1 °C (96.9 °F) (Temporal)   Resp 16   Ht 1.676 m (5' 6\")   Wt 69.4 kg (153 lb)   LMP  (LMP Unknown)   SpO2 96%   BMI 24.69 kg/m²     Labs: Reviewed    Assessment:                                                     Plan:     1. Type 2 diabetes mellitus without complication, with long-term current use of insulin ([facility])  Continue current medical therapy    2. Chest pain, unspecified type  Encourage patient to complete Persantine thallium " study

## 2025-01-10 ENCOUNTER — HOSPITAL ENCOUNTER (OUTPATIENT)
Dept: RADIOLOGY | Facility: MEDICAL CENTER | Age: 85
End: 2025-01-10
Attending: INTERNAL MEDICINE
Payer: MEDICARE

## 2025-01-10 DIAGNOSIS — R07.2 PRECORDIAL PAIN: ICD-10-CM

## 2025-01-10 PROCEDURE — 78452 HT MUSCLE IMAGE SPECT MULT: CPT

## 2025-01-10 PROCEDURE — 78452 HT MUSCLE IMAGE SPECT MULT: CPT | Mod: 26 | Performed by: INTERNAL MEDICINE

## 2025-01-10 PROCEDURE — 93018 CV STRESS TEST I&R ONLY: CPT | Performed by: INTERNAL MEDICINE

## 2025-01-10 PROCEDURE — 700111 HCHG RX REV CODE 636 W/ 250 OVERRIDE (IP)

## 2025-01-10 RX ORDER — AMINOPHYLLINE 25 MG/ML
100 INJECTION, SOLUTION INTRAVENOUS
Status: DISCONTINUED | OUTPATIENT
Start: 2025-01-10 | End: 2025-01-11 | Stop reason: HOSPADM

## 2025-01-10 RX ORDER — REGADENOSON 0.08 MG/ML
INJECTION, SOLUTION INTRAVENOUS
Status: COMPLETED
Start: 2025-01-10 | End: 2025-01-10

## 2025-01-10 RX ORDER — REGADENOSON 0.08 MG/ML
0.4 INJECTION, SOLUTION INTRAVENOUS ONCE
Status: COMPLETED | OUTPATIENT
Start: 2025-01-10 | End: 2025-01-10

## 2025-01-10 RX ADMIN — REGADENOSON 0.4 MG: 0.08 INJECTION, SOLUTION INTRAVENOUS at 09:30

## 2025-01-29 DIAGNOSIS — E11.65 UNCONTROLLED TYPE 2 DIABETES MELLITUS WITH HYPERGLYCEMIA (HCC): ICD-10-CM

## 2025-01-29 RX ORDER — ACYCLOVIR 400 MG/1
TABLET ORAL
Qty: 9 EACH | Refills: 3 | Status: SHIPPED | OUTPATIENT
Start: 2025-01-29

## 2025-03-26 DIAGNOSIS — I50.20 HFREF (HEART FAILURE WITH REDUCED EJECTION FRACTION) (HCC): ICD-10-CM

## 2025-03-26 RX ORDER — METOPROLOL SUCCINATE 50 MG/1
75 TABLET, EXTENDED RELEASE ORAL 2 TIMES DAILY
Qty: 300 TABLET | Refills: 2 | Status: SHIPPED | OUTPATIENT
Start: 2025-03-26

## 2025-03-26 NOTE — TELEPHONE ENCOUNTER
Is the patient due for a refill? Yes    Was the patient seen the last 15 months? Yes    Date of last office visit: 12.09.24    Does the patient have an upcoming appointment?  Yes   If yes, When? 07.11.25    Provider to refill:SURYA    Does the patient have long term Plus and need 100-day supply? (This applies to ALL medications) Yes, quantity updated to 100 days

## 2025-04-28 ENCOUNTER — APPOINTMENT (OUTPATIENT)
Dept: MEDICAL GROUP | Facility: MEDICAL CENTER | Age: 85
End: 2025-04-28
Payer: MEDICARE

## 2025-04-30 ENCOUNTER — APPOINTMENT (OUTPATIENT)
Dept: LAB | Facility: MEDICAL CENTER | Age: 85
End: 2025-04-30
Payer: MEDICARE

## 2025-05-01 ENCOUNTER — TELEPHONE (OUTPATIENT)
Dept: HEALTH INFORMATION MANAGEMENT | Facility: OTHER | Age: 85
End: 2025-05-01
Payer: MEDICARE

## 2025-05-29 ENCOUNTER — PATIENT MESSAGE (OUTPATIENT)
Dept: MEDICAL GROUP | Facility: LAB | Age: 85
End: 2025-05-29
Payer: MEDICARE

## 2025-05-29 ENCOUNTER — TELEPHONE (OUTPATIENT)
Dept: MEDICAL GROUP | Facility: LAB | Age: 85
End: 2025-05-29
Payer: MEDICARE

## 2025-05-30 NOTE — TELEPHONE ENCOUNTER
Aura:  Please call Mavis let her know that when she is in [place] she needs to be seen by a physician typically in [place] and rarely will they allow prescriptions from outside state.  Follow-up in urgent care would be appropriate.   Regards, Waylon Webber, DO    
LVM for patient letting her know   
Lauri called because they are visiting [place] and Mavis is having a fibromyalgia flare up and is requesting prednisone.   
AROM

## 2025-06-05 DIAGNOSIS — Z85.3 HISTORY OF BREAST CANCER: ICD-10-CM

## 2025-06-05 DIAGNOSIS — M54.50 CHRONIC MIDLINE LOW BACK PAIN WITHOUT SCIATICA: ICD-10-CM

## 2025-06-05 DIAGNOSIS — E11.65 UNCONTROLLED TYPE 2 DIABETES MELLITUS WITH HYPERGLYCEMIA (HCC): ICD-10-CM

## 2025-06-05 DIAGNOSIS — Z12.31 ENCOUNTER FOR SCREENING MAMMOGRAM FOR MALIGNANT NEOPLASM OF BREAST: ICD-10-CM

## 2025-06-05 DIAGNOSIS — E78.2 MIXED HYPERLIPIDEMIA: ICD-10-CM

## 2025-06-05 DIAGNOSIS — M62.830 SPASM OF BACK MUSCLES: ICD-10-CM

## 2025-06-05 DIAGNOSIS — G89.29 CHRONIC MIDLINE LOW BACK PAIN WITHOUT SCIATICA: ICD-10-CM

## 2025-06-05 DIAGNOSIS — E03.4 HYPOTHYROIDISM DUE TO ACQUIRED ATROPHY OF THYROID: ICD-10-CM

## 2025-06-05 RX ORDER — LEVOTHYROXINE SODIUM 75 UG/1
75 TABLET ORAL
Qty: 90 TABLET | Refills: 3 | Status: SHIPPED | OUTPATIENT
Start: 2025-06-05

## 2025-06-16 DIAGNOSIS — M62.830 SPASM OF BACK MUSCLES: ICD-10-CM

## 2025-06-16 DIAGNOSIS — E11.65 UNCONTROLLED TYPE 2 DIABETES MELLITUS WITH HYPERGLYCEMIA (HCC): ICD-10-CM

## 2025-06-16 DIAGNOSIS — M54.50 CHRONIC MIDLINE LOW BACK PAIN WITHOUT SCIATICA: ICD-10-CM

## 2025-06-16 DIAGNOSIS — Z12.31 ENCOUNTER FOR SCREENING MAMMOGRAM FOR MALIGNANT NEOPLASM OF BREAST: ICD-10-CM

## 2025-06-16 DIAGNOSIS — G89.29 CHRONIC MIDLINE LOW BACK PAIN WITHOUT SCIATICA: ICD-10-CM

## 2025-06-16 DIAGNOSIS — Z79.899 ON STATIN THERAPY DUE TO RISK OF FUTURE CARDIOVASCULAR EVENT: ICD-10-CM

## 2025-06-16 DIAGNOSIS — E03.4 HYPOTHYROIDISM DUE TO ACQUIRED ATROPHY OF THYROID: ICD-10-CM

## 2025-06-16 DIAGNOSIS — Z85.3 HISTORY OF BREAST CANCER: ICD-10-CM

## 2025-06-16 DIAGNOSIS — E78.2 MIXED HYPERLIPIDEMIA: ICD-10-CM

## 2025-06-16 RX ORDER — ROSUVASTATIN CALCIUM 40 MG/1
40 TABLET, COATED ORAL DAILY
Qty: 100 TABLET | Refills: 3 | Status: SHIPPED | OUTPATIENT
Start: 2025-06-16

## 2025-06-17 DIAGNOSIS — Z79.01 CHRONIC ANTICOAGULATION: ICD-10-CM

## 2025-07-01 ENCOUNTER — APPOINTMENT (OUTPATIENT)
Dept: VASCULAR LAB | Facility: MEDICAL CENTER | Age: 85
End: 2025-07-01
Attending: INTERNAL MEDICINE
Payer: MEDICARE

## 2025-07-01 DIAGNOSIS — O22.30 DVT (DEEP VEIN THROMBOSIS) IN PREGNANCY: ICD-10-CM

## 2025-07-01 DIAGNOSIS — Z79.01 CHRONIC ANTICOAGULATION: ICD-10-CM

## 2025-07-01 DIAGNOSIS — I48.91 ATRIAL FIBRILLATION WITH RVR (HCC): Primary | ICD-10-CM

## 2025-07-01 PROCEDURE — 99212 OFFICE O/P EST SF 10 MIN: CPT

## 2025-07-01 NOTE — PROGRESS NOTES
Target end date: Indefinite  Indication: Afib, Hx of DVT  Drug: Eliquis 5 mg BID  CHADsVASC = 6 (age, sex, hx of VTE, DM)    Health Status Since Last Assessment  Pt denies any new relevant medical problems, ED visits or hospitalizations  Pt denies any embolic events (stroke/tia/systemic embolism)    Adherence with DOAC Therapy  Pt has not missed doses in the average week.  DOAC is affordable    Bleeding Risk Assessment  Pt denies epistaxis  Pt denies any hematuria  Pt denies any excessive or unusual bleeding/hematomas.  Pt denies any GI bleeds or hematemesis.  Pt denies any concerning daily headache or subdural hematoma symptoms.    Latest Hemoglobin: WNL  Hemoglobin   Date Value Ref Range Status   11/04/2024 13.6 12.0 - 16.0 g/dL Final     Latest Platelets: Low, stable pt reported no concerning s/sx of bleeding at appointment. CTM.   Platelet Count   Date Value Ref Range Status   11/04/2024 156 (L) 164 - 446 K/uL Final       Creatinine Clearance/Renal Function  Latest CrCl: >100 ml/min  Eliquis for AF: Scr < 1.5, age > 80, wt > 60kg - renal dose adjustment NOT indicated    Hepatic function  Latest LFTs: WNL  Pt denies any history of liver dysfunction   Pt denies  ETOH overuse     Drug Interactions  ASA/other antiplatelets: None  NSAID: None  Other drug interactions: None  Verified no anticonvulsant or azole therapy, education provided for future use.     Examination  Blood Pressure WNL  There were no vitals filed for this visit.    Symptomatic hypotension: Denies   Significant gait impairment/imbalance/high risk for falls? No    Final Assessment and Recommendations:  Patient appears stable from the anticoagulation standpoint.    Benefits of continued DOAC therapy outweigh risks for this patient  Recommend pt continue with current DOAC therapy: Eliquis 5 mg BID      Other Actions: CMP/CBC hemogram ordered prior to next visit    Follow up:  Pt to obtain labs tomorrow, will check chart in ~1 week to assess lab  results  Will follow up with patient 6 month(s).     Delisa Golden, PharmD

## 2025-07-02 ENCOUNTER — HOSPITAL ENCOUNTER (OUTPATIENT)
Dept: LAB | Facility: MEDICAL CENTER | Age: 85
End: 2025-07-02
Attending: INTERNAL MEDICINE
Payer: MEDICARE

## 2025-07-02 DIAGNOSIS — Z79.4 CONTROLLED TYPE 2 DIABETES MELLITUS WITHOUT COMPLICATION, WITH LONG-TERM CURRENT USE OF INSULIN (HCC): ICD-10-CM

## 2025-07-02 DIAGNOSIS — E55.9 VITAMIN D DEFICIENCY: ICD-10-CM

## 2025-07-02 DIAGNOSIS — Z79.4 LONG-TERM INSULIN USE (HCC): ICD-10-CM

## 2025-07-02 DIAGNOSIS — E78.5 DYSLIPIDEMIA: ICD-10-CM

## 2025-07-02 DIAGNOSIS — E11.9 CONTROLLED TYPE 2 DIABETES MELLITUS WITHOUT COMPLICATION, WITH LONG-TERM CURRENT USE OF INSULIN (HCC): ICD-10-CM

## 2025-07-02 DIAGNOSIS — E03.9 PRIMARY HYPOTHYROIDISM: ICD-10-CM

## 2025-07-02 LAB
25(OH)D3 SERPL-MCNC: 90 NG/ML (ref 30–100)
ALBUMIN SERPL BCP-MCNC: 4.1 G/DL (ref 3.2–4.9)
ALBUMIN/GLOB SERPL: 1.6 G/DL
ALP SERPL-CCNC: 50 U/L (ref 30–99)
ALT SERPL-CCNC: 10 U/L (ref 2–50)
ANION GAP SERPL CALC-SCNC: 10 MMOL/L (ref 7–16)
AST SERPL-CCNC: 16 U/L (ref 12–45)
BILIRUB SERPL-MCNC: 0.7 MG/DL (ref 0.1–1.5)
BUN SERPL-MCNC: 10 MG/DL (ref 8–22)
CALCIUM ALBUM COR SERPL-MCNC: 9.1 MG/DL (ref 8.5–10.5)
CALCIUM SERPL-MCNC: 9.2 MG/DL (ref 8.5–10.5)
CHLORIDE SERPL-SCNC: 106 MMOL/L (ref 96–112)
CHOLEST SERPL-MCNC: 142 MG/DL (ref 100–199)
CO2 SERPL-SCNC: 26 MMOL/L (ref 20–33)
CREAT SERPL-MCNC: 0.64 MG/DL (ref 0.5–1.4)
CREAT UR-MCNC: 135 MG/DL
FASTING STATUS PATIENT QL REPORTED: NORMAL
GFR SERPLBLD CREATININE-BSD FMLA CKD-EPI: 87 ML/MIN/1.73 M 2
GLOBULIN SER CALC-MCNC: 2.6 G/DL (ref 1.9–3.5)
GLUCOSE SERPL-MCNC: 103 MG/DL (ref 65–99)
HDLC SERPL-MCNC: 50 MG/DL
LDLC SERPL CALC-MCNC: 68 MG/DL
MICROALBUMIN UR-MCNC: 2.7 MG/DL
MICROALBUMIN/CREAT UR: 20 MG/G (ref 0–30)
POTASSIUM SERPL-SCNC: 4.2 MMOL/L (ref 3.6–5.5)
PROT SERPL-MCNC: 6.7 G/DL (ref 6–8.2)
SODIUM SERPL-SCNC: 142 MMOL/L (ref 135–145)
T4 FREE SERPL-MCNC: 1.39 NG/DL (ref 0.93–1.7)
TRIGL SERPL-MCNC: 120 MG/DL (ref 0–149)
TSH SERPL-ACNC: 1.12 UIU/ML (ref 0.38–5.33)

## 2025-07-02 PROCEDURE — 82306 VITAMIN D 25 HYDROXY: CPT

## 2025-07-02 PROCEDURE — 36415 COLL VENOUS BLD VENIPUNCTURE: CPT

## 2025-07-02 PROCEDURE — 80053 COMPREHEN METABOLIC PANEL: CPT

## 2025-07-02 PROCEDURE — 82570 ASSAY OF URINE CREATININE: CPT

## 2025-07-02 PROCEDURE — 84443 ASSAY THYROID STIM HORMONE: CPT

## 2025-07-02 PROCEDURE — 80061 LIPID PANEL: CPT

## 2025-07-02 PROCEDURE — 84439 ASSAY OF FREE THYROXINE: CPT

## 2025-07-02 PROCEDURE — 82043 UR ALBUMIN QUANTITATIVE: CPT

## 2025-07-07 DIAGNOSIS — Z79.4 CONTROLLED TYPE 2 DIABETES MELLITUS WITHOUT COMPLICATION, WITH LONG-TERM CURRENT USE OF INSULIN (HCC): ICD-10-CM

## 2025-07-07 DIAGNOSIS — E11.9 CONTROLLED TYPE 2 DIABETES MELLITUS WITHOUT COMPLICATION, WITH LONG-TERM CURRENT USE OF INSULIN (HCC): ICD-10-CM

## 2025-07-08 RX ORDER — SEMAGLUTIDE 2.68 MG/ML
INJECTION, SOLUTION SUBCUTANEOUS
Qty: 3 ML | Refills: 5 | Status: SHIPPED | OUTPATIENT
Start: 2025-07-08

## 2025-07-23 RX ORDER — SEMAGLUTIDE 2.68 MG/ML
2 INJECTION, SOLUTION SUBCUTANEOUS
Qty: 3 ML | Refills: 5 | Status: SHIPPED | OUTPATIENT
Start: 2025-07-23

## 2025-08-14 ENCOUNTER — OFFICE VISIT (OUTPATIENT)
Dept: MEDICAL GROUP | Facility: LAB | Age: 85
End: 2025-08-14
Payer: MEDICARE

## 2025-08-14 ENCOUNTER — OFFICE VISIT (OUTPATIENT)
Dept: ENDOCRINOLOGY | Facility: MEDICAL CENTER | Age: 85
End: 2025-08-14
Attending: INTERNAL MEDICINE
Payer: MEDICARE

## 2025-08-14 VITALS
OXYGEN SATURATION: 97 % | BODY MASS INDEX: 24.35 KG/M2 | HEIGHT: 66 IN | WEIGHT: 151.5 LBS | SYSTOLIC BLOOD PRESSURE: 108 MMHG | HEART RATE: 64 BPM | DIASTOLIC BLOOD PRESSURE: 64 MMHG

## 2025-08-14 VITALS
WEIGHT: 148.81 LBS | BODY MASS INDEX: 23.92 KG/M2 | SYSTOLIC BLOOD PRESSURE: 126 MMHG | TEMPERATURE: 97.4 F | OXYGEN SATURATION: 94 % | HEIGHT: 66 IN | RESPIRATION RATE: 16 BRPM | DIASTOLIC BLOOD PRESSURE: 60 MMHG | HEART RATE: 74 BPM

## 2025-08-14 DIAGNOSIS — E11.9 CONTROLLED TYPE 2 DIABETES MELLITUS WITHOUT COMPLICATION, WITH LONG-TERM CURRENT USE OF INSULIN (HCC): Primary | ICD-10-CM

## 2025-08-14 DIAGNOSIS — Z79.4 TYPE 2 DIABETES MELLITUS WITHOUT COMPLICATION, WITH LONG-TERM CURRENT USE OF INSULIN (HCC): ICD-10-CM

## 2025-08-14 DIAGNOSIS — Z79.4 CONTROLLED TYPE 2 DIABETES MELLITUS WITHOUT COMPLICATION, WITH LONG-TERM CURRENT USE OF INSULIN (HCC): Primary | ICD-10-CM

## 2025-08-14 DIAGNOSIS — E55.9 VITAMIN D DEFICIENCY: ICD-10-CM

## 2025-08-14 DIAGNOSIS — E11.9 TYPE 2 DIABETES MELLITUS WITHOUT COMPLICATION, WITH LONG-TERM CURRENT USE OF INSULIN (HCC): ICD-10-CM

## 2025-08-14 DIAGNOSIS — Z79.4 LONG-TERM INSULIN USE (HCC): ICD-10-CM

## 2025-08-14 DIAGNOSIS — E03.9 PRIMARY HYPOTHYROIDISM: ICD-10-CM

## 2025-08-14 DIAGNOSIS — E55.9 VITAMIN D DEFICIENCY: Primary | ICD-10-CM

## 2025-08-14 DIAGNOSIS — Z86.39 HISTORY OF OBESITY: ICD-10-CM

## 2025-08-14 DIAGNOSIS — E78.5 DYSLIPIDEMIA: ICD-10-CM

## 2025-08-14 LAB
HBA1C MFR BLD: 5.9 % (ref ?–5.8)
POCT INT CON NEG: NEGATIVE
POCT INT CON POS: POSITIVE

## 2025-08-14 PROCEDURE — 83036 HEMOGLOBIN GLYCOSYLATED A1C: CPT | Performed by: INTERNAL MEDICINE

## 2025-08-14 PROCEDURE — 3078F DIAST BP <80 MM HG: CPT | Performed by: INTERNAL MEDICINE

## 2025-08-14 PROCEDURE — 3074F SYST BP LT 130 MM HG: CPT | Performed by: INTERNAL MEDICINE

## 2025-08-14 PROCEDURE — 95251 CONT GLUC MNTR ANALYSIS I&R: CPT | Performed by: INTERNAL MEDICINE

## 2025-08-14 PROCEDURE — 99214 OFFICE O/P EST MOD 30 MIN: CPT | Mod: 25 | Performed by: INTERNAL MEDICINE

## 2025-08-14 PROCEDURE — 99214 OFFICE O/P EST MOD 30 MIN: CPT | Performed by: INTERNAL MEDICINE

## 2025-08-14 PROCEDURE — 0447T RMVL IMPLTBL GLUCOSE SENSOR: CPT | Performed by: INTERNAL MEDICINE

## 2025-08-14 ASSESSMENT — FIBROSIS 4 INDEX
FIB4 SCORE: 2.69
FIB4 SCORE: 2.69

## 2025-08-27 ENCOUNTER — APPOINTMENT (OUTPATIENT)
Dept: MEDICAL GROUP | Facility: LAB | Age: 85
End: 2025-08-27
Payer: MEDICARE

## 2025-08-29 ENCOUNTER — OFFICE VISIT (OUTPATIENT)
Dept: URGENT CARE | Facility: CLINIC | Age: 85
End: 2025-08-29
Payer: MEDICARE

## 2025-08-29 VITALS
HEART RATE: 71 BPM | SYSTOLIC BLOOD PRESSURE: 122 MMHG | BODY MASS INDEX: 24.11 KG/M2 | OXYGEN SATURATION: 97 % | WEIGHT: 150 LBS | RESPIRATION RATE: 16 BRPM | TEMPERATURE: 98.4 F | DIASTOLIC BLOOD PRESSURE: 62 MMHG | HEIGHT: 66 IN

## 2025-08-29 ASSESSMENT — ENCOUNTER SYMPTOMS: DIARRHEA: 1

## 2025-08-29 ASSESSMENT — LIFESTYLE VARIABLES
AUDIT-C TOTAL SCORE: 0
SKIP TO QUESTIONS 9-10: 1
HOW OFTEN DO YOU HAVE SIX OR MORE DRINKS ON ONE OCCASION: NEVER
HOW OFTEN DO YOU HAVE A DRINK CONTAINING ALCOHOL: NEVER
HOW MANY STANDARD DRINKS CONTAINING ALCOHOL DO YOU HAVE ON A TYPICAL DAY: PATIENT DOES NOT DRINK

## 2025-08-29 ASSESSMENT — FIBROSIS 4 INDEX: FIB4 SCORE: 2.69
